# Patient Record
Sex: FEMALE | Race: WHITE | NOT HISPANIC OR LATINO | Employment: OTHER | URBAN - NONMETROPOLITAN AREA
[De-identification: names, ages, dates, MRNs, and addresses within clinical notes are randomized per-mention and may not be internally consistent; named-entity substitution may affect disease eponyms.]

---

## 2017-01-11 ENCOUNTER — TELEPHONE (OUTPATIENT)
Dept: GASTROENTEROLOGY | Facility: CLINIC | Age: 57
End: 2017-01-11

## 2017-01-11 NOTE — TELEPHONE ENCOUNTER
----- Message from Black Beckwith sent at 12/30/2016  2:00 PM CST -----  Regarding: RECALL  LEFT VOICE MAIL FOR PT TO CALL TO SET UP OV

## 2017-01-16 ENCOUNTER — TELEPHONE (OUTPATIENT)
Dept: OBGYN | Age: 57
End: 2017-01-16

## 2017-01-16 RX ORDER — MEDROXYPROGESTERONE ACETATE 2.5 MG/1
2.5 TABLET ORAL DAILY
Qty: 30 TABLET | Refills: 11 | Status: SHIPPED | OUTPATIENT
Start: 2017-01-16 | End: 2018-01-28 | Stop reason: SDUPTHER

## 2017-01-16 RX ORDER — ESTROGEN,CON/M-PROGEST ACET 0.625-2.5
TABLET ORAL
Qty: 30 TABLET | Refills: 11 | Status: SHIPPED | OUTPATIENT
Start: 2017-01-16 | End: 2017-05-24

## 2017-01-16 RX ORDER — ESTRADIOL 0.5 MG/1
0.5 TABLET ORAL DAILY
Qty: 30 TABLET | Refills: 11 | Status: SHIPPED | OUTPATIENT
Start: 2017-01-16 | End: 2018-01-28 | Stop reason: SDUPTHER

## 2017-01-23 ENCOUNTER — TELEPHONE (OUTPATIENT)
Dept: GASTROENTEROLOGY | Facility: CLINIC | Age: 57
End: 2017-01-23

## 2017-01-23 NOTE — TELEPHONE ENCOUNTER
----- Message from Black Beckwith sent at 1/20/2017 11:11 AM CST -----  Regarding: RECALL  SPOKE WITH PT. SHE HAS SWITCHED TO ANOTHER GI DOCTOR AND HAS HAD HER SCOPE DONE.

## 2017-01-31 ENCOUNTER — OFFICE VISIT (OUTPATIENT)
Dept: OBGYN | Age: 57
End: 2017-01-31
Payer: MEDICARE

## 2017-01-31 VITALS
WEIGHT: 163 LBS | SYSTOLIC BLOOD PRESSURE: 114 MMHG | DIASTOLIC BLOOD PRESSURE: 74 MMHG | BODY MASS INDEX: 30 KG/M2 | HEIGHT: 62 IN

## 2017-01-31 DIAGNOSIS — R32 URINARY INCONTINENCE, UNSPECIFIED TYPE: ICD-10-CM

## 2017-01-31 DIAGNOSIS — Z12.31 ENCOUNTER FOR SCREENING MAMMOGRAM FOR BREAST CANCER: ICD-10-CM

## 2017-01-31 DIAGNOSIS — Z01.419 ENCOUNTER FOR GYNECOLOGICAL EXAMINATION WITHOUT ABNORMAL FINDING: Primary | ICD-10-CM

## 2017-01-31 LAB
BACTERIA URINE, POC: NORMAL
BILIRUBIN URINE: NORMAL MG/DL
BLOOD, URINE: NEGATIVE
CASTS URINE, POC: NORMAL
CLARITY: CLEAR
COLOR: NORMAL
CRYSTALS URINE, POC: NORMAL
EPI CELLS URINE, POC: NORMAL
GLUCOSE URINE: NORMAL
KETONES, URINE: NEGATIVE
LEUKOCYTE EST, POC: NORMAL
NITRITE, URINE: NEGATIVE
PH UA: NORMAL (ref 4.5–8)
PROTEIN UA: NEGATIVE
RBC URINE, POC: NORMAL
SPECIFIC GRAVITY UA: NORMAL (ref 1–1.03)
UROBILINOGEN, URINE: NORMAL
WBC URINE, POC: NORMAL
YEAST URINE, POC: NORMAL

## 2017-01-31 PROCEDURE — 3017F COLORECTAL CA SCREEN DOC REV: CPT

## 2017-01-31 PROCEDURE — 3014F SCREEN MAMMO DOC REV: CPT

## 2017-01-31 PROCEDURE — 1036F TOBACCO NON-USER: CPT

## 2017-01-31 PROCEDURE — 81000 URINALYSIS NONAUTO W/SCOPE: CPT

## 2017-01-31 PROCEDURE — G8427 DOCREV CUR MEDS BY ELIG CLIN: HCPCS

## 2017-01-31 PROCEDURE — 99213 OFFICE O/P EST LOW 20 MIN: CPT

## 2017-01-31 PROCEDURE — G8484 FLU IMMUNIZE NO ADMIN: HCPCS

## 2017-01-31 PROCEDURE — G0101 CA SCREEN;PELVIC/BREAST EXAM: HCPCS

## 2017-01-31 PROCEDURE — G8419 CALC BMI OUT NRM PARAM NOF/U: HCPCS

## 2017-01-31 ASSESSMENT — ENCOUNTER SYMPTOMS
BACK PAIN: 0
CONSTIPATION: 0
DIARRHEA: 0
COUGH: 0
COLOR CHANGE: 0
BLOOD IN STOOL: 0
SHORTNESS OF BREATH: 0
TROUBLE SWALLOWING: 0

## 2017-02-20 DIAGNOSIS — R09.81 SINUS CONGESTION: ICD-10-CM

## 2017-02-20 DIAGNOSIS — Z91.09 ENVIRONMENTAL ALLERGIES: Primary | ICD-10-CM

## 2017-02-20 RX ORDER — FLUTICASONE PROPIONATE 50 MCG
SPRAY, SUSPENSION (ML) NASAL
Qty: 1 BOTTLE | Refills: 6 | Status: SHIPPED | OUTPATIENT
Start: 2017-02-20 | End: 2018-10-03 | Stop reason: SDUPTHER

## 2017-03-03 RX ORDER — MONTELUKAST SODIUM 10 MG/1
10 TABLET ORAL NIGHTLY
Qty: 30 TABLET | Refills: 11 | Status: SHIPPED | OUTPATIENT
Start: 2017-03-03 | End: 2018-03-26 | Stop reason: SDUPTHER

## 2017-03-15 ENCOUNTER — CARE COORDINATION (OUTPATIENT)
Dept: PRIMARY CARE CLINIC | Age: 57
End: 2017-03-15

## 2017-03-15 ENCOUNTER — OFFICE VISIT (OUTPATIENT)
Dept: PRIMARY CARE CLINIC | Age: 57
End: 2017-03-15
Payer: MEDICARE

## 2017-03-15 VITALS
DIASTOLIC BLOOD PRESSURE: 74 MMHG | OXYGEN SATURATION: 98 % | HEART RATE: 73 BPM | WEIGHT: 165.4 LBS | BODY MASS INDEX: 30.44 KG/M2 | SYSTOLIC BLOOD PRESSURE: 108 MMHG | HEIGHT: 62 IN

## 2017-03-15 DIAGNOSIS — E03.9 HYPOTHYROIDISM, UNSPECIFIED TYPE: ICD-10-CM

## 2017-03-15 DIAGNOSIS — Z23 NEED FOR PROPHYLACTIC VACCINATION AGAINST STREPTOCOCCUS PNEUMONIAE (PNEUMOCOCCUS): ICD-10-CM

## 2017-03-15 DIAGNOSIS — Z00.00 ROUTINE GENERAL MEDICAL EXAMINATION AT A HEALTH CARE FACILITY: Primary | ICD-10-CM

## 2017-03-15 DIAGNOSIS — Z11.4 SCREENING FOR HIV (HUMAN IMMUNODEFICIENCY VIRUS): ICD-10-CM

## 2017-03-15 PROCEDURE — 90670 PCV13 VACCINE IM: CPT | Performed by: FAMILY MEDICINE

## 2017-03-15 PROCEDURE — G0009 ADMIN PNEUMOCOCCAL VACCINE: HCPCS | Performed by: FAMILY MEDICINE

## 2017-03-15 PROCEDURE — G0438 PPPS, INITIAL VISIT: HCPCS | Performed by: FAMILY MEDICINE

## 2017-03-20 DIAGNOSIS — E03.9 HYPOTHYROIDISM, UNSPECIFIED TYPE: ICD-10-CM

## 2017-03-20 DIAGNOSIS — Z00.00 ROUTINE GENERAL MEDICAL EXAMINATION AT A HEALTH CARE FACILITY: ICD-10-CM

## 2017-03-20 DIAGNOSIS — E78.1 HIGH TRIGLYCERIDES: ICD-10-CM

## 2017-03-20 DIAGNOSIS — Z11.4 SCREENING FOR HIV (HUMAN IMMUNODEFICIENCY VIRUS): ICD-10-CM

## 2017-03-20 LAB
ALBUMIN SERPL-MCNC: 4.2 G/DL (ref 3.5–5.2)
ALP BLD-CCNC: 78 U/L (ref 35–104)
ALT SERPL-CCNC: 14 U/L (ref 5–33)
ANION GAP SERPL CALCULATED.3IONS-SCNC: 13 MMOL/L (ref 7–19)
AST SERPL-CCNC: 19 U/L (ref 5–32)
BILIRUB SERPL-MCNC: 0.4 MG/DL (ref 0.2–1.2)
BUN BLDV-MCNC: 10 MG/DL (ref 6–20)
CALCIUM SERPL-MCNC: 9.3 MG/DL (ref 8.6–10)
CHLORIDE BLD-SCNC: 101 MMOL/L (ref 98–111)
CHOLESTEROL, TOTAL: 196 MG/DL (ref 160–199)
CO2: 24 MMOL/L (ref 22–29)
CREAT SERPL-MCNC: 0.6 MG/DL (ref 0.5–0.9)
GFR NON-AFRICAN AMERICAN: >60
GLOBULIN: 3.1 G/DL
GLUCOSE BLD-MCNC: 101 MG/DL (ref 74–109)
HDLC SERPL-MCNC: 53 MG/DL (ref 65–121)
LDL CHOLESTEROL CALCULATED: 116 MG/DL
POTASSIUM SERPL-SCNC: 4.1 MMOL/L (ref 3.5–5)
RAPID HIV 1&2: NORMAL
SODIUM BLD-SCNC: 138 MMOL/L (ref 136–145)
T4 FREE: 1.7 NG/ML (ref 0.9–1.7)
TOTAL PROTEIN: 7.3 G/DL (ref 6.6–8.7)
TRIGL SERPL-MCNC: 133 MG/DL (ref 150–199)
TSH SERPL DL<=0.05 MIU/L-ACNC: 0.31 UIU/ML (ref 0.27–4.2)

## 2017-03-29 ENCOUNTER — TELEPHONE (OUTPATIENT)
Dept: PRIMARY CARE CLINIC | Age: 57
End: 2017-03-29

## 2017-03-30 DIAGNOSIS — M47.812 ARTHRITIS OF NECK: ICD-10-CM

## 2017-03-30 DIAGNOSIS — R51.9 NONINTRACTABLE HEADACHE, UNSPECIFIED CHRONICITY PATTERN, UNSPECIFIED HEADACHE TYPE: ICD-10-CM

## 2017-03-30 DIAGNOSIS — M62.838 MUSCLE SPASM: ICD-10-CM

## 2017-03-30 RX ORDER — NAPROXEN 500 MG/1
500 TABLET ORAL EVERY 12 HOURS
Qty: 60 TABLET | Refills: 11 | Status: SHIPPED | OUTPATIENT
Start: 2017-03-30

## 2017-04-03 ENCOUNTER — HOSPITAL ENCOUNTER (OUTPATIENT)
Dept: WOMENS IMAGING | Age: 57
Discharge: HOME | End: 2017-04-03
Payer: MEDICARE

## 2017-04-03 DIAGNOSIS — Z12.31 ENCOUNTER FOR SCREENING MAMMOGRAM FOR BREAST CANCER: ICD-10-CM

## 2017-04-03 PROCEDURE — 77063 BREAST TOMOSYNTHESIS BI: CPT

## 2017-05-24 ENCOUNTER — OFFICE VISIT (OUTPATIENT)
Dept: PRIMARY CARE CLINIC | Age: 57
End: 2017-05-24
Payer: MEDICARE

## 2017-05-24 VITALS
RESPIRATION RATE: 18 BRPM | BODY MASS INDEX: 30.36 KG/M2 | HEIGHT: 62 IN | TEMPERATURE: 97.5 F | SYSTOLIC BLOOD PRESSURE: 128 MMHG | DIASTOLIC BLOOD PRESSURE: 68 MMHG | OXYGEN SATURATION: 98 % | HEART RATE: 78 BPM | WEIGHT: 165 LBS

## 2017-05-24 DIAGNOSIS — T78.40XA ALLERGIC REACTION, INITIAL ENCOUNTER: Primary | ICD-10-CM

## 2017-05-24 PROCEDURE — 3017F COLORECTAL CA SCREEN DOC REV: CPT | Performed by: FAMILY MEDICINE

## 2017-05-24 PROCEDURE — 1036F TOBACCO NON-USER: CPT | Performed by: FAMILY MEDICINE

## 2017-05-24 PROCEDURE — G8427 DOCREV CUR MEDS BY ELIG CLIN: HCPCS | Performed by: FAMILY MEDICINE

## 2017-05-24 PROCEDURE — G8417 CALC BMI ABV UP PARAM F/U: HCPCS | Performed by: FAMILY MEDICINE

## 2017-05-24 PROCEDURE — 99213 OFFICE O/P EST LOW 20 MIN: CPT | Performed by: FAMILY MEDICINE

## 2017-05-24 PROCEDURE — 96372 THER/PROPH/DIAG INJ SC/IM: CPT | Performed by: FAMILY MEDICINE

## 2017-05-24 PROCEDURE — 3014F SCREEN MAMMO DOC REV: CPT | Performed by: FAMILY MEDICINE

## 2017-05-24 RX ORDER — METHYLPREDNISOLONE 4 MG/1
TABLET ORAL
Qty: 1 KIT | Refills: 0 | Status: SHIPPED | OUTPATIENT
Start: 2017-05-24 | End: 2017-08-10

## 2017-05-24 RX ORDER — METHYLPREDNISOLONE SODIUM SUCCINATE 40 MG/ML
80 INJECTION, POWDER, LYOPHILIZED, FOR SOLUTION INTRAMUSCULAR; INTRAVENOUS ONCE
Status: COMPLETED | OUTPATIENT
Start: 2017-05-24 | End: 2017-05-24

## 2017-05-24 RX ADMIN — METHYLPREDNISOLONE SODIUM SUCCINATE 80 MG: 40 INJECTION, POWDER, LYOPHILIZED, FOR SOLUTION INTRAMUSCULAR; INTRAVENOUS at 14:57

## 2017-05-24 ASSESSMENT — ENCOUNTER SYMPTOMS
NAUSEA: 0
ABDOMINAL PAIN: 0
DIARRHEA: 0
SHORTNESS OF BREATH: 0
FACIAL SWELLING: 1
COUGH: 0
CONSTIPATION: 0
WHEEZING: 0
VOMITING: 0
CHEST TIGHTNESS: 0
SINUS PRESSURE: 0
SORE THROAT: 0

## 2017-06-13 ENCOUNTER — OFFICE VISIT (OUTPATIENT)
Dept: PRIMARY CARE CLINIC | Age: 57
End: 2017-06-13
Payer: MEDICARE

## 2017-06-13 VITALS
DIASTOLIC BLOOD PRESSURE: 80 MMHG | BODY MASS INDEX: 30.36 KG/M2 | OXYGEN SATURATION: 98 % | TEMPERATURE: 98 F | HEART RATE: 77 BPM | HEIGHT: 62 IN | SYSTOLIC BLOOD PRESSURE: 136 MMHG | RESPIRATION RATE: 20 BRPM | WEIGHT: 165 LBS

## 2017-06-13 DIAGNOSIS — H10.32 ACUTE BACTERIAL CONJUNCTIVITIS OF LEFT EYE: Primary | ICD-10-CM

## 2017-06-13 PROCEDURE — G8427 DOCREV CUR MEDS BY ELIG CLIN: HCPCS | Performed by: FAMILY MEDICINE

## 2017-06-13 PROCEDURE — 1036F TOBACCO NON-USER: CPT | Performed by: FAMILY MEDICINE

## 2017-06-13 PROCEDURE — G8417 CALC BMI ABV UP PARAM F/U: HCPCS | Performed by: FAMILY MEDICINE

## 2017-06-13 PROCEDURE — 99213 OFFICE O/P EST LOW 20 MIN: CPT | Performed by: FAMILY MEDICINE

## 2017-06-13 PROCEDURE — 3017F COLORECTAL CA SCREEN DOC REV: CPT | Performed by: FAMILY MEDICINE

## 2017-06-13 PROCEDURE — 3014F SCREEN MAMMO DOC REV: CPT | Performed by: FAMILY MEDICINE

## 2017-06-13 RX ORDER — POLYMYXIN B SULFATE AND TRIMETHOPRIM 1; 10000 MG/ML; [USP'U]/ML
1 SOLUTION OPHTHALMIC EVERY 4 HOURS
Qty: 10 ML | Refills: 0 | Status: SHIPPED | OUTPATIENT
Start: 2017-06-13 | End: 2017-06-23

## 2017-06-13 ASSESSMENT — ENCOUNTER SYMPTOMS
NAUSEA: 0
EYE REDNESS: 1
DIARRHEA: 0
SHORTNESS OF BREATH: 0
ABDOMINAL PAIN: 0
EYE DISCHARGE: 1
CHEST TIGHTNESS: 0
WHEEZING: 0
CONSTIPATION: 0
COUGH: 0
PHOTOPHOBIA: 0
EYE PAIN: 1
EYE ITCHING: 1
VOMITING: 0

## 2017-08-10 ENCOUNTER — OFFICE VISIT (OUTPATIENT)
Dept: GASTROENTEROLOGY | Age: 57
End: 2017-08-10
Payer: MEDICARE

## 2017-08-10 ENCOUNTER — OFFICE VISIT (OUTPATIENT)
Dept: PRIMARY CARE CLINIC | Age: 57
End: 2017-08-10
Payer: MEDICARE

## 2017-08-10 VITALS
WEIGHT: 163 LBS | OXYGEN SATURATION: 98 % | DIASTOLIC BLOOD PRESSURE: 62 MMHG | HEART RATE: 83 BPM | HEIGHT: 62 IN | BODY MASS INDEX: 30 KG/M2 | RESPIRATION RATE: 18 BRPM | SYSTOLIC BLOOD PRESSURE: 116 MMHG

## 2017-08-10 VITALS
BODY MASS INDEX: 30 KG/M2 | SYSTOLIC BLOOD PRESSURE: 116 MMHG | DIASTOLIC BLOOD PRESSURE: 68 MMHG | HEIGHT: 62 IN | OXYGEN SATURATION: 98 % | WEIGHT: 163 LBS | HEART RATE: 70 BPM | TEMPERATURE: 97.5 F

## 2017-08-10 DIAGNOSIS — H10.13 ALLERGIC CONJUNCTIVITIS OF BOTH EYES AND RHINITIS: Primary | ICD-10-CM

## 2017-08-10 DIAGNOSIS — K58.1 IRRITABLE BOWEL SYNDROME WITH CONSTIPATION: Primary | ICD-10-CM

## 2017-08-10 DIAGNOSIS — K59.09 CHRONIC CONSTIPATION: ICD-10-CM

## 2017-08-10 DIAGNOSIS — J30.9 ALLERGIC CONJUNCTIVITIS OF BOTH EYES AND RHINITIS: Primary | ICD-10-CM

## 2017-08-10 DIAGNOSIS — K64.9 HEMORRHOIDS, UNSPECIFIED HEMORRHOID TYPE: ICD-10-CM

## 2017-08-10 DIAGNOSIS — K21.9 CHRONIC GERD: ICD-10-CM

## 2017-08-10 PROCEDURE — 3017F COLORECTAL CA SCREEN DOC REV: CPT | Performed by: FAMILY MEDICINE

## 2017-08-10 PROCEDURE — 3014F SCREEN MAMMO DOC REV: CPT | Performed by: NURSE PRACTITIONER

## 2017-08-10 PROCEDURE — G8417 CALC BMI ABV UP PARAM F/U: HCPCS | Performed by: FAMILY MEDICINE

## 2017-08-10 PROCEDURE — 3017F COLORECTAL CA SCREEN DOC REV: CPT | Performed by: NURSE PRACTITIONER

## 2017-08-10 PROCEDURE — 99213 OFFICE O/P EST LOW 20 MIN: CPT | Performed by: FAMILY MEDICINE

## 2017-08-10 PROCEDURE — G8427 DOCREV CUR MEDS BY ELIG CLIN: HCPCS | Performed by: NURSE PRACTITIONER

## 2017-08-10 PROCEDURE — 99213 OFFICE O/P EST LOW 20 MIN: CPT | Performed by: NURSE PRACTITIONER

## 2017-08-10 PROCEDURE — 3014F SCREEN MAMMO DOC REV: CPT | Performed by: FAMILY MEDICINE

## 2017-08-10 PROCEDURE — G8427 DOCREV CUR MEDS BY ELIG CLIN: HCPCS | Performed by: FAMILY MEDICINE

## 2017-08-10 PROCEDURE — 1036F TOBACCO NON-USER: CPT | Performed by: NURSE PRACTITIONER

## 2017-08-10 PROCEDURE — G8417 CALC BMI ABV UP PARAM F/U: HCPCS | Performed by: NURSE PRACTITIONER

## 2017-08-10 PROCEDURE — 1036F TOBACCO NON-USER: CPT | Performed by: FAMILY MEDICINE

## 2017-08-10 RX ORDER — NEOMYCIN SULFATE, POLYMYXIN B SULFATE AND DEXAMETHASONE 3.5; 10000; 1 MG/ML; [USP'U]/ML; MG/ML
SUSPENSION/ DROPS OPHTHALMIC
Refills: 0 | COMMUNITY
Start: 2017-06-27 | End: 2017-08-10

## 2017-08-10 RX ORDER — LANSOPRAZOLE 30 MG/1
30 CAPSULE, DELAYED RELEASE ORAL
Qty: 90 CAPSULE | Refills: 3 | Status: SHIPPED | OUTPATIENT
Start: 2017-08-10 | End: 2018-07-16 | Stop reason: SDUPTHER

## 2017-08-10 RX ORDER — POLYETHYLENE GLYCOL 3350 17 G/17G
POWDER, FOR SOLUTION ORAL
Qty: 1 BOTTLE | Refills: 11 | Status: SHIPPED | OUTPATIENT
Start: 2017-08-10 | End: 2018-10-18 | Stop reason: SDUPTHER

## 2017-08-10 RX ORDER — TOBRAMYCIN 3 MG/ML
SOLUTION/ DROPS OPHTHALMIC
Refills: 0 | COMMUNITY
Start: 2017-06-19 | End: 2017-08-10

## 2017-08-10 ASSESSMENT — ENCOUNTER SYMPTOMS
EYE ITCHING: 1
ALLERGIC/IMMUNOLOGIC NEGATIVE: 1
RECTAL PAIN: 0
ABDOMINAL PAIN: 0
SHORTNESS OF BREATH: 0
CHEST TIGHTNESS: 0
COUGH: 0
NAUSEA: 0
DIARRHEA: 0
SORE THROAT: 0
EYE DISCHARGE: 1
BACK PAIN: 1
VOICE CHANGE: 0
EYES NEGATIVE: 1
BLOOD IN STOOL: 0
VOMITING: 0
COUGH: 1
SHORTNESS OF BREATH: 0
CONSTIPATION: 1

## 2017-08-14 ENCOUNTER — TELEPHONE (OUTPATIENT)
Dept: PRIMARY CARE CLINIC | Age: 57
End: 2017-08-14

## 2017-08-28 RX ORDER — LEVOTHYROXINE SODIUM 112 MCG
TABLET ORAL
Qty: 30 TABLET | Refills: 11 | Status: SHIPPED | OUTPATIENT
Start: 2017-08-28 | End: 2017-09-18 | Stop reason: SDUPTHER

## 2017-09-18 ENCOUNTER — OFFICE VISIT (OUTPATIENT)
Dept: PRIMARY CARE CLINIC | Age: 57
End: 2017-09-18
Payer: MEDICARE

## 2017-09-18 VITALS
BODY MASS INDEX: 30.44 KG/M2 | HEIGHT: 62 IN | SYSTOLIC BLOOD PRESSURE: 118 MMHG | OXYGEN SATURATION: 98 % | HEART RATE: 77 BPM | TEMPERATURE: 98.6 F | DIASTOLIC BLOOD PRESSURE: 68 MMHG | WEIGHT: 165.4 LBS

## 2017-09-18 DIAGNOSIS — E03.9 HYPOTHYROIDISM, UNSPECIFIED TYPE: Primary | ICD-10-CM

## 2017-09-18 DIAGNOSIS — E03.9 HYPOTHYROIDISM, UNSPECIFIED TYPE: ICD-10-CM

## 2017-09-18 DIAGNOSIS — J45.20 MILD INTERMITTENT ASTHMA WITHOUT COMPLICATION: ICD-10-CM

## 2017-09-18 DIAGNOSIS — Z91.09 ENVIRONMENTAL ALLERGIES: ICD-10-CM

## 2017-09-18 LAB
T4 FREE: 1.5 NG/DL (ref 0.9–1.7)
TSH SERPL DL<=0.05 MIU/L-ACNC: 0.12 UIU/ML (ref 0.27–4.2)

## 2017-09-18 PROCEDURE — 1036F TOBACCO NON-USER: CPT | Performed by: FAMILY MEDICINE

## 2017-09-18 PROCEDURE — G8417 CALC BMI ABV UP PARAM F/U: HCPCS | Performed by: FAMILY MEDICINE

## 2017-09-18 PROCEDURE — 3014F SCREEN MAMMO DOC REV: CPT | Performed by: FAMILY MEDICINE

## 2017-09-18 PROCEDURE — 3017F COLORECTAL CA SCREEN DOC REV: CPT | Performed by: FAMILY MEDICINE

## 2017-09-18 PROCEDURE — 99213 OFFICE O/P EST LOW 20 MIN: CPT | Performed by: FAMILY MEDICINE

## 2017-09-18 PROCEDURE — G8427 DOCREV CUR MEDS BY ELIG CLIN: HCPCS | Performed by: FAMILY MEDICINE

## 2017-09-18 RX ORDER — EPINEPHRINE 0.3 MG/.3ML
INJECTION SUBCUTANEOUS
Refills: 1 | COMMUNITY
Start: 2017-08-29

## 2017-09-18 RX ORDER — METHYLPREDNISOLONE 4 MG/1
TABLET ORAL
Qty: 1 KIT | Refills: 0 | Status: SHIPPED | OUTPATIENT
Start: 2017-09-18 | End: 2017-09-24

## 2017-09-18 RX ORDER — LEVOTHYROXINE SODIUM 112 MCG
TABLET ORAL
Qty: 30 TABLET | Refills: 11 | Status: SHIPPED | OUTPATIENT
Start: 2017-09-18 | End: 2018-04-09 | Stop reason: SDUPTHER

## 2017-09-18 RX ORDER — EPINASTINE HCL 0.05 %
DROPS OPHTHALMIC (EYE)
Refills: 3 | COMMUNITY
Start: 2017-08-29

## 2017-09-18 RX ORDER — CETIRIZINE HYDROCHLORIDE 10 MG/1
TABLET ORAL
Refills: 3 | COMMUNITY
Start: 2017-08-29 | End: 2019-09-05

## 2017-09-18 ASSESSMENT — ENCOUNTER SYMPTOMS
SHORTNESS OF BREATH: 0
RHINORRHEA: 1
WHEEZING: 0
COUGH: 1

## 2017-09-19 ENCOUNTER — TELEPHONE (OUTPATIENT)
Dept: PRIMARY CARE CLINIC | Age: 57
End: 2017-09-19

## 2017-09-19 DIAGNOSIS — R79.89 LOW TSH LEVEL: Primary | ICD-10-CM

## 2017-11-15 ENCOUNTER — OFFICE VISIT (OUTPATIENT)
Dept: URGENT CARE | Age: 57
End: 2017-11-15
Payer: MEDICARE

## 2017-11-15 VITALS
BODY MASS INDEX: 29.81 KG/M2 | RESPIRATION RATE: 20 BRPM | SYSTOLIC BLOOD PRESSURE: 139 MMHG | TEMPERATURE: 97.7 F | OXYGEN SATURATION: 98 % | WEIGHT: 163 LBS | DIASTOLIC BLOOD PRESSURE: 86 MMHG | HEART RATE: 68 BPM

## 2017-11-15 DIAGNOSIS — J01.10 ACUTE NON-RECURRENT FRONTAL SINUSITIS: Primary | ICD-10-CM

## 2017-11-15 PROCEDURE — 96372 THER/PROPH/DIAG INJ SC/IM: CPT | Performed by: PHYSICIAN ASSISTANT

## 2017-11-15 PROCEDURE — 1036F TOBACCO NON-USER: CPT | Performed by: PHYSICIAN ASSISTANT

## 2017-11-15 PROCEDURE — G8484 FLU IMMUNIZE NO ADMIN: HCPCS | Performed by: PHYSICIAN ASSISTANT

## 2017-11-15 PROCEDURE — G8427 DOCREV CUR MEDS BY ELIG CLIN: HCPCS | Performed by: PHYSICIAN ASSISTANT

## 2017-11-15 PROCEDURE — 3014F SCREEN MAMMO DOC REV: CPT | Performed by: PHYSICIAN ASSISTANT

## 2017-11-15 PROCEDURE — 3017F COLORECTAL CA SCREEN DOC REV: CPT | Performed by: PHYSICIAN ASSISTANT

## 2017-11-15 PROCEDURE — 99213 OFFICE O/P EST LOW 20 MIN: CPT | Performed by: PHYSICIAN ASSISTANT

## 2017-11-15 PROCEDURE — G8417 CALC BMI ABV UP PARAM F/U: HCPCS | Performed by: PHYSICIAN ASSISTANT

## 2017-11-15 RX ORDER — DEXAMETHASONE SODIUM PHOSPHATE 10 MG/ML
10 INJECTION INTRAMUSCULAR; INTRAVENOUS ONCE
Status: COMPLETED | OUTPATIENT
Start: 2017-11-15 | End: 2017-11-15

## 2017-11-15 RX ORDER — AZITHROMYCIN 250 MG/1
TABLET, FILM COATED ORAL
Qty: 1 PACKET | Refills: 0 | Status: SHIPPED | OUTPATIENT
Start: 2017-11-15 | End: 2017-11-25

## 2017-11-15 RX ADMIN — DEXAMETHASONE SODIUM PHOSPHATE 10 MG: 10 INJECTION INTRAMUSCULAR; INTRAVENOUS at 14:46

## 2017-11-15 ASSESSMENT — ENCOUNTER SYMPTOMS
ABDOMINAL PAIN: 0
RHINORRHEA: 1
COUGH: 1
VOMITING: 0
SORE THROAT: 1
NAUSEA: 0
DIARRHEA: 0

## 2017-11-15 NOTE — PROGRESS NOTES
Subjective:      Patient ID: Champ Chun is a 55 y.o. female. HUGO Brannon presents today with headache, runny nose, sore throat, sinus pressure, watery eyes, wheezing, and chills. Symptoms developed 1 week ago. Has nasal congestion. Has a cough that is productive. Has ear pressure. No abdominal pain or NVD. No fever noted. Has used an albuterol inhaler, flonase. Review of Systems   Constitutional: Positive for chills. Negative for fever. HENT: Positive for congestion, rhinorrhea and sore throat. Negative for ear pain. Respiratory: Positive for cough. Gastrointestinal: Negative for abdominal pain, diarrhea, nausea and vomiting. Neurological: Positive for headaches. All other systems reviewed and are negative. Objective:   Physical Exam   Constitutional: She is oriented to person, place, and time. She appears well-developed and well-nourished. No distress. HENT:   Head: Normocephalic and atraumatic. Right Ear: External ear normal.   Left Ear: External ear normal.   Nose: Nose normal.   Mouth/Throat: Oropharynx is clear and moist. No oropharyngeal exudate. Eyes: Conjunctivae are normal. Right eye exhibits no discharge. Left eye exhibits no discharge. Neck: Normal range of motion. Neck supple. Cardiovascular: Normal rate, regular rhythm and normal heart sounds. No murmur heard. Pulmonary/Chest: Effort normal and breath sounds normal. No respiratory distress. She has no wheezes. She has no rales. Abdominal: Soft. Bowel sounds are normal. She exhibits no distension and no mass. There is no tenderness. There is no rebound and no guarding. Lymphadenopathy:     She has no cervical adenopathy. Neurological: She is alert and oriented to person, place, and time. Skin: Skin is warm and dry. No rash noted. She is not diaphoretic. No erythema. No pallor. Psychiatric: She has a normal mood and affect.  Her behavior is normal. Judgment and thought content normal.   Nursing note and vitals reviewed. Assessment:      Acute Sinusitis      Plan:      - Start Ciara Ades today. Take as directed. - Decadron 10 mg IM x 1 today in office.   - Continue all other medications as directed. - Notify clinic with any change in or worsening of symptoms.   - Return as needed.

## 2017-11-15 NOTE — PATIENT INSTRUCTIONS
Patient Education        Sinusitis: Care Instructions  Your Care Instructions    Sinusitis is an infection of the lining of the sinus cavities in your head. Sinusitis often follows a cold. It causes pain and pressure in your head and face. In most cases, sinusitis gets better on its own in 1 to 2 weeks. But some mild symptoms may last for several weeks. Sometimes antibiotics are needed. Follow-up care is a key part of your treatment and safety. Be sure to make and go to all appointments, and call your doctor if you are having problems. It's also a good idea to know your test results and keep a list of the medicines you take. How can you care for yourself at home? · Take an over-the-counter pain medicine, such as acetaminophen (Tylenol), ibuprofen (Advil, Motrin), or naproxen (Aleve). Read and follow all instructions on the label. · If the doctor prescribed antibiotics, take them as directed. Do not stop taking them just because you feel better. You need to take the full course of antibiotics. · Be careful when taking over-the-counter cold or flu medicines and Tylenol at the same time. Many of these medicines have acetaminophen, which is Tylenol. Read the labels to make sure that you are not taking more than the recommended dose. Too much acetaminophen (Tylenol) can be harmful. · Breathe warm, moist air from a steamy shower, a hot bath, or a sink filled with hot water. Avoid cold, dry air. Using a humidifier in your home may help. Follow the directions for cleaning the machine. · Use saline (saltwater) nasal washes to help keep your nasal passages open and wash out mucus and bacteria. You can buy saline nose drops at a grocery store or drugstore. Or you can make your own at home by adding 1 teaspoon of salt and 1 teaspoon of baking soda to 2 cups of distilled water. If you make your own, fill a bulb syringe with the solution, insert the tip into your nostril, and squeeze gently. [place] your nose.   · Put a hot, wet towel or a warm gel pack on your face 3 or 4 times a day for 5 to 10 minutes each time. · Try a decongestant nasal spray like oxymetazoline (Afrin). Do not use it for more than 3 days in a row. Using it for more than 3 days can make your congestion worse. When should you call for help? Call your doctor now or seek immediate medical care if:  · You have new or worse swelling or redness in your face or around your eyes. · You have a new or higher fever. Watch closely for changes in your health, and be sure to contact your doctor if:  · You have new or worse facial pain. · The mucus from your nose becomes thicker (like pus) or has new blood in it. · You are not getting better as expected. Where can you learn more? Go to https://VY[employer] LimitedpepremaAnybotseb.GoCoop. org and sign in to your "Tixie [employer]" account. Enter R405 in the GEOCOMtms box to learn more about \"Sinusitis: Care Instructions. \"     If you do not have an account, please click on the \"Sign Up Now\" link. Current as of: July 29, 2016  Content Version: 11.3  © 1499-5780 [email or web address], [employer]. Care instructions adapted under license by [facility] ([facility]). If you have questions about a medical condition or this instruction, always ask your healthcare professional. [address] any warranty or liability for your use of this information.

## 2018-01-03 ENCOUNTER — TELEPHONE (OUTPATIENT)
Dept: GASTROENTEROLOGY | Age: 58
End: 2018-01-03

## 2018-01-26 DIAGNOSIS — R79.89 LOW TSH LEVEL: ICD-10-CM

## 2018-01-26 LAB
T4 FREE: 1.5 NG/DL (ref 0.9–1.7)
TSH SERPL DL<=0.05 MIU/L-ACNC: 0.38 UIU/ML (ref 0.27–4.2)

## 2018-01-29 RX ORDER — ESTRADIOL 0.5 MG/1
0.5 TABLET ORAL DAILY
Qty: 30 TABLET | Refills: 10 | Status: SHIPPED | OUTPATIENT
Start: 2018-01-29 | End: 2018-04-30 | Stop reason: SDUPTHER

## 2018-01-29 RX ORDER — MEDROXYPROGESTERONE ACETATE 2.5 MG/1
2.5 TABLET ORAL DAILY
Qty: 30 TABLET | Refills: 10 | Status: SHIPPED | OUTPATIENT
Start: 2018-01-29 | End: 2018-04-30 | Stop reason: SDUPTHER

## 2018-02-02 ENCOUNTER — OFFICE VISIT (OUTPATIENT)
Dept: PRIMARY CARE CLINIC | Age: 58
End: 2018-02-02
Payer: MEDICARE

## 2018-02-02 DIAGNOSIS — Z23 NEED FOR INFLUENZA VACCINATION: Primary | ICD-10-CM

## 2018-02-02 PROCEDURE — 90688 IIV4 VACCINE SPLT 0.5 ML IM: CPT | Performed by: NURSE PRACTITIONER

## 2018-02-02 PROCEDURE — G0008 ADMIN INFLUENZA VIRUS VAC: HCPCS | Performed by: NURSE PRACTITIONER

## 2018-02-02 NOTE — PROGRESS NOTES
After obtaining consent, and per orders of Hong ORTIZ, injection of Influenza given in Right deltoid by Bunny Xiao. Patient instructed to remain in clinic for 20 minutes afterwards, and to report any adverse reaction to me immediately.

## 2018-02-05 ENCOUNTER — HOSPITAL ENCOUNTER (OUTPATIENT)
Dept: MRI IMAGING | Age: 58
Discharge: HOME | End: 2018-02-05
Payer: MEDICARE

## 2018-02-05 DIAGNOSIS — M25.511 CHRONIC RIGHT SHOULDER PAIN: ICD-10-CM

## 2018-02-05 DIAGNOSIS — G89.29 CHRONIC RIGHT SHOULDER PAIN: ICD-10-CM

## 2018-02-05 DIAGNOSIS — M47.892 OTHER OSTEOARTHRITIS OF SPINE, CERVICAL REGION: ICD-10-CM

## 2018-02-05 DIAGNOSIS — M50.31 DEGENERATION OF INTERVERTEBRAL DISC OF HIGH CERVICAL REGION: ICD-10-CM

## 2018-02-05 PROCEDURE — 72141 MRI NECK SPINE W/O DYE: CPT

## 2018-02-06 ENCOUNTER — OFFICE VISIT (OUTPATIENT)
Dept: OBGYN | Age: 58
End: 2018-02-06
Payer: MEDICARE

## 2018-02-06 VITALS
WEIGHT: 171 LBS | BODY MASS INDEX: 31.47 KG/M2 | SYSTOLIC BLOOD PRESSURE: 150 MMHG | DIASTOLIC BLOOD PRESSURE: 89 MMHG | HEIGHT: 62 IN | HEART RATE: 66 BPM

## 2018-02-06 DIAGNOSIS — Z12.31 SCREENING MAMMOGRAM, ENCOUNTER FOR: ICD-10-CM

## 2018-02-06 DIAGNOSIS — Z12.4 SCREENING FOR CERVICAL CANCER: ICD-10-CM

## 2018-02-06 DIAGNOSIS — N81.4 CYSTOCELE WITH UTERINE PROLAPSE: ICD-10-CM

## 2018-02-06 DIAGNOSIS — R39.15 URINARY URGENCY: ICD-10-CM

## 2018-02-06 DIAGNOSIS — Z01.419 ENCOUNTER FOR GYNECOLOGICAL EXAMINATION WITHOUT ABNORMAL FINDING: Primary | ICD-10-CM

## 2018-02-06 DIAGNOSIS — N39.3 SI (STRESS INCONTINENCE), FEMALE: ICD-10-CM

## 2018-02-06 PROCEDURE — 3017F COLORECTAL CA SCREEN DOC REV: CPT

## 2018-02-06 PROCEDURE — 99214 OFFICE O/P EST MOD 30 MIN: CPT

## 2018-02-06 PROCEDURE — 3014F SCREEN MAMMO DOC REV: CPT

## 2018-02-06 PROCEDURE — G8427 DOCREV CUR MEDS BY ELIG CLIN: HCPCS

## 2018-02-06 PROCEDURE — G8417 CALC BMI ABV UP PARAM F/U: HCPCS

## 2018-02-06 PROCEDURE — G8484 FLU IMMUNIZE NO ADMIN: HCPCS

## 2018-02-06 PROCEDURE — G0101 CA SCREEN;PELVIC/BREAST EXAM: HCPCS

## 2018-02-06 PROCEDURE — 1036F TOBACCO NON-USER: CPT

## 2018-02-06 ASSESSMENT — ENCOUNTER SYMPTOMS
EYES NEGATIVE: 1
GASTROINTESTINAL NEGATIVE: 1
ALLERGIC/IMMUNOLOGIC NEGATIVE: 1
RESPIRATORY NEGATIVE: 1

## 2018-02-06 NOTE — PROGRESS NOTES
Subjective:      Patient ID: Ronal Lozano is a 65 y.o. female. HPI   Pt presents today for pap smear and breast exam.  She also complains that wants to discuss her \"weak bladder. \"  Pt had urodynamics in the past and has tried medication. Last mammogram:  2017  Last pap smear: 2016   Contraception:  TL  :  2  Para:  2  AB:  0  Last bone density:  It's been a while  Last colonoscopy:       Review of Systems   Constitutional: Negative. HENT: Negative. Eyes: Negative. Respiratory: Negative. Cardiovascular: Negative. Gastrointestinal: Negative. Endocrine: Negative. Genitourinary: Negative. Musculoskeletal: Negative. Skin: Negative. Allergic/Immunologic: Negative. Neurological: Negative. Hematological: Negative. Psychiatric/Behavioral: Negative.       Ronal Lozano is a 65 y.o. female with the following history as recorded in EpicCare:  Patient Active Problem List    Diagnosis Date Noted    Cystocele with uterine prolapse 2018    Chronic GERD 08/10/2017    Screening for colon cancer 2016    Breast density 2016    Irritable bowel syndrome 2015    Irritable bowel syndrome with constipation 2014    Abdominal cramping 2014    Bloating 2014    Abdominal distention 2014    Gas pain 2014    Reactive airway disease with wheezing     Allergic bronchitis     Rectal bleeding 2013    Abdominal pain, other specified site 2013    Hemorrhoids 2013    Chronic constipation 2013    Polypharmacy 2013    Narcotic dependence ([facility] [address] 2013    Chronic pain 2013    NSAID long-term use 2013    Family history of colonic polyps 2013    Stress incontinence, female 2013    Postmenopausal atrophic vaginitis 2013    Hemorrhoids, external 2013    Chronic anxiety     Vitamin D deficiency     GERD (gastroesophageal reflux disease) 2012    Hyperthyroidism 07/05/2012    Arthritis of neck (HCC) 07/05/2012    Headache 07/05/2012     Current Outpatient Prescriptions   Medication Sig Dispense Refill    estradiol (ESTRACE) 0.5 MG tablet TAKE 1 TABLET BY MOUTH DAILY 30 tablet 10    medroxyPROGESTERone (PROVERA) 2.5 MG tablet TAKE 1 TABLET BY MOUTH DAILY 30 tablet 10    cetirizine (ZYRTEC) 10 MG tablet TAKE 1 TABLET BY MOUTH DAILY  3    EPINEPHrine (EPIPEN) 0.3 MG/0.3ML SOAJ injection USE AS DIRECTED FOR ACUTE  ALLERGIC REACTION  1    epinastine (ELESTAT) 0.05 % SOLN INSTILL 1 DROP IN EACH EYE ONE TO TWICE A DAY AS NEEDED  3    SYNTHROID 112 MCG tablet TAKE 1 TABLET BY MOUTH DAILY. TAKE 30 MINUTES PRIOR TO MEAL. 30 tablet 11    linaclotide (LINZESS) 290 MCG CAPS capsule Take 1 capsule by mouth every morning (before breakfast) 90 capsule 3    lansoprazole (PREVACID) 30 MG delayed release capsule Take 1 capsule by mouth every morning (before breakfast) Office appt needed before requesting additional refills 90 capsule 3    polyethylene glycol (GLYCOLAX) powder 1 teaspoonful every day or as needed for desired bm 1 Bottle 11    naproxen (NAPROXEN) 500 MG EC tablet Take 1 tablet by mouth every 12 hours Generic is okay 60 tablet 11    montelukast (SINGULAIR) 10 MG tablet Take 1 tablet by mouth nightly 30 tablet 11    fluticasone (FLONASE) 50 MCG/ACT nasal spray USE 2 SPRAYS IN EACH NOSTRIL DAILY 1 Bottle 6    potassium citrate (UROCIT-K) 10 MEQ (1080 MG) extended release tablet Take by mouth daily      albuterol sulfate  (90 BASE) MCG/ACT inhaler Inhale 2 puffs into the lungs every 6 hours as needed for Wheezing 1 Inhaler 3    HYDROcodone-acetaminophen (NORCO) 7.5-325 MG per tablet   0    diazepam (VALIUM) 5 MG tablet Take 1 tablet by mouth every 12 hours as needed for Anxiety or Sleep.  This is our last fill on this (Patient taking differently: Take 10 mg by mouth every 12 hours as needed for Anxiety or Sleep This is our last fill on this) 60 tablet 0    vitamin D-3 (CHOLECALCIFEROL) 5000 UNITS TABS Take 5,000 Units by mouth daily       No current facility-administered medications for this visit. Allergies: Pollen extract; Effexor [venlafaxine hydrochloride]; and Poison ivy extract [poison ivy extract]  Past Medical History:   Diagnosis Date    Allergic bronchitis     has wheezing related to dust and pollen    Allergic rhinitis     Blepharitis of both eyes     Bone spur     cervical    Cervical disc disease     Chicken pox     age 26    Chronic anxiety     has been on valium after failing effexor and other medications    Coccyx disorder     history of f/x    Constipation     chronic since a teen    Degenerative disc disease at L5-S1 level     Genital warts     Dr Janel Esparza GERD (gastroesophageal reflux disease)     Hemorrhoids     HPV in female     Hyperthyroidism     took radioactive iodine    Post menopausal syndrome     Reactive airway disease with wheezing     Scoliosis     Unspecified vitamin D deficiency      Past Surgical History:   Procedure Laterality Date    COLONOSCOPY  2011    [facility]?     COLONOSCOPY N/A 2016    Dr Milind Garcia [facility] (-) dysplasia x 1, BCM x 1--3 yr recall    NASAL SEPTUM SURGERY      TUBAL LIGATION      UPPER GASTROINTESTINAL ENDOSCOPY  3/3/2011     ?      Family History   Problem Relation Age of Onset    Diabetes Sister     Thyroid Disease Sister     Rheum Arthritis Sister     Other Sister      fibromyalgia    Rheum Arthritis Maternal Aunt     Colon Polyps Maternal Aunt     Heart Failure Father     Heart Disease Father       of acute MI at 58    Hypertension Mother     Cancer Mother      lung cancer    Colon Polyps Mother     Other Son      sleep apnea,anxiety and neck problems after  injuries    Colon Cancer Neg Hx     Esophageal Cancer Neg Hx     Stomach Cancer Neg Hx     Liver Cancer Neg Hx     Liver Disease Neg Hx     Rectal Cancer Neg Hx      Social History   Substance Use Topics    Smoking status: Former Smoker     Quit date: 4/7/2008    Smokeless tobacco: Never Used      Comment: Quit smoking in 2008 with Wellbutrin (had crazy dreams on it)    Alcohol use No       Objective:   Physical Exam   Constitutional: She is oriented to person, place, and time. She appears well-developed and well-nourished. HENT:   Head: Normocephalic and atraumatic. Eyes: Pupils are equal, round, and reactive to light. Neck: Normal range of motion. Neck supple. Cardiovascular: Normal rate and regular rhythm. Exam reveals no gallop and no friction rub. No murmur heard. Pulmonary/Chest: Effort normal and breath sounds normal. Right breast exhibits no inverted nipple, no mass, no nipple discharge, no skin change and no tenderness. Left breast exhibits no inverted nipple, no mass, no nipple discharge, no skin change and no tenderness. Breasts are symmetrical.   Abdominal: Soft. Bowel sounds are normal. She exhibits no distension and no mass. There is no tenderness. Genitourinary: Rectal exam shows no external hemorrhoid, no internal hemorrhoid, no fissure, no mass, no tenderness and guaiac negative stool. No breast swelling, tenderness, discharge or bleeding. There is no rash, tenderness or lesion on the right labia. There is no rash, tenderness or lesion on the left labia. Uterus is not deviated, not enlarged, not fixed and not tender. Cervix exhibits no motion tenderness, no discharge and no friability. Right adnexum displays no mass, no tenderness and no fullness. Left adnexum displays no mass, no tenderness and no fullness. No vaginal discharge found. Genitourinary Comments: External genitalia: labia and vagina are atrophic with some loss of normal anatomical architecture. Specimen for cervical cytology obtained. Musculoskeletal: Normal range of motion. Neurological: She is alert and oriented to person, place, and time.    Skin: Skin is

## 2018-02-15 ENCOUNTER — TELEPHONE (OUTPATIENT)
Dept: OBGYN | Age: 58
End: 2018-02-15

## 2018-03-05 DIAGNOSIS — J45.20 REACTIVE AIRWAY DISEASE WITH WHEEZING, MILD INTERMITTENT, UNCOMPLICATED: ICD-10-CM

## 2018-03-19 ENCOUNTER — OFFICE VISIT (OUTPATIENT)
Dept: PRIMARY CARE CLINIC | Age: 58
End: 2018-03-19
Payer: MEDICARE

## 2018-03-19 VITALS
SYSTOLIC BLOOD PRESSURE: 120 MMHG | DIASTOLIC BLOOD PRESSURE: 72 MMHG | WEIGHT: 177.4 LBS | OXYGEN SATURATION: 98 % | TEMPERATURE: 97.6 F | HEIGHT: 62 IN | HEART RATE: 74 BPM | BODY MASS INDEX: 32.65 KG/M2

## 2018-03-19 DIAGNOSIS — E03.9 ACQUIRED HYPOTHYROIDISM: ICD-10-CM

## 2018-03-19 DIAGNOSIS — Z13.220 LIPID SCREENING: ICD-10-CM

## 2018-03-19 DIAGNOSIS — K59.03 DRUG-INDUCED CONSTIPATION: ICD-10-CM

## 2018-03-19 DIAGNOSIS — Z23 NEED FOR 23-POLYVALENT PNEUMOCOCCAL POLYSACCHARIDE VACCINE: ICD-10-CM

## 2018-03-19 DIAGNOSIS — Z00.00 ROUTINE GENERAL MEDICAL EXAMINATION AT A HEALTH CARE FACILITY: Primary | ICD-10-CM

## 2018-03-19 PROCEDURE — G0439 PPPS, SUBSEQ VISIT: HCPCS | Performed by: FAMILY MEDICINE

## 2018-03-19 PROCEDURE — 90732 PPSV23 VACC 2 YRS+ SUBQ/IM: CPT | Performed by: FAMILY MEDICINE

## 2018-03-19 PROCEDURE — G0009 ADMIN PNEUMOCOCCAL VACCINE: HCPCS | Performed by: FAMILY MEDICINE

## 2018-03-19 ASSESSMENT — ANXIETY QUESTIONNAIRES: GAD7 TOTAL SCORE: 6

## 2018-03-19 ASSESSMENT — LIFESTYLE VARIABLES: HOW OFTEN DO YOU HAVE A DRINK CONTAINING ALCOHOL: 0

## 2018-03-19 ASSESSMENT — PATIENT HEALTH QUESTIONNAIRE - PHQ9: SUM OF ALL RESPONSES TO PHQ QUESTIONS 1-9: 0

## 2018-03-19 NOTE — PROGRESS NOTES
refills Yes ANGEL Waters   polyethylene glycol [facility]) powder 1 teaspoonful every day or as needed for desired bm Yes ANGEL Waters   naproxen (NAPROXEN) 500 MG EC tablet Take 1 tablet by mouth every 12 hours Generic is okay Yes Yulissa Ortiz MD   montelukast (SINGULAIR) 10 MG tablet Take 1 tablet by mouth nightly Yes Yulissa Ortiz MD   fluticasone (FLONASE) 50 MCG/ACT nasal spray USE 2 SPRAYS IN EACH NOSTRIL DAILY Yes Yulissa Ortiz MD   potassium citrate (UROCIT-K) 10 MEQ (1080 MG) extended release tablet Take by mouth daily Yes Historical Provider, MD   HYDROcodone-acetaminophen ([address]) 7.5-325 MG per tablet  Yes Historical Provider, MD   vitamin D-3 (CHOLECALCIFEROL) 5000 UNITS TABS Take 5,000 Units by mouth daily Yes Historical Provider, MD   diazepam (VALIUM) 5 MG tablet Take 1 tablet by mouth every 12 hours as needed for Anxiety or Sleep. This is our last fill on this  Patient taking differently: Take 10 mg by mouth every 12 hours as needed for Anxiety or Sleep This is our last fill on this Yes Jun Villanueva MD       Past Medical History:   Diagnosis Date    Allergic bronchitis     has wheezing related to dust and pollen    Allergic rhinitis     Blepharitis of both eyes     Bone spur     cervical    Cervical disc disease     Chicken pox     age 26    Chronic anxiety 2006    has been on valium after failing effexor and other medications    Coccyx disorder     history of f/x    Constipation     chronic since a teen    Degenerative disc disease at L5-S1 level     Genital warts     Dr Lucy Porter GERD (gastroesophageal reflux disease)     Hemorrhoids     HPV in female     Hyperthyroidism     took radioactive iodine    Post menopausal syndrome 2010    Reactive airway disease with wheezing     Scoliosis     Unspecified vitamin D deficiency      Past Surgical History:   Procedure Laterality Date    [facility]  9/16/2011    [facility]?     COLONOSCOPY nodules, no cervical lymphadenopathy  Pulmonary/Chest: clear to auscultation bilaterally- no wheezes, rales or rhonchi, normal air movement, no respiratory distress  Cardiovascular: normal rate, regular rhythm, normal S1 and S2, no murmurs, rubs, clicks, or gallops, distal pulses intact, no carotid bruits  Abdomen: soft, non-tender, non-distended, normal bowel sounds, no masses or organomegaly  Extremities: no cyanosis, clubbing or edema  Musculoskeletal: normal range of motion, no joint swelling, deformity or tenderness  Neurologic: reflexes normal and symmetric, no cranial nerve deficit, gait, coordination and speech normal    Patient's complete Health Risk Assessment and screening values have been reviewed and are found in Flowsheets. The following problems were reviewed today and where indicated follow up appointments were made and/or referrals ordered. Positive Risk Factor Screenings with Interventions:         Anxiety:  Anxiety Score: 6  Anxiety Screening: (!) Positive Screening for Anxiety  Anxiety Interventions:  · None indicated      General Health:  General  In general, how would you say your health is?: Fair  In the past 7 days, have you experienced any of the following?: (!) New or Increased Fatigue, Stress  Do you get the social and emotional support that you need?: Yes  Do you have a Living Will?: (!) No  General Health Risk Interventions:  · Checking thyroid function    Health Habits/Nutrition:  Health Habits/Nutrition  Do you exercise for at least 20 minutes 2-3 times per week?: (!) No  Have you lost any weight without trying in the past 3 months?: No  Do you eat fewer than 2 meals per day?: No  Have you seen a dentist within the past year?: (!) No  Body mass index is 32.45 kg/m².   Health Habits/Nutrition Interventions:  · None indicated    Hearing/Vision:  Hearing/Vision  Do you or your family notice any trouble with your hearing?: No  Do you have difficulty driving, watching TV, or doing any of

## 2018-03-22 DIAGNOSIS — E03.9 ACQUIRED HYPOTHYROIDISM: ICD-10-CM

## 2018-03-22 DIAGNOSIS — Z13.220 LIPID SCREENING: ICD-10-CM

## 2018-03-22 LAB
ALBUMIN SERPL-MCNC: 4.5 G/DL (ref 3.5–5.2)
ALP BLD-CCNC: 72 U/L (ref 35–104)
ALT SERPL-CCNC: 19 U/L (ref 5–33)
ANION GAP SERPL CALCULATED.3IONS-SCNC: 20 MMOL/L (ref 7–19)
AST SERPL-CCNC: 21 U/L (ref 5–32)
BILIRUB SERPL-MCNC: 0.6 MG/DL (ref 0.2–1.2)
BUN BLDV-MCNC: 12 MG/DL (ref 6–20)
CALCIUM SERPL-MCNC: 9.8 MG/DL (ref 8.6–10)
CHLORIDE BLD-SCNC: 102 MMOL/L (ref 98–111)
CHOLESTEROL, TOTAL: 195 MG/DL (ref 160–199)
CO2: 21 MMOL/L (ref 22–29)
CREAT SERPL-MCNC: 0.6 MG/DL (ref 0.5–0.9)
GFR NON-AFRICAN AMERICAN: >60
GLUCOSE BLD-MCNC: 109 MG/DL (ref 74–109)
HDLC SERPL-MCNC: 49 MG/DL (ref 65–121)
LDL CHOLESTEROL CALCULATED: 120 MG/DL
POTASSIUM SERPL-SCNC: 4 MMOL/L (ref 3.5–5)
SODIUM BLD-SCNC: 143 MMOL/L (ref 136–145)
T4 FREE: 1.5 NG/DL (ref 0.9–1.7)
TOTAL PROTEIN: 7.9 G/DL (ref 6.6–8.7)
TRIGL SERPL-MCNC: 131 MG/DL (ref 0–149)
TSH SERPL DL<=0.05 MIU/L-ACNC: 0.33 UIU/ML (ref 0.27–4.2)

## 2018-03-26 RX ORDER — MONTELUKAST SODIUM 10 MG/1
10 TABLET ORAL NIGHTLY
Qty: 30 TABLET | Refills: 11 | Status: SHIPPED | OUTPATIENT
Start: 2018-03-26 | End: 2019-06-24 | Stop reason: SDUPTHER

## 2018-03-27 ENCOUNTER — TELEPHONE (OUTPATIENT)
Dept: PRIMARY CARE CLINIC | Age: 58
End: 2018-03-27

## 2018-03-27 NOTE — TELEPHONE ENCOUNTER
----- Message from Kaela Muñoz MD sent at 3/22/2018 12:40 PM CDT -----  Please notify patient of normal results. Normal TSH and Free T4. The current medical regimen is effective;  continue present plan and medications.

## 2018-04-09 ENCOUNTER — TELEPHONE (OUTPATIENT)
Dept: PRIMARY CARE CLINIC | Age: 58
End: 2018-04-09

## 2018-04-09 DIAGNOSIS — E03.9 HYPOTHYROIDISM, UNSPECIFIED TYPE: ICD-10-CM

## 2018-04-09 RX ORDER — LEVOTHYROXINE SODIUM 112 MCG
TABLET ORAL
Qty: 30 TABLET | Refills: 11 | Status: SHIPPED | OUTPATIENT
Start: 2018-04-09 | End: 2019-05-27 | Stop reason: SDUPTHER

## 2018-04-17 ENCOUNTER — TELEPHONE (OUTPATIENT)
Dept: PRIMARY CARE CLINIC | Age: 58
End: 2018-04-17

## 2018-04-25 ENCOUNTER — HOSPITAL ENCOUNTER (OUTPATIENT)
Dept: WOMENS IMAGING | Age: 58
Discharge: HOME | End: 2018-04-25
Payer: MEDICARE

## 2018-04-25 DIAGNOSIS — Z12.31 SCREENING MAMMOGRAM, ENCOUNTER FOR: ICD-10-CM

## 2018-04-25 PROCEDURE — 77063 BREAST TOMOSYNTHESIS BI: CPT

## 2018-04-30 RX ORDER — MEDROXYPROGESTERONE ACETATE 2.5 MG/1
2.5 TABLET ORAL DAILY
Qty: 90 TABLET | Refills: 3 | Status: SHIPPED | OUTPATIENT
Start: 2018-04-30 | End: 2019-02-12 | Stop reason: SDUPTHER

## 2018-04-30 RX ORDER — ESTRADIOL 0.5 MG/1
0.5 TABLET ORAL DAILY
Qty: 90 TABLET | Refills: 3 | Status: SHIPPED | OUTPATIENT
Start: 2018-04-30 | End: 2019-02-12 | Stop reason: SDUPTHER

## 2018-07-06 ENCOUNTER — TELEPHONE (OUTPATIENT)
Dept: GASTROENTEROLOGY | Age: 58
End: 2018-07-06

## 2018-07-09 RX ORDER — RANITIDINE 150 MG/1
150 TABLET ORAL 2 TIMES DAILY
Qty: 60 TABLET | Refills: 11 | Status: SHIPPED | OUTPATIENT
Start: 2018-07-09 | End: 2018-08-09

## 2018-07-16 RX ORDER — LANSOPRAZOLE 30 MG/1
30 CAPSULE, DELAYED RELEASE ORAL
Qty: 90 CAPSULE | Refills: 0 | Status: SHIPPED | OUTPATIENT
Start: 2018-07-16 | End: 2018-08-09 | Stop reason: SDUPTHER

## 2018-08-09 ENCOUNTER — OFFICE VISIT (OUTPATIENT)
Dept: GASTROENTEROLOGY | Age: 58
End: 2018-08-09
Payer: MEDICARE

## 2018-08-09 VITALS
OXYGEN SATURATION: 97 % | WEIGHT: 172 LBS | BODY MASS INDEX: 31.65 KG/M2 | DIASTOLIC BLOOD PRESSURE: 80 MMHG | HEART RATE: 71 BPM | HEIGHT: 62 IN | SYSTOLIC BLOOD PRESSURE: 130 MMHG

## 2018-08-09 DIAGNOSIS — K58.1 IRRITABLE BOWEL SYNDROME WITH CONSTIPATION: ICD-10-CM

## 2018-08-09 DIAGNOSIS — K21.9 CHRONIC GERD: Primary | ICD-10-CM

## 2018-08-09 DIAGNOSIS — F11.20 NARCOTIC DEPENDENCE (HCC): ICD-10-CM

## 2018-08-09 DIAGNOSIS — K92.89 GAS BLOAT SYNDROME: ICD-10-CM

## 2018-08-09 DIAGNOSIS — K59.03 CONSTIPATION DUE TO OPIOID THERAPY: ICD-10-CM

## 2018-08-09 DIAGNOSIS — T40.2X5A CONSTIPATION DUE TO OPIOID THERAPY: ICD-10-CM

## 2018-08-09 PROCEDURE — 99213 OFFICE O/P EST LOW 20 MIN: CPT | Performed by: NURSE PRACTITIONER

## 2018-08-09 RX ORDER — LANSOPRAZOLE 30 MG/1
30 CAPSULE, DELAYED RELEASE ORAL
Qty: 90 CAPSULE | Refills: 3 | Status: SHIPPED | OUTPATIENT
Start: 2018-08-09 | End: 2019-09-25 | Stop reason: SDUPTHER

## 2018-08-09 ASSESSMENT — ENCOUNTER SYMPTOMS
CHEST TIGHTNESS: 0
ABDOMINAL PAIN: 1
SHORTNESS OF BREATH: 0
SORE THROAT: 0
RECTAL PAIN: 0
EYES NEGATIVE: 1
CONSTIPATION: 1
NAUSEA: 0
VOMITING: 0
ALLERGIC/IMMUNOLOGIC NEGATIVE: 1
BACK PAIN: 1
BLOOD IN STOOL: 0
COUGH: 0
VOICE CHANGE: 0
DIARRHEA: 0

## 2018-08-09 NOTE — PROGRESS NOTES
Subjective:      Patient ID: Kaylie Price is a 65 y.o. female  Soren Stauffer MD    [facility]  Chief Complaint   Patient presents with    Medication Refill    Gas    Bloated       Patient is due for prescription refills. She has chronic GERD which is well controlled with prevacid once daily. She has chronic constipation. She is taking Linzess 290 mcg daily. She still has to take Miralax at times. She has movantik listed on her medications but she cannot recall taking this. She denies rectal bleeding, change in bowel habit. She has abdominal cramping and random generalized abdominal pain r/t IBS  She has chronic constipation, OIC    She denies dysphagia, melena, n/v. She has quite a lot of gas and has some mild abdominal distention/bloating. Past Medical History:   Diagnosis Date    Allergic bronchitis     has wheezing related to dust and pollen    Allergic rhinitis     Blepharitis of both eyes     Bone spur     cervical    Cervical disc disease     Chicken pox     age 26    Chronic anxiety 2006    has been on valium after failing effexor and other medications    Coccyx disorder     history of f/x    Constipation     chronic since a teen    Degenerative disc disease at L5-S1 level     Genital warts     Dr Feliciano Mi GERD (gastroesophageal reflux disease)     Hemorrhoids     HPV in female     Hyperthyroidism     took radioactive iodine    Post menopausal syndrome 2010    Reactive airway disease with wheezing     Scoliosis     Unspecified vitamin D deficiency        Past Surgical History:   Procedure Laterality Date    [facility]  9/16/2011    [facility]?     COLONOSCOPY N/A 11/11/2016    Dr Beauty Barstow AP (-) dysplasia x 1, BCM x 1--3 yr recall    NASAL SEPTUM SURGERY      TUBAL LIGATION      UPPER GASTROINTESTINAL ENDOSCOPY  3/3/2011     ?        Current Outpatient Prescriptions   Medication Sig Dispense Refill    linaclotide (LINZESS) [address]

## 2018-08-31 ENCOUNTER — OFFICE VISIT (OUTPATIENT)
Dept: URGENT CARE | Age: 58
End: 2018-08-31
Payer: MEDICARE

## 2018-08-31 VITALS
BODY MASS INDEX: 31.43 KG/M2 | HEIGHT: 62 IN | TEMPERATURE: 98.5 F | WEIGHT: 170.8 LBS | RESPIRATION RATE: 18 BRPM | SYSTOLIC BLOOD PRESSURE: 129 MMHG | OXYGEN SATURATION: 98 % | DIASTOLIC BLOOD PRESSURE: 86 MMHG | HEART RATE: 69 BPM

## 2018-08-31 DIAGNOSIS — R09.81 NASAL CONGESTION: ICD-10-CM

## 2018-08-31 DIAGNOSIS — J02.9 ACUTE PHARYNGITIS, UNSPECIFIED ETIOLOGY: Primary | ICD-10-CM

## 2018-08-31 DIAGNOSIS — J02.9 SORE THROAT: ICD-10-CM

## 2018-08-31 LAB — S PYO AG THROAT QL: NORMAL

## 2018-08-31 PROCEDURE — 99213 OFFICE O/P EST LOW 20 MIN: CPT | Performed by: SPECIALIST

## 2018-08-31 PROCEDURE — 96372 THER/PROPH/DIAG INJ SC/IM: CPT | Performed by: SPECIALIST

## 2018-08-31 PROCEDURE — 87880 STREP A ASSAY W/OPTIC: CPT | Performed by: SPECIALIST

## 2018-08-31 RX ORDER — DEXAMETHASONE SODIUM PHOSPHATE 10 MG/ML
5 INJECTION INTRAMUSCULAR; INTRAVENOUS ONCE
Status: COMPLETED | OUTPATIENT
Start: 2018-08-31 | End: 2018-08-31

## 2018-08-31 RX ORDER — AMOXICILLIN 500 MG/1
500 CAPSULE ORAL 3 TIMES DAILY
Qty: 30 CAPSULE | Refills: 0 | Status: SHIPPED | OUTPATIENT
Start: 2018-08-31 | End: 2018-09-10

## 2018-08-31 RX ADMIN — DEXAMETHASONE SODIUM PHOSPHATE 5 MG: 10 INJECTION INTRAMUSCULAR; INTRAVENOUS at 16:07

## 2018-08-31 ASSESSMENT — ENCOUNTER SYMPTOMS
COUGH: 1
SORE THROAT: 1
SWOLLEN GLANDS: 1

## 2018-08-31 NOTE — PATIENT INSTRUCTIONS
chances of quitting for good. · Use a vaporizer or humidifier to add moisture to your bedroom. Follow the directions for cleaning the machine. When should you call for help? Call your doctor now or seek immediate medical care if:    · You have new or worse trouble swallowing.     · Your sore throat gets much worse on one side.    Watch closely for changes in your health, and be sure to contact your doctor if you do not get better as expected. Where can you learn more? Go to https://[employer].W4. org and sign in to your Fashionchick account. Enter Y259 in the Trekea box to learn more about \"Sore Throat: Care Instructions. \"     If you do not have an account, please click on the \"Sign Up Now\" link. Current as of: May 12, 2017  Content Version: 11.7  © 9794-6879 [employer]. Care instructions adapted under license by [facility] ([facility]). If you have questions about a medical condition or this instruction, always ask your healthcare professional. [place] [address] any warranty or liability for your use of this information.

## 2018-08-31 NOTE — PROGRESS NOTES
[address]  [address] [place] [id number]  Dept: [phone]  Loc: [phone]    Kaylie Price is a 65 y.o. female who presents today for her medical conditions/complaints as noted below. Salud Funez is c/o of Pharyngitis and Otalgia        HPI:     Pharyngitis   This is a new problem. The current episode started in the past 7 days. The problem has been gradually worsening. Associated symptoms include congestion, coughing, a sore throat and swollen glands. Past Medical History:   Diagnosis Date    Allergic bronchitis     has wheezing related to dust and pollen    Allergic rhinitis     Blepharitis of both eyes     Bone spur     cervical    Cervical disc disease     Chicken pox     age 26    Chronic anxiety     has been on valium after failing effexor and other medications    Coccyx disorder     history of f/x    Constipation     chronic since a teen    Degenerative disc disease at L5-S1 level     Genital warts     Dr Braden Stark GERD (gastroesophageal reflux disease)     Hemorrhoids     HPV in female     Hyperthyroidism     took radioactive iodine    Post menopausal syndrome 2010    Reactive airway disease with wheezing     Scoliosis     Unspecified vitamin D deficiency       Past Surgical History:   Procedure Laterality Date    [facility]?     COLONOSCOPY N/A 2016     [facility] (-) dysplasia x 1, BCM x 1--3 yr recall    NASAL SEPTUM SURGERY      TUBAL LIGATION      UPPER GASTROINTESTINAL ENDOSCOPY  3/3/2011     ?        Family History   Problem Relation Age of Onset    Diabetes Sister     Thyroid Disease Sister     Rheum Arthritis Sister     Other Sister         fibromyalgia    Rheum Arthritis Maternal Aunt     Colon Polyps Maternal Aunt     Heart Failure Father     Heart Disease Father          of acute MI at 58    Hypertension Mother     Cancer Mother         lung cancer    Colon Polyps Mother    [employer] Other Son         sleep apnea,anxiety and neck problems after  injuries    Colon Cancer Neg Hx     Esophageal Cancer Neg Hx     Stomach Cancer Neg Hx     Liver Cancer Neg Hx     Liver Disease Neg Hx     Rectal Cancer Neg Hx        Social History   Substance Use Topics    Smoking status: Former Smoker     Quit date: 4/7/2008    Smokeless tobacco: Never Used      Comment: Quit smoking in 2008 with Wellbutrin (had crazy dreams on it)    Alcohol use No      Current Outpatient Prescriptions   Medication Sig Dispense Refill    amoxicillin (AMOXIL) 500 MG capsule Take 1 capsule by mouth 3 times daily for 10 days 30 capsule 0    linaclotide (LINZESS) 290 MCG CAPS capsule Take 1 capsule by mouth every morning (before breakfast) 90 capsule 3    lansoprazole (PREVACID) 30 MG delayed release capsule Take 1 capsule by mouth every morning (before breakfast) Office appt needed before requesting additional refills 90 capsule 3    estradiol (ESTRACE) 0.5 MG tablet Take 1 tablet by mouth daily 90 tablet 3    medroxyPROGESTERone (PROVERA) 2.5 MG tablet Take 1 tablet by mouth daily 90 tablet 3    SYNTHROID 112 MCG tablet TAKE 1 TABLET BY MOUTH DAILY. TAKE 30 MINUTES PRIOR TO MEAL.  30 tablet 11    montelukast (SINGULAIR) 10 MG tablet Take 1 tablet by mouth nightly 30 tablet 11    naloxegol (MOVANTIK) 25 MG TABS tablet Take 1 tablet by mouth every morning 30 tablet 3    PROAIR  (90 Base) MCG/ACT inhaler INHALE 2 PUFFS INTO THE LUNGS EVERY 6 HOURS AS NEEDED FOR WHEEZING 8.5 Inhaler 1    cetirizine (ZYRTEC) 10 MG tablet TAKE 1 TABLET BY MOUTH DAILY  3    EPINEPHrine (EPIPEN) 0.3 MG/0.3ML SOAJ injection USE AS DIRECTED FOR ACUTE  ALLERGIC REACTION  1    epinastine (ELESTAT) 0.05 % SOLN INSTILL 1 DROP IN EACH EYE ONE TO TWICE A DAY AS NEEDED  3    polyethylene glycol (GLYCOLAX) powder 1 teaspoonful every day or as needed for desired bm 1 Bottle 11  naproxen (NAPROXEN) 500 MG EC tablet Take 1 tablet by mouth every 12 hours Generic is okay 60 tablet 11    fluticasone (FLONASE) 50 MCG/ACT nasal spray USE 2 SPRAYS IN EACH NOSTRIL DAILY 1 Bottle 6    HYDROcodone-acetaminophen (NORCO) 7.5-325 MG per tablet   0    diazepam (VALIUM) 5 MG tablet Take 1 tablet by mouth every 12 hours as needed for Anxiety or Sleep. This is our last fill on this (Patient taking differently: Take 10 mg by mouth every 12 hours as needed for Anxiety or Sleep This is our last fill on this) 60 tablet 0     No current facility-administered medications for this visit. Allergies   Allergen Reactions    Pollen Extract      Early Spring and early Fall    Effexor [facility] Hydrochloride] Other (See Comments)     Slurred speech, feelings of having a TIA, seeing double, unaware of her surroundings   [address] [place]] [address] [employer] Maintenance   Topic Date Due    Diabetes screen  11/17/2001    Shingles Vaccine (1 of 2 - 2 Dose Series) 11/17/2011    Flu vaccine (1) 09/01/2018    TSH testing  03/22/2019    Colon cancer screen colonoscopy  11/11/2019    Breast cancer screen  04/25/2020    Cervical cancer screen  02/07/2021    DTaP/Tdap/Td vaccine (2 - Td) 01/23/2023    Lipid screen  03/22/2023    Pneumococcal med risk  Completed    Hepatitis C screen  Completed    HIV screen  Completed       Subjective:      Review of Systems   HENT: Positive for congestion and sore throat. Respiratory: Positive for cough. Objective:     Physical Exam   Constitutional: She is oriented to person, place, and time. Vital signs are normal. She appears well-developed and well-nourished. HENT:   Head: Normocephalic and atraumatic. Right Ear: Tympanic membrane and external ear normal.   Left Ear: Tympanic membrane and external ear normal.   Mouth/Throat: Uvula is midline and mucous membranes are normal. Posterior oropharyngeal erythema present.

## 2018-09-20 ENCOUNTER — OFFICE VISIT (OUTPATIENT)
Dept: PRIMARY CARE CLINIC | Age: 58
End: 2018-09-20
Payer: MEDICARE

## 2018-09-20 VITALS
TEMPERATURE: 97.4 F | SYSTOLIC BLOOD PRESSURE: 120 MMHG | DIASTOLIC BLOOD PRESSURE: 82 MMHG | WEIGHT: 172.4 LBS | OXYGEN SATURATION: 98 % | BODY MASS INDEX: 31.73 KG/M2 | HEART RATE: 62 BPM | HEIGHT: 62 IN

## 2018-09-20 DIAGNOSIS — K59.03 CONSTIPATION DUE TO OPIOID THERAPY: ICD-10-CM

## 2018-09-20 DIAGNOSIS — T40.2X5A CONSTIPATION DUE TO OPIOID THERAPY: ICD-10-CM

## 2018-09-20 DIAGNOSIS — E03.9 HYPOTHYROIDISM, UNSPECIFIED TYPE: ICD-10-CM

## 2018-09-20 DIAGNOSIS — K59.03 DRUG-INDUCED CONSTIPATION: ICD-10-CM

## 2018-09-20 DIAGNOSIS — M47.812 ARTHRITIS OF NECK: ICD-10-CM
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
## 2018-09-20 DIAGNOSIS — Z23 NEEDS FLU SHOT: ICD-10-CM

## 2018-09-20 LAB
ALBUMIN SERPL-MCNC: 4.5 G/DL (ref 3.5–5.2)
ALP BLD-CCNC: 78 U/L (ref 35–104)
ALT SERPL-CCNC: 18 U/L (ref 5–33)
ANION GAP SERPL CALCULATED.3IONS-SCNC: 14 MMOL/L (ref 7–19)
AST SERPL-CCNC: 19 U/L (ref 5–32)
BILIRUB SERPL-MCNC: 0.6 MG/DL (ref 0.2–1.2)
BUN BLDV-MCNC: 11 MG/DL (ref 6–20)
CALCIUM SERPL-MCNC: 9.2 MG/DL (ref 8.6–10)
CHLORIDE BLD-SCNC: 102 MMOL/L (ref 98–111)
CO2: 23 MMOL/L (ref 22–29)
CREAT SERPL-MCNC: 0.6 MG/DL (ref 0.5–0.9)
GFR NON-AFRICAN AMERICAN: >60
GLUCOSE BLD-MCNC: 87 MG/DL (ref 74–109)
POTASSIUM SERPL-SCNC: 4.4 MMOL/L (ref 3.5–5)
SODIUM BLD-SCNC: 139 MMOL/L (ref 136–145)
T4 FREE: 1.3 NG/DL (ref 0.9–1.7)
TOTAL PROTEIN: 7.5 G/DL (ref 6.6–8.7)
TSH SERPL DL<=0.05 MIU/L-ACNC: 0.63 UIU/ML (ref 0.27–4.2)

## 2018-09-20 PROCEDURE — 90686 IIV4 VACC NO PRSV 0.5 ML IM: CPT | Performed by: FAMILY MEDICINE

## 2018-09-20 PROCEDURE — 99214 OFFICE O/P EST MOD 30 MIN: CPT | Performed by: FAMILY MEDICINE

## 2018-09-20 PROCEDURE — G0008 ADMIN INFLUENZA VIRUS VAC: HCPCS | Performed by: FAMILY MEDICINE

## 2018-09-20 RX ORDER — DOXYCYCLINE 100 MG/1
100 CAPSULE ORAL 2 TIMES DAILY
Qty: 20 CAPSULE | Refills: 0 | Status: SHIPPED | OUTPATIENT
Start: 2018-09-20 | End: 2018-11-14

## 2018-09-20 NOTE — PROGRESS NOTES
breakfast) Office appt needed before requesting additional refills 8/9/18  Yes ANGEL Tolliver   estradiol (ESTRACE) 0.5 MG tablet Take 1 tablet by mouth daily 4/30/18  Yes Rakesh Emerson MD   medroxyPROGESTERone (PROVERA) 2.5 MG tablet Take 1 tablet by mouth daily 4/30/18  Yes Rakesh Emerson MD   SYNTHROID 112 MCG tablet TAKE 1 TABLET BY MOUTH DAILY. TAKE 30 MINUTES PRIOR TO MEAL. 4/9/18  Yes Megan Michaels MD   montelukast (SINGULAIR) 10 MG tablet Take 1 tablet by mouth nightly 3/26/18  Yes Megan Michaels MD   PROAIR  (37 Base) MCG/ACT inhaler INHALE 2 PUFFS INTO THE LUNGS EVERY 6 HOURS AS NEEDED FOR WHEEZING 3/5/18  Yes Megan Michaels MD   cetirizine (ZYRTEC) 10 MG tablet TAKE 1 TABLET BY MOUTH DAILY 8/29/17  Yes Historical Provider, MD   EPINEPHrine (EPIPEN) 0.3 MG/0.3ML SOAJ injection USE AS DIRECTED FOR ACUTE  ALLERGIC REACTION 8/29/17  Yes Historical Provider, MD   epinastine (ELESTAT) 0.05 % SOLN INSTILL 1 DROP IN EACH EYE ONE TO TWICE A DAY AS NEEDED 8/29/17  Yes Historical Provider, MD   polyethylene glycol (GLYCOLAX) powder 1 teaspoonful every day or as needed for desired bm 8/10/17  Yes ANGEL Tolliver   naproxen (NAPROXEN) 500 MG EC tablet Take 1 tablet by mouth every 12 hours Generic is okay 3/30/17  Yes Megan Michaels MD   fluticasone [facility]) 50 MCG/ACT nasal spray USE 2 SPRAYS IN [facility] NOSTRIL DAILY 2/20/17  Yes Megan Michaels MD   HYDROcodone-acetaminophen [facility]) 7.5-325 MG per tablet  6/4/15  Yes Historical Provider, MD   diazepam (VALIUM) 5 MG tablet Take 1 tablet by mouth every 12 hours as needed for Anxiety or Sleep.  This is our last fill on this  Patient taking differently: Take 10 mg by mouth every 12 hours as needed for Anxiety or Sleep This is our last fill on this 7/8/13  Yes Viktor Mahan MD       Past Medical History:   Diagnosis Date    Allergic bronchitis     has wheezing related to dust and pollen    Allergic rhinitis     But most ticks do not carry diseases, and most tick bites do not cause serious health problems. Some people may have an allergic reaction to a tick bite. This reaction may be mild, with symptoms like itching and swelling. In rare cases, a severe allergic reaction may occur. Most of the time, all you need to do for a tick bite is relieve any symptoms you may have. Follow-up care is a key part of your treatment and safety. Be sure to make and go to all appointments, and call your doctor if you are having problems. It's also a good idea to know your test results and keep a list of the medicines you take. How can you care for yourself at home? · Put ice or a cold pack on the bite for 15 to 20 minutes once an hour. Put a thin cloth between the ice and your skin. · Try an over-the-counter medicine to relieve itching, redness, swelling, and pain. Be safe with medicines. Read and follow all instructions on the label. ¨ Take an antihistamine medicine, such as a nondrowsy one like loratadine (Claritin) or one that might make you sleepy like diphenhydramine (Benadryl). These medicines may help relieve itching, redness, and swelling. ¨ Use a spray of local anesthetic that contains benzocaine, such as Solarcaine. It may help relieve pain. If your skin reacts to the spray, stop using it. ¨ Put calamine lotion on the skin. It may help relieve itching. To avoid tick bites  · Avoid ticks:  ¨ Learn where ticks are found in your community, and stay away from those areas if possible. ¨ Cover as much of your body as possible when you work or play in grassy or wooded areas. ¨ Use insect repellents, such as products containing DEET. You can spray them on your skin. ¨ Take steps to control ticks on your property if you live in an area where Lyme disease occurs. Clear leaves, brush, tall grasses, woodpiles, and stone fences from around your house and the edges of your yard or garden. This may help get rid of ticks.   · When you

## 2018-09-20 NOTE — PROGRESS NOTES
After obtaining consent, and per orders of Dr. Madison Garay, injection of Flu given in Left deltoid by Davion Kim. Patient instructed to remain in clinic for 20 minutes afterwards, and to report any adverse reaction to me immediately.

## 2018-09-20 NOTE — PATIENT INSTRUCTIONS
Patient Education        Tick Bite: Care Instructions  Your Care Instructions    Ticks are small spiderlike animals. They bite to fasten themselves onto your skin and feed on your blood. Ticks can carry diseases. But most ticks do not carry diseases, and most tick bites do not cause serious health problems. Some people may have an allergic reaction to a tick bite. This reaction may be mild, with symptoms like itching and swelling. In rare cases, a severe allergic reaction may occur. Most of the time, all you need to do for a tick bite is relieve any symptoms you may have. Follow-up care is a key part of your treatment and safety. Be sure to make and go to all appointments, and call your doctor if you are having problems. It's also a good idea to know your test results and keep a list of the medicines you take. How can you care for yourself at home? · Put ice or a cold pack on the bite for 15 to 20 minutes once an hour. Put a thin cloth between the ice and your skin. · Try an over-the-counter medicine to relieve itching, redness, swelling, and pain. Be safe with medicines. Read and follow all instructions on the label. ¨ Take an antihistamine medicine, such as a nondrowsy one like loratadine (Claritin) or one that might make you sleepy like diphenhydramine (Benadryl). These medicines may help relieve itching, redness, and swelling. ¨ Use a spray of local anesthetic that contains benzocaine, such as Solarcaine. It may help relieve pain. If your skin reacts to the spray, stop using it. ¨ Put calamine lotion on the skin. It may help relieve itching. To avoid tick bites  · Avoid ticks:  ¨ Learn where ticks are found in your community, and stay away from those areas if possible. ¨ Cover as much of your body as possible when you work or play in grassy or wooded areas. ¨ Use insect repellents, such as products containing DEET. You can spray them on your skin.   ¨ Take steps to control ticks on your property if you live in an area where Lyme disease occurs. Clear leaves, brush, tall grasses, woodpiles, and stone fences from around your house and the edges of your yard or garden. This may help get rid of ticks. · When you come in from outdoors, check your body for ticks, including your groin, head, and underarms. The ticks may be about the size of a sesame seed. If no one else can help you check for ticks on your scalp, comb your hair with a fine-tooth comb. · If you find a tick, remove it quickly. Use tweezers to grasp the tick as close to its mouth (the part in your skin) as possible. Slowly pull the tick straight out-do not twist or yank-until its mouth releases from your skin. If part of the tick stays in the skin, leave it alone. It will likely come out on its own in a few days. · Ticks can come into your house on clothing, outdoor gear, and pets. These ticks can fall off and attach to you. ¨ Check your clothing and outdoor gear. Remove any ticks you find. Then put your clothing in a clothes dryer on high heat for 1 hour to kill any ticks that might remain. ¨ Check your pets for ticks after they have been outdoors. · When hiking in the woods, carry a small dry jar or ziplock bag. If you find a tick on your body, remove the tick and put it in the jar or bag. Store the container in the freezer so you can give it to your doctor if symptoms develop. The tick can be tested to learn whether it is carrying the bacteria that cause Lyme disease. When should you call for help? Call 911 anytime you think you may need emergency care. For example, call if:    · You have symptoms of a severe allergic reaction. These may include:  ¨ Sudden raised, red areas (hives) all over your body. ¨ Swelling of the throat, mouth, lips, or tongue. ¨ Trouble breathing. ¨ Passing out (losing consciousness).  Or you may feel very lightheaded or suddenly feel weak, confused, or restless.    Call your doctor now or seek immediate medical care

## 2018-09-23 ASSESSMENT — ENCOUNTER SYMPTOMS
CONSTIPATION: 1
COUGH: 0
SHORTNESS OF BREATH: 0

## 2018-10-03 DIAGNOSIS — R09.81 SINUS CONGESTION: ICD-10-CM

## 2018-10-03 DIAGNOSIS — Z91.09 ENVIRONMENTAL ALLERGIES: ICD-10-CM

## 2018-10-03 RX ORDER — FLUTICASONE PROPIONATE 50 MCG
SPRAY, SUSPENSION (ML) NASAL
Qty: 1 BOTTLE | Refills: 6 | Status: SHIPPED | OUTPATIENT
Start: 2018-10-03 | End: 2018-10-04 | Stop reason: SDUPTHER

## 2018-10-04 ENCOUNTER — OFFICE VISIT (OUTPATIENT)
Dept: PRIMARY CARE CLINIC | Age: 58
End: 2018-10-04
Payer: MEDICARE

## 2018-10-04 VITALS
BODY MASS INDEX: 31.5 KG/M2 | HEIGHT: 62 IN | WEIGHT: 171.2 LBS | DIASTOLIC BLOOD PRESSURE: 84 MMHG | TEMPERATURE: 96.9 F | OXYGEN SATURATION: 98 % | SYSTOLIC BLOOD PRESSURE: 122 MMHG | HEART RATE: 88 BPM

## 2018-10-04 DIAGNOSIS — Z91.09 ENVIRONMENTAL ALLERGIES: ICD-10-CM

## 2018-10-04 DIAGNOSIS — K59.03 DRUG-INDUCED CONSTIPATION: Primary | ICD-10-CM
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
## 2018-10-04 PROCEDURE — 99213 OFFICE O/P EST LOW 20 MIN: CPT | Performed by: FAMILY MEDICINE

## 2018-10-04 RX ORDER — FLUTICASONE PROPIONATE 50 MCG
SPRAY, SUSPENSION (ML) NASAL
Qty: 1 BOTTLE | Refills: 6 | Status: SHIPPED | OUTPATIENT
Start: 2018-10-04 | End: 2020-12-03 | Stop reason: SDUPTHER

## 2018-10-04 ASSESSMENT — ENCOUNTER SYMPTOMS
SHORTNESS OF BREATH: 0
COUGH: 0
COLOR CHANGE: 0

## 2018-10-04 NOTE — PROGRESS NOTES
types were placed in this encounter. No orders of the defined types were placed in this encounter. Return in about 6 weeks (around 11/15/2018) for COnstipation. Patient Instructions       Patient Education        Constipation: Care Instructions  Your Care Instructions    Constipation means that you have a hard time passing stools (bowel movements). People pass stools from 3 times a day to once every 3 days. What is normal for you may be different. Constipation may occur with pain in the rectum and cramping. The pain may get worse when you try to pass stools. Sometimes there are small amounts of bright red blood on toilet paper or the surface of stools. This is because of enlarged veins near the rectum (hemorrhoids). A few changes in your diet and lifestyle may help you avoid ongoing constipation. Your doctor may also prescribe medicine to help loosen your stool. Some medicines can cause constipation. These include pain medicines and antidepressants. Tell your doctor about all the medicines you take. Your doctor may want to make a medicine change to ease your symptoms. Follow-up care is a key part of your treatment and safety. Be sure to make and go to all appointments, and call your doctor if you are having problems. It's also a good idea to know your test results and keep a list of the medicines you take. How can you care for yourself at home? · Drink plenty of fluids, enough so that your urine is light yellow or clear like water. If you have kidney, heart, or liver disease and have to limit fluids, talk with your doctor before you increase the amount of fluids you drink. · Include high-fiber foods in your diet each day. These include fruits, vegetables, beans, and whole grains. · Get at least 30 minutes of exercise on most days of the week. Walking is a good choice. You also may want to do other activities, such as running, swimming, cycling, or playing tennis or team sports.   · Take a fiber

## 2018-11-07 ENCOUNTER — OFFICE VISIT (OUTPATIENT)
Dept: URGENT CARE | Age: 58
End: 2018-11-07
Payer: MEDICARE

## 2018-11-07 VITALS
HEART RATE: 90 BPM | SYSTOLIC BLOOD PRESSURE: 128 MMHG | TEMPERATURE: 97.3 F | WEIGHT: 171.4 LBS | HEIGHT: 62 IN | RESPIRATION RATE: 18 BRPM | DIASTOLIC BLOOD PRESSURE: 82 MMHG | BODY MASS INDEX: 31.54 KG/M2 | OXYGEN SATURATION: 98 %

## 2018-11-07 DIAGNOSIS — J02.9 SORE THROAT: Primary | ICD-10-CM

## 2018-11-07 DIAGNOSIS — J06.9 URI, ACUTE: ICD-10-CM

## 2018-11-07 LAB — S PYO AG THROAT QL: NORMAL

## 2018-11-07 PROCEDURE — G8417 CALC BMI ABV UP PARAM F/U: HCPCS | Performed by: NURSE PRACTITIONER

## 2018-11-07 PROCEDURE — 3017F COLORECTAL CA SCREEN DOC REV: CPT | Performed by: NURSE PRACTITIONER

## 2018-11-07 PROCEDURE — G8482 FLU IMMUNIZE ORDER/ADMIN: HCPCS | Performed by: NURSE PRACTITIONER

## 2018-11-07 PROCEDURE — 99213 OFFICE O/P EST LOW 20 MIN: CPT | Performed by: NURSE PRACTITIONER

## 2018-11-07 PROCEDURE — G8427 DOCREV CUR MEDS BY ELIG CLIN: HCPCS | Performed by: NURSE PRACTITIONER

## 2018-11-07 PROCEDURE — 87880 STREP A ASSAY W/OPTIC: CPT | Performed by: NURSE PRACTITIONER

## 2018-11-07 PROCEDURE — 1036F TOBACCO NON-USER: CPT | Performed by: NURSE PRACTITIONER

## 2018-11-07 RX ORDER — GUAIFENESIN 600 MG/1
600 TABLET, EXTENDED RELEASE ORAL 2 TIMES DAILY
Qty: 30 TABLET | Refills: 0 | Status: SHIPPED | OUTPATIENT
Start: 2018-11-07 | End: 2019-09-05

## 2018-11-07 RX ORDER — MOMETASONE FUROATE 50 UG/1
2 SPRAY, METERED NASAL DAILY
Qty: 1 INHALER | Refills: 3 | Status: SHIPPED | OUTPATIENT
Start: 2018-11-07 | End: 2018-11-28

## 2018-11-07 RX ORDER — METHYLPREDNISOLONE 4 MG/1
TABLET ORAL
Qty: 1 KIT | Refills: 0 | Status: SHIPPED | OUTPATIENT
Start: 2018-11-07 | End: 2018-11-13

## 2018-11-07 ASSESSMENT — ENCOUNTER SYMPTOMS
VOICE CHANGE: 0
COUGH: 1
SINUS PRESSURE: 0
STRIDOR: 0
EYES NEGATIVE: 1
TROUBLE SWALLOWING: 0
COLOR CHANGE: 0
SHORTNESS OF BREATH: 0
SORE THROAT: 1
GASTROINTESTINAL NEGATIVE: 1
WHEEZING: 0
CHOKING: 0
CHEST TIGHTNESS: 0
RHINORRHEA: 0

## 2018-11-08 NOTE — PROGRESS NOTES
[address] [place] [address]  Dept: [phone]  Loc: [phone]    Vi Marshall is a 65 y.o. female who presents today for her medical conditions/complaintsas noted below. Salud Funez is c/o of Pharyngitis; Cough; and Otalgia (bilateral)        HPI:     HPI   Pt presents to clinic with c/o cough and congestion for 3 days. Denies fever, SOB, or any other symptoms. Results for orders placed or performed in visit on 11/07/18   POCT rapid strep A   Result Value Ref Range    Strep A Ag None Detected None Detected         Past Medical History:   Diagnosis Date    Allergic bronchitis     has wheezing related to dust and pollen    Allergic rhinitis     Blepharitis of both eyes     Bone spur     cervical    Cervical disc disease     Chicken pox     age 26    Chronic anxiety 2006    has been on valium after failing effexor and other medications    Coccyx disorder     history of f/x    Constipation     chronic since a teen    Degenerative disc disease at L5-S1 level     Genital warts     Dr Louie Walker GERD (gastroesophageal reflux disease)     Hemorrhoids     HPV in female     Hyperthyroidism     took radioactive iodine    Post menopausal syndrome 2010    Reactive airway disease with wheezing     Scoliosis     Unspecified vitamin D deficiency       Past Surgical History:   Procedure Laterality Date    [facility]     COLONOSCOPY N/A 11/11/2016    Dr Cristhian [facility] (-) dysplasia x 1, BCM x 1--3 yr recall    NASAL SEPTUM SURGERY      TUBAL LIGATION      UPPER GASTROINTESTINAL ENDOSCOPY  3/3/2011     ?        Family History   Problem Relation Age of Onset    Diabetes Sister     Thyroid Disease Sister     Rheum Arthritis Sister     Other Sister         fibromyalgia    Rheum Arthritis Maternal Aunt     Colon Polyps Maternal Aunt     Heart Failure Father     Heart Disease Father          of acute MI at 58    Hypertension Mother     Cancer Mother         lung cancer    Colon Polyps Mother     Other Son         sleep apnea,anxiety and neck problems after  injuries    Colon Cancer Neg Hx     Esophageal Cancer Neg Hx     Stomach Cancer Neg Hx     Liver Cancer Neg Hx     Liver Disease Neg Hx     Rectal Cancer Neg Hx        Social History   Substance Use Topics    Smoking status: Former Smoker     Packs/day: 0.50     Years: 15.00     Quit date: 2008    Smokeless tobacco: Never Used      Comment: Quit smoking in  with Wellbutrin (had crazy dreams on it)    Alcohol use No      Current Outpatient Prescriptions   Medication Sig Dispense Refill    methylPREDNISolone (MEDROL DOSEPACK) 4 MG tablet Take by mouth. 1 kit 0    mometasone (NASONEX) 50 MCG/ACT nasal spray 2 sprays by Nasal route daily 1 Inhaler 3    guaiFENesin (MUCINEX) 600 MG extended release tablet Take 1 tablet by mouth 2 times daily 30 tablet 0    polyethylene glycol (GLYCOLAX) powder 1 TEASPOONFUL EVERY DAY OR AS NEEDED FOR DESIRED  g 11    fluticasone (FLONASE) 50 MCG/ACT nasal spray USE 2 SPRAYS IN EACH NOSTRIL DAILY 1 Bottle 6    lansoprazole (PREVACID) 30 MG delayed release capsule Take 1 capsule by mouth every morning (before breakfast) Office appt needed before requesting additional refills 90 capsule 3    estradiol (ESTRACE) 0.5 MG tablet Take 1 tablet by mouth daily 90 tablet 3    medroxyPROGESTERone (PROVERA) 2.5 MG tablet Take 1 tablet by mouth daily 90 tablet 3    SYNTHROID 112 MCG tablet TAKE 1 TABLET BY MOUTH DAILY. TAKE 30 MINUTES PRIOR TO MEAL.  30 tablet 11    montelukast (SINGULAIR) 10 MG tablet Take 1 tablet by mouth nightly 30 tablet 11    PROAIR  (90 Base) MCG/ACT inhaler INHALE 2 PUFFS INTO THE LUNGS EVERY 6 HOURS AS NEEDED FOR WHEEZING 8.5 Inhaler 1    cetirizine (ZYRTEC) 10 MG tablet TAKE 1 TABLET BY MOUTH DAILY  3    EPINEPHrine (EPIPEN) 0.3

## 2018-11-13 ENCOUNTER — TELEPHONE (OUTPATIENT)
Dept: PRIMARY CARE CLINIC | Age: 58
End: 2018-11-13

## 2018-11-14 ENCOUNTER — OFFICE VISIT (OUTPATIENT)
Dept: PRIMARY CARE CLINIC | Age: 58
End: 2018-11-14
Payer: MEDICARE

## 2018-11-14 VITALS
OXYGEN SATURATION: 98 % | SYSTOLIC BLOOD PRESSURE: 122 MMHG | HEIGHT: 62 IN | WEIGHT: 173.6 LBS | TEMPERATURE: 97.6 F | DIASTOLIC BLOOD PRESSURE: 64 MMHG | BODY MASS INDEX: 31.94 KG/M2 | HEART RATE: 60 BPM

## 2018-11-14 DIAGNOSIS — J01.40 ACUTE NON-RECURRENT PANSINUSITIS: ICD-10-CM

## 2018-11-14 DIAGNOSIS — A49.9 BACTERIAL INFECTION: Primary | ICD-10-CM

## 2018-11-14 PROCEDURE — 96372 THER/PROPH/DIAG INJ SC/IM: CPT | Performed by: NURSE PRACTITIONER

## 2018-11-14 PROCEDURE — G8427 DOCREV CUR MEDS BY ELIG CLIN: HCPCS | Performed by: NURSE PRACTITIONER

## 2018-11-14 PROCEDURE — G8482 FLU IMMUNIZE ORDER/ADMIN: HCPCS | Performed by: NURSE PRACTITIONER

## 2018-11-14 PROCEDURE — 99213 OFFICE O/P EST LOW 20 MIN: CPT | Performed by: NURSE PRACTITIONER

## 2018-11-14 PROCEDURE — 3017F COLORECTAL CA SCREEN DOC REV: CPT | Performed by: NURSE PRACTITIONER

## 2018-11-14 PROCEDURE — G8417 CALC BMI ABV UP PARAM F/U: HCPCS | Performed by: NURSE PRACTITIONER

## 2018-11-14 PROCEDURE — 1036F TOBACCO NON-USER: CPT | Performed by: NURSE PRACTITIONER

## 2018-11-14 RX ORDER — AMOXICILLIN AND CLAVULANATE POTASSIUM 875; 125 MG/1; MG/1
1 TABLET, FILM COATED ORAL 2 TIMES DAILY
Qty: 20 TABLET | Refills: 0 | Status: SHIPPED | OUTPATIENT
Start: 2018-11-14 | End: 2018-11-24

## 2018-11-14 RX ORDER — DEXTROMETHORPHAN HYDROBROMIDE AND PROMETHAZINE HYDROCHLORIDE 15; 6.25 MG/5ML; MG/5ML
SYRUP ORAL
Qty: 120 ML | Refills: 0 | Status: SHIPPED | OUTPATIENT
Start: 2018-11-14 | End: 2018-11-21

## 2018-11-14 RX ORDER — METHYLPREDNISOLONE ACETATE 40 MG/ML
40 INJECTION, SUSPENSION INTRA-ARTICULAR; INTRALESIONAL; INTRAMUSCULAR; SOFT TISSUE ONCE
Status: COMPLETED | OUTPATIENT
Start: 2018-11-14 | End: 2018-11-14

## 2018-11-14 RX ADMIN — METHYLPREDNISOLONE ACETATE 40 MG: 40 INJECTION, SUSPENSION INTRA-ARTICULAR; INTRALESIONAL; INTRAMUSCULAR; SOFT TISSUE at 14:36

## 2018-11-14 ASSESSMENT — ENCOUNTER SYMPTOMS
VOICE CHANGE: 1
SINUS PRESSURE: 1
EYES NEGATIVE: 1
GASTROINTESTINAL NEGATIVE: 1
COUGH: 1
SORE THROAT: 1

## 2018-11-14 NOTE — PROGRESS NOTES
[facility] PRIMARY CARE  [facility]  [address] [place] [address]  Dept: [phone]  Dept Fax: [phone]  Loc: [phone]    Arash Fisher is a 65 y.o. female who presents today for her medical conditions/complaints as noted below. Arash Fisher is c/o of URI (seen in UC on 11/7 ); Cough; and Shortness of Breath (when laying down)      Chief Complaint   Patient presents with    URI     seen in UC on 11/7     Cough    Shortness of Breath     when laying down       HPI:     HPI   Patient here with complaints of worsening URI symptoms. Patient had appointment with UC and received medrol dosepak and flonase. She was told to use Mucinex. She reports that symptoms have worsened and she now is having some SOB when she lays down. She reports that the congestion has settled in her chest now. Past Medical History:   Diagnosis Date    Allergic bronchitis     has wheezing related to dust and pollen    Allergic rhinitis     Blepharitis of both eyes     Bone spur     cervical    Cervical disc disease     Chicken pox     age 26    Chronic anxiety 2006    has been on valium after failing effexor and other medications    Coccyx disorder     history of f/x    Constipation     chronic since a teen    Degenerative disc disease at L5-S1 level     Genital warts     Dr Sandra Fontenot GERD (gastroesophageal reflux disease)     Hemorrhoids     HPV in female     Hyperthyroidism     took radioactive iodine    Post menopausal syndrome 2010    Reactive airway disease with wheezing     Scoliosis     Unspecified vitamin D deficiency         Past Surgical History:   Procedure Laterality Date    COLONOSCOPY  9/16/2011    [facility]?     COLONOSCOPY N/A 11/11/2016    Dr Safia Lim AP (-) dysplasia x 1, BCM x 1--3 yr recall    NASAL SEPTUM SURGERY      TUBAL LIGATION      UPPER GASTROINTESTINAL ENDOSCOPY  3/3/2011     ?        Social History   Substance Use Negative. Musculoskeletal: Negative. Skin: Negative. Neurological: Positive for headaches. Hematological: Negative. Psychiatric/Behavioral: Negative. Objective:     Physical Exam   Constitutional: She is oriented to person, place, and time. Vital signs are normal. She appears well-developed and well-nourished. HENT:   Head: Normocephalic and atraumatic. Right Ear: Hearing, external ear and ear canal normal. A middle ear effusion is present. Left Ear: Hearing, external ear and ear canal normal. A middle ear effusion is present. Nose: Mucosal edema, rhinorrhea and sinus tenderness present. Right sinus exhibits maxillary sinus tenderness and frontal sinus tenderness. Left sinus exhibits maxillary sinus tenderness and frontal sinus tenderness. Mouth/Throat: Uvula is midline and mucous membranes are normal. Posterior oropharyngeal erythema present. No oropharyngeal exudate or posterior oropharyngeal edema. Eyes: Pupils are equal, round, and reactive to light. Conjunctivae, EOM and lids are normal.   Neck: Trachea normal and normal range of motion. Neck supple. No thyroid mass and no thyromegaly present. Cardiovascular: Normal rate, regular rhythm, normal heart sounds and intact distal pulses. Pulmonary/Chest: Effort normal and breath sounds normal.   Abdominal: Soft. Normal appearance and bowel sounds are normal.   Musculoskeletal: Normal range of motion. Cervical back: Normal. She exhibits normal range of motion and no tenderness. Thoracic back: Normal. She exhibits normal range of motion and no tenderness. Lumbar back: Normal. She exhibits normal range of motion and no tenderness. Neurological: She is alert and oriented to person, place, and time. She has normal strength. Skin: Skin is warm, dry and intact. Psychiatric: She has a normal mood and affect.  Her speech is normal and behavior is normal. Judgment and thought content normal. Cognition and memory are normal.   Nursing note and vitals reviewed. /64   Pulse 60   Temp 97.6 °F (36.4 °C) (Temporal)   Ht 5' 2\" (1.575 m)   Wt 173 lb 9.6 oz (78.7 kg)   LMP 2009   SpO2 98%   BMI 31.75 kg/m²     Assessment:      Diagnosis Orders   1. Bacterial infection     2. Acute non-recurrent pansinusitis  amoxicillin-clavulanate (AUGMENTIN) 875-125 MG per tablet    methylPREDNISolone acetate (DEPO-MEDROL) injection 40 mg    promethazine-dextromethorphan (PROMETHAZINE-DM) 6.25-15 MG/5ML syrup       No results found for this visit on 18. Plan:     Discussed diagnosis and treatment with patient. Take full course of antibiotics. Steroid injection in office. Use promethazin as needed for coughing. Advised symptomatic treatment. If patient is not improving or developing any new/worsening symptoms then RTC, prn or go to ER. Patient is to follow up with PCP as needed. Patient verbalizes understanding. Return if symptoms worsen or fail to improve. No orders of the defined types were placed in this encounter. Orders Placed This Encounter   Medications    amoxicillin-clavulanate (AUGMENTIN) 875-125 MG per tablet     Sig: Take 1 tablet by mouth 2 times daily for 10 days     Dispense:  20 tablet     Refill:  0    methylPREDNISolone acetate (DEPO-MEDROL) injection 40 mg    promethazine-dextromethorphan (PROMETHAZINE-DM) 6.25-15 MG/5ML syrup     Si mLs at night for cough as needed     Dispense:  120 mL     Refill:  0        Patient giveneducational materials - see patient instructions. Discussed use, benefit, and side effects of prescribed medications. All patient questions answered. Pt voiced understanding. Reviewed health maintenance. Instructed tocontinue current medications, diet and exercise. Patient agreed with treatment plan. Follow up as directed.            Electronically signed by ANGEL Macario on 2018 at 2:27 PM

## 2018-11-28 ENCOUNTER — OFFICE VISIT (OUTPATIENT)
Dept: PRIMARY CARE CLINIC | Age: 58
End: 2018-11-28
Payer: MEDICARE

## 2018-11-28 VITALS
HEIGHT: 62 IN | WEIGHT: 172.4 LBS | OXYGEN SATURATION: 97 % | TEMPERATURE: 97.2 F | DIASTOLIC BLOOD PRESSURE: 80 MMHG | HEART RATE: 68 BPM | SYSTOLIC BLOOD PRESSURE: 122 MMHG | BODY MASS INDEX: 31.73 KG/M2

## 2018-11-28 DIAGNOSIS — B37.9 YEAST INFECTION: ICD-10-CM

## 2018-11-28 DIAGNOSIS — T40.2X5A CONSTIPATION DUE TO OPIOID THERAPY: ICD-10-CM

## 2018-11-28 DIAGNOSIS — K59.03 CONSTIPATION DUE TO OPIOID THERAPY: ICD-10-CM

## 2018-11-28 DIAGNOSIS — J06.9 URI, ACUTE: Primary | ICD-10-CM

## 2018-11-28 PROCEDURE — 99213 OFFICE O/P EST LOW 20 MIN: CPT | Performed by: FAMILY MEDICINE

## 2018-11-28 PROCEDURE — G8417 CALC BMI ABV UP PARAM F/U: HCPCS | Performed by: FAMILY MEDICINE

## 2018-11-28 PROCEDURE — 3017F COLORECTAL CA SCREEN DOC REV: CPT | Performed by: FAMILY MEDICINE

## 2018-11-28 PROCEDURE — G8482 FLU IMMUNIZE ORDER/ADMIN: HCPCS | Performed by: FAMILY MEDICINE

## 2018-11-28 PROCEDURE — 1036F TOBACCO NON-USER: CPT | Performed by: FAMILY MEDICINE

## 2018-11-28 PROCEDURE — G8427 DOCREV CUR MEDS BY ELIG CLIN: HCPCS | Performed by: FAMILY MEDICINE

## 2018-11-28 RX ORDER — FLUCONAZOLE 150 MG/1
150 TABLET ORAL ONCE
Qty: 1 TABLET | Refills: 0 | Status: SHIPPED | OUTPATIENT
Start: 2018-11-28 | End: 2018-11-28

## 2018-11-28 ASSESSMENT — ENCOUNTER SYMPTOMS
COLOR CHANGE: 0
COUGH: 1

## 2018-11-28 NOTE — PROGRESS NOTES
Angie Hoyos is a 63 y.o. female    Chief Complaint   Patient presents with    Follow-up     6 weeks    Constipation       URI    This is a new problem. The current episode started 1 to 4 weeks ago. The problem has been gradually improving. There has been no fever. Associated symptoms include congestion and coughing. Pertinent negatives include no chest pain or rash. Treatments tried: recently completed course of augmentin. The treatment provided moderate relief. She does complain of yeast infection. Her last appointment patient was still with chronic constipation. It was thought it was related to opiate-induced constipation. Patient was started on Movantik. Patient states that it worsened her constipation. Patient states she switched back to miralax and her symptoms have improved. Review of Systems   Constitutional: Negative for chills and fever. HENT: Positive for congestion. Respiratory: Positive for cough. Cardiovascular: Negative for chest pain and leg swelling. Skin: Negative for color change and rash. Psychiatric/Behavioral: The patient is not nervous/anxious. Prior to Admission medications    Medication Sig Start Date End Date Taking?  Authorizing Provider   fluconazole (DIFLUCAN) 150 MG tablet Take 1 tablet by mouth once for 1 dose 11/28/18 11/28/18 Yes Luz Elena Sharp MD   guaiFENesin [facility]) 600 MG extended release tablet Take 1 tablet by mouth 2 times daily 11/7/18  Yes ANGEL Altman - CNP   polyethylene glycol [facility]) powder 1 TEASPOONFUL EVERY DAY OR AS NEEDED FOR DESIRED BM 10/22/18  Yes ANGEL Joshi   fluticasone [facility]) 50 MCG/ACT nasal spray USE 2 SPRAYS IN EACH NOSTRIL DAILY 10/4/18  Yes Luz Elena Sharp MD   lansoprazole (PREVACID) 30 MG delayed release capsule Take 1 capsule by mouth every morning (before breakfast) Office appt needed before requesting additional refills 8/9/18  Yes ANGEL Joshi   estradiol (ESTRACE) 0.5 MG

## 2018-12-27 ENCOUNTER — TELEPHONE (OUTPATIENT)
Dept: PRIMARY CARE CLINIC | Age: 58
End: 2018-12-27

## 2019-01-08 ENCOUNTER — TELEPHONE (OUTPATIENT)
Dept: PRIMARY CARE CLINIC | Age: 59
End: 2019-01-08

## 2019-02-12 ENCOUNTER — OFFICE VISIT (OUTPATIENT)
Dept: OBGYN | Age: 59
End: 2019-02-12
Payer: MEDICARE

## 2019-02-12 VITALS
BODY MASS INDEX: 32.2 KG/M2 | SYSTOLIC BLOOD PRESSURE: 138 MMHG | WEIGHT: 175 LBS | HEART RATE: 72 BPM | HEIGHT: 62 IN | DIASTOLIC BLOOD PRESSURE: 80 MMHG

## 2019-02-12 DIAGNOSIS — Z78.0 MENOPAUSE: ICD-10-CM

## 2019-02-12 DIAGNOSIS — Z12.31 SCREENING MAMMOGRAM, ENCOUNTER FOR: ICD-10-CM

## 2019-02-12 DIAGNOSIS — Z01.419 ENCOUNTER FOR ROUTINE GYNECOLOGIC EXAMINATION IN MEDICARE PATIENT: Primary | ICD-10-CM

## 2019-02-12 PROCEDURE — 3017F COLORECTAL CA SCREEN DOC REV: CPT | Performed by: NURSE PRACTITIONER

## 2019-02-12 PROCEDURE — G8427 DOCREV CUR MEDS BY ELIG CLIN: HCPCS | Performed by: NURSE PRACTITIONER

## 2019-02-12 PROCEDURE — G8482 FLU IMMUNIZE ORDER/ADMIN: HCPCS | Performed by: NURSE PRACTITIONER

## 2019-02-12 PROCEDURE — 1036F TOBACCO NON-USER: CPT | Performed by: NURSE PRACTITIONER

## 2019-02-12 PROCEDURE — G8417 CALC BMI ABV UP PARAM F/U: HCPCS | Performed by: NURSE PRACTITIONER

## 2019-02-12 PROCEDURE — 99213 OFFICE O/P EST LOW 20 MIN: CPT | Performed by: NURSE PRACTITIONER

## 2019-02-12 RX ORDER — ESTRADIOL 0.5 MG/1
0.5 TABLET ORAL DAILY
Qty: 90 TABLET | Refills: 3 | Status: SHIPPED | OUTPATIENT
Start: 2019-02-12 | End: 2020-02-07 | Stop reason: SDUPTHER

## 2019-02-12 RX ORDER — MEDROXYPROGESTERONE ACETATE 2.5 MG/1
2.5 TABLET ORAL DAILY
Qty: 90 TABLET | Refills: 3 | Status: SHIPPED | OUTPATIENT
Start: 2019-02-12 | End: 2020-02-13 | Stop reason: SDUPTHER

## 2019-02-12 ASSESSMENT — ENCOUNTER SYMPTOMS
DIARRHEA: 0
ALLERGIC/IMMUNOLOGIC NEGATIVE: 1
GASTROINTESTINAL NEGATIVE: 1
RESPIRATORY NEGATIVE: 1
BACK PAIN: 1
CONSTIPATION: 0
EYES NEGATIVE: 1

## 2019-02-13 ENCOUNTER — TELEPHONE (OUTPATIENT)
Dept: PRIMARY CARE CLINIC | Age: 59
End: 2019-02-13

## 2019-04-29 ENCOUNTER — HOSPITAL ENCOUNTER (OUTPATIENT)
Dept: WOMENS IMAGING | Age: 59
Discharge: HOME | End: 2019-04-29
Payer: MEDICARE

## 2019-04-29 DIAGNOSIS — Z12.31 SCREENING MAMMOGRAM, ENCOUNTER FOR: ICD-10-CM

## 2019-04-29 PROCEDURE — 77063 BREAST TOMOSYNTHESIS BI: CPT

## 2019-05-02 ENCOUNTER — TELEPHONE (OUTPATIENT)
Dept: WOMENS IMAGING | Age: 59
End: 2019-05-02

## 2019-05-02 NOTE — TELEPHONE ENCOUNTER
I called MsFreya Armin with mammo results and recommendation for additional imaging.   She requested a Monday or Tuesday morning, so we scheduled her for Monday, 5/13/19 @ 0800

## 2019-05-13 ENCOUNTER — HOSPITAL ENCOUNTER (OUTPATIENT)
Dept: WOMENS IMAGING | Age: 59
Discharge: HOME | End: 2019-05-13
Payer: MEDICARE

## 2019-05-13 ENCOUNTER — HOSPITAL ENCOUNTER (OUTPATIENT)
Dept: ULTRASOUND IMAGING | Age: 59
Discharge: HOME | End: 2019-05-13
Payer: MEDICARE

## 2019-05-13 DIAGNOSIS — R92.8 ABNORMAL MAMMOGRAM: ICD-10-CM

## 2019-05-13 PROCEDURE — G0279 TOMOSYNTHESIS, MAMMO: HCPCS

## 2019-05-13 PROCEDURE — 76642 ULTRASOUND BREAST LIMITED: CPT

## 2019-05-27 DIAGNOSIS — E03.9 HYPOTHYROIDISM, UNSPECIFIED TYPE: ICD-10-CM

## 2019-05-28 RX ORDER — LEVOTHYROXINE SODIUM 112 MCG
TABLET ORAL
Qty: 30 TABLET | Refills: 0 | Status: SHIPPED | OUTPATIENT
Start: 2019-05-28 | End: 2019-06-10 | Stop reason: SDUPTHER

## 2019-05-29 ENCOUNTER — OFFICE VISIT (OUTPATIENT)
Dept: PRIMARY CARE CLINIC | Age: 59
End: 2019-05-29
Payer: MEDICARE

## 2019-05-29 VITALS
WEIGHT: 176.6 LBS | TEMPERATURE: 97.4 F | RESPIRATION RATE: 18 BRPM | DIASTOLIC BLOOD PRESSURE: 76 MMHG | HEIGHT: 62 IN | HEART RATE: 76 BPM | BODY MASS INDEX: 32.5 KG/M2 | SYSTOLIC BLOOD PRESSURE: 128 MMHG | OXYGEN SATURATION: 99 %

## 2019-05-29 DIAGNOSIS — Z13.220 LIPID SCREENING: ICD-10-CM

## 2019-05-29 DIAGNOSIS — E03.9 HYPOTHYROIDISM, UNSPECIFIED TYPE: Primary | ICD-10-CM

## 2019-05-29 DIAGNOSIS — R23.2 FACIAL FLUSHING: ICD-10-CM

## 2019-05-29 DIAGNOSIS — R73.09 ELEVATED GLUCOSE: ICD-10-CM

## 2019-05-29 DIAGNOSIS — F11.20 NARCOTIC DEPENDENCE (HCC): ICD-10-CM

## 2019-05-29 PROCEDURE — 99214 OFFICE O/P EST MOD 30 MIN: CPT | Performed by: NURSE PRACTITIONER

## 2019-05-29 PROCEDURE — 3017F COLORECTAL CA SCREEN DOC REV: CPT | Performed by: NURSE PRACTITIONER

## 2019-05-29 PROCEDURE — G8427 DOCREV CUR MEDS BY ELIG CLIN: HCPCS | Performed by: NURSE PRACTITIONER

## 2019-05-29 PROCEDURE — 1036F TOBACCO NON-USER: CPT | Performed by: NURSE PRACTITIONER

## 2019-05-29 PROCEDURE — G8417 CALC BMI ABV UP PARAM F/U: HCPCS | Performed by: NURSE PRACTITIONER

## 2019-05-29 RX ORDER — FLUOXETINE 10 MG/1
10 CAPSULE ORAL DAILY
Qty: 30 CAPSULE | Refills: 3 | Status: SHIPPED | OUTPATIENT
Start: 2019-05-29 | End: 2019-09-15 | Stop reason: SDUPTHER

## 2019-05-29 ASSESSMENT — PATIENT HEALTH QUESTIONNAIRE - PHQ9
SUM OF ALL RESPONSES TO PHQ QUESTIONS 1-9: 0
SUM OF ALL RESPONSES TO PHQ9 QUESTIONS 1 & 2: 0
2. FEELING DOWN, DEPRESSED OR HOPELESS: 0
SUM OF ALL RESPONSES TO PHQ QUESTIONS 1-9: 0
1. LITTLE INTEREST OR PLEASURE IN DOING THINGS: 0

## 2019-05-29 NOTE — PROGRESS NOTES
[facility] PRIMARY CARE  [facility]5 [facility]  [address] [place] [address]  Dept: [phone]  Dept Fax: [phone]  Loc: [phone]    Joshua Kyle is a 63 y.o. female who presents today for her medical conditions/complaints as noted below. Salud Funez is c/o of Thyroid Problem (6 mo follow up)      Chief Complaint   Patient presents with    Thyroid Problem     6 mo follow up       HPI:     HPI  Patient here for follow up on chronic conditions including hypothyroidism, GERD, and chronic constipation. Patient is also complaining of increased anxiety and insomnia. She reports trying the CBD oil, but is unable to continue due to cost.   Patient reports being on Valium in the past and was taking that daily. She had tried Effexor in the past, but had allergic reaction to that. She has also tried Vistaril as well without relief. Past Medical History:   Diagnosis Date    Allergic bronchitis     has wheezing related to dust and pollen    Allergic rhinitis     Blepharitis of both eyes     Bone spur     cervical    Cervical disc disease     Chicken pox     age 26    Chronic anxiety 2006    has been on valium after failing effexor and other medications    Coccyx disorder     history of f/x    Constipation     chronic since a teen    Degenerative disc disease at L5-S1 level     Genital warts     Dr Coretta Carmona GERD (gastroesophageal reflux disease)     Hemorrhoids     HPV in female     Hyperthyroidism     took radioactive iodine    Post menopausal syndrome 2010    Reactive airway disease with wheezing     Scoliosis     Unspecified vitamin D deficiency         Past Surgical History:   Procedure Laterality Date    COLONOSCOPY  9/16/2011    [facility]?     COLONOSCOPY N/A 11/11/2016    Dr Andressa Love AP (-) dysplasia x 1, BCM x 1--3 yr recall    NASAL SEPTUM SURGERY      TUBAL LIGATION      UPPER GASTROINTESTINAL ENDOSCOPY  3/3/2011     ? facility-administered medications for this visit. Allergies   Allergen Reactions    Pollen Extract      Early Spring and early Fall    Effexor [facility] Hydrochloride] Other (See Comments)     Slurred speech, feelings of having a TIA, seeing double, unaware of her surroundings    Poison Ivy Extract [Poison Ivy Extract] Itching       Family History   Problem Relation Age of Onset    Diabetes Sister     Thyroid Disease Sister     Rheum Arthritis Sister     Other Sister         fibromyalgia    Rheum Arthritis Maternal Aunt     Colon Polyps Maternal Aunt     Heart Failure Father     Heart Disease Father          of acute MI at 58    Hypertension Mother     Cancer Mother         lung cancer    Colon Polyps Mother     Other Son         sleep apnea,anxiety and neck problems after  injuries    Colon Cancer Neg Hx     Esophageal Cancer Neg Hx     Stomach Cancer Neg Hx     Liver Cancer Neg Hx     Liver Disease Neg Hx     Rectal Cancer Neg Hx        Quality & Risk Score Accuracy    Visit Dx:  F11.20 - Narcotic dependence ([facility] [address]  Assessment and plan:  Stable based upon symptoms and exam. Continue current treatment plan and follow up at least yearly. Last edited 19 06:59 CDT by ANGEL Mann           Subjective:      Review of Systems   Constitutional: Negative. HENT: Negative. Facial flushing   Eyes: Negative. Respiratory: Negative. Cardiovascular: Negative. Gastrointestinal: Negative. Endocrine: Negative. Genitourinary: Negative. Musculoskeletal: Positive for back pain (chronic). Skin: Negative. Neurological: Negative. Hematological: Negative. Psychiatric/Behavioral: Negative. Objective:     Physical Exam   Constitutional: She is oriented to person, place, and time. Vital signs are normal. She appears well-developed and well-nourished. HENT:   Head: Normocephalic and atraumatic.    Right Ear: Hearing and external ear normal. annual wellness exam.    Return in about 6 months (around 11/29/2019) for Medicare AWV. Orders Placed This Encounter   Procedures    CBC     Standing Status:   Future     Standing Expiration Date:   5/29/2020    Comprehensive Metabolic Panel     Standing Status:   Future     Standing Expiration Date:   5/29/2020    TSH without Reflex     Standing Status:   Future     Standing Expiration Date:   5/29/2020    Lipid Panel     Standing Status:   Future     Standing Expiration Date:   5/29/2020     Order Specific Question:   Is Patient Fasting?/# of Hours     Answer:   8 hours    Hemoglobin A1C     Standing Status:   Future     Standing Expiration Date:   5/29/2020    MARCIA Screen with Reflex     Standing Status:   Future     Standing Expiration Date:   5/29/2020    T4, Free     Standing Status:   Future     Standing Expiration Date:   5/29/2020       Orders Placed This Encounter   Medications    FLUoxetine (PROZAC) 10 MG capsule     Sig: Take 1 capsule by mouth daily     Dispense:  30 capsule     Refill:  3        Patient offered educational handouts and has had all questions answered. Patient voices understanding and agrees to plans along with risks and benefits of plan. Patient is instructed to continue prior meds, diet, and exercise plans as instructed. Patient agrees to follow up as instructed and sooner if needed. Patient agrees to go to ER if condition becomes emergent. EMR Dragon/transcription disclaimer: Some of this encounter note is an electronic transcription/translation of spoken language to printed text. The electronic translation of spoken language may permit erroneous, or at times, nonsensical words or phrases to be inadvertently transcribed.  Although I have reviewed the note for such errors, some may still exist.    Electronically signed by ANGEL Ahumada on 5/30/2019 at 6:59 AM

## 2019-05-30 DIAGNOSIS — Z13.220 LIPID SCREENING: ICD-10-CM

## 2019-05-30 DIAGNOSIS — R23.2 FACIAL FLUSHING: ICD-10-CM

## 2019-05-30 DIAGNOSIS — R73.09 ELEVATED GLUCOSE: ICD-10-CM

## 2019-05-30 DIAGNOSIS — E03.9 HYPOTHYROIDISM, UNSPECIFIED TYPE: ICD-10-CM

## 2019-05-30 LAB
ALBUMIN SERPL-MCNC: 4.4 G/DL (ref 3.5–5.2)
ALP BLD-CCNC: 70 U/L (ref 35–104)
ALT SERPL-CCNC: 17 U/L (ref 5–33)
ANION GAP SERPL CALCULATED.3IONS-SCNC: 14 MMOL/L (ref 7–19)
AST SERPL-CCNC: 18 U/L (ref 5–32)
BILIRUB SERPL-MCNC: 0.7 MG/DL (ref 0.2–1.2)
BUN BLDV-MCNC: 11 MG/DL (ref 6–20)
CALCIUM SERPL-MCNC: 9.2 MG/DL (ref 8.6–10)
CHLORIDE BLD-SCNC: 103 MMOL/L (ref 98–111)
CHOLESTEROL, TOTAL: 188 MG/DL (ref 160–199)
CO2: 24 MMOL/L (ref 22–29)
CREAT SERPL-MCNC: 0.7 MG/DL (ref 0.5–0.9)
GFR NON-AFRICAN AMERICAN: >60
GLUCOSE BLD-MCNC: 98 MG/DL (ref 74–109)
HBA1C MFR BLD: 5.5 % (ref 4–6)
HCT VFR BLD CALC: 38.8 % (ref 37–47)
HDLC SERPL-MCNC: 46 MG/DL (ref 65–121)
HEMOGLOBIN: 13 G/DL (ref 12–16)
LDL CHOLESTEROL CALCULATED: 105 MG/DL
MCH RBC QN AUTO: 30.6 PG (ref 27–31)
MCHC RBC AUTO-ENTMCNC: 33.5 G/DL (ref 33–37)
MCV RBC AUTO: 91.3 FL (ref 81–99)
PDW BLD-RTO: 13.9 % (ref 11.5–14.5)
PLATELET # BLD: 264 K/UL (ref 130–400)
PMV BLD AUTO: 11.2 FL (ref 9.4–12.3)
POTASSIUM SERPL-SCNC: 4.4 MMOL/L (ref 3.5–5)
RBC # BLD: 4.25 M/UL (ref 4.2–5.4)
SODIUM BLD-SCNC: 141 MMOL/L (ref 136–145)
T4 FREE: 1.3 NG/DL (ref 0.9–1.7)
TOTAL PROTEIN: 7.3 G/DL (ref 6.6–8.7)
TRIGL SERPL-MCNC: 187 MG/DL (ref 0–149)
TSH SERPL DL<=0.05 MIU/L-ACNC: 1.17 UIU/ML (ref 0.27–4.2)
WBC # BLD: 6.5 K/UL (ref 4.8–10.8)

## 2019-05-30 ASSESSMENT — ENCOUNTER SYMPTOMS
RESPIRATORY NEGATIVE: 1
BACK PAIN: 1
EYES NEGATIVE: 1
GASTROINTESTINAL NEGATIVE: 1

## 2019-05-31 ENCOUNTER — TELEPHONE (OUTPATIENT)
Dept: PRIMARY CARE CLINIC | Age: 59
End: 2019-05-31

## 2019-05-31 NOTE — TELEPHONE ENCOUNTER
----- Message from ANGEL Hector sent at 5/30/2019  4:16 PM CDT -----  Please let patient know that lab results were normal.  Still waiting for MARCIA to return. Thanks!

## 2019-05-31 NOTE — TELEPHONE ENCOUNTER
Called patient and left message on identified voice mail that labs were normal and still waiting on MARCIA results

## 2019-06-01 LAB — ANA IGG, ELISA: NORMAL

## 2019-06-04 ENCOUNTER — TELEPHONE (OUTPATIENT)
Dept: PRIMARY CARE CLINIC | Age: 59
End: 2019-06-04

## 2019-06-10 DIAGNOSIS — E03.9 HYPOTHYROIDISM, UNSPECIFIED TYPE: ICD-10-CM

## 2019-06-10 RX ORDER — LEVOTHYROXINE SODIUM 112 MCG
TABLET ORAL
Qty: 90 TABLET | Refills: 0 | Status: SHIPPED | OUTPATIENT
Start: 2019-06-10 | End: 2019-09-12 | Stop reason: SDUPTHER

## 2019-06-25 RX ORDER — MONTELUKAST SODIUM 10 MG/1
10 TABLET ORAL NIGHTLY
Qty: 30 TABLET | Refills: 2 | Status: SHIPPED | OUTPATIENT
Start: 2019-06-25 | End: 2019-09-18 | Stop reason: SDUPTHER

## 2019-09-04 DIAGNOSIS — L23.7 CONTACT DERMATITIS DUE TO POISON IVY: ICD-10-CM

## 2019-09-04 RX ORDER — TRIAMCINOLONE ACETONIDE 5 MG/G
CREAM TOPICAL
Qty: 60 G | Refills: 3 | Status: SHIPPED | OUTPATIENT
Start: 2019-09-04 | End: 2019-09-11

## 2019-09-04 NOTE — TELEPHONE ENCOUNTER
Salud Funez called to request a refill on her medication. Last office visit : 5/29/2019   Next office visit : 12/3/2019     Requested Prescriptions     Signed Prescriptions Disp Refills    triamcinolone (ARISTOCORT) 0.5 % cream 60 g 3     Sig: Apply topically 3 times daily for poison ivy rash.      Authorizing Provider: Momo Valente User: Srinivas Kan MA

## 2019-09-05 ENCOUNTER — OFFICE VISIT (OUTPATIENT)
Dept: URGENT CARE | Age: 59
End: 2019-09-05
Payer: MEDICARE

## 2019-09-05 VITALS
OXYGEN SATURATION: 97 % | BODY MASS INDEX: 30.18 KG/M2 | WEIGHT: 164 LBS | TEMPERATURE: 97.4 F | HEART RATE: 86 BPM | DIASTOLIC BLOOD PRESSURE: 78 MMHG | HEIGHT: 62 IN | RESPIRATION RATE: 18 BRPM | SYSTOLIC BLOOD PRESSURE: 120 MMHG

## 2019-09-05 DIAGNOSIS — L23.7 POISON IVY DERMATITIS: Primary | ICD-10-CM

## 2019-09-05 PROCEDURE — 99213 OFFICE O/P EST LOW 20 MIN: CPT | Performed by: NURSE PRACTITIONER

## 2019-09-05 PROCEDURE — G8417 CALC BMI ABV UP PARAM F/U: HCPCS | Performed by: NURSE PRACTITIONER

## 2019-09-05 PROCEDURE — 1036F TOBACCO NON-USER: CPT | Performed by: NURSE PRACTITIONER

## 2019-09-05 PROCEDURE — 3017F COLORECTAL CA SCREEN DOC REV: CPT | Performed by: NURSE PRACTITIONER

## 2019-09-05 PROCEDURE — G8427 DOCREV CUR MEDS BY ELIG CLIN: HCPCS | Performed by: NURSE PRACTITIONER

## 2019-09-05 PROCEDURE — 96372 THER/PROPH/DIAG INJ SC/IM: CPT | Performed by: NURSE PRACTITIONER

## 2019-09-05 RX ORDER — DEXAMETHASONE SODIUM PHOSPHATE 10 MG/ML
10 INJECTION INTRAMUSCULAR; INTRAVENOUS ONCE
Status: COMPLETED | OUTPATIENT
Start: 2019-09-05 | End: 2019-09-05

## 2019-09-05 RX ORDER — PREDNISONE 10 MG/1
TABLET ORAL
Qty: 42 TABLET | Refills: 0 | Status: SHIPPED | OUTPATIENT
Start: 2019-09-05 | End: 2019-10-24

## 2019-09-05 RX ADMIN — DEXAMETHASONE SODIUM PHOSPHATE 10 MG: 10 INJECTION INTRAMUSCULAR; INTRAVENOUS at 14:17

## 2019-09-05 ASSESSMENT — ENCOUNTER SYMPTOMS
SORE THROAT: 0
COUGH: 1

## 2019-09-05 NOTE — PROGRESS NOTES
dermatitis may also be caused by something that irritates the skin, such as bleach, a chemical, or soap. These types of rashes cannot be spread from person to person. How long your rash will last depends on what caused it. Rashes may last a few days or months. Follow-up care is a key part of your treatment and safety. Be sure to make and go to all appointments, and call your doctor if you are having problems. It's also a good idea to know your test results and keep a list of the medicines you take. How can you care for yourself at home? · Do not scratch the rash. Cut your nails short, and file them smooth. Or wear gloves if this helps keep you from scratching. · Wash the area with water only. Pat dry. · Put cold, wet cloths on the rash to reduce itching. · Keep cool, and stay out of the sun. · Leave the rash open to the air as much as possible. · If the rash itches, use hydrocortisone cream. Follow the directions on the label. Calamine lotion may help for plant rashes. · Take an over-the-counter antihistamine, such as diphenhydramine (Benadryl) or loratadine (Claritin), to help calm the itching. Read and follow all instructions on the label. · If your doctor prescribed a cream, use it as directed. If your doctor prescribed medicine, take it exactly as directed. When should you call for help? Call your doctor now or seek immediate medical care if:    · You have symptoms of infection, such as:  ? Increased pain, swelling, warmth, or redness. ? Red streaks leading from the area. ? Pus draining from the area. ? A fever.     · You have joint pain along with the rash.    Watch closely for changes in your health, and be sure to contact your doctor if:    · Your rash is changing or getting worse.     · You are not getting better as expected. Where can you learn more? Go to https://juanita.Breadcrumbtracking. org and sign in to your CoContest account.  Enter [phone] in the "[employer]" box to learn more about \"Dermatitis: Care Instructions. \"     If you do not have an account, please click on the \"Sign Up Now\" link. Current as of: April 1, 2019  Content Version: 12.1  © 5372-2830 [employer]. Care instructions adapted under license by [facility] ([facility]). If you have questions about a medical condition or this instruction, always ask your healthcare professional. [place] [address] any warranty or liability for your use of this information. Patient Education        Poison [place], [place], and [place]: Care Instructions  Your Care Instructions    Poison ivy, poison oak, and poison sumac are plants that can cause a skin rash upon contact. The red, itchy rash often shows up in lines or streaks and may cause fluid-filled blisters or large, raised hives. The rash is caused by an allergic reaction to an oil in poison ivy, oak, and sumac. The rash may occur when you touch the plant or when you touch clothing, pet fur, sporting gear, gardening tools, or other objects that have come in contact with one of these plants. You cannot catch or spread the rash, even if you touch it or the blister fluid, because the plant oil will already have been absorbed or washed off the skin. The rash may seem to be spreading, but either it is still developing from earlier contact or you have touched something that still has the plant oil on it. Follow-up care is a key part of your treatment and safety. Be sure to make and go to all appointments, and call your doctor if you are having problems. It's also a good idea to know your test results and keep a list of the medicines you take. How can you care for yourself at home? · If your doctor prescribed a cream, use it as directed. If your doctor prescribed medicine, take it exactly as prescribed. Call your doctor if you think you are having a problem with your medicine. · Use cold, wet cloths to reduce itching. · Keep cool, and stay out of the sun.   · Leave the rash open to the air. · Wash all clothing or other things that may have come in contact with the plant oil. · Avoid most lotions and ointments until the rash heals. Calamine lotion may help relieve symptoms of a plant rash. Use it 3 or 4 times a day. To prevent poison ivy exposure  If you know that you will be near poison ivy, oak, or sumac, you can try these options:  · Use a product designed to help prevent plant oil from getting on the skin. These products, such as Ivy X Pre-Contact Skin Solution, come in lotions, sprays, or towelettes. You put the product on your skin right before you go outdoors. · If you did not use a preventive product and you have had contact with plant oil, clean it off your skin as soon as possible. Use a product such as Tecnu Original Outdoor Skin Cleanser. These products can also be used to clean plant oil from clothing or tools. When should you call for help? Call your doctor now or seek immediate medical care if:    · Your rash gets worse, and you start to feel bad and have a fever, a stiff neck, nausea, and vomiting.     · You have signs of infection, such as:  ? Increased pain, swelling, warmth, or redness. ? Red streaks leading from the rash. ? Pus draining from the rash. ? A fever.    Watch closely for changes in your health, and be sure to contact your doctor if:    · You have new blisters or bruises, or the rash spreads and looks like a sunburn.     · The rash gets worse, or it comes back after nearly disappearing.     · You think a medicine you are using is making your rash worse.     · Your rash does not clear up after 1 to 2 weeks of home treatment.     · You have joint aches or body aches with your rash. Where can you learn more? Go to https://PanOpticapeArkeb.Interactive Bid Games Inc. org and sign in to your lifeIO account. Enter K513 in the [facility] box to learn more about \"Poison [place], [place], and [place]: Care Instructions. \"     If you do not have an account,

## 2019-09-12 DIAGNOSIS — E03.9 HYPOTHYROIDISM, UNSPECIFIED TYPE: ICD-10-CM

## 2019-09-12 RX ORDER — LEVOTHYROXINE SODIUM 112 MCG
TABLET ORAL
Qty: 90 TABLET | Refills: 0 | Status: SHIPPED | OUTPATIENT
Start: 2019-09-12 | End: 2019-12-05 | Stop reason: SDUPTHER

## 2019-09-12 RX ORDER — LEVOCETIRIZINE DIHYDROCHLORIDE 5 MG/1
5 TABLET, FILM COATED ORAL DAILY
Qty: 30 TABLET | Refills: 5 | Status: SHIPPED | OUTPATIENT
Start: 2019-09-12 | End: 2020-03-08 | Stop reason: SDUPTHER

## 2019-09-18 RX ORDER — MONTELUKAST SODIUM 10 MG/1
10 TABLET ORAL NIGHTLY
Qty: 90 TABLET | Refills: 2 | Status: SHIPPED | OUTPATIENT
Start: 2019-09-18 | End: 2020-03-08 | Stop reason: SDUPTHER

## 2019-09-27 ENCOUNTER — TELEPHONE (OUTPATIENT)
Dept: GASTROENTEROLOGY | Age: 59
End: 2019-09-27

## 2019-09-27 ENCOUNTER — TELEPHONE (OUTPATIENT)
Dept: PRIMARY CARE CLINIC | Age: 59
End: 2019-09-27

## 2019-09-27 NOTE — TELEPHONE ENCOUNTER
Last OV BG stringer 8-9-18 OV scheduled for medication refills on 10-24-19. Patient has been taking Lansoprazole 30 mg daily. I called this patient at [phone] to let her know her insurance has denied her Lansoprazole, they said it is no longer on Formulary. I told the patient it has been over a year since she was last seen, she does have OV scheduled on 10-24-19, I suggested she can purchase OTC till she can come in to see BG stringer and discuss at that time one of the alternatives recommended by her insurance company. Patient was not happy with this news and said she will be calling her insurance company to ask about this.  Kal montez

## 2019-10-14 DIAGNOSIS — E03.9 HYPOTHYROIDISM, UNSPECIFIED TYPE: Primary | ICD-10-CM

## 2019-10-14 DIAGNOSIS — R73.09 ELEVATED GLUCOSE: ICD-10-CM

## 2019-10-14 DIAGNOSIS — Z13.220 LIPID SCREENING: ICD-10-CM

## 2019-10-14 DIAGNOSIS — E03.9 HYPOTHYROIDISM, UNSPECIFIED TYPE: ICD-10-CM

## 2019-10-14 LAB
ALBUMIN SERPL-MCNC: 4.5 G/DL (ref 3.5–5.2)
ALP BLD-CCNC: 74 U/L (ref 35–104)
ALT SERPL-CCNC: 13 U/L (ref 5–33)
ANION GAP SERPL CALCULATED.3IONS-SCNC: 14 MMOL/L (ref 7–19)
AST SERPL-CCNC: 19 U/L (ref 5–32)
BASOPHILS ABSOLUTE: 0 K/UL (ref 0–0.2)
BASOPHILS RELATIVE PERCENT: 0.3 % (ref 0–1)
BILIRUB SERPL-MCNC: 0.6 MG/DL (ref 0.2–1.2)
BUN BLDV-MCNC: 14 MG/DL (ref 6–20)
CALCIUM SERPL-MCNC: 9.7 MG/DL (ref 8.6–10)
CHLORIDE BLD-SCNC: 102 MMOL/L (ref 98–111)
CHOLESTEROL, TOTAL: 185 MG/DL (ref 160–199)
CO2: 25 MMOL/L (ref 22–29)
CREAT SERPL-MCNC: 0.6 MG/DL (ref 0.5–0.9)
EOSINOPHILS ABSOLUTE: 0 K/UL (ref 0–0.6)
EOSINOPHILS RELATIVE PERCENT: 0.6 % (ref 0–5)
GFR NON-AFRICAN AMERICAN: >60
GLUCOSE BLD-MCNC: 84 MG/DL (ref 74–109)
HBA1C MFR BLD: 5.6 % (ref 4–6)
HCT VFR BLD CALC: 41.1 % (ref 37–47)
HDLC SERPL-MCNC: 52 MG/DL (ref 65–121)
HEMOGLOBIN: 13.4 G/DL (ref 12–16)
IMMATURE GRANULOCYTES #: 0 K/UL
LDL CHOLESTEROL CALCULATED: 100 MG/DL
LYMPHOCYTES ABSOLUTE: 2.5 K/UL (ref 1.1–4.5)
LYMPHOCYTES RELATIVE PERCENT: 38.8 % (ref 20–40)
MCH RBC QN AUTO: 29.9 PG (ref 27–31)
MCHC RBC AUTO-ENTMCNC: 32.6 G/DL (ref 33–37)
MCV RBC AUTO: 91.7 FL (ref 81–99)
MONOCYTES ABSOLUTE: 0.4 K/UL (ref 0–0.9)
MONOCYTES RELATIVE PERCENT: 6.7 % (ref 0–10)
NEUTROPHILS ABSOLUTE: 3.5 K/UL (ref 1.5–7.5)
NEUTROPHILS RELATIVE PERCENT: 53.1 % (ref 50–65)
PDW BLD-RTO: 13.3 % (ref 11.5–14.5)
PLATELET # BLD: 263 K/UL (ref 130–400)
PMV BLD AUTO: 10.9 FL (ref 9.4–12.3)
POTASSIUM SERPL-SCNC: 4.5 MMOL/L (ref 3.5–5)
RBC # BLD: 4.48 M/UL (ref 4.2–5.4)
SODIUM BLD-SCNC: 141 MMOL/L (ref 136–145)
T4 FREE: 1.5 NG/DL (ref 0.9–1.7)
TOTAL PROTEIN: 7.9 G/DL (ref 6.6–8.7)
TRIGL SERPL-MCNC: 167 MG/DL (ref 0–149)
TSH SERPL DL<=0.05 MIU/L-ACNC: 0.41 UIU/ML (ref 0.27–4.2)
WBC # BLD: 6.6 K/UL (ref 4.8–10.8)

## 2019-10-18 ENCOUNTER — TELEPHONE (OUTPATIENT)
Dept: PRIMARY CARE CLINIC | Age: 59
End: 2019-10-18

## 2019-10-24 ENCOUNTER — OFFICE VISIT (OUTPATIENT)
Dept: GASTROENTEROLOGY | Age: 59
End: 2019-10-24
Payer: MEDICARE

## 2019-10-24 VITALS
WEIGHT: 169.4 LBS | HEART RATE: 73 BPM | BODY MASS INDEX: 31.17 KG/M2 | DIASTOLIC BLOOD PRESSURE: 70 MMHG | OXYGEN SATURATION: 98 % | HEIGHT: 62 IN | SYSTOLIC BLOOD PRESSURE: 100 MMHG

## 2019-10-24 DIAGNOSIS — K64.8 BLEEDING INTERNAL HEMORRHOIDS: ICD-10-CM

## 2019-10-24 DIAGNOSIS — K58.1 IRRITABLE BOWEL SYNDROME WITH CONSTIPATION: ICD-10-CM

## 2019-10-24 DIAGNOSIS — Z86.010 HISTORY OF ADENOMATOUS POLYP OF COLON: ICD-10-CM

## 2019-10-24 DIAGNOSIS — K21.9 CHRONIC GERD: Primary | ICD-10-CM

## 2019-10-24 PROCEDURE — 1036F TOBACCO NON-USER: CPT | Performed by: NURSE PRACTITIONER

## 2019-10-24 PROCEDURE — G8417 CALC BMI ABV UP PARAM F/U: HCPCS | Performed by: NURSE PRACTITIONER

## 2019-10-24 PROCEDURE — G8484 FLU IMMUNIZE NO ADMIN: HCPCS | Performed by: NURSE PRACTITIONER

## 2019-10-24 PROCEDURE — 3017F COLORECTAL CA SCREEN DOC REV: CPT | Performed by: NURSE PRACTITIONER

## 2019-10-24 PROCEDURE — G8427 DOCREV CUR MEDS BY ELIG CLIN: HCPCS | Performed by: NURSE PRACTITIONER

## 2019-10-24 PROCEDURE — 99214 OFFICE O/P EST MOD 30 MIN: CPT | Performed by: NURSE PRACTITIONER

## 2019-10-24 RX ORDER — PANTOPRAZOLE SODIUM 40 MG/1
40 TABLET, DELAYED RELEASE ORAL
Qty: 90 TABLET | Refills: 3 | Status: SHIPPED | OUTPATIENT
Start: 2019-10-24 | End: 2020-09-15

## 2019-10-24 RX ORDER — M-VIT,TX,IRON,MINS/CALC/FOLIC 27MG-0.4MG
1 TABLET ORAL DAILY
COMMUNITY
End: 2021-06-03

## 2019-10-24 ASSESSMENT — ENCOUNTER SYMPTOMS
DIARRHEA: 0
SORE THROAT: 0
ALLERGIC/IMMUNOLOGIC NEGATIVE: 1
NAUSEA: 0
VOICE CHANGE: 0
CONSTIPATION: 1
BLOOD IN STOOL: 0
SHORTNESS OF BREATH: 0
COUGH: 0
RECTAL PAIN: 0
BACK PAIN: 1
EYES NEGATIVE: 1
CHEST TIGHTNESS: 0
VOMITING: 0
ABDOMINAL PAIN: 0

## 2019-11-11 ENCOUNTER — HOSPITAL ENCOUNTER (OUTPATIENT)
Age: 59
Setting detail: OUTPATIENT SURGERY
Discharge: HOME | End: 2019-11-11
Attending: INTERNAL MEDICINE | Admitting: INTERNAL MEDICINE
Payer: MEDICARE

## 2019-11-11 ENCOUNTER — TELEPHONE (OUTPATIENT)
Dept: GASTROENTEROLOGY | Age: 59
End: 2019-11-11

## 2019-11-11 ENCOUNTER — ANESTHESIA (OUTPATIENT)
Dept: ENDOSCOPY | Age: 59
End: 2019-11-11
Payer: MEDICARE

## 2019-11-11 ENCOUNTER — ANESTHESIA EVENT (OUTPATIENT)
Dept: ENDOSCOPY | Age: 59
End: 2019-11-11
Payer: MEDICARE

## 2019-11-11 VITALS
OXYGEN SATURATION: 100 % | RESPIRATION RATE: 16 BRPM | WEIGHT: 169 LBS | DIASTOLIC BLOOD PRESSURE: 78 MMHG | BODY MASS INDEX: 31.1 KG/M2 | TEMPERATURE: 97.8 F | HEART RATE: 66 BPM | HEIGHT: 62 IN | SYSTOLIC BLOOD PRESSURE: 144 MMHG

## 2019-11-11 VITALS
SYSTOLIC BLOOD PRESSURE: 105 MMHG | OXYGEN SATURATION: 97 % | RESPIRATION RATE: 14 BRPM | DIASTOLIC BLOOD PRESSURE: 71 MMHG

## 2019-11-11 PROCEDURE — 3700000000 HC ANESTHESIA ATTENDED CARE: Performed by: INTERNAL MEDICINE

## 2019-11-11 PROCEDURE — 3700000001 HC ADD 15 MINUTES (ANESTHESIA): Performed by: INTERNAL MEDICINE

## 2019-11-11 PROCEDURE — 88304 TISSUE EXAM BY PATHOLOGIST: CPT

## 2019-11-11 PROCEDURE — 7100000010 HC PHASE II RECOVERY - FIRST 15 MIN: Performed by: INTERNAL MEDICINE

## 2019-11-11 PROCEDURE — 45378 DIAGNOSTIC COLONOSCOPY: CPT | Performed by: INTERNAL MEDICINE

## 2019-11-11 PROCEDURE — 6360000002 HC RX W HCPCS: Performed by: NURSE ANESTHETIST, CERTIFIED REGISTERED

## 2019-11-11 PROCEDURE — 2500000003 HC RX 250 WO HCPCS: Performed by: NURSE ANESTHETIST, CERTIFIED REGISTERED

## 2019-11-11 PROCEDURE — 7100000011 HC PHASE II RECOVERY - ADDTL 15 MIN: Performed by: INTERNAL MEDICINE

## 2019-11-11 PROCEDURE — 3609027000 HC COLONOSCOPY: Performed by: INTERNAL MEDICINE

## 2019-11-11 PROCEDURE — 2580000003 HC RX 258: Performed by: INTERNAL MEDICINE

## 2019-11-11 RX ORDER — ONDANSETRON 2 MG/ML
4 INJECTION INTRAMUSCULAR; INTRAVENOUS
Status: DISCONTINUED | OUTPATIENT
Start: 2019-11-11 | End: 2019-11-11 | Stop reason: HOSPADM

## 2019-11-11 RX ORDER — DIPHENHYDRAMINE HYDROCHLORIDE 50 MG/ML
12.5 INJECTION INTRAMUSCULAR; INTRAVENOUS
Status: DISCONTINUED | OUTPATIENT
Start: 2019-11-11 | End: 2019-11-11 | Stop reason: HOSPADM

## 2019-11-11 RX ORDER — PROMETHAZINE HYDROCHLORIDE 25 MG/ML
6.25 INJECTION, SOLUTION INTRAMUSCULAR; INTRAVENOUS
Status: DISCONTINUED | OUTPATIENT
Start: 2019-11-11 | End: 2019-11-11 | Stop reason: HOSPADM

## 2019-11-11 RX ORDER — PROPOFOL 10 MG/ML
INJECTION, EMULSION INTRAVENOUS PRN
Status: DISCONTINUED | OUTPATIENT
Start: 2019-11-11 | End: 2019-11-11 | Stop reason: SDUPTHER

## 2019-11-11 RX ORDER — LIDOCAINE HYDROCHLORIDE 20 MG/ML
INJECTION, SOLUTION INFILTRATION; PERINEURAL PRN
Status: DISCONTINUED | OUTPATIENT
Start: 2019-11-11 | End: 2019-11-11 | Stop reason: SDUPTHER

## 2019-11-11 RX ORDER — SODIUM CHLORIDE, SODIUM LACTATE, POTASSIUM CHLORIDE, CALCIUM CHLORIDE 600; 310; 30; 20 MG/100ML; MG/100ML; MG/100ML; MG/100ML
INJECTION, SOLUTION INTRAVENOUS CONTINUOUS
Status: DISCONTINUED | OUTPATIENT
Start: 2019-11-11 | End: 2019-11-11 | Stop reason: HOSPADM

## 2019-11-11 RX ORDER — LIDOCAINE HYDROCHLORIDE 10 MG/ML
1 INJECTION, SOLUTION EPIDURAL; INFILTRATION; INTRACAUDAL; PERINEURAL ONCE
Status: DISCONTINUED | OUTPATIENT
Start: 2019-11-11 | End: 2019-11-11 | Stop reason: HOSPADM

## 2019-11-11 RX ADMIN — PROPOFOL 50 MG: 10 INJECTION, EMULSION INTRAVENOUS at 11:46

## 2019-11-11 RX ADMIN — PROPOFOL 50 MG: 10 INJECTION, EMULSION INTRAVENOUS at 11:37

## 2019-11-11 RX ADMIN — PROPOFOL 100 MG: 10 INJECTION, EMULSION INTRAVENOUS at 11:42

## 2019-11-11 RX ADMIN — PROPOFOL 20 MG: 10 INJECTION, EMULSION INTRAVENOUS at 11:49

## 2019-11-11 RX ADMIN — SODIUM CHLORIDE, POTASSIUM CHLORIDE, SODIUM LACTATE AND CALCIUM CHLORIDE: 600; 310; 30; 20 INJECTION, SOLUTION INTRAVENOUS at 09:38

## 2019-11-11 RX ADMIN — LIDOCAINE HYDROCHLORIDE 50 ML: 20 INJECTION, SOLUTION INFILTRATION; PERINEURAL at 11:36

## 2019-11-11 ASSESSMENT — PAIN SCALES - GENERAL
PAINLEVEL_OUTOF10: 0
PAINLEVEL_OUTOF10: 0

## 2019-11-11 ASSESSMENT — PAIN - FUNCTIONAL ASSESSMENT: PAIN_FUNCTIONAL_ASSESSMENT: 0-10

## 2019-11-18 ENCOUNTER — OFFICE VISIT (OUTPATIENT)
Dept: SURGERY | Age: 59
End: 2019-11-18
Payer: MEDICARE

## 2019-11-18 VITALS
BODY MASS INDEX: 31.25 KG/M2 | DIASTOLIC BLOOD PRESSURE: 70 MMHG | HEIGHT: 62 IN | TEMPERATURE: 98 F | SYSTOLIC BLOOD PRESSURE: 124 MMHG | WEIGHT: 169.8 LBS

## 2019-11-18 DIAGNOSIS — K64.4 INTERNAL AND EXTERNAL BLEEDING HEMORRHOIDS: Primary | ICD-10-CM

## 2019-11-18 DIAGNOSIS — K64.8 INTERNAL AND EXTERNAL BLEEDING HEMORRHOIDS: Primary | ICD-10-CM

## 2019-11-18 PROCEDURE — G8484 FLU IMMUNIZE NO ADMIN: HCPCS | Performed by: SURGERY

## 2019-11-18 PROCEDURE — 99204 OFFICE O/P NEW MOD 45 MIN: CPT | Performed by: SURGERY

## 2019-11-18 PROCEDURE — 3017F COLORECTAL CA SCREEN DOC REV: CPT | Performed by: SURGERY

## 2019-11-18 PROCEDURE — G8427 DOCREV CUR MEDS BY ELIG CLIN: HCPCS | Performed by: SURGERY

## 2019-11-18 PROCEDURE — 1036F TOBACCO NON-USER: CPT | Performed by: SURGERY

## 2019-11-18 PROCEDURE — G8417 CALC BMI ABV UP PARAM F/U: HCPCS | Performed by: SURGERY

## 2019-11-18 ASSESSMENT — ENCOUNTER SYMPTOMS
EYE REDNESS: 0
CONSTIPATION: 1
BACK PAIN: 1
NAUSEA: 0
ANAL BLEEDING: 1
ABDOMINAL DISTENTION: 1
EYE PAIN: 0
SORE THROAT: 0
DIARRHEA: 0
SHORTNESS OF BREATH: 0
COUGH: 0
EYE ITCHING: 1
WHEEZING: 1

## 2019-12-03 ENCOUNTER — HOSPITAL ENCOUNTER (OUTPATIENT)
Dept: NON INVASIVE DIAGNOSTICS | Age: 59
Discharge: HOME | End: 2019-12-03
Payer: MEDICARE

## 2019-12-03 ENCOUNTER — OFFICE VISIT (OUTPATIENT)
Dept: PRIMARY CARE CLINIC | Age: 59
End: 2019-12-03
Payer: MEDICARE

## 2019-12-03 VITALS
BODY MASS INDEX: 32.02 KG/M2 | SYSTOLIC BLOOD PRESSURE: 126 MMHG | WEIGHT: 174 LBS | DIASTOLIC BLOOD PRESSURE: 84 MMHG | HEART RATE: 72 BPM | HEIGHT: 62 IN | OXYGEN SATURATION: 98 % | RESPIRATION RATE: 17 BRPM | TEMPERATURE: 97.6 F

## 2019-12-03 DIAGNOSIS — E03.9 HYPOTHYROIDISM, UNSPECIFIED TYPE: ICD-10-CM

## 2019-12-03 DIAGNOSIS — Z71.89 ADVANCE CARE PLANNING: ICD-10-CM

## 2019-12-03 DIAGNOSIS — Z13.31 POSITIVE DEPRESSION SCREENING: ICD-10-CM

## 2019-12-03 DIAGNOSIS — Z87.891 PERSONAL HISTORY OF TOBACCO USE: ICD-10-CM

## 2019-12-03 DIAGNOSIS — Z00.00 ROUTINE GENERAL MEDICAL EXAMINATION AT A HEALTH CARE FACILITY: Primary | ICD-10-CM

## 2019-12-03 LAB — TSH REFLEX FT4: 0.45 UIU/ML (ref 0.35–5.5)

## 2019-12-03 PROCEDURE — 99497 ADVNCD CARE PLAN 30 MIN: CPT | Performed by: NURSE PRACTITIONER

## 2019-12-03 PROCEDURE — G8431 POS CLIN DEPRES SCRN F/U DOC: HCPCS | Performed by: NURSE PRACTITIONER

## 2019-12-03 PROCEDURE — 93005 ELECTROCARDIOGRAM TRACING: CPT

## 2019-12-03 PROCEDURE — G8484 FLU IMMUNIZE NO ADMIN: HCPCS | Performed by: NURSE PRACTITIONER

## 2019-12-03 PROCEDURE — 3017F COLORECTAL CA SCREEN DOC REV: CPT | Performed by: NURSE PRACTITIONER

## 2019-12-03 PROCEDURE — G0296 VISIT TO DETERM LDCT ELIG: HCPCS | Performed by: NURSE PRACTITIONER

## 2019-12-03 PROCEDURE — G0439 PPPS, SUBSEQ VISIT: HCPCS | Performed by: NURSE PRACTITIONER

## 2019-12-03 PROCEDURE — G0447 BEHAVIOR COUNSEL OBESITY 15M: HCPCS | Performed by: NURSE PRACTITIONER

## 2019-12-03 ASSESSMENT — LIFESTYLE VARIABLES: HOW OFTEN DO YOU HAVE A DRINK CONTAINING ALCOHOL: 0

## 2019-12-03 ASSESSMENT — PATIENT HEALTH QUESTIONNAIRE - PHQ9: SUM OF ALL RESPONSES TO PHQ QUESTIONS 1-9: 14

## 2019-12-05 ENCOUNTER — TELEPHONE (OUTPATIENT)
Dept: PRIMARY CARE CLINIC | Age: 59
End: 2019-12-05

## 2019-12-05 DIAGNOSIS — E03.9 HYPOTHYROIDISM, UNSPECIFIED TYPE: ICD-10-CM

## 2019-12-05 LAB
EKG P AXIS: -16 DEGREES
EKG P-R INTERVAL: 162 MS
EKG Q-T INTERVAL: 414 MS
EKG QRS DURATION: 74 MS
EKG QTC CALCULATION (BAZETT): 417 MS
EKG T AXIS: 14 DEGREES

## 2019-12-05 RX ORDER — LEVOTHYROXINE SODIUM 112 MCG
TABLET ORAL
Qty: 90 TABLET | Refills: 0 | Status: SHIPPED | OUTPATIENT
Start: 2019-12-05 | End: 2020-03-08 | Stop reason: SDUPTHER

## 2019-12-06 ENCOUNTER — HOSPITAL ENCOUNTER (OUTPATIENT)
Age: 59
Setting detail: OUTPATIENT SURGERY
Discharge: HOME | End: 2019-12-06
Attending: SURGERY | Admitting: SURGERY
Payer: MEDICARE

## 2019-12-06 ENCOUNTER — ANESTHESIA EVENT (OUTPATIENT)
Dept: OPERATING ROOM | Age: 59
End: 2019-12-06

## 2019-12-06 ENCOUNTER — PREP FOR PROCEDURE (OUTPATIENT)
Dept: SURGERY | Age: 59
End: 2019-12-06

## 2019-12-06 ENCOUNTER — ANESTHESIA (OUTPATIENT)
Dept: OPERATING ROOM | Age: 59
End: 2019-12-06

## 2019-12-06 ENCOUNTER — HOSPITAL ENCOUNTER (OUTPATIENT)
Age: 59
Setting detail: SPECIMEN
Discharge: HOME | End: 2019-12-06
Payer: MEDICARE

## 2019-12-06 VITALS
WEIGHT: 170 LBS | DIASTOLIC BLOOD PRESSURE: 67 MMHG | HEIGHT: 62 IN | SYSTOLIC BLOOD PRESSURE: 123 MMHG | HEART RATE: 73 BPM | TEMPERATURE: 97.8 F | BODY MASS INDEX: 31.28 KG/M2 | RESPIRATION RATE: 18 BRPM | OXYGEN SATURATION: 96 %

## 2019-12-06 VITALS
RESPIRATION RATE: 2 BRPM | SYSTOLIC BLOOD PRESSURE: 93 MMHG | DIASTOLIC BLOOD PRESSURE: 54 MMHG | OXYGEN SATURATION: 99 % | TEMPERATURE: 98 F

## 2019-12-06 DIAGNOSIS — G89.18 POST-OPERATIVE PAIN: Primary | ICD-10-CM

## 2019-12-06 PROCEDURE — G8907 PT DOC NO EVENTS ON DISCHARG: HCPCS

## 2019-12-06 PROCEDURE — G8918 PT W/O PREOP ORDER IV AB PRO: HCPCS

## 2019-12-06 PROCEDURE — 46260 REMOVE IN/EX HEM GROUPS 2+: CPT | Performed by: SURGERY

## 2019-12-06 PROCEDURE — 46260 REMOVE IN/EX HEM GROUPS 2+: CPT

## 2019-12-06 RX ORDER — PROMETHAZINE HYDROCHLORIDE 25 MG/ML
6.25 INJECTION, SOLUTION INTRAMUSCULAR; INTRAVENOUS
Status: DISCONTINUED | OUTPATIENT
Start: 2019-12-06 | End: 2019-12-06 | Stop reason: HOSPADM

## 2019-12-06 RX ORDER — METOCLOPRAMIDE HYDROCHLORIDE 5 MG/ML
10 INJECTION INTRAMUSCULAR; INTRAVENOUS
Status: DISCONTINUED | OUTPATIENT
Start: 2019-12-06 | End: 2019-12-06 | Stop reason: HOSPADM

## 2019-12-06 RX ORDER — KETOROLAC TROMETHAMINE 30 MG/ML
INJECTION, SOLUTION INTRAMUSCULAR; INTRAVENOUS PRN
Status: DISCONTINUED | OUTPATIENT
Start: 2019-12-06 | End: 2019-12-06 | Stop reason: SDUPTHER

## 2019-12-06 RX ORDER — HYDROCODONE BITARTRATE AND ACETAMINOPHEN 5; 325 MG/1; MG/1
1 TABLET ORAL EVERY 4 HOURS PRN
Status: DISCONTINUED | OUTPATIENT
Start: 2019-12-06 | End: 2019-12-06 | Stop reason: HOSPADM

## 2019-12-06 RX ORDER — BUPIVACAINE HYDROCHLORIDE AND EPINEPHRINE 2.5; 5 MG/ML; UG/ML
INJECTION, SOLUTION INFILTRATION; PERINEURAL PRN
Status: DISCONTINUED | OUTPATIENT
Start: 2019-12-06 | End: 2019-12-06 | Stop reason: ALTCHOICE

## 2019-12-06 RX ORDER — SODIUM CHLORIDE, SODIUM LACTATE, POTASSIUM CHLORIDE, CALCIUM CHLORIDE 600; 310; 30; 20 MG/100ML; MG/100ML; MG/100ML; MG/100ML
INJECTION, SOLUTION INTRAVENOUS CONTINUOUS
Status: DISCONTINUED | OUTPATIENT
Start: 2019-12-06 | End: 2019-12-06 | Stop reason: HOSPADM

## 2019-12-06 RX ORDER — HYDROCODONE BITARTRATE AND ACETAMINOPHEN 5; 325 MG/1; MG/1
2 TABLET ORAL EVERY 4 HOURS PRN
Status: DISCONTINUED | OUTPATIENT
Start: 2019-12-06 | End: 2019-12-06 | Stop reason: HOSPADM

## 2019-12-06 RX ORDER — PROPOFOL 10 MG/ML
INJECTION, EMULSION INTRAVENOUS PRN
Status: DISCONTINUED | OUTPATIENT
Start: 2019-12-06 | End: 2019-12-06 | Stop reason: SDUPTHER

## 2019-12-06 RX ORDER — HYDROMORPHONE HCL 110MG/55ML
0.5 PATIENT CONTROLLED ANALGESIA SYRINGE INTRAVENOUS EVERY 5 MIN PRN
Status: DISCONTINUED | OUTPATIENT
Start: 2019-12-06 | End: 2019-12-06 | Stop reason: HOSPADM

## 2019-12-06 RX ORDER — DIPHENHYDRAMINE HYDROCHLORIDE 50 MG/ML
12.5 INJECTION INTRAMUSCULAR; INTRAVENOUS
Status: DISCONTINUED | OUTPATIENT
Start: 2019-12-06 | End: 2019-12-06 | Stop reason: HOSPADM

## 2019-12-06 RX ORDER — HYDRALAZINE HYDROCHLORIDE 20 MG/ML
5 INJECTION INTRAMUSCULAR; INTRAVENOUS EVERY 10 MIN PRN
Status: DISCONTINUED | OUTPATIENT
Start: 2019-12-06 | End: 2019-12-06 | Stop reason: HOSPADM

## 2019-12-06 RX ORDER — MORPHINE SULFATE 10 MG/ML
4 INJECTION, SOLUTION INTRAMUSCULAR; INTRAVENOUS EVERY 5 MIN PRN
Status: DISCONTINUED | OUTPATIENT
Start: 2019-12-06 | End: 2019-12-06 | Stop reason: HOSPADM

## 2019-12-06 RX ORDER — HYDROCODONE BITARTRATE AND ACETAMINOPHEN 7.5; 325 MG/1; MG/1
2 TABLET ORAL EVERY 6 HOURS PRN
Qty: 33 TABLET | Refills: 0 | Status: SHIPPED | OUTPATIENT
Start: 2019-12-06 | End: 2019-12-13

## 2019-12-06 RX ORDER — MORPHINE SULFATE 10 MG/ML
2 INJECTION, SOLUTION INTRAMUSCULAR; INTRAVENOUS EVERY 5 MIN PRN
Status: DISCONTINUED | OUTPATIENT
Start: 2019-12-06 | End: 2019-12-06 | Stop reason: HOSPADM

## 2019-12-06 RX ORDER — ONDANSETRON 2 MG/ML
4 INJECTION INTRAMUSCULAR; INTRAVENOUS EVERY 6 HOURS PRN
Status: DISCONTINUED | OUTPATIENT
Start: 2019-12-06 | End: 2019-12-06 | Stop reason: HOSPADM

## 2019-12-06 RX ORDER — SODIUM CHLORIDE 0.9 % (FLUSH) 0.9 %
10 SYRINGE (ML) INJECTION PRN
Status: DISCONTINUED | OUTPATIENT
Start: 2019-12-06 | End: 2019-12-06 | Stop reason: HOSPADM

## 2019-12-06 RX ORDER — ONDANSETRON 2 MG/ML
INJECTION INTRAMUSCULAR; INTRAVENOUS PRN
Status: DISCONTINUED | OUTPATIENT
Start: 2019-12-06 | End: 2019-12-06 | Stop reason: SDUPTHER

## 2019-12-06 RX ORDER — ONDANSETRON 4 MG/1
8 TABLET, FILM COATED ORAL ONCE
Status: COMPLETED | OUTPATIENT
Start: 2019-12-06 | End: 2019-12-06

## 2019-12-06 RX ORDER — SODIUM CHLORIDE 0.9 % (FLUSH) 0.9 %
10 SYRINGE (ML) INJECTION EVERY 12 HOURS SCHEDULED
Status: DISCONTINUED | OUTPATIENT
Start: 2019-12-06 | End: 2019-12-06 | Stop reason: HOSPADM

## 2019-12-06 RX ORDER — LABETALOL HYDROCHLORIDE 5 MG/ML
5 INJECTION, SOLUTION INTRAVENOUS EVERY 10 MIN PRN
Status: DISCONTINUED | OUTPATIENT
Start: 2019-12-06 | End: 2019-12-06 | Stop reason: HOSPADM

## 2019-12-06 RX ORDER — HYDROMORPHONE HCL 110MG/55ML
0.25 PATIENT CONTROLLED ANALGESIA SYRINGE INTRAVENOUS EVERY 5 MIN PRN
Status: DISCONTINUED | OUTPATIENT
Start: 2019-12-06 | End: 2019-12-06 | Stop reason: HOSPADM

## 2019-12-06 RX ORDER — ENALAPRILAT 2.5 MG/2ML
1.25 INJECTION INTRAVENOUS
Status: DISCONTINUED | OUTPATIENT
Start: 2019-12-06 | End: 2019-12-06 | Stop reason: HOSPADM

## 2019-12-06 RX ORDER — HYDROCODONE BITARTRATE AND ACETAMINOPHEN 7.5; 325 MG/1; MG/1
2 TABLET ORAL EVERY 6 HOURS PRN
Status: DISCONTINUED | OUTPATIENT
Start: 2019-12-06 | End: 2019-12-06 | Stop reason: HOSPADM

## 2019-12-06 RX ORDER — MEPERIDINE HYDROCHLORIDE 25 MG/ML
12.5 INJECTION INTRAMUSCULAR; INTRAVENOUS; SUBCUTANEOUS EVERY 5 MIN PRN
Status: DISCONTINUED | OUTPATIENT
Start: 2019-12-06 | End: 2019-12-06 | Stop reason: HOSPADM

## 2019-12-06 RX ORDER — DEXAMETHASONE SODIUM PHOSPHATE 4 MG/ML
INJECTION, SOLUTION INTRA-ARTICULAR; INTRALESIONAL; INTRAMUSCULAR; INTRAVENOUS; SOFT TISSUE PRN
Status: DISCONTINUED | OUTPATIENT
Start: 2019-12-06 | End: 2019-12-06 | Stop reason: SDUPTHER

## 2019-12-06 RX ORDER — FENTANYL CITRATE 50 UG/ML
INJECTION, SOLUTION INTRAMUSCULAR; INTRAVENOUS PRN
Status: DISCONTINUED | OUTPATIENT
Start: 2019-12-06 | End: 2019-12-06 | Stop reason: SDUPTHER

## 2019-12-06 RX ORDER — LIDOCAINE HYDROCHLORIDE 10 MG/ML
INJECTION, SOLUTION INFILTRATION; PERINEURAL PRN
Status: DISCONTINUED | OUTPATIENT
Start: 2019-12-06 | End: 2019-12-06 | Stop reason: SDUPTHER

## 2019-12-06 RX ADMIN — Medication 0.5 MG: at 09:35

## 2019-12-06 RX ADMIN — ONDANSETRON 4 MG: 2 INJECTION INTRAMUSCULAR; INTRAVENOUS at 09:48

## 2019-12-06 RX ADMIN — Medication 0.5 MG: at 10:14

## 2019-12-06 RX ADMIN — SODIUM CHLORIDE, SODIUM LACTATE, POTASSIUM CHLORIDE, CALCIUM CHLORIDE: 600; 310; 30; 20 INJECTION, SOLUTION INTRAVENOUS at 09:38

## 2019-12-06 RX ADMIN — LIDOCAINE HYDROCHLORIDE 50 MG: 10 INJECTION, SOLUTION INFILTRATION; PERINEURAL at 09:41

## 2019-12-06 RX ADMIN — FENTANYL CITRATE 25 MCG: 50 INJECTION, SOLUTION INTRAMUSCULAR; INTRAVENOUS at 09:41

## 2019-12-06 RX ADMIN — DEXAMETHASONE SODIUM PHOSPHATE 4 MG: 4 INJECTION, SOLUTION INTRA-ARTICULAR; INTRALESIONAL; INTRAMUSCULAR; INTRAVENOUS; SOFT TISSUE at 09:48

## 2019-12-06 RX ADMIN — HYDROCODONE BITARTRATE AND ACETAMINOPHEN 2 TABLET: 7.5; 325 TABLET ORAL at 11:13

## 2019-12-06 RX ADMIN — PROPOFOL 150 MG: 10 INJECTION, EMULSION INTRAVENOUS at 09:41

## 2019-12-06 RX ADMIN — KETOROLAC TROMETHAMINE 30 MG: 30 INJECTION, SOLUTION INTRAMUSCULAR; INTRAVENOUS at 09:48

## 2019-12-06 RX ADMIN — FENTANYL CITRATE 25 MCG: 50 INJECTION, SOLUTION INTRAMUSCULAR; INTRAVENOUS at 09:47

## 2019-12-06 RX ADMIN — ONDANSETRON 8 MG: 4 TABLET, FILM COATED ORAL at 11:14

## 2019-12-06 ASSESSMENT — PAIN SCALES - GENERAL: PAINLEVEL_OUTOF10: 7

## 2019-12-18 ENCOUNTER — OFFICE VISIT (OUTPATIENT)
Dept: URGENT CARE | Age: 59
End: 2019-12-18
Payer: MEDICARE

## 2019-12-18 ENCOUNTER — HOSPITAL ENCOUNTER (OUTPATIENT)
Dept: CT IMAGING | Age: 59
Discharge: HOME | End: 2019-12-18
Payer: MEDICARE

## 2019-12-18 ENCOUNTER — TELEPHONE (OUTPATIENT)
Dept: PRIMARY CARE CLINIC | Age: 59
End: 2019-12-18

## 2019-12-18 VITALS
TEMPERATURE: 98.1 F | DIASTOLIC BLOOD PRESSURE: 76 MMHG | SYSTOLIC BLOOD PRESSURE: 129 MMHG | BODY MASS INDEX: 31.43 KG/M2 | HEART RATE: 72 BPM | RESPIRATION RATE: 16 BRPM | WEIGHT: 170.8 LBS | OXYGEN SATURATION: 99 % | HEIGHT: 62 IN

## 2019-12-18 DIAGNOSIS — Z87.891 PERSONAL HISTORY OF TOBACCO USE: ICD-10-CM

## 2019-12-18 DIAGNOSIS — R91.1 LUNG NODULE: Primary | ICD-10-CM

## 2019-12-18 DIAGNOSIS — R50.9 FEVER, UNSPECIFIED FEVER CAUSE: ICD-10-CM

## 2019-12-18 DIAGNOSIS — J02.9 SORE THROAT: ICD-10-CM

## 2019-12-18 DIAGNOSIS — J01.90 ACUTE SINUSITIS, RECURRENCE NOT SPECIFIED, UNSPECIFIED LOCATION: Primary | ICD-10-CM

## 2019-12-18 LAB
INFLUENZA A ANTIBODY: NEGATIVE
INFLUENZA B ANTIBODY: NEGATIVE

## 2019-12-18 PROCEDURE — 1036F TOBACCO NON-USER: CPT | Performed by: SPECIALIST

## 2019-12-18 PROCEDURE — G8427 DOCREV CUR MEDS BY ELIG CLIN: HCPCS | Performed by: SPECIALIST

## 2019-12-18 PROCEDURE — G0297 LDCT FOR LUNG CA SCREEN: HCPCS

## 2019-12-18 PROCEDURE — 87804 INFLUENZA ASSAY W/OPTIC: CPT | Performed by: SPECIALIST

## 2019-12-18 PROCEDURE — G8417 CALC BMI ABV UP PARAM F/U: HCPCS | Performed by: SPECIALIST

## 2019-12-18 PROCEDURE — 99213 OFFICE O/P EST LOW 20 MIN: CPT | Performed by: SPECIALIST

## 2019-12-18 PROCEDURE — 96372 THER/PROPH/DIAG INJ SC/IM: CPT | Performed by: SPECIALIST

## 2019-12-18 PROCEDURE — G8484 FLU IMMUNIZE NO ADMIN: HCPCS | Performed by: SPECIALIST

## 2019-12-18 PROCEDURE — 3017F COLORECTAL CA SCREEN DOC REV: CPT | Performed by: SPECIALIST

## 2019-12-18 RX ORDER — DEXAMETHASONE SODIUM PHOSPHATE 10 MG/ML
5 INJECTION INTRAMUSCULAR; INTRAVENOUS ONCE
Status: COMPLETED | OUTPATIENT
Start: 2019-12-18 | End: 2019-12-18

## 2019-12-18 RX ORDER — AMOXICILLIN 875 MG/1
875 TABLET, COATED ORAL 2 TIMES DAILY
Qty: 20 TABLET | Refills: 0 | Status: SHIPPED | OUTPATIENT
Start: 2019-12-18 | End: 2019-12-28

## 2019-12-18 RX ADMIN — DEXAMETHASONE SODIUM PHOSPHATE 5 MG: 10 INJECTION INTRAMUSCULAR; INTRAVENOUS at 10:52

## 2019-12-18 ASSESSMENT — ENCOUNTER SYMPTOMS: COUGH: 1

## 2019-12-30 ENCOUNTER — OFFICE VISIT (OUTPATIENT)
Dept: SURGERY | Age: 59
End: 2019-12-30

## 2019-12-30 VITALS
SYSTOLIC BLOOD PRESSURE: 133 MMHG | BODY MASS INDEX: 31.28 KG/M2 | TEMPERATURE: 97.6 F | DIASTOLIC BLOOD PRESSURE: 86 MMHG | HEIGHT: 62 IN | WEIGHT: 170 LBS

## 2019-12-30 DIAGNOSIS — Z98.890 S/P HEMORRHOIDECTOMY: Primary | ICD-10-CM

## 2019-12-30 DIAGNOSIS — Z87.19 S/P HEMORRHOIDECTOMY: Primary | ICD-10-CM

## 2019-12-30 PROCEDURE — 99024 POSTOP FOLLOW-UP VISIT: CPT | Performed by: SURGERY

## 2020-01-20 ENCOUNTER — OFFICE VISIT (OUTPATIENT)
Dept: SURGERY | Age: 60
End: 2020-01-20

## 2020-01-20 VITALS
HEIGHT: 62 IN | TEMPERATURE: 97.4 F | DIASTOLIC BLOOD PRESSURE: 72 MMHG | BODY MASS INDEX: 32.39 KG/M2 | SYSTOLIC BLOOD PRESSURE: 118 MMHG | WEIGHT: 176 LBS

## 2020-01-20 PROCEDURE — 99024 POSTOP FOLLOW-UP VISIT: CPT | Performed by: SURGERY

## 2020-01-20 NOTE — PROGRESS NOTES
Subjective:  Ms. Mariana Root is here to follow up for a second time after hemorrhoidectomy. She reports that she is doing well. She has continued to improve since her last appointment. She denies any on going pain or bleeding. She has still had some discharge. She is having bowel movements without any difficulty. Objective:  /72 (Site: Right Upper Arm, Position: Sitting, Cuff Size: Medium Adult)   Temp 97.4 °F (36.3 °C) (Temporal)   Ht 5' 2\" (1.575 m)   Wt 176 lb (79.8 kg)   LMP 07/11/2009   BMI 32.19 kg/m²   General: NAD, AAO  Chest: regular, non-labored  Perianal: improvement in edema, area of excision continuing to heal with healthy pink tissue    Assessment and plan:  63year old female s/p internal and external hemorrhoidectomy  1) continuing to heal appropriately.   2) continue stool softener, water and fiber as needed to keep stool soft  3) follow up in general surgery as needed, and call for any questions or concerns

## 2020-02-07 RX ORDER — ESTRADIOL 0.5 MG/1
0.5 TABLET ORAL DAILY
Qty: 90 TABLET | Refills: 3 | Status: SHIPPED | OUTPATIENT
Start: 2020-02-07 | End: 2020-02-13 | Stop reason: SDUPTHER

## 2020-02-13 ENCOUNTER — OFFICE VISIT (OUTPATIENT)
Dept: OBGYN | Age: 60
End: 2020-02-13
Payer: MEDICARE

## 2020-02-13 VITALS
WEIGHT: 173 LBS | DIASTOLIC BLOOD PRESSURE: 80 MMHG | HEIGHT: 62 IN | HEART RATE: 79 BPM | BODY MASS INDEX: 31.83 KG/M2 | SYSTOLIC BLOOD PRESSURE: 126 MMHG

## 2020-02-13 PROCEDURE — G8484 FLU IMMUNIZE NO ADMIN: HCPCS | Performed by: NURSE PRACTITIONER

## 2020-02-13 PROCEDURE — 1036F TOBACCO NON-USER: CPT | Performed by: NURSE PRACTITIONER

## 2020-02-13 PROCEDURE — G0101 CA SCREEN;PELVIC/BREAST EXAM: HCPCS | Performed by: NURSE PRACTITIONER

## 2020-02-13 PROCEDURE — 3017F COLORECTAL CA SCREEN DOC REV: CPT | Performed by: NURSE PRACTITIONER

## 2020-02-13 PROCEDURE — 99214 OFFICE O/P EST MOD 30 MIN: CPT | Performed by: NURSE PRACTITIONER

## 2020-02-13 PROCEDURE — G8427 DOCREV CUR MEDS BY ELIG CLIN: HCPCS | Performed by: NURSE PRACTITIONER

## 2020-02-13 PROCEDURE — G8417 CALC BMI ABV UP PARAM F/U: HCPCS | Performed by: NURSE PRACTITIONER

## 2020-02-13 RX ORDER — MEDROXYPROGESTERONE ACETATE 2.5 MG/1
2.5 TABLET ORAL DAILY
Qty: 90 TABLET | Refills: 3 | Status: SHIPPED | OUTPATIENT
Start: 2020-02-13 | End: 2020-03-08 | Stop reason: SDUPTHER

## 2020-02-13 RX ORDER — ESTRADIOL 0.5 MG/1
0.5 TABLET ORAL DAILY
Qty: 90 TABLET | Refills: 3 | Status: SHIPPED | OUTPATIENT
Start: 2020-02-13 | End: 2021-05-03

## 2020-02-13 ASSESSMENT — ENCOUNTER SYMPTOMS
RESPIRATORY NEGATIVE: 1
DIARRHEA: 0
EYES NEGATIVE: 1
ALLERGIC/IMMUNOLOGIC NEGATIVE: 1
GASTROINTESTINAL NEGATIVE: 1
CONSTIPATION: 0

## 2020-02-13 NOTE — PATIENT INSTRUCTIONS
feel your breast tissue before moving on to the next spot. ? Check your entire breast, moving up and down as if following a strip from the collarbone to the bra line, and from the armpit to the ribs. Repeat until you have covered the entire breast.  ? Repeat this procedure for your left breast, using the pads of the 3 middle fingers of your right hand. · To examine your breasts while in the shower:  ? Place one arm over your head and lightly soap your breast on that side. ? Using the pads of your fingers, gently move your hand over your breast (in the strip pattern described above), feeling carefully for any lumps or changes. ? Repeat for the other breast.  · Have your doctor inspect anything you notice to see if you need further testing. Where can you learn more? Go to https://AltheRx Pharmaceuticalspemasoneb.V2contact. org and sign in to your Grid Mobile account. Enter P148 in the [employer] box to learn more about \"Breast Self-Exam: Care Instructions. \"     If you do not have an account, please click on the \"Sign Up Now\" link. Current as of: August 21, 2019  Content Version: 12.3  © 3651-9835 Healthwise, Inc[employer]. Care instructions adapted under license by [facility] ([facility]). If you have questions about a medical condition or this instruction, always ask your healthcare professional. [address] any warranty or liability for your use of this information.

## 2020-02-13 NOTE — PROGRESS NOTES
Pt presents today for pap smear and breast exam.   She does not want another BMD.    Mammo:2019  Pap smear:2018  Contraception:postmenopausal    P:2  Ab:0  Bone density:  she thinks  Colonoscopy:2019

## 2020-02-13 NOTE — PROGRESS NOTES
Adrian Garcia is a 63 y.o. female who presents today for her medical conditions/ complaints as noted below. Salud Funez is c/o of Gynecologic Exam        HPI   Pt presents for annual exam and pap smear. Needs order for screening mammogram. PCP draws labs and manages meds. Sister was recently diagnosed with breast cancer- ductal carcinoma. Her sister is 6years older. Taking HRT and doing well. C/o \"heavy spotting\" about 4 months ago. Has not had any since, and denies pain. Took steroids around this time for bronchitis. Mammo:2019  Pap smear:2018  Contraception:postmenopausal    P:2  Ab:0  Bone density:  she thinks  Colonoscopy:2019  Patient's last menstrual period was 2009. [id number]    Past Medical History:   Diagnosis Date    Allergic bronchitis     has wheezing related to dust and pollen    Allergic rhinitis     Blepharitis of both eyes     Bone spur     cervical    Cervical disc disease     Chicken pox     age 26    Chronic anxiety     has been on valium after failing effexor and other medications    Coccyx disorder     history of f/x    Constipation     chronic since a teen    Degenerative disc disease at L5-S1 level     Genital warts     Dr Contreras [facility] GERD (gastroesophageal reflux disease)     Hemorrhoids     HPV in female     Hyperthyroidism     took radioactive iodine    Post menopausal syndrome 2010    Reactive airway disease with wheezing     Scoliosis     Unspecified vitamin D deficiency      Past Surgical History:   Procedure Laterality Date    [facility]     COLONOSCOPY N/A 2016    Dr Hari Gipson AP (-) dysplasia x 1, BCM x 1--3 yr recall    COLONOSCOPY N/A 2019    Dr Liborio Zuleta, large grade 3 internal and external hemorrhoids, suboptimal prep, 3 yr recall    HEMORRHOID SURGERY N/A 2019    INTERNAL AND EXTERNAL [facility]  NASAL SEPTUM SURGERY      TUBAL LIGATION      UPPER GASTROINTESTINAL ENDOSCOPY  3/3/2011     ?      Family History   Problem Relation Age of Onset    Diabetes Sister     Thyroid Disease Sister     Rheum Arthritis Sister     Other Sister         fibromyalgia    Rheum Arthritis Maternal Aunt     Colon Polyps Maternal Aunt     Heart Failure Father     Heart Disease Father          of acute MI at 58    Hypertension Mother     Cancer Mother         lung cancer    Colon Polyps Mother     High Blood Pressure Mother     Other Son         sleep apnea,anxiety and neck problems after  injuries    Breast Cancer Maternal Cousin     Colon Cancer Neg Hx     Esophageal Cancer Neg Hx     Stomach Cancer Neg Hx     Liver Cancer Neg Hx     Liver Disease Neg Hx     Rectal Cancer Neg Hx      Social History     Tobacco Use    Smoking status: Former Smoker     Packs/day: 2.00     Years: 25.00     Pack years: 50.00     Types: Cigarettes     Last attempt to quit: 2008     Years since quittin.8    Smokeless tobacco: Never Used    Tobacco comment: Quit smoking in  with Wellbutrin (had crazy dreams on it)   Substance Use Topics    Alcohol use: No     Alcohol/week: 0.0 standard drinks       Current Outpatient Medications   Medication Sig Dispense Refill    medroxyPROGESTERone (PROVERA) 2.5 MG tablet Take 1 tablet by mouth daily 90 tablet 3    estradiol (ESTRACE) 0.5 MG tablet Take 1 tablet by mouth daily 90 tablet 3    FLUoxetine (PROZAC) 10 MG capsule TAKE 1 CAPSULE BY MOUTH EVERY DAY 30 capsule 5    SYNTHROID 112 MCG tablet TAKE 1 TABLET BY MOUTH EVERY DAY IN THE MORNING 90 tablet 0    Turmeric Curcumin 500 MG CAPS Take 1 capsule by mouth daily      vitamin D (CHOLECALCIFEROL) 125 MCG (5000 UT) CAPS capsule Take 5,000 Units by mouth daily      Multiple Vitamins-Minerals (THERAPEUTIC MULTIVITAMIN-MINERALS) tablet Take 1 tablet by mouth daily      pantoprazole (PROTONIX) 40 MG tablet Take 1 tablet by mouth every morning (before breakfast) 90 tablet 3    montelukast (SINGULAIR) 10 MG tablet TAKE 1 TABLET BY MOUTH NIGHTLY 90 tablet 2    levocetirizine (XYZAL) 5 MG tablet Take 1 tablet by mouth daily 30 tablet 5    fluticasone (FLONASE) 50 MCG/ACT nasal spray USE 2 SPRAYS IN EACH NOSTRIL DAILY 1 Bottle 6    PROAIR  (90 Base) MCG/ACT inhaler INHALE 2 PUFFS INTO THE LUNGS EVERY 6 HOURS AS NEEDED FOR WHEEZING 8.5 Inhaler 1    EPINEPHrine (EPIPEN) 0.3 MG/0.3ML SOAJ injection USE AS DIRECTED FOR ACUTE  ALLERGIC REACTION  1    epinastine (ELESTAT) 0.05 % SOLN INSTILL 1 DROP IN EACH EYE ONE TO TWICE A DAY AS NEEDED  3    naproxen (NAPROXEN) 500 MG EC tablet Take 1 tablet by mouth every 12 hours Generic is okay 60 tablet 11     No current facility-administered medications for this visit. Allergies   Allergen Reactions    Pollen Extract      Early Spring and early Fall    Effexor [facility] Hydrochloride] Other (See Comments)     Slurred speech, feelings of having a TIA, seeing double, unaware of her surroundings    Poison Ivy Extract [Poison Ivy Extract] Itching     Vitals:    02/13/20 1031   BP: 126/80   Pulse: 79     Body mass index is 31.64 kg/m². Review of Systems   Constitutional: Negative. HENT: Negative. Eyes: Negative. Respiratory: Negative. Cardiovascular: Negative. Gastrointestinal: Negative. Negative for constipation and diarrhea. Endocrine: Negative. Genitourinary: Positive for menstrual problem (heavy spotting about 4 months ago). Negative for frequency and urgency. Musculoskeletal: Negative. Skin: Negative. Allergic/Immunologic: Negative. Neurological: Negative. Hematological: Negative. Psychiatric/Behavioral: Negative. All other systems reviewed and are negative. Physical Exam  Vitals signs and nursing note reviewed. Constitutional:       Appearance: She is well-developed. HENT:      Head: Normocephalic. Neck:      Musculoskeletal: Normal range of motion and neck supple. Thyroid: No thyroid mass or thyromegaly. Cardiovascular:      Rate and Rhythm: Normal rate and regular rhythm. Pulmonary:      Effort: Pulmonary effort is normal.      Breath sounds: Normal breath sounds. Chest:      Breasts:         Right: No inverted nipple, mass, nipple discharge or skin change. Left: No inverted nipple, mass, nipple discharge or skin change. Abdominal:      Palpations: Abdomen is soft. There is no mass. Tenderness: There is no abdominal tenderness. Genitourinary:     General: Normal vulva. Vagina: Normal.      Cervix: No cervical motion tenderness. Uterus: Normal. Not enlarged. Adnexa:         Right: No mass or tenderness. Left: No mass or tenderness. Comments: Pap collected  Atrophic changes  External os pinpoint  Musculoskeletal: Normal range of motion. Skin:     General: Skin is warm and dry. Neurological:      Mental Status: She is alert and oriented to person, place, and time. Psychiatric:         Attention and Perception: Attention normal.         Mood and Affect: Mood normal.         Speech: Speech normal.         Behavior: Behavior normal.         Thought Content: Thought content normal.         Cognition and Memory: Cognition normal.         Judgment: Judgment normal.          Diagnosis Orders   1. Women's annual routine gynecological examination     2. Screening for cervical cancer  PAP SMEAR    Human papillomavirus (HPV) DNA probe thin prep high risk   3. Screening for breast cancer  JAME DIGITAL SCREEN W CAD BILATERAL   4. Postmenopausal bleeding  US Non OB Transvaginal   5.  Symptomatic menopausal or female climacteric states         MEDICATIONS:  Orders Placed This Encounter   Medications    medroxyPROGESTERone (PROVERA) 2.5 MG tablet     Sig: Take 1 tablet by mouth daily     Dispense:  90 tablet     Refill:  3    estradiol (ESTRACE) 0.5 MG tablet Sig: Take 1 tablet by mouth daily     Dispense:  90 tablet     Refill:  3       ORDERS:  Orders Placed This Encounter   Procedures    JAME DIGITAL SCREEN W CAD BILATERAL    US Non OB Transvaginal    PAP SMEAR    Human papillomavirus (HPV) DNA probe thin prep high risk       PLAN:  Pap collected  Ordered screening mammogram  Ordered TVUS  Refilled HRT    Patient Instructions   Patient Education        Breast Self-Exam: Care Instructions  Your Care Instructions    A breast self-exam is when you check your breasts for lumps or changes. This regular exam helps you learn how your breasts normally look and feel. Most breast problems or changes are not because of cancer. Breast self-exam is not a substitute for a mammogram. Having regular breast exams by your doctor and regular mammograms improve your chances of finding any problems with your breasts. Some women set a time each month to do a step-by-step breast self-exam. Other women like a less formal system. They might look at their breasts as they brush their teeth, or feel their breasts once in a while in the shower. If you notice a change in your breast, tell your doctor. Follow-up care is a key part of your treatment and safety. Be sure to make and go to all appointments, and call your doctor if you are having problems. It's also a good idea to know your test results and keep a list of the medicines you take. How do you do a breast self-exam?  · The best time to examine your breasts is usually one week after your menstrual period begins. Your breasts should not be tender then. If you do not have periods, you might do your exam on a day of the month that is easy to remember. · To examine your breasts:  ? Remove all your clothes above the waist and lie down. When you are lying down, your breast tissue spreads evenly over your chest wall, which makes it easier to feel all your breast tissue. ?  Use the pads--not the fingertips--of the 3 middle fingers of

## 2020-02-19 LAB
HPV COMMENT: NORMAL
HPV TYPE 16: NOT DETECTED
HPV TYPE 18: NOT DETECTED
HPVOH (OTHER TYPES): NOT DETECTED

## 2020-02-24 ENCOUNTER — HOSPITAL ENCOUNTER (OUTPATIENT)
Dept: ULTRASOUND IMAGING | Age: 60
Discharge: HOME | End: 2020-02-24
Payer: MEDICARE

## 2020-02-24 ENCOUNTER — TELEPHONE (OUTPATIENT)
Dept: OBGYN | Age: 60
End: 2020-02-24

## 2020-02-24 PROCEDURE — 76830 TRANSVAGINAL US NON-OB: CPT

## 2020-02-24 NOTE — TELEPHONE ENCOUNTER
----- Message from ANGEL Colin CNP sent at 2/24/2020 11:55 AM CST -----  Had some spotting several months ago. Please let her know her uterine lining is thickened, and she needs to have a biopsy to rule out any abnormal cells.  Thanks ML

## 2020-03-02 ENCOUNTER — PROCEDURE VISIT (OUTPATIENT)
Dept: OBGYN | Age: 60
End: 2020-03-02
Payer: MEDICARE

## 2020-03-02 VITALS
HEIGHT: 62 IN | DIASTOLIC BLOOD PRESSURE: 76 MMHG | SYSTOLIC BLOOD PRESSURE: 127 MMHG | WEIGHT: 176 LBS | HEART RATE: 76 BPM | BODY MASS INDEX: 32.39 KG/M2

## 2020-03-02 PROCEDURE — 58100 BIOPSY OF UTERUS LINING: CPT | Performed by: NURSE PRACTITIONER

## 2020-03-02 PROCEDURE — G8417 CALC BMI ABV UP PARAM F/U: HCPCS | Performed by: NURSE PRACTITIONER

## 2020-03-02 PROCEDURE — 3017F COLORECTAL CA SCREEN DOC REV: CPT | Performed by: NURSE PRACTITIONER

## 2020-03-02 PROCEDURE — G8484 FLU IMMUNIZE NO ADMIN: HCPCS | Performed by: NURSE PRACTITIONER

## 2020-03-02 PROCEDURE — 1036F TOBACCO NON-USER: CPT | Performed by: NURSE PRACTITIONER

## 2020-03-02 PROCEDURE — 99213 OFFICE O/P EST LOW 20 MIN: CPT | Performed by: NURSE PRACTITIONER

## 2020-03-02 PROCEDURE — G8427 DOCREV CUR MEDS BY ELIG CLIN: HCPCS | Performed by: NURSE PRACTITIONER

## 2020-03-02 ASSESSMENT — ENCOUNTER SYMPTOMS
DIARRHEA: 0
ALLERGIC/IMMUNOLOGIC NEGATIVE: 1
RESPIRATORY NEGATIVE: 1
EYES NEGATIVE: 1
CONSTIPATION: 0
GASTROINTESTINAL NEGATIVE: 1

## 2020-03-02 NOTE — PATIENT INSTRUCTIONS
Patient Education        Endometrial Biopsy: About This Test  What is it? An endometrial biopsy is a way for your doctor to take a small sample of the lining of the uterus (endometrium). The sample is looked at under a microscope for abnormal cells. An endometrial biopsy helps your doctor find problems in the endometrium. Why is this test done? An endometrial biopsy is done to check for cancer of the uterus. The test is also done if you have abnormal bleeding from your uterus. The test results show how your body's hormones are affecting the lining of the uterus, such as during menopause. How do you prepare for the test?  · If you take aspirin or some other blood thinner, ask your doctor if you should stop taking it before your test. Make sure that you understand exactly what your doctor wants you to do. These medicines increase the risk of bleeding. · Ask your doctor if you should take a pain reliever, such as ibuprofen (Advil or Motrin), 30 to 60 minutes before the test. This can help reduce any cramping pain that the test can cause. How is the test done? · You will lie on an exam table. Your feet will be in stirrups. · The doctor may use a spray or injection to numb your cervix. The cervix is the opening to the uterus. · The doctor will use a tool called a speculum to see the cervix. · Then the doctor will pass a thin tube through the cervix to take a sample of the uterus lining. · The sample is sent to a lab. How long does the test take? The test will take about 5 to 15 minutes. What happens after the test?  · You will probably be able to go home right away. · You likely will have mild vaginal bleeding and may have cramps for a few days after the test. The cramps may feel like bad menstrual cramps. How can you care for yourself at home? · Ask your doctor if you can take an over-the-counter pain medicine, such as acetaminophen (Tylenol), ibuprofen (Advil, Motrin), or naproxen (Aleve).  Be safe

## 2020-03-02 NOTE — PROGRESS NOTES
Haley Kellogg is a 63 y.o. female who presents today for her medical conditions/ complaints as noted below. Salud Funez is c/o of Procedure (EMB)        HPI  Pt presents for f/u on PMB and endometrial thickening on u/s. Had episode of PMB a few months ago after taking a round of steroids. Has not had any bleeding since. Patient's last menstrual period was 07/11/2009. [id number]    Past Medical History:   Diagnosis Date    Allergic bronchitis     has wheezing related to dust and pollen    Allergic rhinitis     Blepharitis of both eyes     Bone spur     cervical    Cervical disc disease     Chicken pox     age 26    Chronic anxiety 2006    has been on valium after failing effexor and other medications    Coccyx disorder     history of f/x    Constipation     chronic since a teen    Degenerative disc disease at L5-S1 level     Genital warts     Dr Hernandez Friend GERD (gastroesophageal reflux disease)     Hemorrhoids     HPV in female     Hyperthyroidism     took radioactive iodine    Post menopausal syndrome 2010    Reactive airway disease with wheezing     Scoliosis     Unspecified vitamin D deficiency      Past Surgical History:   Procedure Laterality Date    COLONOSCOPY  9/16/2011    [facility]?     COLONOSCOPY N/A 11/11/2016    Dr Merline Almonte AP (-) dysplasia x 1, BCM x 1--3 yr recall    COLONOSCOPY N/A 11/11/2019    Dr Malvin Mcfarland, large grade 3 internal and external hemorrhoids, suboptimal prep, 3 yr recall    HEMORRHOID SURGERY N/A 12/6/2019    INTERNAL AND EXTERNAL HEMORRHOIDECTOMY performed by Julio Bowling MD at [address] [facility] GASTROINTESTINAL ENDOSCOPY  3/3/2011     ?      Family History   Problem Relation Age of Onset    Diabetes Sister     Thyroid Disease Sister     Rheum Arthritis Sister     Other Sister         fibromyalgia    Rheum Arthritis Maternal Aunt     Colon Polyps Maternal Aunt  Heart Failure Father     Heart Disease Father          of acute MI at 58    Hypertension Mother     Cancer Mother         lung cancer    Colon Polyps Mother     High Blood Pressure Mother     Other Son         sleep apnea,anxiety and neck problems after  injuries    Breast Cancer Maternal Cousin     Colon Cancer Neg Hx     Esophageal Cancer Neg Hx     Stomach Cancer Neg Hx     Liver Cancer Neg Hx     Liver Disease Neg Hx     Rectal Cancer Neg Hx      Social History     Tobacco Use    Smoking status: Former Smoker     Packs/day: 2.00     Years: 25.00     Pack years: 50.00     Types: Cigarettes     Last attempt to quit: 2008     Years since quittin.9    Smokeless tobacco: Never Used    Tobacco comment: Quit smoking in  with Wellbutrin (had crazy dreams on it)   Substance Use Topics    Alcohol use: No     Alcohol/week: 0.0 standard drinks       Current Outpatient Medications   Medication Sig Dispense Refill    medroxyPROGESTERone (PROVERA) 2.5 MG tablet Take 1 tablet by mouth daily 90 tablet 3    estradiol (ESTRACE) 0.5 MG tablet Take 1 tablet by mouth daily 90 tablet 3    FLUoxetine (PROZAC) 10 MG capsule TAKE 1 CAPSULE BY MOUTH EVERY DAY 30 capsule 5    SYNTHROID 112 MCG tablet TAKE 1 TABLET BY MOUTH EVERY DAY IN THE MORNING 90 tablet 0    Turmeric Curcumin 500 MG CAPS Take 1 capsule by mouth daily      vitamin D (CHOLECALCIFEROL) 125 MCG (5000 UT) CAPS capsule Take 5,000 Units by mouth daily      Multiple Vitamins-Minerals (THERAPEUTIC MULTIVITAMIN-MINERALS) tablet Take 1 tablet by mouth daily      pantoprazole (PROTONIX) 40 MG tablet Take 1 tablet by mouth every morning (before breakfast) 90 tablet 3    montelukast (SINGULAIR) 10 MG tablet TAKE 1 TABLET BY MOUTH NIGHTLY 90 tablet 2    levocetirizine (XYZAL) 5 MG tablet Take 1 tablet by mouth daily 30 tablet 5    fluticasone (FLONASE) 50 MCG/ACT nasal spray USE 2 SPRAYS IN EACH NOSTRIL DAILY 1 Bottle 6    Cuff Size: Medium Adult)   Pulse 76   Ht 5' 2\" (1.575 m)   Wt 176 lb (79.8 kg)   LMP 07/11/2009   Breastfeeding No   BMI 32.19 kg/m²     Endometrial Biopsy Procedure Note    Pre-operative Diagnosis: PMB    Post-operative Diagnosis: normal    Indications: postmenopausal bleeding    Procedure Details    Urine pregnancy test was not done. The risks (including infection, bleeding, pain, and uterine perforation) and benefits of the procedure were explained to the patient and Written informed consent was obtained. The patient was placed in the dorsal lithotomy position. Speculum inserted in the vagina, and the cervix prepped with povidone iodine. Single tooth tenaculum applied to anterior cervical lip. Uterus was sounded to 9cm. Pipelle endometrial aspirator was inserted and gentle pressure applied. Adequate tissue sample obtained. Sent for pathology. Pt tolerated well. Condition:  Stable    Complications:  None    Plan:    The patient was advised to call for any fever or for prolonged or severe pain or bleeding. She was advised to use OTC ibuprofen as needed for mild to moderate pain. She was advised to avoid vaginal intercourse for 48 hours or until the bleeding has completely stopped. Musculoskeletal: Normal range of motion. Skin:     General: Skin is warm and dry. Neurological:      Mental Status: She is alert and oriented to person, place, and time. Psychiatric:         Attention and Perception: Attention normal.         Mood and Affect: Mood normal.         Speech: Speech normal.         Behavior: Behavior normal.         Thought Content: Thought content normal.         Cognition and Memory: Cognition normal.         Judgment: Judgment normal.          Diagnosis Orders   1. Postmenopausal bleeding  Surgical Pathology    54314 - [place] BIOPSY OF UTERUS LINING   2.  Abnormal vaginal bleeding with endometrial thickness greater than 5 mm present on transvaginal ultrasound in postmenopausal patient  Surgical Pathology    58315 - [place] BIOPSY OF UTERUS LINING       MEDICATIONS:  No orders of the defined types were placed in this encounter. ORDERS:  Orders Placed This Encounter   Procedures    Surgical Pathology    20212 - [place] BIOPSY OF UTERUS LINING     EXAM: Transvaginal pelvic ultrasound   INDICATION: Postmenopausal bleeding   COMPARISON: 7/11/2016   FINDINGS:   The uterine body measures 7.5 x 4.0 x 4.3 cm and demonstrates no focal   abnormality. The endometrial stripe is thickened and heterogeneous   measuring 12 mm. The right ovary was unable to be visualized. The left ovary measures   1.7 x 1.4 x 1.0 cm and demonstrates normal sonographic appearance and   Doppler flow. No free pelvic fluid identified.       Impression   1. Thickened endometrial stripe. Differential diagnosis includes   endometrial hyperplasia, neoplasm, and polyp. 2. RIGHT ovary was unable to be visualized. Signed by Dr Elliot Simmonds on 2/24/2020 11:43 AM           PLAN:  Endo bx done, pt tolerated well  Discussed differential dx with pt understanding  Will call with results and form plan- expectant management vs D&C    Patient Instructions   Patient Education        Endometrial Biopsy: About This Test  What is it? An endometrial biopsy is a way for your doctor to take a small sample of the lining of the uterus (endometrium). The sample is looked at under a microscope for abnormal cells. An endometrial biopsy helps your doctor find problems in the endometrium. Why is this test done? An endometrial biopsy is done to check for cancer of the uterus. The test is also done if you have abnormal bleeding from your uterus. The test results show how your body's hormones are affecting the lining of the uterus, such as during menopause.   How do you prepare for the test?  · If you take aspirin or some other blood thinner, ask your doctor if you should stop taking it before your test. Make sure that you understand exactly what your doctor wants you to do. These medicines increase the risk of bleeding. · Ask your doctor if you should take a pain reliever, such as ibuprofen (Advil or Motrin), 30 to 60 minutes before the test. This can help reduce any cramping pain that the test can cause. How is the test done? · You will lie on an exam table. Your feet will be in stirrups. · The doctor may use a spray or injection to numb your cervix. The cervix is the opening to the uterus. · The doctor will use a tool called a speculum to see the cervix. · Then the doctor will pass a thin tube through the cervix to take a sample of the uterus lining. · The sample is sent to a lab. How long does the test take? The test will take about 5 to 15 minutes. What happens after the test?  · You will probably be able to go home right away. · You likely will have mild vaginal bleeding and may have cramps for a few days after the test. The cramps may feel like bad menstrual cramps. How can you care for yourself at home? · Ask your doctor if you can take an over-the-counter pain medicine, such as acetaminophen (Tylenol), ibuprofen (Advil, Motrin), or naproxen (Aleve). Be safe with medicines. Read and follow all instructions on the label. · Use pads or tampons for vaginal bleeding or discharge. · You may return to all your usual activities (including sex) when you feel like it. Follow-up care is a key part of your treatment and safety. Be sure to make and go to all appointments, and call your doctor if you are having problems. It's also a good idea to keep a list of the medicines you take. Ask your doctor when you can expect to have your test results. Where can you learn more? Go to https://[employer]. org and sign in to your [employer] account. Enter [phone] in the [employer]enc[employer] box to learn more about \"Endometrial Biopsy: About This Test.\"     If you do not have an account, please click on the \"Sign Up Now\" link.   Current as of: February 19, 2019  Content Version: 12.3  © 4260-8952 Healthwise, Incorporated. Care instructions adapted under license by [facility] ([place] [facility]). If you have questions about a medical condition or this instruction, always ask your healthcare professional. [address] any warranty or liability for your use of this information.

## 2020-03-08 ENCOUNTER — PATIENT MESSAGE (OUTPATIENT)
Dept: OBGYN | Age: 60
End: 2020-03-08

## 2020-03-09 RX ORDER — MEDROXYPROGESTERONE ACETATE 2.5 MG/1
2.5 TABLET ORAL DAILY
Qty: 90 TABLET | Refills: 3 | Status: SHIPPED | OUTPATIENT
Start: 2020-03-09 | End: 2020-03-23 | Stop reason: SDUPTHER

## 2020-03-09 NOTE — TELEPHONE ENCOUNTER
From: Nisha Cifuentes  To: ANGEL Denis CNP  Sent: 3/8/2020 11:37 AM CDT  Subject: Test Results Question    Im wondering what my results where!  Fatmata had spotting when I wipe myself have been all week even today so I'm wondering if that's normal?

## 2020-03-10 ENCOUNTER — OFFICE VISIT (OUTPATIENT)
Dept: URGENT CARE | Age: 60
End: 2020-03-10
Payer: MEDICARE

## 2020-03-10 VITALS
RESPIRATION RATE: 18 BRPM | DIASTOLIC BLOOD PRESSURE: 90 MMHG | WEIGHT: 176.2 LBS | HEIGHT: 62 IN | HEART RATE: 74 BPM | SYSTOLIC BLOOD PRESSURE: 146 MMHG | OXYGEN SATURATION: 99 % | TEMPERATURE: 97.5 F | BODY MASS INDEX: 32.42 KG/M2

## 2020-03-10 LAB
INFLUENZA A ANTIBODY: NEGATIVE
INFLUENZA B ANTIBODY: NEGATIVE
S PYO AG THROAT QL: NORMAL

## 2020-03-10 PROCEDURE — 99213 OFFICE O/P EST LOW 20 MIN: CPT | Performed by: NURSE PRACTITIONER

## 2020-03-10 PROCEDURE — 87880 STREP A ASSAY W/OPTIC: CPT | Performed by: NURSE PRACTITIONER

## 2020-03-10 PROCEDURE — 87804 INFLUENZA ASSAY W/OPTIC: CPT | Performed by: NURSE PRACTITIONER

## 2020-03-10 RX ORDER — METHYLPREDNISOLONE 4 MG/1
TABLET ORAL
Qty: 1 KIT | Refills: 0 | Status: SHIPPED | OUTPATIENT
Start: 2020-03-10 | End: 2020-03-16

## 2020-03-10 RX ORDER — AMOXICILLIN AND CLAVULANATE POTASSIUM 875; 125 MG/1; MG/1
1 TABLET, FILM COATED ORAL 2 TIMES DAILY
Qty: 20 TABLET | Refills: 0 | Status: SHIPPED | OUTPATIENT
Start: 2020-03-10 | End: 2020-03-20

## 2020-03-10 ASSESSMENT — ENCOUNTER SYMPTOMS
NAUSEA: 0
ABDOMINAL PAIN: 0
EYES NEGATIVE: 1
SINUS PRESSURE: 0
RHINORRHEA: 1
DIARRHEA: 0
SORE THROAT: 0
COUGH: 1
SINUS PAIN: 1
SHORTNESS OF BREATH: 0
VOMITING: 0

## 2020-03-10 ASSESSMENT — VISUAL ACUITY: OU: 1

## 2020-03-10 NOTE — PROGRESS NOTES
[address] [facility] URGENT CARE  [address]  [address] [place] [address]  Dept: [phone]  Loc: [phone]    Lindsay Elise is a 63 y.o. female who presents today for her medical conditions/complaintsas noted below. Salud Funez is c/o of Congestion        HPI:     URI    This is a new problem. The current episode started yesterday. The problem has been unchanged. There has been no fever. Associated symptoms include congestion, coughing, headaches, rhinorrhea, sinus pain and sneezing. Pertinent negatives include no abdominal pain, diarrhea, nausea, rash, sore throat or vomiting. Associated symptoms comments: Bilateral ear pain. Treatments tried: Singulair, Xyzal daily. The treatment provided mild relief. She has had a headache and slight dizziness for 2 weeks, then 2 days ago she began having runny nose, drainage, and sneezing. She is taking Xyzal and Singulair daily. She denies fever, chills or body aches. Past Medical History:   Diagnosis Date    Allergic bronchitis     has wheezing related to dust and pollen    Allergic rhinitis     Blepharitis of both eyes     Bone spur     cervical    Cervical disc disease     Chicken pox     age 26    Chronic anxiety 2006    has been on valium after failing effexor and other medications    Coccyx disorder     history of f/x    Constipation     chronic since a teen    Degenerative disc disease at L5-S1 level     Genital warts     Dr Ramesh Serna GERD (gastroesophageal reflux disease)     Hemorrhoids     HPV in female     Hyperthyroidism     took radioactive iodine    Post menopausal syndrome 2010    Reactive airway disease with wheezing     Scoliosis     Unspecified vitamin D deficiency      Past Surgical History:   Procedure Laterality Date    COLONOSCOPY  9/16/2011    WB?     COLONOSCOPY N/A 11/11/2016    Dr Marie Rasheed AP (-) dysplasia x 1, BCM x 1--3 yr recall    COLONOSCOPY N/A 11/11/2019 1 TABLET BY MOUTH EVERY DAY IN THE MORNING 90 tablet 2    montelukast (SINGULAIR) 10 MG tablet Take 1 tablet by mouth nightly 90 tablet 2    estradiol (ESTRACE) 0.5 MG tablet Take 1 tablet by mouth daily 90 tablet 3    FLUoxetine (PROZAC) 10 MG capsule TAKE 1 CAPSULE BY MOUTH EVERY DAY 30 capsule 5    Turmeric Curcumin 500 MG CAPS Take 1 capsule by mouth daily      vitamin D (CHOLECALCIFEROL) 125 MCG (5000 UT) CAPS capsule Take 5,000 Units by mouth daily      Multiple Vitamins-Minerals (THERAPEUTIC MULTIVITAMIN-MINERALS) tablet Take 1 tablet by mouth daily      pantoprazole (PROTONIX) 40 MG tablet Take 1 tablet by mouth every morning (before breakfast) 90 tablet 3    fluticasone (FLONASE) 50 MCG/ACT nasal spray USE 2 SPRAYS IN EACH NOSTRIL DAILY 1 Bottle 6    PROAIR  (90 Base) MCG/ACT inhaler INHALE 2 PUFFS INTO THE LUNGS EVERY 6 HOURS AS NEEDED FOR WHEEZING 8.5 Inhaler 1    EPINEPHrine (EPIPEN) 0.3 MG/0.3ML SOAJ injection USE AS DIRECTED FOR ACUTE  ALLERGIC REACTION  1    epinastine (ELESTAT) 0.05 % SOLN INSTILL 1 DROP IN EACH EYE ONE TO TWICE A DAY AS NEEDED  3    naproxen (NAPROXEN) 500 MG EC tablet Take 1 tablet by mouth every 12 hours Generic is okay 60 tablet 11     No current facility-administered medications for this visit.       Allergies   Allergen Reactions    Pollen Extract      Early Spring and early Fall    Effexor [facility] Hydrochloride] Other (See Comments)     Slurred speech, feelings of having a TIA, seeing double, unaware of her surroundings   [address] Extract] Itching       Health Maintenance   Topic Date Due    Shingles Vaccine (1 of 2) 11/17/2011    Flu vaccine (1) 09/01/2019    Annual Wellness Visit (AWV)  12/03/2020    TSH testing  12/03/2020    Low dose CT lung screening  12/18/2020    Breast cancer screen  05/13/2021    Colon cancer screen colonoscopy  11/11/2022    DTaP/Tdap/Td vaccine (2 - Td) 01/23/2023    Lipid screen  10/14/2024 improve. Orders Placed This Encounter   Medications    amoxicillin-clavulanate (AUGMENTIN) 875-125 MG per tablet     Sig: Take 1 tablet by mouth 2 times daily for 10 days     Dispense:  20 tablet     Refill:  0    methylPREDNISolone (MEDROL DOSEPACK) 4 MG tablet     Sig: Take by mouth. Dispense:  1 kit     Refill:  0        Patient Instructions     Plenty of fluids  Rest  OTC Tylenol or Motrin as needed  Augmentin as prescribed  Medrol dosepack as directed  Continue daily allergy medications  Follow up with PCP or return to Urgent Care for worsening or unresolved symptoms. Patient Education        Seasonal Allergies: Care Instructions  Your Care Instructions  Allergies occur when your body's defense system (immune system) overreacts to certain substances. The immune system treats a harmless substance as if it were a harmful germ or virus. Many things can cause this to happen. Examples include pollens, medicine, food, dust, animal dander, and mold. Your allergies are seasonal if you have symptoms just at certain times of the year. In that case, you are probably allergic to pollens from certain trees, grasses, or weeds. Allergies can be mild or severe. Over-the-counter allergy medicine may help with some symptoms. Read and follow all instructions on the label. Managing your allergies is an important part of staying healthy. Your doctor may suggest that you have tests to help find the cause of your allergies. When you know what things trigger your symptoms, you can avoid them. This can prevent allergy symptoms and other health problems. In some cases, immunotherapy might help. For this treatment, you get shots or use pills that have a small amount of certain allergens in them. Your body \"gets used to\" the allergen, so you react less to it over time. This kind of treatment may help prevent or reduce some allergy symptoms. Follow-up care is a key part of your treatment and safety.  Be sure to make and go to all appointments, and call your doctor if you are having problems. It's also a good idea to know your test results and keep a list of the medicines you take. How can you care for yourself at home? · Be safe with medicines. Take your medicines exactly as prescribed. Call your doctor if you think you are having a problem with your medicine. · During your allergy season, keep windows closed. If you need to use air-conditioning, change or clean all filters every month. Take a shower and change your clothes after you have been outside. · Stay inside when pollen counts are high. Vacuum once or twice a week. Use a vacuum  with a HEPA filter or a double-thickness filter. When should you call for help? Give an epinephrine shot if:    · You think you are having a severe allergic reaction.    After giving an epinephrine shot, call  911, even if you feel better.   Call 911 if:    · You have symptoms of a severe allergic reaction. These may include:  ? Sudden raised, red areas (hives) all over your body. ? Swelling of the throat, mouth, lips, or tongue. ? Trouble breathing. ? Passing out (losing consciousness). Or you may feel very lightheaded or suddenly feel weak, confused, or restless.     · You have been given an epinephrine shot, even if you feel better.    Call your doctor now or seek immediate medical care if:    · You have symptoms of an allergic reaction, such as:  ? A rash or hives (raised, red areas on the skin). ? Itching. ? Swelling. ? Belly pain, nausea, or vomiting.    Watch closely for changes in your health, and be sure to contact your doctor if:    · You do not get better as expected. Where can you learn more? Go to https://McKinnon & [email or web address] org and sign in to your Dot VN account. Enter J912 in the [employer] box to learn more about \"Seasonal Allergies: Care Instructions. \"     If you do not have an account, please click on the \"Sign Up Now\" link.   Current as medicine. · Use saline (saltwater) nasal washes to help keep your nasal passages open and wash out mucus and bacteria. You can buy saline nose drops at a grocery store or drugstore. Or you can make your own at home by adding 1 teaspoon of salt and 1 teaspoon of baking soda to 2 cups of distilled water. If you make your own, fill a bulb syringe with the solution, insert the tip into your nostril, and squeeze gently. Ashlee Rivera your nose. When should you call for help? Call your doctor now or seek immediate medical care if:    · You are having trouble breathing.    Watch closely for changes in your health, and be sure to contact your doctor if:    · Mucus from your nose gets thicker (like pus) or has new blood in it.     · You have new or worse symptoms.     · You do not get better as expected. Where can you learn more? Go to https://Anatole.FTAPI Software. org and sign in to your Keynoir account. Enter M030 in the Sopogy box to learn more about \"Rhinitis: Care Instructions. \"     If you do not have an account, please click on the \"Sign Up Now\" link. Current as of: July 28, 2019  Content Version: 12.3  © 8909-1197 [facility]. Care instructions adapted under license by [facility] ([facility]). If you have questions about a medical condition or this instruction, always ask your healthcare professional. [place] [address] any warranty or liability for your use of this information. Patient Education        Middle Ear Fluid: Care Instructions  Your Care Instructions    Fluid often builds up inside the ear during a cold or allergies. Usually the fluid drains away, but sometimes a small tube in the ear, called the eustachian tube, stays blocked for months. Symptoms of fluid buildup may include:  · Popping, ringing, or a feeling of fullness or pressure in the ear. · Trouble hearing. · Balance problems and dizziness. In most cases, you can treat yourself at home.   Follow-up

## 2020-03-10 NOTE — PATIENT INSTRUCTIONS
Plenty of fluids  Rest  OTC Tylenol or Motrin as needed  Augmentin as prescribed  Medrol dosepack as directed  Continue daily allergy medications  Follow up with PCP or return to Urgent Care for worsening or unresolved symptoms. Patient Education        Seasonal Allergies: Care Instructions  Your Care Instructions  Allergies occur when your body's defense system (immune system) overreacts to certain substances. The immune system treats a harmless substance as if it were a harmful germ or virus. Many things can cause this to happen. Examples include pollens, medicine, food, dust, animal dander, and mold. Your allergies are seasonal if you have symptoms just at certain times of the year. In that case, you are probably allergic to pollens from certain trees, grasses, or weeds. Allergies can be mild or severe. Over-the-counter allergy medicine may help with some symptoms. Read and follow all instructions on the label. Managing your allergies is an important part of staying healthy. Your doctor may suggest that you have tests to help find the cause of your allergies. When you know what things trigger your symptoms, you can avoid them. This can prevent allergy symptoms and other health problems. In some cases, immunotherapy might help. For this treatment, you get shots or use pills that have a small amount of certain allergens in them. Your body \"gets used to\" the allergen, so you react less to it over time. This kind of treatment may help prevent or reduce some allergy symptoms. Follow-up care is a key part of your treatment and safety. Be sure to make and go to all appointments, and call your doctor if you are having problems. It's also a good idea to know your test results and keep a list of the medicines you take. How can you care for yourself at home? · Be safe with medicines. Take your medicines exactly as prescribed. Call your doctor if you think you are having a problem with your medicine.   · During your allergy season, keep windows closed. If you need to use air-conditioning, change or clean all filters every month. Take a shower and change your clothes after you have been outside. · Stay inside when pollen counts are high. Vacuum once or twice a week. Use a vacuum  with a HEPA filter or a double-thickness filter. When should you call for help? Give an epinephrine shot if:    · You think you are having a severe allergic reaction.    After giving an epinephrine shot, call  911, even if you feel better.   Call 911 if:    · You have symptoms of a severe allergic reaction. These may include:  ? Sudden raised, red areas (hives) all over your body. ? Swelling of the throat, mouth, lips, or tongue. ? Trouble breathing. ? Passing out (losing consciousness). Or you may feel very lightheaded or suddenly feel weak, confused, or restless.     · You have been given an epinephrine shot, even if you feel better.    Call your doctor now or seek immediate medical care if:    · You have symptoms of an allergic reaction, such as:  ? A rash or hives (raised, red areas on the skin). ? Itching. ? Swelling. ? Belly pain, nausea, or vomiting.    Watch closely for changes in your health, and be sure to contact your doctor if:    · You do not get better as expected. Where can you learn more? Go to https://musiXmatchpeIncident Technologies.Pombai. org and sign in to your Primcogent Solutions account. Enter J912 in the [facility] box to learn more about \"Seasonal Allergies: Care Instructions. \"     If you do not have an account, please click on the \"Sign Up Now\" link. Current as of: April 7, 2019  Content Version: 12.3  © 1834-2529 Healthwise, [employer]. Care instructions adapted under license by [facility] [employer] [facility] ([facility]). If you have questions about a medical condition or this instruction, always ask your healthcare professional. [place] [address] any warranty or liability for your use of this information.          Patient insert the tip into your nostril, and squeeze gently. Julissa Ana your nose. When should you call for help? Call your doctor now or seek immediate medical care if:    · You are having trouble breathing.    Watch closely for changes in your health, and be sure to contact your doctor if:    · Mucus from your nose gets thicker (like pus) or has new blood in it.     · You have new or worse symptoms.     · You do not get better as expected. Where can you learn more? Go to https://Touchdown Technologies.Lapio. org and sign in to your SuperSonic Imagine account. Enter M030 in the [employer] box to learn more about \"Rhinitis: Care Instructions. \"     If you do not have an account, please click on the \"Sign Up Now\" link. Current as of: July 28, 2019  Content Version: 12.3  © 5080-6032 [employer]. Care instructions adapted under license by [facility] ([facility]). If you have questions about a medical condition or this instruction, always ask your healthcare professional. [place] [address] any warranty or liability for your use of this information. Patient Education        Middle Ear Fluid: Care Instructions  Your Care Instructions    Fluid often builds up inside the ear during a cold or allergies. Usually the fluid drains away, but sometimes a small tube in the ear, called the eustachian tube, stays blocked for months. Symptoms of fluid buildup may include:  · Popping, ringing, or a feeling of fullness or pressure in the ear. · Trouble hearing. · Balance problems and dizziness. In most cases, you can treat yourself at home. Follow-up care is a key part of your treatment and safety. Be sure to make and go to all appointments, and call your doctor if you are having problems. It's also a good idea to know your test results and keep a list of the medicines you take. How can you care for yourself at home? · In most cases, the fluid clears up within a few months without treatment.  You may need

## 2020-03-13 ENCOUNTER — PATIENT MESSAGE (OUTPATIENT)
Dept: OBGYN | Age: 60
End: 2020-03-13

## 2020-03-23 ENCOUNTER — TELEPHONE (OUTPATIENT)
Dept: OBGYN | Age: 60
End: 2020-03-23

## 2020-03-23 RX ORDER — MEDROXYPROGESTERONE ACETATE 2.5 MG/1
2.5 TABLET ORAL DAILY
Qty: 90 TABLET | Refills: 3 | Status: SHIPPED | OUTPATIENT
Start: 2020-03-23 | End: 2021-04-19

## 2020-03-24 RX ORDER — MONTELUKAST SODIUM 10 MG/1
10 TABLET ORAL NIGHTLY
Qty: 90 TABLET | Refills: 2 | Status: SHIPPED | OUTPATIENT
Start: 2020-03-24 | End: 2021-05-31

## 2020-03-26 ENCOUNTER — PROCEDURE VISIT (OUTPATIENT)
Dept: OBGYN | Age: 60
End: 2020-03-26
Payer: MEDICARE

## 2020-03-26 VITALS
HEIGHT: 62 IN | WEIGHT: 177 LBS | DIASTOLIC BLOOD PRESSURE: 84 MMHG | TEMPERATURE: 98.4 F | SYSTOLIC BLOOD PRESSURE: 141 MMHG | BODY MASS INDEX: 32.57 KG/M2 | HEART RATE: 68 BPM

## 2020-03-26 PROCEDURE — 58100 BIOPSY OF UTERUS LINING: CPT | Performed by: OBSTETRICS & GYNECOLOGY

## 2020-03-31 ENCOUNTER — TELEPHONE (OUTPATIENT)
Dept: OBGYN | Age: 60
End: 2020-03-31

## 2020-03-31 NOTE — TELEPHONE ENCOUNTER
Called to inform pt that pathology from E[facility] was negative. Dr. Cristhian Dowell recommend D&C after Covid 19. Will place pt on a list to call after 5/2020 to schedule. L/m on identified VM informing pt of this.

## 2020-04-01 ENCOUNTER — TELEMEDICINE (OUTPATIENT)
Dept: PRIMARY CARE CLINIC | Age: 60
End: 2020-04-01
Payer: MEDICARE

## 2020-04-01 PROCEDURE — 3017F COLORECTAL CA SCREEN DOC REV: CPT | Performed by: NURSE PRACTITIONER

## 2020-04-01 PROCEDURE — G8427 DOCREV CUR MEDS BY ELIG CLIN: HCPCS | Performed by: NURSE PRACTITIONER

## 2020-04-01 PROCEDURE — 1036F TOBACCO NON-USER: CPT | Performed by: NURSE PRACTITIONER

## 2020-04-01 PROCEDURE — G8417 CALC BMI ABV UP PARAM F/U: HCPCS | Performed by: NURSE PRACTITIONER

## 2020-04-01 PROCEDURE — 99213 OFFICE O/P EST LOW 20 MIN: CPT | Performed by: NURSE PRACTITIONER

## 2020-04-01 RX ORDER — AZELASTINE 1 MG/ML
2 SPRAY, METERED NASAL 2 TIMES DAILY
Qty: 1 BOTTLE | Refills: 3 | Status: SHIPPED | OUTPATIENT
Start: 2020-04-01 | End: 2020-07-03

## 2020-04-01 RX ORDER — PREDNISONE 10 MG/1
TABLET ORAL
Qty: 39 TABLET | Refills: 0 | Status: SHIPPED | OUTPATIENT
Start: 2020-04-01 | End: 2020-07-20

## 2020-04-01 ASSESSMENT — ENCOUNTER SYMPTOMS
NAUSEA: 0
SORE THROAT: 1
SHORTNESS OF BREATH: 0
COUGH: 1
PHOTOPHOBIA: 0
RHINORRHEA: 1
VOMITING: 0
VOICE CHANGE: 0
BACK PAIN: 0
COLOR CHANGE: 0
EYE ITCHING: 1

## 2020-05-05 ENCOUNTER — TELEPHONE (OUTPATIENT)
Dept: OBGYN | Age: 60
End: 2020-05-05

## 2020-05-07 ENCOUNTER — TELEPHONE (OUTPATIENT)
Dept: OBGYN | Age: 60
End: 2020-05-07

## 2020-05-27 ENCOUNTER — TRANSCRIBE ORDERS (OUTPATIENT)
Dept: ADMINISTRATIVE | Facility: HOSPITAL | Age: 60
End: 2020-05-27

## 2020-05-27 DIAGNOSIS — Z01.818 PREOP TESTING: Primary | ICD-10-CM

## 2020-06-01 ENCOUNTER — APPOINTMENT (OUTPATIENT)
Dept: LAB | Facility: HOSPITAL | Age: 60
End: 2020-06-01

## 2020-06-01 PROCEDURE — U0003 INFECTIOUS AGENT DETECTION BY NUCLEIC ACID (DNA OR RNA); SEVERE ACUTE RESPIRATORY SYNDROME CORONAVIRUS 2 (SARS-COV-2) (CORONAVIRUS DISEASE [COVID-19]), AMPLIFIED PROBE TECHNIQUE, MAKING USE OF HIGH THROUGHPUT TECHNOLOGIES AS DESCRIBED BY CMS-2020-01-R: HCPCS | Performed by: PAIN MEDICINE

## 2020-06-03 LAB
COVID LABCORP PRIORITY: NORMAL
SARS-COV-2 RNA RESP QL NAA+PROBE: NOT DETECTED

## 2020-06-17 ENCOUNTER — PATIENT MESSAGE (OUTPATIENT)
Dept: PRIMARY CARE CLINIC | Age: 60
End: 2020-06-17

## 2020-06-26 ENCOUNTER — HOSPITAL ENCOUNTER (OUTPATIENT)
Dept: CT IMAGING | Age: 60
Discharge: HOME | End: 2020-06-26
Payer: MEDICARE

## 2020-06-26 PROCEDURE — 71250 CT THORAX DX C-: CPT

## 2020-06-29 ENCOUNTER — TELEPHONE (OUTPATIENT)
Dept: OBGYN | Age: 60
End: 2020-06-29

## 2020-07-02 RX ORDER — FLUOXETINE 10 MG/1
CAPSULE ORAL
Qty: 30 CAPSULE | Refills: 5 | Status: SHIPPED | OUTPATIENT
Start: 2020-07-02 | End: 2021-01-19

## 2020-07-16 ENCOUNTER — OFFICE VISIT (OUTPATIENT)
Dept: OBGYN | Age: 60
End: 2020-07-16
Payer: MEDICARE

## 2020-07-16 ENCOUNTER — TELEPHONE (OUTPATIENT)
Dept: OBGYN | Age: 60
End: 2020-07-16

## 2020-07-16 VITALS
DIASTOLIC BLOOD PRESSURE: 80 MMHG | TEMPERATURE: 98.5 F | BODY MASS INDEX: 33.31 KG/M2 | WEIGHT: 181 LBS | SYSTOLIC BLOOD PRESSURE: 138 MMHG | HEART RATE: 68 BPM | HEIGHT: 62 IN

## 2020-07-16 PROCEDURE — 99211 OFF/OP EST MAY X REQ PHY/QHP: CPT | Performed by: OBSTETRICS & GYNECOLOGY

## 2020-07-16 NOTE — PROGRESS NOTES
Patient is having a D&C and hysteroscopy on 7/24 for PMB. She had an episode of PMB in 3/2020 and EMB was negative. TVUS showed   1. Thickened endometrial stripe. Differential diagnosis includes    endometrial hyperplasia, neoplasm, and polyp. 2. RIGHT ovary was unable to be visualized. Dr. Dre Dyer recommended D&C    Eric Vail is a 63 y.o. female with the following history as recorded in [facility]:  Patient Active Problem List    Diagnosis Date Noted    Constipation due to opioid therapy 08/09/2018    Gas bloat syndrome 08/09/2018    Cystocele with uterine prolapse 02/06/2018    Chronic GERD 08/10/2017    Breast density 03/30/2016    Irritable bowel syndrome with constipation 04/07/2014    Reactive airway disease with wheezing     Allergic bronchitis     Abdominal pain, other specified site 03/20/2013    Chronic constipation 03/20/2013    Polypharmacy 03/20/2013    Narcotic dependence ([facility] [address] 03/20/2013    Chronic pain 03/20/2013    NSAID long-term use 03/20/2013    Family history of colonic polyps 03/20/2013    Stress incontinence, female 03/20/2013    Postmenopausal atrophic vaginitis 02/20/2013    Chronic anxiety     Vitamin D deficiency     Hyperthyroidism 07/05/2012    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  39 tablet 0    montelukast (SINGULAIR) 10 MG tablet Take 1 tablet by mouth nightly 90 tablet 2    medroxyPROGESTERone (PROVERA) 2.5 MG tablet Take 1 tablet by mouth daily 90 tablet 3    levocetirizine (XYZAL) 5 MG tablet Take 1 tablet by mouth daily 30 tablet 11    SYNTHROID 112 MCG tablet TAKE 1 TABLET BY MOUTH EVERY DAY IN THE MORNING 90 tablet 2    estradiol (ESTRACE) 0.5 MG tablet Take 1 tablet by mouth daily 90 tablet 3    Turmeric Curcumin 500 MG CAPS Take 1 capsule by mouth daily      vitamin D (CHOLECALCIFEROL) 125 MCG (5000 UT) CAPS capsule Take 5,000 Units by mouth daily      Multiple Vitamins-Minerals (THERAPEUTIC MULTIVITAMIN-MINERALS) tablet Take 1 tablet by mouth daily      pantoprazole (PROTONIX) 40 MG tablet Take 1 tablet by mouth every morning (before breakfast) 90 tablet 3    fluticasone (FLONASE) 50 MCG/ACT nasal spray USE 2 SPRAYS IN EACH NOSTRIL DAILY 1 Bottle 6    PROAIR  (90 Base) MCG/ACT inhaler INHALE 2 PUFFS INTO THE LUNGS EVERY 6 HOURS AS NEEDED FOR WHEEZING 8.5 Inhaler 1    EPINEPHrine (EPIPEN) 0.3 MG/0.3ML SOAJ injection USE AS DIRECTED FOR ACUTE  ALLERGIC REACTION  1    epinastine (ELESTAT) 0.05 % SOLN INSTILL 1 DROP IN EACH EYE ONE TO TWICE A DAY AS NEEDED  3    naproxen (NAPROXEN) 500 MG EC tablet Take 1 tablet by mouth every 12 hours Generic is okay 60 tablet 11     No current facility-administered medications for this visit.       Allergies: Pollen extract; Effexor [venlafaxine hydrochloride]; and Poison ivy extract [poison ivy extract]  Past Medical History:   Diagnosis Date    Allergic bronchitis     has wheezing related to dust and pollen    Allergic rhinitis     Blepharitis of both eyes     Bone spur     cervical    Cervical disc disease     Chicken pox     age 26    Chronic anxiety 2006    has been on valium after failing effexor and other medications    Coccyx disorder     history of f/x    Constipation     chronic since a teen    Degenerative disc disease at L5-S1 level     Genital warts     Dr Marshall Pro GERD (gastroesophageal reflux disease)     Hemorrhoids     HPV in female     Hyperthyroidism     took radioactive iodine    Post menopausal syndrome 2010    Reactive airway disease with wheezing     Scoliosis     Unspecified vitamin D deficiency      Past Surgical History:   Procedure Laterality Date    COLONOSCOPY  2011    [facility]?     COLONOSCOPY N/A 2016    Dr Gaspar Bumpers AP (-) dysplasia x 1, BCM x 1--3 yr recall    COLONOSCOPY N/A 2019    Dr Jacquelyn Catherine, large grade 3 internal and external hemorrhoids, suboptimal prep, 3 yr recall    HEMORRHOID SURGERY N/A 2019    INTERNAL AND EXTERNAL HEMORRHOIDECTOMY performed by Felicity Ballard MD at [facility] GASTROINTESTINAL ENDOSCOPY  3/3/2011     ?      Family History   Problem Relation Age of Onset    Diabetes Sister     Thyroid Disease Sister     Rheum Arthritis Sister     Other Sister         fibromyalgia    Rheum Arthritis Maternal Aunt     Colon Polyps Maternal Aunt     Heart Failure Father     Heart Disease Father          of acute MI at 58    Hypertension Mother     Cancer Mother         lung cancer    Colon Polyps Mother     High Blood Pressure Mother     Other Son         sleep apnea,anxiety and neck problems after  injuries    Breast Cancer Maternal Cousin     Colon Cancer Neg Hx     Esophageal Cancer Neg Hx     Stomach Cancer Neg Hx     Liver Cancer Neg Hx     Liver Disease Neg Hx     Rectal Cancer Neg Hx      Social History     Tobacco Use    Smoking status: Former Smoker     Packs/day: 2.00     Years: 25.00     Pack years: 50.00     Types: Cigarettes     Last attempt to quit: 2008     Years since quittin.2    Smokeless tobacco: Never Used    Tobacco comment: Quit smoking in  with Wellbutrin (had crazy dreams on it)   Substance Use Topics    Alcohol use: No     Alcohol/week: 0.0 standard drinks       /80 (Site: Left Upper Arm, Position: Sitting, Cuff Size: Medium Adult)   Pulse 68   Temp 98.5 °F (36.9 °C) (Temporal)   Ht 5' 2\" (1.575 m)   Wt 181 lb (82.1 kg)   LMP 2009   BMI 33.11 kg/m²        Diagnosis Orders   1. Postmenopausal bleeding           Counseling was offered and accepted by patient. Dr. Shannon Gonzalez discussed at length the risks, benefits and alternatives to surgery including medical management and no intervention at patient's last office visit. Discussed the surgical procedure in detail with patient. Some patients will need a full medical clearance. Preop testing ordered and pt aware of Covid testing. Consents for surgery signed and all questions proceeding. Reviewed signs and symptoms of postoperative infection and complications with patient.   All questions answered and  patient voiced understanding of above

## 2020-07-16 NOTE — TELEPHONE ENCOUNTER
Called to inform pt that I accidentally gave her wrong paper and to please arrive at 9 am for surgery on 7/24.  Douglas HOPSON

## 2020-07-16 NOTE — PATIENT INSTRUCTIONS
Patient Education        Endometrial Ablation: Before Your Procedure  What is endometrial ablation? Endometrial ablation is a type of procedure that's often used to treat heavy menstrual bleeding. It can also be used for other types of bleeding in the uterus. It's not recommended if you plan to get pregnant. Ablation works by destroying the lining of your uterus. As it heals, the lining will scar. This scarring reduces or prevents bleeding. Your doctor may give you medicine to help you relax. You may also get medicine to help with pain. First, your doctor inserts a special tool into your vagina. This is called a speculum. It gently spreads apart the sides of your vagina. Next, the doctor may put a lighted tube through your cervix. This is called a hysteroscope or scope. It helps the doctor see inside your uterus. Then the doctor inserts a device to destroy the lining. This device may work in one of many ways. It may use a laser beam, heat, electricity, freezing, or microwaves. Ablation can be done in a doctor's office. Or it may be done in a hospital. It usually takes less than an hour. You can go home after the procedure. Follow-up care is a key part of your treatment and safety. Be sure to make and go to all appointments, and call your doctor if you are having problems. It's also a good idea to know your test results and keep a list of the medicines you take. How do you prepare for the procedure? Procedures can be stressful. This information will help you understand what you can expect. And it will help you safely prepare for your procedure. Preparing for the procedure  · Be sure you have someone to take you home. Anesthesia and pain medicine will make it unsafe for you to drive or get home on your own. · Understand exactly what procedure is planned, along with the risks, benefits, and other options.   · If you take aspirin or some other blood thinner, ask your doctor if you should stop taking it before your procedure. Make sure that you understand exactly what your doctor wants you to do. These medicines increase the risk of bleeding. · Tell your doctor ALL the medicines, vitamins, supplements, and herbal remedies you take. Some may increase the risk of problems during your procedure. Your doctor will tell you if you should stop taking any of them before the procedure and how soon to do it. · Make sure your doctor and the hospital have a copy of your advance directive. If you don't have one, you may want to prepare one. It lets others know your health care wishes. It's a good thing to have before any type of surgery or procedure. What happens on the day of the procedure? · Follow the instructions exactly about when to stop eating and drinking. If you don't, your procedure may be canceled. If your doctor told you to take your medicines on the day of the procedure, take them with only a sip of water. · Take a bath or shower before you come in for your procedure. Do not apply lotions, perfumes, deodorants, or nail polish. · Take off all jewelry and piercings. And take out contact lenses, if you wear them. At the [address] [place] [address] or hospital    · Bring a picture ID. · You will be kept comfortable and safe by your anesthesia provider. You may get medicine that relaxes you or puts you in a light sleep. · The procedure will take less than an hour. When should you call your doctor? · You have questions or concerns. · You do not understand how to prepare for your procedure. · You become ill before the procedure (such as fever, flu, or a cold). · You need to reschedule or have changed your mind about having the procedure. Where can you learn more? Go to [email or web address] and sign in to your MediaLink account. Enter U091 in the Agency Entourage box to learn more about \"Endometrial Ablation: Before Your Procedure. \"     If you do not have an account, please click on the \"Sign Up Now\" link. Current as of: November 8, 2019               Content Version: 12.5  © 9785-0585 Healthwise, Incorporated. Care instructions adapted under license by [facility] ([facility]). If you have questions about a medical condition or this instruction, always ask your healthcare professional. [address] any warranty or liability for your use of this information.

## 2020-07-20 ENCOUNTER — TELEPHONE (OUTPATIENT)
Dept: PRIMARY CARE CLINIC | Age: 60
End: 2020-07-20

## 2020-07-20 ENCOUNTER — HOSPITAL ENCOUNTER (OUTPATIENT)
Dept: PREADMISSION TESTING | Age: 60
Discharge: HOME | End: 2020-07-24
Payer: MEDICARE

## 2020-07-20 VITALS — HEIGHT: 62 IN | BODY MASS INDEX: 33.31 KG/M2 | WEIGHT: 181 LBS

## 2020-07-20 LAB
BASOPHILS ABSOLUTE: 0 K/UL (ref 0–0.2)
BASOPHILS RELATIVE PERCENT: 0.5 % (ref 0–1)
EOSINOPHILS ABSOLUTE: 0.1 K/UL (ref 0–0.6)
EOSINOPHILS RELATIVE PERCENT: 1.1 % (ref 0–5)
HCT VFR BLD CALC: 36.7 % (ref 37–47)
HEMOGLOBIN: 12.5 G/DL (ref 12–16)
IMMATURE GRANULOCYTES #: 0 K/UL
LYMPHOCYTES ABSOLUTE: 2.5 K/UL (ref 1.1–4.5)
LYMPHOCYTES RELATIVE PERCENT: 37.7 % (ref 20–40)
MCH RBC QN AUTO: 31.5 PG (ref 27–31)
MCHC RBC AUTO-ENTMCNC: 34.1 G/DL (ref 33–37)
MCV RBC AUTO: 92.4 FL (ref 81–99)
MONOCYTES ABSOLUTE: 0.4 K/UL (ref 0–0.9)
MONOCYTES RELATIVE PERCENT: 6.6 % (ref 0–10)
NEUTROPHILS ABSOLUTE: 3.5 K/UL (ref 1.5–7.5)
NEUTROPHILS RELATIVE PERCENT: 53.6 % (ref 50–65)
PDW BLD-RTO: 13.1 % (ref 11.5–14.5)
PLATELET # BLD: 234 K/UL (ref 130–400)
PMV BLD AUTO: 10.9 FL (ref 9.4–12.3)
RBC # BLD: 3.97 M/UL (ref 4.2–5.4)
WBC # BLD: 6.5 K/UL (ref 4.8–10.8)

## 2020-07-20 PROCEDURE — 93005 ELECTROCARDIOGRAM TRACING: CPT | Performed by: OBSTETRICS & GYNECOLOGY

## 2020-07-20 PROCEDURE — 85025 COMPLETE CBC W/AUTO DIFF WBC: CPT

## 2020-07-21 ENCOUNTER — VIRTUAL VISIT (OUTPATIENT)
Dept: PRIMARY CARE CLINIC | Age: 60
End: 2020-07-21
Payer: MEDICARE

## 2020-07-21 LAB
EKG P AXIS: 13 DEGREES
EKG P-R INTERVAL: 164 MS
EKG Q-T INTERVAL: 382 MS
EKG QRS DURATION: 82 MS
EKG QTC CALCULATION (BAZETT): 396 MS
EKG T AXIS: 12 DEGREES
REPORT: NORMAL
SARS-COV-2: NOT DETECTED
THIS TEST SENT TO: NORMAL

## 2020-07-21 PROCEDURE — 99213 OFFICE O/P EST LOW 20 MIN: CPT | Performed by: NURSE PRACTITIONER

## 2020-07-21 RX ORDER — PREDNISONE 10 MG/1
10 TABLET ORAL DAILY
Qty: 7 TABLET | Refills: 0 | Status: ON HOLD | OUTPATIENT
Start: 2020-07-21 | End: 2020-07-24 | Stop reason: ALTCHOICE

## 2020-07-21 ASSESSMENT — ENCOUNTER SYMPTOMS
EYES NEGATIVE: 1
RESPIRATORY NEGATIVE: 1
GASTROINTESTINAL NEGATIVE: 1

## 2020-07-21 ASSESSMENT — PATIENT HEALTH QUESTIONNAIRE - PHQ9
SUM OF ALL RESPONSES TO PHQ9 QUESTIONS 1 & 2: 0
SUM OF ALL RESPONSES TO PHQ QUESTIONS 1-9: 0
SUM OF ALL RESPONSES TO PHQ QUESTIONS 1-9: 0
1. LITTLE INTEREST OR PLEASURE IN DOING THINGS: 0
2. FEELING DOWN, DEPRESSED OR HOPELESS: 0

## 2020-07-21 NOTE — PROGRESS NOTES
e  [address] [address]     Phone:  [phone]  Fax:  [phone]      2020    TELEHEALTH EVALUATION -- Audio/Visual (During OBDSX-65 public health emergency)    HPI:    Chief Complaint   Patient presents with    Elbow Pain         Salud Funez (:  1960) has requested an audio/video evaluation for the following concern(s): This is a video visit for patient having complaints of worsening elbow pain. She has noticed over the last few weeks a tender spot on the left elbow. It is red in color and slightly swollen. She denies ever having bursitis in the area before. Review of Systems   Constitutional: Negative. HENT: Negative. Eyes: Negative. Respiratory: Negative. Cardiovascular: Negative. Gastrointestinal: Negative. Endocrine: Negative. Genitourinary: Negative. Musculoskeletal: Positive for arthralgias (left elbow pain). Skin: Negative. Neurological: Negative. Hematological: Negative. Psychiatric/Behavioral: Negative. Prior to Visit Medications    Medication Sig Taking?  Authorizing Provider   predniSONE (DELTASONE) 10 MG tablet Take 1 tablet by mouth daily for 7 days Yes ANGEL Marquez   azelastine (ASTELIN) 0.1 % nasal spray USE 2 SPRAYS IN EACH NOSTRIL TWICE A DAY AS DIRECTED  ANGEL Khan   FLUoxetine (PROZAC) 10 MG capsule TAKE 1 CAPSULE BY MOUTH EVERY DAY  ANGEL Marquez   montelukast (SINGULAIR) 10 MG tablet Take 1 tablet by mouth nightly  ANGEL Marquez   medroxyPROGESTERone (PROVERA) 2.5 MG tablet Take 1 tablet by mouth daily  ANGEL Pyle CNP   levocetirizine (XYZAL) 5 MG tablet Take 1 tablet by mouth daily  ANGEL Marquez   SYNTHROID 112 MCG tablet TAKE 1 TABLET BY MOUTH EVERY DAY IN THE MORNING  ANGEL Marquez   estradiol (ESTRACE) 0.5 MG tablet Take 1 tablet by mouth daily  ANGEL Pyle CNP   vitamin D (CHOLECALCIFEROL) 125 MCG (5000 UT) CAPS capsule Take 5,000 Units by mouth daily  Historical Provider, MD   Multiple Vitamins-Minerals (THERAPEUTIC MULTIVITAMIN-MINERALS) tablet Take 1 tablet by mouth daily  Historical Provider, MD   pantoprazole (PROTONIX) 40 MG tablet Take 1 tablet by mouth every morning (before breakfast)  Daralene Ro, APRN   fluticasone (FLONASE) 50 MCG/ACT nasal spray USE 2 SPRAYS IN EACH NOSTRIL DAILY  Simba Lawrence MD   PROAIR  (89 Base) MCG/ACT inhaler INHALE 2 PUFFS INTO THE LUNGS EVERY 6 HOURS AS NEEDED FOR WHEEZING  Simba Lawrence MD   EPINEPHrine (EPIPEN) 0.3 MG/0.3ML SOAJ injection USE AS DIRECTED FOR ACUTE  ALLERGIC REACTION  Historical Provider, MD   epinastine (ELESTAT) 0.05 % SOLN INSTILL 1 DROP IN EACH EYE ONE TO TWICE A DAY AS NEEDED  Historical Provider, MD   naproxen (NAPROXEN) 500 MG EC tablet Take 1 tablet by mouth every 12 hours Generic is okay  Simba Lawrence MD       Social History     Tobacco Use    Smoking status: Former Smoker     Packs/day: 2.00     Years: 25.00     Pack years: 50.00     Types: Cigarettes     Last attempt to quit: 2008     Years since quittin.2    Smokeless tobacco: Never Used    Tobacco comment: Quit smoking in  with Wellbutrin (had crazy dreams on it)   Substance Use Topics    Alcohol use: No     Alcohol/week: 0.0 standard drinks    Drug use: No        Allergies   Allergen Reactions    Pollen Extract      Early Spring and early     Effexor [Venlafaxine Hydrochloride] Other (See Comments)     Slurred speech, feelings of having a TIA, seeing double, unaware of her surroundings   [employer] Extract [Poison Ivy Extract] Itching   ,   Past Medical History:   Diagnosis Date    Allergic bronchitis     has wheezing related to dust and pollen    Allergic rhinitis     Asthma     Blepharitis of both eyes     Bone spur     cervical    Cervical disc disease     Chicken pox     age 26    Chronic anxiety     has been on valium after failing effexor and other medications    Coccyx disorder     history of f/x    Constipation     chronic since a teen    Degenerative disc disease at L5-S1 level     Genital warts     Dr Gurwinder Snowden GERD (gastroesophageal reflux disease)     Hemorrhoids     HPV in female     Hyperthyroidism     took radioactive iodine    Post menopausal syndrome     Reactive airway disease with wheezing     Scoliosis     Unspecified vitamin D deficiency    ,   Past Surgical History:   Procedure Laterality Date    COLONOSCOPY  2011    WB?     COLONOSCOPY N/A 2016    Dr Sondra Franco AP (-) dysplasia x 1, BCM x 1--3 yr recall    COLONOSCOPY N/A 2019    Dr Kanika Mason, large grade 3 internal and external hemorrhoids, suboptimal prep, 3 yr recall    HEMORRHOID SURGERY N/A 2019    INTERNAL AND EXTERNAL HEMORRHOIDECTOMY performed by Jesse Roque MD at [address]      UPPER GASTROINTESTINAL ENDOSCOPY  3/3/2011     ?   ,   Family History   Problem Relation Age of Onset    Diabetes Sister     Thyroid Disease Sister     Rheum Arthritis Sister     Other Sister         fibromyalgia    Rheum Arthritis Maternal Aunt     Colon Polyps Maternal Aunt     Heart Failure Father     Heart Disease Father          of acute MI at 58    Hypertension Mother     Cancer Mother         lung cancer    Colon Polyps Mother     High Blood Pressure Mother     Other Son         sleep apnea,anxiety and neck problems after  injuries    Breast Cancer Maternal Cousin     Colon Cancer Neg Hx     Esophageal Cancer Neg Hx     Stomach Cancer Neg Hx     Liver Cancer Neg Hx     Liver Disease Neg Hx     Rectal Cancer Neg Hx        PHYSICAL EXAMINATION:  [ INSTRUCTIONS:  \"[x]\" Indicates a positive item  \"[]\" Indicates a negative item  -- DELETE ALL ITEMS NOT EXAMINED]  Vital Signs: (As obtained by patient/caregiver at home)        Constitutional: [x] Appears well-developed and well-nourished [x] No apparent distress      [] Abnormal   Mental status  [x] Alert and awake  [x] Oriented to person/place/time [x]Able to follow commands        Eyes:  EOM    [x]  Normal  [] Abnormal-  Sclera  [x]  Normal  [] Abnormal -         Discharge []  None visible  [] Abnormal -    HENT:   [x] Normocephalic, atraumatic. [] Abnormal   [x] Mouth/Throat: Mucous membranes are moist.     External Ears [x] Normal  [] Abnormal-    Neck: [x] No visualized mass     Pulmonary/Chest: [x] Respiratory effort normal.  [x] No visualized signs of difficulty breathing or respiratory distress        [] Abnormal      Musculoskeletal:   [x] Normal gait with no signs of ataxia         [x] Normal range of motion of neck        [] Abnormal       Neurological:        [x] No Facial Asymmetry (Cranial nerve 7 motor function) (limited exam to video visit)          [] No gaze palsy        [] Abnormal         Skin:        [x] No significant exanthematous lesions or discoloration noted on facial skin         [] Abnormal            Psychiatric:       [x] Normal Affect [] Abnormal        [] No Hallucinations    Other pertinent observable physical exam findings:-    Due to this being a TeleHealth encounter, evaluation of the following organ systems is limited: Vitals/Constitutional/EENT/Resp/CV/GI//MS/Neuro/Skin/Heme-Lymph-Imm. ASSESSMENT/PLAN:  1. Olecranon bursitis of left elbow    - predniSONE (DELTASONE) 10 MG tablet; Take 1 tablet by mouth daily for 7 days  Dispense: 7 tablet; Refill: 0      I do believe patient has olecranon bursitis of left elbow, but it is not very large. She is to Ace wrap the area and alternate between heat and ice as discussed. I am sending in prednisone for her to take for inflammation. If area does enlarge she can come into the office for procedure to have this area drained. Return if symptoms worsen or fail to improve.     An  electronic

## 2020-07-24 ENCOUNTER — ANESTHESIA EVENT (OUTPATIENT)
Dept: OPERATING ROOM | Age: 60
End: 2020-07-24
Payer: MEDICARE

## 2020-07-24 ENCOUNTER — HOSPITAL ENCOUNTER (OUTPATIENT)
Age: 60
Setting detail: OUTPATIENT SURGERY
Discharge: HOME | End: 2020-07-24
Attending: OBSTETRICS & GYNECOLOGY | Admitting: OBSTETRICS & GYNECOLOGY
Payer: MEDICARE

## 2020-07-24 ENCOUNTER — ANESTHESIA (OUTPATIENT)
Dept: OPERATING ROOM | Age: 60
End: 2020-07-24
Payer: MEDICARE

## 2020-07-24 VITALS
WEIGHT: 180 LBS | OXYGEN SATURATION: 100 % | RESPIRATION RATE: 16 BRPM | DIASTOLIC BLOOD PRESSURE: 82 MMHG | SYSTOLIC BLOOD PRESSURE: 160 MMHG | HEIGHT: 62 IN | BODY MASS INDEX: 33.13 KG/M2 | TEMPERATURE: 97 F | HEART RATE: 58 BPM

## 2020-07-24 VITALS
RESPIRATION RATE: 12 BRPM | OXYGEN SATURATION: 98 % | SYSTOLIC BLOOD PRESSURE: 139 MMHG | TEMPERATURE: 97.1 F | DIASTOLIC BLOOD PRESSURE: 74 MMHG

## 2020-07-24 PROBLEM — N95.0 PMB (POSTMENOPAUSAL BLEEDING): Status: ACTIVE | Noted: 2020-07-24

## 2020-07-24 LAB — HCG(URINE) PREGNANCY TEST: NEGATIVE

## 2020-07-24 PROCEDURE — 2500000003 HC RX 250 WO HCPCS

## 2020-07-24 PROCEDURE — 7100000011 HC PHASE II RECOVERY - ADDTL 15 MIN: Performed by: OBSTETRICS & GYNECOLOGY

## 2020-07-24 PROCEDURE — 3700000001 HC ADD 15 MINUTES (ANESTHESIA): Performed by: OBSTETRICS & GYNECOLOGY

## 2020-07-24 PROCEDURE — 7100000000 HC PACU RECOVERY - FIRST 15 MIN: Performed by: OBSTETRICS & GYNECOLOGY

## 2020-07-24 PROCEDURE — 3600000004 HC SURGERY LEVEL 4 BASE: Performed by: OBSTETRICS & GYNECOLOGY

## 2020-07-24 PROCEDURE — 3700000000 HC ANESTHESIA ATTENDED CARE: Performed by: OBSTETRICS & GYNECOLOGY

## 2020-07-24 PROCEDURE — 58558 HYSTEROSCOPY BIOPSY: CPT | Performed by: OBSTETRICS & GYNECOLOGY

## 2020-07-24 PROCEDURE — 2709999900 HC NON-CHARGEABLE SUPPLY: Performed by: OBSTETRICS & GYNECOLOGY

## 2020-07-24 PROCEDURE — 6360000002 HC RX W HCPCS

## 2020-07-24 PROCEDURE — 7100000010 HC PHASE II RECOVERY - FIRST 15 MIN: Performed by: OBSTETRICS & GYNECOLOGY

## 2020-07-24 PROCEDURE — 88305 TISSUE EXAM BY PATHOLOGIST: CPT

## 2020-07-24 PROCEDURE — 3600000014 HC SURGERY LEVEL 4 ADDTL 15MIN: Performed by: OBSTETRICS & GYNECOLOGY

## 2020-07-24 PROCEDURE — 2720000010 HC SURG SUPPLY STERILE: Performed by: OBSTETRICS & GYNECOLOGY

## 2020-07-24 PROCEDURE — 2580000003 HC RX 258: Performed by: OBSTETRICS & GYNECOLOGY

## 2020-07-24 PROCEDURE — 2580000003 HC RX 258: Performed by: ANESTHESIOLOGY

## 2020-07-24 PROCEDURE — 6360000002 HC RX W HCPCS: Performed by: ANESTHESIOLOGY

## 2020-07-24 PROCEDURE — 6370000000 HC RX 637 (ALT 250 FOR IP): Performed by: ANESTHESIOLOGY

## 2020-07-24 PROCEDURE — 84703 CHORIONIC GONADOTROPIN ASSAY: CPT

## 2020-07-24 PROCEDURE — 7100000001 HC PACU RECOVERY - ADDTL 15 MIN: Performed by: OBSTETRICS & GYNECOLOGY

## 2020-07-24 PROCEDURE — 6360000002 HC RX W HCPCS: Performed by: OBSTETRICS & GYNECOLOGY

## 2020-07-24 RX ORDER — HYDROMORPHONE HYDROCHLORIDE 1 MG/ML
0.25 INJECTION, SOLUTION INTRAMUSCULAR; INTRAVENOUS; SUBCUTANEOUS EVERY 5 MIN PRN
Status: DISCONTINUED | OUTPATIENT
Start: 2020-07-24 | End: 2020-07-24 | Stop reason: HOSPADM

## 2020-07-24 RX ORDER — MORPHINE SULFATE 4 MG/ML
4 INJECTION, SOLUTION INTRAMUSCULAR; INTRAVENOUS
Status: DISCONTINUED | OUTPATIENT
Start: 2020-07-24 | End: 2020-07-24 | Stop reason: HOSPADM

## 2020-07-24 RX ORDER — LIDOCAINE HYDROCHLORIDE 10 MG/ML
1 INJECTION, SOLUTION EPIDURAL; INFILTRATION; INTRACAUDAL; PERINEURAL
Status: DISCONTINUED | OUTPATIENT
Start: 2020-07-24 | End: 2020-07-24 | Stop reason: HOSPADM

## 2020-07-24 RX ORDER — MIDAZOLAM HYDROCHLORIDE 1 MG/ML
INJECTION INTRAMUSCULAR; INTRAVENOUS PRN
Status: DISCONTINUED | OUTPATIENT
Start: 2020-07-24 | End: 2020-07-24 | Stop reason: SDUPTHER

## 2020-07-24 RX ORDER — DIPHENHYDRAMINE HYDROCHLORIDE 50 MG/ML
12.5 INJECTION INTRAMUSCULAR; INTRAVENOUS
Status: DISCONTINUED | OUTPATIENT
Start: 2020-07-24 | End: 2020-07-24 | Stop reason: HOSPADM

## 2020-07-24 RX ORDER — METOCLOPRAMIDE HYDROCHLORIDE 5 MG/ML
10 INJECTION INTRAMUSCULAR; INTRAVENOUS
Status: DISCONTINUED | OUTPATIENT
Start: 2020-07-24 | End: 2020-07-24 | Stop reason: HOSPADM

## 2020-07-24 RX ORDER — MORPHINE SULFATE 4 MG/ML
2 INJECTION, SOLUTION INTRAMUSCULAR; INTRAVENOUS EVERY 5 MIN PRN
Status: DISCONTINUED | OUTPATIENT
Start: 2020-07-24 | End: 2020-07-24 | Stop reason: HOSPADM

## 2020-07-24 RX ORDER — MEPERIDINE HYDROCHLORIDE 50 MG/ML
12.5 INJECTION INTRAMUSCULAR; INTRAVENOUS; SUBCUTANEOUS EVERY 5 MIN PRN
Status: DISCONTINUED | OUTPATIENT
Start: 2020-07-24 | End: 2020-07-24 | Stop reason: HOSPADM

## 2020-07-24 RX ORDER — MIDAZOLAM HYDROCHLORIDE 1 MG/ML
2 INJECTION INTRAMUSCULAR; INTRAVENOUS
Status: DISCONTINUED | OUTPATIENT
Start: 2020-07-24 | End: 2020-07-24 | Stop reason: HOSPADM

## 2020-07-24 RX ORDER — FENTANYL CITRATE 50 UG/ML
INJECTION, SOLUTION INTRAMUSCULAR; INTRAVENOUS PRN
Status: DISCONTINUED | OUTPATIENT
Start: 2020-07-24 | End: 2020-07-24 | Stop reason: SDUPTHER

## 2020-07-24 RX ORDER — DEXAMETHASONE SODIUM PHOSPHATE 10 MG/ML
INJECTION, SOLUTION INTRAMUSCULAR; INTRAVENOUS PRN
Status: DISCONTINUED | OUTPATIENT
Start: 2020-07-24 | End: 2020-07-24 | Stop reason: SDUPTHER

## 2020-07-24 RX ORDER — HYDROMORPHONE HYDROCHLORIDE 1 MG/ML
0.5 INJECTION, SOLUTION INTRAMUSCULAR; INTRAVENOUS; SUBCUTANEOUS EVERY 5 MIN PRN
Status: DISCONTINUED | OUTPATIENT
Start: 2020-07-24 | End: 2020-07-24 | Stop reason: HOSPADM

## 2020-07-24 RX ORDER — SODIUM CHLORIDE, SODIUM LACTATE, POTASSIUM CHLORIDE, CALCIUM CHLORIDE 600; 310; 30; 20 MG/100ML; MG/100ML; MG/100ML; MG/100ML
INJECTION, SOLUTION INTRAVENOUS CONTINUOUS
Status: DISCONTINUED | OUTPATIENT
Start: 2020-07-24 | End: 2020-07-24 | Stop reason: HOSPADM

## 2020-07-24 RX ORDER — LIDOCAINE HYDROCHLORIDE 10 MG/ML
INJECTION, SOLUTION EPIDURAL; INFILTRATION; INTRACAUDAL; PERINEURAL PRN
Status: DISCONTINUED | OUTPATIENT
Start: 2020-07-24 | End: 2020-07-24 | Stop reason: SDUPTHER

## 2020-07-24 RX ORDER — MORPHINE SULFATE 4 MG/ML
4 INJECTION, SOLUTION INTRAMUSCULAR; INTRAVENOUS EVERY 5 MIN PRN
Status: DISCONTINUED | OUTPATIENT
Start: 2020-07-24 | End: 2020-07-24 | Stop reason: HOSPADM

## 2020-07-24 RX ORDER — SCOLOPAMINE TRANSDERMAL SYSTEM 1 MG/1
1 PATCH, EXTENDED RELEASE TRANSDERMAL ONCE
Status: DISCONTINUED | OUTPATIENT
Start: 2020-07-24 | End: 2020-07-24 | Stop reason: HOSPADM

## 2020-07-24 RX ORDER — LABETALOL HYDROCHLORIDE 5 MG/ML
5 INJECTION, SOLUTION INTRAVENOUS EVERY 10 MIN PRN
Status: DISCONTINUED | OUTPATIENT
Start: 2020-07-24 | End: 2020-07-24 | Stop reason: HOSPADM

## 2020-07-24 RX ORDER — SODIUM CHLORIDE 0.9 % (FLUSH) 0.9 %
10 SYRINGE (ML) INJECTION PRN
Status: DISCONTINUED | OUTPATIENT
Start: 2020-07-24 | End: 2020-07-24 | Stop reason: HOSPADM

## 2020-07-24 RX ORDER — KETOROLAC TROMETHAMINE 30 MG/ML
INJECTION, SOLUTION INTRAMUSCULAR; INTRAVENOUS PRN
Status: DISCONTINUED | OUTPATIENT
Start: 2020-07-24 | End: 2020-07-24 | Stop reason: SDUPTHER

## 2020-07-24 RX ORDER — FENTANYL CITRATE 50 UG/ML
50 INJECTION, SOLUTION INTRAMUSCULAR; INTRAVENOUS
Status: DISCONTINUED | OUTPATIENT
Start: 2020-07-24 | End: 2020-07-24 | Stop reason: HOSPADM

## 2020-07-24 RX ORDER — ONDANSETRON 2 MG/ML
INJECTION INTRAMUSCULAR; INTRAVENOUS PRN
Status: DISCONTINUED | OUTPATIENT
Start: 2020-07-24 | End: 2020-07-24 | Stop reason: SDUPTHER

## 2020-07-24 RX ORDER — SODIUM CHLORIDE 0.9 % (FLUSH) 0.9 %
10 SYRINGE (ML) INJECTION EVERY 12 HOURS SCHEDULED
Status: DISCONTINUED | OUTPATIENT
Start: 2020-07-24 | End: 2020-07-24 | Stop reason: HOSPADM

## 2020-07-24 RX ORDER — HYDROCODONE BITARTRATE AND ACETAMINOPHEN 7.5; 325 MG/1; MG/1
1 TABLET ORAL 3 TIMES DAILY
COMMUNITY
End: 2021-08-06 | Stop reason: ALTCHOICE

## 2020-07-24 RX ORDER — PROMETHAZINE HYDROCHLORIDE 25 MG/ML
6.25 INJECTION, SOLUTION INTRAMUSCULAR; INTRAVENOUS
Status: DISCONTINUED | OUTPATIENT
Start: 2020-07-24 | End: 2020-07-24 | Stop reason: HOSPADM

## 2020-07-24 RX ORDER — HYDRALAZINE HYDROCHLORIDE 20 MG/ML
5 INJECTION INTRAMUSCULAR; INTRAVENOUS EVERY 10 MIN PRN
Status: DISCONTINUED | OUTPATIENT
Start: 2020-07-24 | End: 2020-07-24 | Stop reason: HOSPADM

## 2020-07-24 RX ORDER — PROPOFOL 10 MG/ML
INJECTION, EMULSION INTRAVENOUS PRN
Status: DISCONTINUED | OUTPATIENT
Start: 2020-07-24 | End: 2020-07-24 | Stop reason: SDUPTHER

## 2020-07-24 RX ADMIN — PROPOFOL 160 MG: 10 INJECTION, EMULSION INTRAVENOUS at 11:10

## 2020-07-24 RX ADMIN — WATER 1 G: 1 INJECTION INTRAMUSCULAR; INTRAVENOUS; SUBCUTANEOUS at 11:13

## 2020-07-24 RX ADMIN — FENTANYL CITRATE 50 MCG: 50 INJECTION INTRAMUSCULAR; INTRAVENOUS at 11:15

## 2020-07-24 RX ADMIN — LIDOCAINE HYDROCHLORIDE 50 MG: 10 INJECTION, SOLUTION EPIDURAL; INFILTRATION; INTRACAUDAL; PERINEURAL at 11:10

## 2020-07-24 RX ADMIN — ONDANSETRON HYDROCHLORIDE 4 MG: 2 INJECTION, SOLUTION INTRAMUSCULAR; INTRAVENOUS at 11:15

## 2020-07-24 RX ADMIN — HYDROMORPHONE HYDROCHLORIDE 0.5 MG: 1 INJECTION, SOLUTION INTRAMUSCULAR; INTRAVENOUS; SUBCUTANEOUS at 11:49

## 2020-07-24 RX ADMIN — KETOROLAC TROMETHAMINE 30 MG: 30 INJECTION, SOLUTION INTRAMUSCULAR; INTRAVENOUS at 11:28

## 2020-07-24 RX ADMIN — DEXAMETHASONE SODIUM PHOSPHATE 10 MG: 10 INJECTION, SOLUTION INTRAMUSCULAR; INTRAVENOUS at 11:15

## 2020-07-24 RX ADMIN — FENTANYL CITRATE 50 MCG: 50 INJECTION INTRAMUSCULAR; INTRAVENOUS at 11:05

## 2020-07-24 RX ADMIN — MIDAZOLAM 2 MG: 1 INJECTION INTRAMUSCULAR; INTRAVENOUS at 10:59

## 2020-07-24 RX ADMIN — SODIUM CHLORIDE, POTASSIUM CHLORIDE, SODIUM LACTATE AND CALCIUM CHLORIDE: 600; 310; 30; 20 INJECTION, SOLUTION INTRAVENOUS at 09:21

## 2020-07-24 ASSESSMENT — PAIN DESCRIPTION - PAIN TYPE: TYPE: SURGICAL PAIN

## 2020-07-24 ASSESSMENT — PAIN DESCRIPTION - DESCRIPTORS: DESCRIPTORS: ACHING;CRAMPING

## 2020-07-24 ASSESSMENT — ENCOUNTER SYMPTOMS: SHORTNESS OF BREATH: 0

## 2020-07-24 ASSESSMENT — PAIN SCALES - GENERAL
PAINLEVEL_OUTOF10: 7
PAINLEVEL_OUTOF10: 5

## 2020-07-24 ASSESSMENT — PAIN DESCRIPTION - LOCATION: LOCATION: ABDOMEN

## 2020-07-24 ASSESSMENT — LIFESTYLE VARIABLES: SMOKING_STATUS: 0

## 2020-07-24 NOTE — OP NOTE
OPERATIVE NOTE        Date of Service: 07/24/20     Pre-operative Diagnosis: PMB, ENDOMETRIUM THICKENING    Post-operative Diagnosis:  Same    Procedure: Procedure(s):  DILATATION AND CURETTAGE HYSTEROSCOPY    Anesthesia: General    Surgeon: Js Eubanks MD    Assistants: Scrub Person First: Cam Saba GIANFRANCO DIAN [employer] Word    Procedure Note:  After informed consents were obtained, the patient was taken to the operating  room where she was placed in the dorsal supine position. After adequate  anesthesia, she was placed in the dorsal lithotomy position, prepped and  draped in the usual sterile fashion for a vaginal procedure. Exam under anesthesia is unremarkable. Retractor was  then placed in the anterior and posterior wall of the vagina and the anterior lip  of the cervix was grasped with a single-tooth tenaculum. Uterus was sounded to 8cm. The cervix was serially dilated and the hysteroscope was inserted. Hysteroscopy revealed endometrial cavity normal in contour without mass or and small fundal polyp removed with myosure. Following hysteroscopy, sharp curettage was performed. This tissue was sent  to pathology for analysis. The tenaculum was removed from the anterior lip of the cervix and all the  retractors were removed from the vagina. Good hemostasis was observed. The patient tolerated the procedure well. There were no complications. She  was awakened from anesthesia, transferred to the recovery room in stable  condition. Sponge lap and needle counts were correct x2.         Estimated Blood Loss: 5cc    Complications: None    Specimens:   ID Type Source Tests Collected by Time Destination   A : uterine polyps Tissue Uterus SURGICAL PATHOLOGY Js Eubanks MD 7/24/2020 [address] [facility] MD [place]  07/24/20  11:33 AM

## 2020-07-24 NOTE — H&P
Patient is having a D&C and hysteroscopy for PMB. She had an episode of PMB in 3/2020 and EMB was negative. TVUS showed   1. Thickened endometrial stripe. Differential diagnosis includes    endometrial hyperplasia, neoplasm, and polyp. 2. RIGHT ovary was unable to be visualized.       Jet Ch is a 63 y.o. female with the following history as recorded in [facility]:  Patient Active Problem List    Diagnosis Date Noted    Constipation due to opioid therapy 08/09/2018    Gas bloat syndrome 08/09/2018    Cystocele with uterine prolapse 02/06/2018    Chronic GERD 08/10/2017    Breast density 03/30/2016    Irritable bowel syndrome with constipation 04/07/2014    Reactive airway disease with wheezing     Allergic bronchitis     Abdominal pain, other specified site 03/20/2013    Chronic constipation 03/20/2013    Polypharmacy 03/20/2013    Narcotic dependence ([address] 03/20/2013    Chronic pain 03/20/2013    NSAID long-term use 03/20/2013    Family history of colonic polyps 03/20/2013    Stress incontinence, female 03/20/2013    Postmenopausal atrophic vaginitis 02/20/2013    Chronic anxiety     Vitamin D deficiency     Hyperthyroidism 07/05/2012    Arthritis of neck 07/05/2012    Headache 07/05/2012     Current Facility-Administered Medications   Medication Dose Route Frequency Provider Last Rate Last Dose    ceFAZolin (ANCEF) 1 g in sterile water 10 mL IV syringe  1 g Intravenous Once Meredith Whiteside MD        lactated ringers infusion   Intravenous Continuous Julius Mason  mL/hr at 07/24/20 0777      sodium chloride flush 0.9 % injection 10 mL  10 mL Intravenous 2 times per day Julius Mason MD        sodium chloride flush 0.9 % injection 10 mL  10 mL Intravenous PRN Julius Mason MD        lidocaine PF 1 % injection 1 mL  1 mL Intradermal Once PRN Julius Mason MD        morphine injection 4 mg  4 mg Intravenous Once PRN Julius Mason MD        morphine injection Alcohol/week: 0.0 standard drinks         /85   Pulse 72   Temp 97.3 °F (36.3 °C) (Temporal)   Resp 16   Ht 5' 2\" (1.575 m)   Wt 180 lb (81.6 kg)   LMP 07/11/2009   SpO2 99%   BMI 32.92 kg/m²   Physical Exam  Constitutional:       Appearance: Normal appearance. HENT:      Head: Normocephalic and atraumatic. Nose: Nose normal.      Mouth/Throat:      Mouth: Mucous membranes are moist.   Eyes:      Extraocular Movements: Extraocular movements intact. Pupils: Pupils are equal, round, and reactive to light. Neck:      Musculoskeletal: Normal range of motion. Cardiovascular:      Rate and Rhythm: Normal rate. Pulses: Normal pulses. Pulmonary:      Effort: Pulmonary effort is normal.   Abdominal:      Palpations: Abdomen is soft. Musculoskeletal: Normal range of motion. Neurological:      General: No focal deficit present. Mental Status: She is alert and oriented to person, place, and time. Skin:     General: Skin is warm. Psychiatric:         Mood and Affect: Mood normal.         Behavior: Behavior normal.          Diagnosis Orders   1. Postmenopausal bleeding     D&C, Hysteroscopy  Counseling was offered and accepted by patient. Discussed at length the risks, benefits and alternatives to surgery including medical management and no intervention. Pt is in agreement with surgery. Consents for surgery signed. All questions answered and patient voiced understanding of above.

## 2020-07-24 NOTE — ANESTHESIA POSTPROCEDURE EVALUATION
Department of Anesthesiology  Postprocedure Note    Patient: Jude Dover  MRN: [id number]  YOB: 1960  Date of evaluation: 7/24/2020  Time:  11:35 AM     Procedure Summary     Date:  07/24/20 Room / Location:  [facility] OR [address] [place] [address]    Anesthesia Start:  9665 Anesthesia Stop:  3015    Procedure:  DILATATION AND CURETTAGE HYSTEROSCOPY (N/A ) Diagnosis:  (PMB, ENDOMETRIUM THICKENING)    Surgeon:  Yasmin Park MD Responsible Provider:  ANGEL Arceo CRNA    Anesthesia Type:  general ASA Status:  2          Anesthesia Type: general    Mansi Phase I: Mansi Score: 10    Mansi Phase II:      Last vitals: Reviewed and per EMR flowsheets.        Anesthesia Post Evaluation

## 2020-07-24 NOTE — ANESTHESIA PRE PROCEDURE
Department of Anesthesiology  Preprocedure Note       Name:  Vitaly Barfield   Age:  63 y.o.  :  1960                                          MRN:  [id number]         Date:  2020      Surgeon: Shaina Horner):  Payal Hernandez MD    Procedure: Procedure(s):  DILATATION AND CURETTAGE HYSTEROSCOPY    Medications prior to admission:   Prior to Admission medications    Medication Sig Start Date End Date Taking?  Authorizing Provider   predniSONE (DELTASONE) 10 MG tablet Take 1 tablet by mouth daily for 7 days 20  ANGEL Sutton   azelastine (ASTELIN) 0.1 % nasal spray USE 2 SPRAYS IN EACH NOSTRIL TWICE A DAY AS DIRECTED 00   ANGEL Hernandes   FLUoxetine (PROZAC) 10 MG capsule TAKE 1 CAPSULE BY MOUTH EVERY DAY 20   ANGEL Sutton   montelukast (SINGULAIR) 10 MG tablet Take 1 tablet by mouth nightly 3/24/20   ANGEL Sutton   medroxyPROGESTERone (PROVERA) 2.5 MG tablet Take 1 tablet by mouth daily 3/23/20   ANGEL Geller CNP   levocetirizine (XYZAL) 5 MG tablet Take 1 tablet by mouth daily 3/8/20   ANGEL Sutton   SYNTHROID 112 MCG tablet TAKE 1 TABLET BY MOUTH EVERY DAY IN THE MORNING 3/8/20   ANGEL Sutton   estradiol (ESTRACE) 0.5 MG tablet Take 1 tablet by mouth daily 20   ANGEL Geller CNP   vitamin D (CHOLECALCIFEROL) 125 MCG (5000 UT) CAPS capsule Take 5,000 Units by mouth daily    Historical Provider, MD   Multiple Vitamins-Minerals (THERAPEUTIC MULTIVITAMIN-MINERALS) tablet Take 1 tablet by mouth daily    Historical Provider, MD   pantoprazole (PROTONIX) 40 MG tablet Take 1 tablet by mouth every morning (before breakfast) 10/24/19   ANGEL Douglass   fluticasone (FLONASE) 50 MCG/ACT nasal spray USE 2 SPRAYS IN [facility] NOSTRIL DAILY 10/4/18   Jm Ordoñez MD   PROAIR  (47 Base) MCG/ACT inhaler INHALE 2 PUFFS INTO THE LUNGS EVERY 6 HOURS AS NEEDED FOR WHEEZING 3/5/18   Jm Ordoñez MD   EPINEPHrine (EPIPEN) 0.3 MG/0.3ML SOAJ injection USE AS DIRECTED FOR ACUTE  ALLERGIC REACTION 8/29/17   Historical Provider, MD   epinastine (ELESTAT) 0.05 % SOLN INSTILL 1 DROP IN EACH EYE ONE TO TWICE A DAY AS NEEDED 8/29/17   Historical Provider, MD   naproxen (NAPROXEN) 500 MG EC tablet Take 1 tablet by mouth every 12 hours Generic is okay 3/30/17   Jase Becker MD       Current medications:    Current Facility-Administered Medications   Medication Dose Route Frequency Provider Last Rate Last Dose    ceFAZolin (ANCEF) 1 g in sterile water 10 mL IV syringe  1 g Intravenous Once Bremarino Franco MD           Allergies:     Allergies   Allergen Reactions    Pollen Extract      Early Spring and early Fall    Effexor [Venlafaxine Hydrochloride] Other (See Comments)     Slurred speech, feelings of having a TIA, seeing double, unaware of her surroundings    Poison Ivy Extract [Poison Ivy Extract] Itching       Problem List:    Patient Active Problem List   Diagnosis Code    Hyperthyroidism E05.90    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    Headache R51    Chronic anxiety F41.9    Vitamin D deficiency E55.9    Postmenopausal atrophic vaginitis N95.2    Abdominal pain, other specified site R10.9    Chronic constipation K59.09    Polypharmacy Z79.899    Narcotic dependence ([facility] [address] F11.20    Chronic pain G89.29    NSAID long-term use Z79.1    Family history of colonic polyps Z83.71    Stress incontinence, female N39.3    Reactive airway disease with wheezing J45.909    Allergic bronchitis J45.909    Irritable bowel syndrome with constipation K58.1    Breast density R92.2    Chronic GERD K21.9    Cystocele with uterine prolapse N81.4    Constipation due to opioid therapy K59.03, T40.2X5A    Gas bloat syndrome K92.89       Past Medical History:        Diagnosis Date    Allergic bronchitis     has wheezing related to dust and pollen    Allergic rhinitis     Asthma     Blepharitis of both eyes     Bone spur     cervical    on day of surgery. Cardiovascular:        (-) hypertension, CAD,  angina and  CHF    NYHA Classification: I  ECG reviewed  Rhythm: regular  Rate: normal           Beta Blocker:  Not on Beta Blocker         Neuro/Psych:   Negative Neuro/Psych ROS  (+) headaches:,    (-) seizures, CVA and depression/anxiety             ROS comment: Cervical disc dz GI/Hepatic/Renal: Neg GI/Hepatic/Renal ROS  (+) GERD:, morbid obesity     (-) hiatal hernia       Endo/Other: Negative Endo/Other ROS   (+) hyperthyroidism::., .          Pt had PAT visit. Abdominal:       Abdomen: soft. Vascular:                                        Anesthesia Plan      general     ASA 2     (Iv zofran within 30 min of closing   lma ok )  Induction: intravenous. BIS  MIPS: Postoperative opioids intended and Prophylactic antiemetics administered. Anesthetic plan and risks discussed with patient. Use of blood products discussed with patient whom. Plan discussed with CRNA.     Attending anesthesiologist reviewed and agrees with Pre Eval content              Lisbeth Sutherland MD   7/24/2020

## 2020-09-10 ENCOUNTER — TRANSCRIBE ORDERS (OUTPATIENT)
Dept: ADMINISTRATIVE | Facility: HOSPITAL | Age: 60
End: 2020-09-10

## 2020-09-10 DIAGNOSIS — Z01.818 PREOP TESTING: Primary | ICD-10-CM

## 2020-09-14 ENCOUNTER — LAB (OUTPATIENT)
Dept: LAB | Facility: HOSPITAL | Age: 60
End: 2020-09-14

## 2020-09-14 ENCOUNTER — TELEPHONE (OUTPATIENT)
Dept: PRIMARY CARE CLINIC | Age: 60
End: 2020-09-14

## 2020-09-14 PROCEDURE — U0003 INFECTIOUS AGENT DETECTION BY NUCLEIC ACID (DNA OR RNA); SEVERE ACUTE RESPIRATORY SYNDROME CORONAVIRUS 2 (SARS-COV-2) (CORONAVIRUS DISEASE [COVID-19]), AMPLIFIED PROBE TECHNIQUE, MAKING USE OF HIGH THROUGHPUT TECHNOLOGIES AS DESCRIBED BY CMS-2020-01-R: HCPCS | Performed by: PAIN MEDICINE

## 2020-09-14 PROCEDURE — C9803 HOPD COVID-19 SPEC COLLECT: HCPCS | Performed by: PAIN MEDICINE

## 2020-09-15 ENCOUNTER — PATIENT MESSAGE (OUTPATIENT)
Dept: PRIMARY CARE CLINIC | Age: 60
End: 2020-09-15

## 2020-09-15 LAB
COVID LABCORP PRIORITY: NORMAL
SARS-COV-2 RNA RESP QL NAA+PROBE: NOT DETECTED

## 2020-09-15 NOTE — TELEPHONE ENCOUNTER
From: Cristel Anders  To: ANGEL Blakely  Sent: 9/15/2020 9:27 AM CDT  Subject: Prescription Question    Elly Cabrera talked to Omid Carrera at [facility] group and she said I could get my PCP to write the Prescription of my Pantoprazole. That way it would make it a lot easier on me and if I have medical problems I could always schedule a visit with her if I only have health issues. she wrote me a Prescription for only 90 days. Is this something that I could get my PCP to do for me?  Thanks

## 2020-09-18 RX ORDER — PANTOPRAZOLE SODIUM 40 MG/1
TABLET, DELAYED RELEASE ORAL
Qty: 90 TABLET | Refills: 1 | Status: SHIPPED | OUTPATIENT
Start: 2020-09-18 | End: 2021-03-21

## 2020-09-23 ENCOUNTER — TRANSCRIBE ORDERS (OUTPATIENT)
Dept: LAB | Facility: HOSPITAL | Age: 60
End: 2020-09-23

## 2020-09-23 DIAGNOSIS — Z01.818 PRE-OP TESTING: Primary | ICD-10-CM

## 2020-09-26 ENCOUNTER — LAB (OUTPATIENT)
Dept: LAB | Facility: HOSPITAL | Age: 60
End: 2020-09-26

## 2020-09-26 DIAGNOSIS — Z01.818 PRE-OP TESTING: ICD-10-CM

## 2020-09-26 PROCEDURE — C9803 HOPD COVID-19 SPEC COLLECT: HCPCS

## 2020-09-26 PROCEDURE — U0003 INFECTIOUS AGENT DETECTION BY NUCLEIC ACID (DNA OR RNA); SEVERE ACUTE RESPIRATORY SYNDROME CORONAVIRUS 2 (SARS-COV-2) (CORONAVIRUS DISEASE [COVID-19]), AMPLIFIED PROBE TECHNIQUE, MAKING USE OF HIGH THROUGHPUT TECHNOLOGIES AS DESCRIBED BY CMS-2020-01-R: HCPCS

## 2020-09-27 LAB
COVID LABCORP PRIORITY: NORMAL
SARS-COV-2 RNA RESP QL NAA+PROBE: NOT DETECTED

## 2020-09-30 ENCOUNTER — TRANSCRIBE ORDERS (OUTPATIENT)
Dept: ADMINISTRATIVE | Facility: HOSPITAL | Age: 60
End: 2020-09-30

## 2020-09-30 DIAGNOSIS — Z01.818 PREOP TESTING: Primary | ICD-10-CM

## 2020-10-06 ENCOUNTER — LAB (OUTPATIENT)
Dept: LAB | Facility: HOSPITAL | Age: 60
End: 2020-10-06

## 2020-10-06 PROCEDURE — U0003 INFECTIOUS AGENT DETECTION BY NUCLEIC ACID (DNA OR RNA); SEVERE ACUTE RESPIRATORY SYNDROME CORONAVIRUS 2 (SARS-COV-2) (CORONAVIRUS DISEASE [COVID-19]), AMPLIFIED PROBE TECHNIQUE, MAKING USE OF HIGH THROUGHPUT TECHNOLOGIES AS DESCRIBED BY CMS-2020-01-R: HCPCS | Performed by: PAIN MEDICINE

## 2020-10-06 PROCEDURE — C9803 HOPD COVID-19 SPEC COLLECT: HCPCS | Performed by: PAIN MEDICINE

## 2020-10-07 ENCOUNTER — HOSPITAL ENCOUNTER (OUTPATIENT)
Dept: MRI IMAGING | Age: 60
Discharge: HOME | End: 2020-10-07
Payer: MEDICARE

## 2020-10-07 LAB
COVID LABCORP PRIORITY: NORMAL
SARS-COV-2 RNA RESP QL NAA+PROBE: NOT DETECTED

## 2020-10-07 PROCEDURE — 72141 MRI NECK SPINE W/O DYE: CPT

## 2020-11-23 RX ORDER — LEVOTHYROXINE SODIUM 112 MCG
TABLET ORAL
Qty: 90 TABLET | Refills: 2 | Status: SHIPPED | OUTPATIENT
Start: 2020-11-23 | End: 2021-08-15

## 2020-11-28 ASSESSMENT — PATIENT HEALTH QUESTIONNAIRE - PHQ9
SUM OF ALL RESPONSES TO PHQ QUESTIONS 1-9: 0
SUM OF ALL RESPONSES TO PHQ9 QUESTIONS 1 & 2: 0
2. FEELING DOWN, DEPRESSED OR HOPELESS: 0
1. LITTLE INTEREST OR PLEASURE IN DOING THINGS: 0

## 2020-11-28 ASSESSMENT — LIFESTYLE VARIABLES: HOW OFTEN DO YOU HAVE A DRINK CONTAINING ALCOHOL: 0

## 2020-11-30 ENCOUNTER — TELEPHONE (OUTPATIENT)
Dept: ADMINISTRATIVE | Age: 60
End: 2020-11-30

## 2020-11-30 ASSESSMENT — LIFESTYLE VARIABLES
HOW OFTEN DO YOU HAVE A DRINK CONTAINING ALCOHOL: NEVER
AUDIT-C TOTAL SCORE: INCOMPLETE
AUDIT TOTAL SCORE: INCOMPLETE

## 2020-12-03 ENCOUNTER — TELEPHONE (OUTPATIENT)
Dept: OTOLARYNGOLOGY | Age: 60
End: 2020-12-03

## 2020-12-03 ENCOUNTER — OFFICE VISIT (OUTPATIENT)
Dept: PRIMARY CARE CLINIC | Age: 60
End: 2020-12-03
Payer: MEDICARE

## 2020-12-03 VITALS
HEIGHT: 62 IN | TEMPERATURE: 96.7 F | OXYGEN SATURATION: 98 % | SYSTOLIC BLOOD PRESSURE: 126 MMHG | DIASTOLIC BLOOD PRESSURE: 72 MMHG | HEART RATE: 74 BPM | BODY MASS INDEX: 33.68 KG/M2 | WEIGHT: 183 LBS

## 2020-12-03 PROCEDURE — G0439 PPPS, SUBSEQ VISIT: HCPCS | Performed by: NURSE PRACTITIONER

## 2020-12-03 PROCEDURE — G0447 BEHAVIOR COUNSEL OBESITY 15M: HCPCS | Performed by: NURSE PRACTITIONER

## 2020-12-03 PROCEDURE — 99497 ADVNCD CARE PLAN 30 MIN: CPT | Performed by: NURSE PRACTITIONER

## 2020-12-03 RX ORDER — FLUTICASONE PROPIONATE 50 MCG
SPRAY, SUSPENSION (ML) NASAL
Qty: 1 BOTTLE | Refills: 6 | Status: SHIPPED | OUTPATIENT
Start: 2020-12-03 | End: 2021-11-10 | Stop reason: SDUPTHER

## 2020-12-03 ASSESSMENT — PATIENT HEALTH QUESTIONNAIRE - PHQ9
SUM OF ALL RESPONSES TO PHQ QUESTIONS 1-9: 0
2. FEELING DOWN, DEPRESSED OR HOPELESS: 0
SUM OF ALL RESPONSES TO PHQ9 QUESTIONS 1 & 2: 0
1. LITTLE INTEREST OR PLEASURE IN DOING THINGS: 0

## 2020-12-03 NOTE — PATIENT INSTRUCTIONS
Advance Directives: Care Instructions  Overview  An advance directive is a legal way to state your wishes at the end of your life. It tells your family and your doctor what to do if you can't say what you want. There are two main types of advance directives. You can change them any time your wishes change. Living will. This form tells your family and your doctor your wishes about life support and other treatment. The form is also called a declaration. Medical power of . This form lets you name a person to make treatment decisions for you when you can't speak for yourself. This person is called a health care agent (health care proxy, health care surrogate). The form is also called a durable power of  for health care. If you do not have an advance directive, decisions about your medical care may be made by a family member, or by a doctor or a  who doesn't know you. It may help to think of an advance directive as a gift to the people who care for you. If you have one, they won't have to make tough decisions by themselves. Follow-up care is a key part of your treatment and safety. Be sure to make and go to all appointments, and call your doctor if you are having problems. It's also a good idea to know your test results and keep a list of the medicines you take. What should you include in an advance directive? Many states have a unique advance directive form. (It may ask you to address specific issues.) Or you might use a universal form that's approved by many states. If your form doesn't tell you what to address, it may be hard to know what to include in your advance directive. Use the questions below to help you get started. · Who do you want to make decisions about your medical care if you are not able to? · What life-support measures do you want if you have a serious illness that gets worse over time or can't be cured? · What are you most afraid of that might happen? (Maybe you're afraid of having pain, losing your independence, or being kept alive by machines.)  · Where would you prefer to die? (Your home? A hospital? A nursing home?)  · Do you want to donate your organs when you die? · Do you want certain Lutheran practices performed before you die? When should you call for help? Be sure to contact your doctor if you have any questions. Where can you learn more? Go to [email or web address] and sign in to your Infectious account. Enter R264 in the [employer] box to learn more about \"Advance Directives: Care Instructions. \"     If you do not have an account, please click on the \"Sign Up Now\" link. Current as of: December 9, 2019               Content Version: 12.6  © 7616-5424 [employer]. Care instructions adapted under license by [facility] ([facility]). If you have questions about a medical condition or this instruction, always ask your healthcare professional. [address] any warranty or liability for your use of this information. Body Mass Index: Care Instructions  Your Care Instructions     Body mass index (BMI) can help you see if your weight is raising your risk for health problems. It uses a formula to compare how much you weigh with how tall you are. · A BMI lower than 18.5 is considered underweight. · A BMI between 18.5 and 24.9 is considered healthy. · A BMI between 25 and 29.9 is considered overweight. A BMI of 30 or higher is considered obese. If your BMI is in the normal range, it means that you have a lower risk for weight-related health problems. If your BMI is in the overweight or obese range, you may be at increased risk for weight-related health problems, such as high blood pressure, heart disease, stroke, arthritis or joint pain, and diabetes.  If your BMI is in the underweight range, you may be at increased risk for health problems such as fatigue, lower protection (immunity) health. You may need to make lifestyle changes to gain or maintain weight and stay healthy, such as getting more healthy foods in your diet and doing exercises to build muscle. Where can you learn more? Go to [email or web address] and sign in to your [employer] account. Enter S176 in the [facility] box to learn more about \"Body Mass Index: Care Instructions. \"     If you do not have an account, please click on the \"Sign Up Now\" link. Current as of: December 11, 2019               Content Version: 12.6  © 2857-8993 [employer]. Care instructions adapted under license by [facility] ([facility]). If you have questions about a medical condition or this instruction, always ask your healthcare professional. [address] any warranty or liability for your use of this information. Personalized Preventive Plan for Salud Funez - 12/3/2020  Medicare offers a range of preventive health benefits. Some of the tests and screenings are paid in full while other may be subject to a deductible, co-insurance, and/or copay. Some of these benefits include a comprehensive review of your medical history including lifestyle, illnesses that may run in your family, and various assessments and screenings as appropriate. After reviewing your medical record and screening and assessments performed today your provider may have ordered immunizations, labs, imaging, and/or referrals for you. A list of these orders (if applicable) as well as your Preventive Care list are included within your After Visit Summary for your review. Other Preventive Recommendations:    · A preventive eye exam performed by an eye specialist is recommended every 1-2 years to screen for glaucoma; cataracts, macular degeneration, and other eye disorders. · A preventive dental visit is recommended every 6 months.   · Try to get at least 150 minutes of exercise per week or 10,000 steps per day on a pedometer . · Order or download the FREE \"Exercise & Physical Activity: Your Everyday Guide\" from [employer]. Call 9-310.853.8195 or search [employer] online. · You need 3171-8871 mg of calcium and 6886-8080 IU of vitamin D per day. It is possible to meet your calcium requirement with diet alone, but a vitamin D supplement is usually necessary to meet this goal.  · When exposed to the sun, use a sunscreen that protects against both UVA and UVB radiation with an SPF of 30 or greater. Reapply every 2 to 3 hours or after sweating, drying off with a towel, or swimming. · Always wear a seat belt when traveling in a car. Always wear a helmet when riding a bicycle or motorcycle.

## 2020-12-03 NOTE — PROGRESS NOTES
Medicare Annual Wellness Visit  Name: Ignacio Eubanks Date: 12/3/2020   MRN: [id number] Sex: Female   Age: 62 y.o. Ethnicity: Non-/Non    : 1960 Race: [employer] is here for Medicare AWV (Pt states her left ear keeps retaining fluid. taking medication for it but doesn't seem to be helping) and Referral - General (Needs referral to Dr. Selwyn Cantu)    Screenings for behavioral, psychosocial and functional/safety risks, and cognitive dysfunction are all negative except as indicated below. These results, as well as other patient data from the [address] form, are documented in Flowsheets linked to this Encounter. Allergies   Allergen Reactions    Pollen Extract      Early Spring and early     Effexor [facility] Hydrochloride] Other (See Comments)     Slurred speech, feelings of having a TIA, seeing double, unaware of her surroundings    Poison Ivy Extract [Poison Ivy Extract] Itching       Prior to Visit Medications    Medication Sig Taking? Authorizing Provider   SYNTHROID 112 MCG tablet TAKE 1 TABLET BY MOUTH EVERY DAY IN THE MORNING Yes ANGEL Woo   pantoprazole (PROTONIX) 40 MG tablet TAKE 1 TABLET BY MOUTH EVERY DAY BEFORE BREAKFAST Yes ANGEL Woo   HYDROcodone-acetaminophen (NORCO) 7.5-325 MG per tablet Take 1 tablet by mouth 3 times daily.  Ordered by pain management Yes Historical Provider, MD   azelastine (ASTELIN) 0.1 % nasal spray USE 2 SPRAYS IN EACH NOSTRIL TWICE A DAY AS DIRECTED Yes ANGEL Hillman   FLUoxetine (PROZAC) 10 MG capsule TAKE 1 CAPSULE BY MOUTH EVERY DAY Yes ANGEL Woo   montelukast (SINGULAIR) 10 MG tablet Take 1 tablet by mouth nightly Yes ANGEL Woo   medroxyPROGESTERone (PROVERA) 2.5 MG tablet Take 1 tablet by mouth daily Yes ANGEL Cooper - CNP   levocetirizine (XYZAL) 5 MG tablet Take 1 tablet by mouth daily Yes ANGEL Woo   estradiol (ESTRACE) 0.5 MG tablet Take 1 tablet internal and external hemorrhoids, suboptimal prep, 3 yr recall   [facility] N/A 2020    DILATATION AND CURETTAGE HYSTEROSCOPY performed by Lizette Duckworth MD at [address] N/A 2019    INTERNAL AND EXTERNAL HEMORRHOIDECTOMY performed by Antony Mtz MD at [address]      UPPER GASTROINTESTINAL ENDOSCOPY  3/3/2011     ? Family History   Problem Relation Age of Onset    Diabetes Sister     Thyroid Disease Sister     Rheum Arthritis Sister     Other Sister         fibromyalgia    Rheum Arthritis Maternal Aunt     Colon Polyps Maternal Aunt     Heart Failure Father     Heart Disease Father          of acute MI at 58    Hypertension Mother     Cancer Mother         lung cancer    Colon Polyps Mother     High Blood Pressure Mother     Other Son         sleep apnea,anxiety and neck problems after  injuries    Breast Cancer Maternal Cousin     Colon Cancer Neg Hx     Esophageal Cancer Neg Hx     Stomach Cancer Neg Hx     Liver Cancer Neg Hx     Liver Disease Neg Hx     Rectal Cancer Neg Hx        CareTeam (Including outside providers/suppliers regularly involved in providing care):   Patient Care Team:  ANGEL Delgado as PCP - General (Nurse Practitioner Family)  ANGEL Delgado [facility]  ANGEL Adam as Advanced Practice Nurse (Family Nurse Practitioner)    Wt Readings from Last 3 Encounters:   20 183 lb (83 kg)   20 181 lb (82.1 kg)   20 180 lb (81.6 kg)     Vitals:    20 0844   BP: 126/72   Pulse: 74   Temp: 96.7 °F (35.9 °C)   TempSrc: Temporal   SpO2: 98%   Weight: 183 lb (83 kg)   Height: 5' 2\" (1.575 m)     Body mass index is 33.47 kg/m². Based upon direct observation of the patient, evaluation of cognition reveals recent and remote memory intact.     General Appearance: alert and oriented to person, place Will: Advance Care Planning addressed with patient today    Health Habits/Nutrition:  Health Habits/Nutrition  Do you exercise for at least 20 minutes 2-3 times per week?: (!) No  Have you lost any weight without trying in the past 3 months?: No  Do you eat fewer than 2 meals per day?: No  Have you seen a dentist within the past year?: Yes  Body mass index: (!) 33.47  Health Habits/Nutrition Interventions:  · Inadequate physical activity:  educational materials provided to promote increased physical activity    Personalized Preventive Plan   Current Health Maintenance Status  Immunization History   Administered Date(s) Administered    Influenza Vaccine, unspecified formulation 10/21/2014    Influenza Virus Vaccine 10/17/2012, 10/27/2015, 10/16/2020    Influenza, Quadv, IM, (6 mo and older Fluzone, Flulaval, Fluarix and 3 yrs and older Afluria) 02/02/2018    Influenza, Quadv, IM, PF (6 mo and older Fluzone, Flulaval, Fluarix, and 3 yrs and older Afluria) 09/20/2018    Pneumococcal Conjugate 13-valent ([id number]) 03/15/2017    Pneumococcal Polysaccharide ([id number]) 03/19/2018    Td, unspecified formulation 01/15/2013    Tdap (Boostrix, Adacel) 01/23/2013        Health Maintenance   Topic Date Due    Shingles Vaccine (1 of 2) 11/17/2011    Annual Wellness Visit (AWV)  12/03/2020    TSH testing  12/03/2020    Breast cancer screen  05/13/2021    Low dose CT lung screening  06/26/2021    Colon cancer screen colonoscopy  11/11/2022    DTaP/Tdap/Td vaccine (2 - Td) 01/23/2023    Lipid screen  10/14/2024    Cervical cancer screen  02/13/2025    Flu vaccine  Completed    Hepatitis C screen  Completed    HIV screen  Completed    Hepatitis A vaccine  Aged Out    Hepatitis B vaccine  Aged Out    Hib vaccine  Aged Out    Meningococcal (ACWY) vaccine  Aged Out    Pneumococcal 0-64 years Vaccine  Aged Out     Recommendations for [employer] Due: see orders and patient instructions/AVS.  . Recommended screening schedule for the next 5-10 years is provided to the patient in written form: see Patient Instructions/AVS.    There are no diagnoses linked to this encounter. Advance Care Planning   Advanced Care Planning: Discussed the patients choices for care and treatment in case of a health event that adversely affects decision-making abilities. Also discussed the patients long-term treatment options. Reviewed the process of designating a Health Care Surrogate as defined by the [facility]. Reviewed the [facility] Will Directive process and the kinds of life-sustaining treatments the patient would like to receive should they become terminally ill or permanently unconscious. Explained the state requirement to complete the forms in the presence of two eligible witnesses OR in the presence of a . Patient was asked to provide a copy of the signed forms to the practice office. Time spent (minutes): 30    Obesity Counseling: Assessed behavioral health risks and factors affecting choice of behavior. Suggested weight control approaches, including dietary changes behavioral modification and follow up plan. Provided educational and support documentation. Time spent (minutes): 15      Patient is also wanting referral to Anton Jaffe due to abnormal MRI of cervical spine.     ENT referral

## 2020-12-03 NOTE — TELEPHONE ENCOUNTER
Called to schedule an appointment from a referral. Patient did not answer and voicemail was full. I was unable to leave a voicemail.

## 2020-12-04 ENCOUNTER — TELEPHONE (OUTPATIENT)
Dept: OTOLARYNGOLOGY | Age: 60
End: 2020-12-04

## 2020-12-04 NOTE — TELEPHONE ENCOUNTER
Salud requests that office  return their call. The best time to reach her is Anytime. Patient returning a called back to scheduled appointment    Thank you.

## 2020-12-08 DIAGNOSIS — E03.9 HYPOTHYROIDISM, UNSPECIFIED TYPE: ICD-10-CM

## 2020-12-08 DIAGNOSIS — Z13.220 LIPID SCREENING: ICD-10-CM

## 2020-12-08 DIAGNOSIS — R73.09 ELEVATED GLUCOSE: ICD-10-CM

## 2020-12-08 LAB
ALBUMIN SERPL-MCNC: 4.6 G/DL (ref 3.5–5.2)
ALP BLD-CCNC: 90 U/L (ref 35–104)
ALT SERPL-CCNC: 15 U/L (ref 5–33)
ANION GAP SERPL CALCULATED.3IONS-SCNC: 13 MMOL/L (ref 7–19)
AST SERPL-CCNC: 17 U/L (ref 5–32)
BASOPHILS ABSOLUTE: 0 K/UL (ref 0–0.2)
BASOPHILS RELATIVE PERCENT: 0.4 % (ref 0–1)
BILIRUB SERPL-MCNC: 0.6 MG/DL (ref 0.2–1.2)
BUN BLDV-MCNC: 10 MG/DL (ref 6–20)
CALCIUM SERPL-MCNC: 9.8 MG/DL (ref 8.6–10)
CHLORIDE BLD-SCNC: 102 MMOL/L (ref 98–111)
CHOLESTEROL, FASTING: 189 MG/DL (ref 160–199)
CO2: 26 MMOL/L (ref 22–29)
CREAT SERPL-MCNC: 0.7 MG/DL (ref 0.5–0.9)
EOSINOPHILS ABSOLUTE: 0.1 K/UL (ref 0–0.6)
EOSINOPHILS RELATIVE PERCENT: 1.2 % (ref 0–5)
GFR AFRICAN AMERICAN: >59
GFR NON-AFRICAN AMERICAN: >60
GLUCOSE BLD-MCNC: 98 MG/DL (ref 74–109)
HBA1C MFR BLD: 5.7 % (ref 4–6)
HCT VFR BLD CALC: 39.8 % (ref 37–47)
HDLC SERPL-MCNC: 52 MG/DL (ref 65–121)
HEMOGLOBIN: 13 G/DL (ref 12–16)
IMMATURE GRANULOCYTES #: 0 K/UL
LDL CHOLESTEROL CALCULATED: 103 MG/DL
LYMPHOCYTES ABSOLUTE: 2.6 K/UL (ref 1.1–4.5)
LYMPHOCYTES RELATIVE PERCENT: 37.3 % (ref 20–40)
MCH RBC QN AUTO: 30.4 PG (ref 27–31)
MCHC RBC AUTO-ENTMCNC: 32.7 G/DL (ref 33–37)
MCV RBC AUTO: 93.2 FL (ref 81–99)
MONOCYTES ABSOLUTE: 0.5 K/UL (ref 0–0.9)
MONOCYTES RELATIVE PERCENT: 7.1 % (ref 0–10)
NEUTROPHILS ABSOLUTE: 3.7 K/UL (ref 1.5–7.5)
NEUTROPHILS RELATIVE PERCENT: 53.7 % (ref 50–65)
PDW BLD-RTO: 13.3 % (ref 11.5–14.5)
PLATELET # BLD: 293 K/UL (ref 130–400)
PMV BLD AUTO: 10.3 FL (ref 9.4–12.3)
POTASSIUM SERPL-SCNC: 4.8 MMOL/L (ref 3.5–5)
RBC # BLD: 4.27 M/UL (ref 4.2–5.4)
SODIUM BLD-SCNC: 141 MMOL/L (ref 136–145)
TOTAL PROTEIN: 8.2 G/DL (ref 6.6–8.7)
TRIGLYCERIDE, FASTING: 168 MG/DL (ref 0–149)
TSH REFLEX FT4: 1.28 UIU/ML (ref 0.35–5.5)
WBC # BLD: 7 K/UL (ref 4.8–10.8)

## 2020-12-10 ENCOUNTER — OFFICE VISIT (OUTPATIENT)
Dept: OTOLARYNGOLOGY | Age: 60
End: 2020-12-10
Payer: MEDICARE

## 2020-12-10 VITALS
DIASTOLIC BLOOD PRESSURE: 74 MMHG | SYSTOLIC BLOOD PRESSURE: 128 MMHG | WEIGHT: 179 LBS | HEIGHT: 62 IN | BODY MASS INDEX: 32.94 KG/M2

## 2020-12-10 PROBLEM — H69.93 EUSTACHIAN TUBE DYSFUNCTION, BILATERAL: Status: ACTIVE | Noted: 2020-12-10

## 2020-12-10 PROBLEM — H69.83 EUSTACHIAN TUBE DYSFUNCTION, BILATERAL: Status: ACTIVE | Noted: 2020-12-10

## 2020-12-10 PROCEDURE — 99202 OFFICE O/P NEW SF 15 MIN: CPT | Performed by: PHYSICIAN ASSISTANT

## 2020-12-10 RX ORDER — PSEUDOEPHEDRINE HYDROCHLORIDE 30 MG/1
30 TABLET ORAL 3 TIMES DAILY
Qty: 30 TABLET | Refills: 3 | Status: SHIPPED | OUTPATIENT
Start: 2020-12-10 | End: 2021-12-10

## 2020-12-10 ASSESSMENT — ENCOUNTER SYMPTOMS
SINUS PRESSURE: 0
SINUS PAIN: 0
RHINORRHEA: 0
SORE THROAT: 0
TROUBLE SWALLOWING: 0
FACIAL SWELLING: 0
EYE DISCHARGE: 0
EYE PAIN: 0
VOICE CHANGE: 0

## 2020-12-10 NOTE — ASSESSMENT & PLAN NOTE
Eustachian tube dysfunction-bilateral  Plan: Due to the patient having spontaneous drainage with no evidence of a middle ear effusion bilaterally, I have recommended the patient to increase her Flonase to twice a day and to add saline nasal spray at night. I will also prescribe over-the-counter Sudafed to be used as needed for her ear pressure. She is advised that if she has recurrence despite the medication, she is contact the office for reevaluation. Currently she is to follow-up on a as needed basis.

## 2020-12-10 NOTE — PROGRESS NOTES
[facility]/ENT  Salud is a pleasant 64-year-old  female that was referred by Marga Chavez due to problems with ear pain associated with tinnitus. Patient reports that she has a longstanding history of allergy problems and is managed by Dr. Brian Dempsey. Over the last 3 months she has noticed increasing amounts of problems with pressure to her ears with the left side greater than right. She reports that her hearing has been muffled at times which is off and on. She also reports occasional tinnitus that she mostly noticed when she is trying to sleep at night. At this time she has had no radiological studies or any audiology studies. Currently is and also denies any fever or chills. Allergies: Pollen extract; Effexor [venlafaxine hydrochloride]; and Poison ivy extract [poison ivy extract]      Current Outpatient Medications   Medication Sig Dispense Refill    pseudoephedrine (DECONGESTANT) 30 MG tablet Take 1 tablet by mouth 3 times daily 30 tablet 3    fluticasone (FLONASE) 50 MCG/ACT nasal spray USE 2 SPRAYS IN EACH NOSTRIL DAILY 1 Bottle 6    SYNTHROID 112 MCG tablet TAKE 1 TABLET BY MOUTH EVERY DAY IN THE MORNING 90 tablet 2    pantoprazole (PROTONIX) 40 MG tablet TAKE 1 TABLET BY MOUTH EVERY DAY BEFORE BREAKFAST 90 tablet 1    HYDROcodone-acetaminophen (NORCO) 7.5-325 MG per tablet Take 1 tablet by mouth 3 times daily.  Ordered by pain management      azelastine (ASTELIN) 0.1 % nasal spray USE 2 SPRAYS IN EACH NOSTRIL TWICE A DAY AS DIRECTED 1 Bottle 5    FLUoxetine (PROZAC) 10 MG capsule TAKE 1 CAPSULE BY MOUTH EVERY DAY 30 capsule 5    montelukast (SINGULAIR) 10 MG tablet Take 1 tablet by mouth nightly 90 tablet 2    medroxyPROGESTERone (PROVERA) 2.5 MG tablet Take 1 tablet by mouth daily 90 tablet 3    levocetirizine (XYZAL) 5 MG tablet Take 1 tablet by mouth daily 30 tablet 11    estradiol (ESTRACE) 0.5 MG tablet Take 1 tablet by mouth daily 90 tablet 3    vitamin D (CHOLECALCIFEROL) 125 MCG (5000 UT) CAPS capsule Take 5,000 Units by mouth daily      PROAIR  (90 Base) MCG/ACT inhaler INHALE 2 PUFFS INTO THE LUNGS EVERY 6 HOURS AS NEEDED FOR WHEEZING 8.5 Inhaler 1    EPINEPHrine (EPIPEN) 0.3 MG/0.3ML SOAJ injection USE AS DIRECTED FOR ACUTE  ALLERGIC REACTION  1    epinastine (ELESTAT) 0.05 % SOLN INSTILL 1 DROP IN EACH EYE ONE TO TWICE A DAY AS NEEDED  3    naproxen (NAPROXEN) 500 MG EC tablet Take 1 tablet by mouth every 12 hours Generic is okay 60 tablet 11    Multiple Vitamins-Minerals (THERAPEUTIC MULTIVITAMIN-MINERALS) tablet Take 1 tablet by mouth daily       No current facility-administered medications for this visit. Past Surgical History:   Procedure Laterality Date    COLONOSCOPY  9/16/2011    [facility]?     COLONOSCOPY N/A 11/11/2016    Dr Maria Elena David AP (-) dysplasia x 1, BCM x 1--3 yr recall    COLONOSCOPY N/A 11/11/2019    Dr Boone Tvoar, large grade 3 internal and external hemorrhoids, suboptimal prep, 3 yr recall    DILATION AND CURETTAGE OF UTERUS N/A 7/24/2020    DILATATION AND CURETTAGE HYSTEROSCOPY performed by Arina Byrnes MD [address] N/A 12/6/2019    INTERNAL AND EXTERNAL HEMORRHOIDECTOMY performed by Jocelyn Hook MD at [address] [facility] GASTROINTESTINAL ENDOSCOPY  3/3/2011     ?        Past Medical History:   Diagnosis Date    Allergic bronchitis     has wheezing related to dust and pollen    Allergic rhinitis     Asthma     Blepharitis of both eyes     Bone spur     cervical    Cervical disc disease     Chicken pox     age 26    Chronic anxiety 2006    has been on valium after failing effexor and other medications    Coccyx disorder     history of f/x    Constipation     chronic since a teen    Degenerative disc disease at L5-S1 level     Genital warts     Dr Maciel Hodge GERD (gastroesophageal reflux disease)  Hemorrhoids     HPV in female     Hyperthyroidism     took radioactive iodine    Post menopausal syndrome     Reactive airway disease with wheezing     Scoliosis     Unspecified vitamin D deficiency        Family History   Problem Relation Age of Onset    Diabetes Sister     Thyroid Disease Sister     Rheum Arthritis Sister     Other Sister         fibromyalgia    Rheum Arthritis Maternal Aunt     Colon Polyps Maternal Aunt     Heart Failure Father     Heart Disease Father          of acute MI at 58    Hypertension Mother     Cancer Mother         lung cancer    Colon Polyps Mother     High Blood Pressure Mother     Other Son         sleep apnea,anxiety and neck problems after  injuries    Breast Cancer Maternal Cousin     Colon Cancer Neg Hx     Esophageal Cancer Neg Hx     Stomach Cancer Neg Hx     Liver Cancer Neg Hx     Liver Disease Neg Hx     Rectal Cancer Neg Hx        Social History     Tobacco Use    Smoking status: Former Smoker     Packs/day: 2.00     Years: 25.00     Pack years: 50.00     Types: Cigarettes     Last attempt to quit: 2008     Years since quittin.6    Smokeless tobacco: Never Used    Tobacco comment: Quit smoking in  with Wellbutrin (had crazy dreams on it)   Substance Use Topics    Alcohol use: No     Alcohol/week: 0.0 standard drinks           REVIEW OF SYSTEMS:  all other systems reviewed and are negative  Review of Systems   Constitutional: Negative for chills and fever. HENT: Positive for congestion, ear discharge and ear pain. Negative for dental problem, facial swelling, hearing loss, nosebleeds, postnasal drip, rhinorrhea, sinus pressure, sinus pain, sneezing, sore throat, tinnitus, trouble swallowing and voice change. Eyes: Negative for pain and discharge.            Comments:     PHYSICAL EXAM:    /74   Ht 5' 2\" (1.575 m)   Wt 179 lb (81.2 kg)   LMP 2009   BMI 32.74 kg/m²   Body mass index is 32.74 kg/m². General Appearance: well developed  and well nourished  Head/ Face: normocephalic and atraumatic  Vocal Quality: good/ normal  Ears: Right Ear: External: external ears normal Otoscopy Ear Canal: clear Otoscopy TM: She is noted with a small microperforation to the central portion of her TM. The TM appears to be adequately drained with no evidence of infection. This is felt to be a spontaneous drainage from a middle ear effusion. Left Ear: External: external ears normal Otoscopy Ear Canal: canal clear Otoscopy TM: TM's retracted TM's total  Hearing: Rinne A>B: Left, Rinne A>B: Right and Burris L  Nose: nares normal and septum midline  Neck: supple and mass No  Thyroid: normal and nodules No   She is noted with no sinus tenderness to percussion over the maxillary or frontal sinuses bilaterally. Assessment & Plan:    Problem List Items Addressed This Visit     Eustachian tube dysfunction, bilateral - Primary     Eustachian tube dysfunction-bilateral  Plan: Due to the patient having spontaneous drainage with no evidence of a middle ear effusion bilaterally, I have recommended the patient to increase her Flonase to twice a day and to add saline nasal spray at night. I will also prescribe over-the-counter Sudafed to be used as needed for her ear pressure. She is advised that if she has recurrence despite the medication, she is contact the office for reevaluation. Currently she is to follow-up on a as needed basis. No orders of the defined types were placed in this encounter. Orders Placed This Encounter   Medications    pseudoephedrine (DECONGESTANT) 30 MG tablet     Sig: Take 1 tablet by mouth 3 times daily     Dispense:  30 tablet     Refill:  3       Electronically signed by Donnie Jiménez PA-C on 12/10/20 at 12:09 PM CST          Please note that this chart was generated using dragon dictation software.   Although every effort was made to ensure the accuracy of this automated transcription, some errors in transcription may have occurred.

## 2020-12-15 ENCOUNTER — HOSPITAL ENCOUNTER (OUTPATIENT)
Dept: GENERAL RADIOLOGY | Facility: HOSPITAL | Age: 60
Discharge: HOME | End: 2020-12-15
Admitting: NURSE PRACTITIONER

## 2020-12-15 ENCOUNTER — OFFICE VISIT (OUTPATIENT)
Dept: NEUROSURGERY | Facility: CLINIC | Age: 60
End: 2020-12-15

## 2020-12-15 ENCOUNTER — TRANSCRIBE ORDERS (OUTPATIENT)
Dept: ADMINISTRATIVE | Facility: HOSPITAL | Age: 60
End: 2020-12-15

## 2020-12-15 VITALS
SYSTOLIC BLOOD PRESSURE: 110 MMHG | BODY MASS INDEX: 33.16 KG/M2 | WEIGHT: 180.2 LBS | DIASTOLIC BLOOD PRESSURE: 70 MMHG | HEIGHT: 62 IN

## 2020-12-15 DIAGNOSIS — R20.2 NUMBNESS AND TINGLING IN BOTH HANDS: ICD-10-CM

## 2020-12-15 DIAGNOSIS — M48.02 CERVICAL STENOSIS OF SPINAL CANAL: ICD-10-CM

## 2020-12-15 DIAGNOSIS — Z01.818 PREOP TESTING: Primary | ICD-10-CM

## 2020-12-15 DIAGNOSIS — M50.30 DEGENERATION OF CERVICAL INTERVERTEBRAL DISC: ICD-10-CM

## 2020-12-15 DIAGNOSIS — M50.30 DEGENERATION OF CERVICAL INTERVERTEBRAL DISC: Primary | ICD-10-CM

## 2020-12-15 DIAGNOSIS — Z87.891 FORMER SMOKER: ICD-10-CM

## 2020-12-15 DIAGNOSIS — R20.0 NUMBNESS AND TINGLING IN BOTH HANDS: ICD-10-CM

## 2020-12-15 DIAGNOSIS — E66.09 CLASS 1 OBESITY DUE TO EXCESS CALORIES WITHOUT SERIOUS COMORBIDITY WITH BODY MASS INDEX (BMI) OF 32.0 TO 32.9 IN ADULT: ICD-10-CM

## 2020-12-15 PROCEDURE — 99204 OFFICE O/P NEW MOD 45 MIN: CPT | Performed by: NURSE PRACTITIONER

## 2020-12-15 PROCEDURE — 72052 X-RAY EXAM NECK SPINE 6/>VWS: CPT

## 2020-12-15 RX ORDER — LEVOCETIRIZINE DIHYDROCHLORIDE 5 MG/1
5 TABLET, FILM COATED ORAL DAILY
COMMUNITY
Start: 2020-11-23

## 2020-12-15 RX ORDER — EPINEPHRINE NASAL SOLUTION 1 MG/ML
0.5 SOLUTION NASAL AS NEEDED
COMMUNITY
End: 2021-07-19

## 2020-12-15 RX ORDER — AZELASTINE 1 MG/ML
1 SPRAY, METERED NASAL 2 TIMES DAILY
COMMUNITY
Start: 2020-07-03

## 2020-12-15 RX ORDER — FLUOXETINE 10 MG/1
10 CAPSULE ORAL DAILY
COMMUNITY
Start: 2020-07-02

## 2020-12-15 RX ORDER — MONTELUKAST SODIUM 10 MG/1
10 TABLET ORAL NIGHTLY
COMMUNITY

## 2020-12-15 RX ORDER — HYDROCODONE BITARTRATE AND ACETAMINOPHEN 7.5; 325 MG/1; MG/1
TABLET ORAL
COMMUNITY
Start: 2020-11-16 | End: 2020-12-16

## 2020-12-15 RX ORDER — ESTRADIOL 0.5 MG/1
0.5 TABLET ORAL DAILY
COMMUNITY
Start: 2020-11-05

## 2020-12-15 RX ORDER — PANTOPRAZOLE SODIUM 40 MG/1
40 TABLET, DELAYED RELEASE ORAL DAILY
COMMUNITY
Start: 2020-09-18

## 2020-12-15 RX ORDER — NAPROXEN 500 MG/1
500 TABLET ORAL 2 TIMES DAILY WITH MEALS
COMMUNITY
End: 2021-07-19

## 2020-12-15 RX ORDER — FLUTICASONE PROPIONATE 50 MCG
1 SPRAY, SUSPENSION (ML) NASAL NIGHTLY
COMMUNITY
Start: 2020-12-03

## 2020-12-15 RX ORDER — MEDROXYPROGESTERONE ACETATE 2.5 MG/1
2.5 TABLET ORAL DAILY
COMMUNITY
Start: 2020-10-21

## 2020-12-15 RX ORDER — LEVOTHYROXINE SODIUM 112 UG/1
112 TABLET ORAL DAILY
COMMUNITY
Start: 2020-11-23

## 2020-12-15 NOTE — PROGRESS NOTES
Chief complaint:   Chief Complaint   Patient presents with   • Neck Pain     Crystal was referred here by her PCP for follow up for cervical pain, she also has bilateral shoulder pain, she also has right arm pain.  She had an MRI in October at [facility] and we called for this to be uploaded in October.  No recent physical therapy, she is in pain management for her neck pain.        Subjective     HPI: This is a 60-year-old female patient who was referred to us by Dr. Chicas for neck pain.  She is here to be evaluated today.  The patient says that her pain is been going on since 2005.  She last worked in 2006 and has been on disability since that time.  The pain in her neck is intermittent.  It is worse with turning her neck.  Nothing makes it better.  She denies any pain radiating into her arms but does have pain going from her neck to her shoulders bilaterally.  Denies any bowel or bladder incontinence.  She does state that she does have numbness and tingling in her fingers when she wakes up in the morning.  She has not done any recent physical therapy, chiropractic care.  She currently is in pain management and has been since 2008.  She receives injections in her neck and her shoulders.  She receives 3 pain pills a day.  She is right-hand dominant.    Review of Systems   HENT: Positive for ear pain, facial swelling, postnasal drip and sneezing.    Eyes: Positive for itching.   Musculoskeletal: Positive for back pain, joint swelling, neck pain and neck stiffness.   Skin: Positive for color change.   Allergic/Immunologic: Positive for environmental allergies.   Neurological: Positive for weakness and numbness.   Psychiatric/Behavioral: The patient is nervous/anxious.    All other systems reviewed and are negative.       Past Medical History:   Diagnosis Date   • Arthritis    • Claustrophobia    • Hx of bladder infections    • Low back pain    • Neck pain    • Thyroid disease      Past Surgical History:  "  Procedure Laterality Date   • NO PAST SURGERIES       Family History   Problem Relation Age of Onset   • Cancer Mother    • Heart disease Father      Social History     Tobacco Use   • Smoking status: Former Smoker   • Smokeless tobacco: Never Used   Substance Use Topics   • Alcohol use: Never     Frequency: Never   • Drug use: Never     (Not in a hospital admission)    Allergies:  Pollen extract, Oxycodone-acetaminophen, Poison ivy extract, and Venlafaxine    Objective      Vital Signs  /70 (BP Location: Right arm, Patient Position: Sitting)   Ht 157.5 cm (62\")   Wt 81.7 kg (180 lb 3.2 oz)   BMI 32.96 kg/m²     Physical Exam  Constitutional:       Appearance: Normal appearance. She is well-developed.   HENT:      Head: Normocephalic.   Eyes:      General: Lids are normal.      Conjunctiva/sclera: Conjunctivae normal.      Pupils: Pupils are equal, round, and reactive to light.   Neck:      Musculoskeletal: Normal range of motion.   Cardiovascular:      Rate and Rhythm: Normal rate and regular rhythm.   Pulmonary:      Effort: Pulmonary effort is normal.      Breath sounds: Normal breath sounds.   Musculoskeletal: Normal range of motion.      Cervical back: She exhibits pain.   Skin:     General: Skin is warm.   Neurological:      Mental Status: She is alert and oriented to person, place, and time.      GCS: GCS eye subscore is 4. GCS verbal subscore is 5. GCS motor subscore is 6.      Cranial Nerves: No cranial nerve deficit.      Sensory: No sensory deficit.      Deep Tendon Reflexes: Reflexes are normal and symmetric. Reflexes normal.   Psychiatric:         Speech: Speech normal.         Behavior: Behavior normal.         Thought Content: Thought content normal.         Neurologic Exam     Mental Status   Oriented to person, place, and time.   Speech: speech is normal     Cranial Nerves     CN III, IV, VI   Pupils are equal, round, and reactive to light.      Results Review: MRI of the cervical spine " that was done on October 7, 2020 shows at C3-4 right-sided foraminal narrowing.  At C4-5 disc degeneration is noted.  See 5-6 there is left-sided foraminal narrowing.  At C6-7 cervical stenosis with bilateral foraminal narrowing.  There is loss of the normal cervical curvature.  No fracture visualized.  There is Modic endplate changes at C4-5.        Assessment/Plan: I am going to send the patient for set of x-rays of the cervical spine to include standing flexion extension for further evaluation of her neck pain.  I will also send her for nerve conduction study of her bilateral upper extremities with numbness and tingling that she is experiencing to rule out carpal tunnel syndrome  For first line conservative care of cervical pain, I would like to send Ms. Kovacs for a dedicated course of physician directed physical therapy consisting of 2-3 times a week for 4-6 weeks.    Return for reassessment with Dr. Gomez after physical therapy.     I advised the patient to call and return sooner for new or worsening complaints of weakness, paresthesias, gait disturbances, or any additional concerns.  Treatment options discussed in detail with Crystal and the patient voiced understanding.  Ms. Kovacs agrees with this plan of care.     Patient is a nonsmoker  The patient's Body mass index is 32.96 kg/m².. BMI is above normal parameters. Recommendations include: educational material and nutrition counseling    Diagnoses and all orders for this visit:    1. Degeneration of cervical intervertebral disc (Primary)  -     Ambulatory Referral to Physical Therapy Evaluate and treat (2-3 days a week for 4-6 weeks )  -     EMG & Nerve Conduction Test; Future  -     XR spine cervical complete w flex ext; Future    2. Cervical stenosis of spinal canal  -     Ambulatory Referral to Physical Therapy Evaluate and treat (2-3 days a week for 4-6 weeks )  -     EMG & Nerve Conduction Test; Future  -     XR spine cervical complete w flex ext;  Future    3. Numbness and tingling in both hands  -     Ambulatory Referral to Physical Therapy Evaluate and treat (2-3 days a week for 4-6 weeks )  -     EMG & Nerve Conduction Test; Future    4. Former smoker    5. Class 1 obesity due to excess calories without serious comorbidity with body mass index (BMI) of 32.0 to 32.9 in adult          I discussed the patients findings and my recommendations with patient    Kb Mckeon, APRN  12/15/20  16:22 CST

## 2020-12-15 NOTE — PATIENT INSTRUCTIONS
"DASH Eating Plan  DASH stands for \"Dietary Approaches to Stop Hypertension.\" The DASH eating plan is a healthy eating plan that has been shown to reduce high blood pressure (hypertension). It may also reduce your risk for type 2 diabetes, heart disease, and stroke. The DASH eating plan may also help with weight loss.  What are tips for following this plan?    General guidelines  · Avoid eating more than 2,300 mg (milligrams) of salt (sodium) a day. If you have hypertension, you may need to reduce your sodium intake to 1,500 mg a day.  · Limit alcohol intake to no more than 1 drink a day for nonpregnant women and 2 drinks a day for men. One drink equals 12 oz of beer, 5 oz of wine, or 1½ oz of hard liquor.  · Work with your health care provider to maintain a healthy body weight or to lose weight. Ask what an ideal weight is for you.  · Get at least 30 minutes of exercise that causes your heart to beat faster (aerobic exercise) most days of the week. Activities may include walking, swimming, or biking.  · Work with your health care provider or diet and nutrition specialist (dietitian) to adjust your eating plan to your individual calorie needs.  Reading food labels    · Check food labels for the amount of sodium per serving. Choose foods with less than 5 percent of the Daily Value of sodium. Generally, foods with less than 300 mg of sodium per serving fit into this eating plan.  · To find whole grains, look for the word \"whole\" as the first word in the ingredient list.  Shopping  · Buy products labeled as \"low-sodium\" or \"no salt added.\"  · Buy fresh foods. Avoid canned foods and premade or frozen meals.  Cooking  · Avoid adding salt when cooking. Use salt-free seasonings or herbs instead of table salt or sea salt. Check with your health care provider or pharmacist before using salt substitutes.  · Do not fall foods. Cook foods using healthy methods such as baking, boiling, grilling, and broiling instead.  · Cook with " heart-healthy oils, such as olive, canola, soybean, or sunflower oil.  Meal planning  · Eat a balanced diet that includes:  ? 5 or more servings of fruits and vegetables each day. At each meal, try to fill half of your plate with fruits and vegetables.  ? Up to 6-8 servings of whole grains each day.  ? Less than 6 oz of lean meat, poultry, or fish each day. A 3-oz serving of meat is about the same size as a deck of cards. One egg equals 1 oz.  ? 2 servings of low-fat dairy each day.  ? A serving of nuts, seeds, or beans 5 times each week.  ? Heart-healthy fats. Healthy fats called Omega-3 fatty acids are found in foods such as flaxseeds and coldwater fish, like sardines, salmon, and mackerel.  · Limit how much you eat of the following:  ? Canned or prepackaged foods.  ? Food that is high in trans fat, such as fried foods.  ? Food that is high in saturated fat, such as fatty meat.  ? Sweets, desserts, sugary drinks, and other foods with added sugar.  ? Full-fat dairy products.  · Do not salt foods before eating.  · Try to eat at least 2 vegetarian meals each week.  · Eat more home-cooked food and less restaurant, buffet, and fast food.  · When eating at a restaurant, ask that your food be prepared with less salt or no salt, if possible.  What foods are recommended?  The items listed may not be a complete list. Talk with your dietitian about what dietary choices are best for you.  Grains  Whole-grain or whole-wheat bread. Whole-grain or whole-wheat pasta. Brown rice. Oatmeal. Quinoa. Bulgur. Whole-grain and low-sodium cereals. Luz bread. Low-fat, low-sodium crackers. Whole-wheat flour tortillas.  Vegetables  Fresh or frozen vegetables (raw, steamed, roasted, or grilled). Low-sodium or reduced-sodium tomato and vegetable juice. Low-sodium or reduced-sodium tomato sauce and tomato paste. Low-sodium or reduced-sodium canned vegetables.  Fruits  All fresh, dried, or frozen fruit. Canned fruit in natural juice (without  added sugar).  Meat and other protein foods  Skinless chicken or turkey. Ground chicken or turkey. Pork with fat trimmed off. Fish and seafood. Egg whites. Dried beans, peas, or lentils. Unsalted nuts, nut butters, and seeds. Unsalted canned beans. Lean cuts of beef with fat trimmed off. Low-sodium, lean deli meat.  Dairy  Low-fat (1%) or fat-free (skim) milk. Fat-free, low-fat, or reduced-fat cheeses. Nonfat, low-sodium ricotta or cottage cheese. Low-fat or nonfat yogurt. Low-fat, low-sodium cheese.  Fats and oils  Soft margarine without trans fats. Vegetable oil. Low-fat, reduced-fat, or light mayonnaise and salad dressings (reduced-sodium). Canola, safflower, olive, soybean, and sunflower oils. Avocado.  Seasoning and other foods  Herbs. Spices. Seasoning mixes without salt. Unsalted popcorn and pretzels. Fat-free sweets.  What foods are not recommended?  The items listed may not be a complete list. Talk with your dietitian about what dietary choices are best for you.  Grains  Baked goods made with fat, such as croissants, muffins, or some breads. Dry pasta or rice meal packs.  Vegetables  Creamed or fried vegetables. Vegetables in a cheese sauce. Regular canned vegetables (not low-sodium or reduced-sodium). Regular canned tomato sauce and paste (not low-sodium or reduced-sodium). Regular tomato and vegetable juice (not low-sodium or reduced-sodium). Pickles. Olives.  Fruits  Canned fruit in a light or heavy syrup. Fried fruit. Fruit in cream or butter sauce.  Meat and other protein foods  Fatty cuts of meat. Ribs. Fried meat. Mcnamara. Sausage. Bologna and other processed lunch meats. Salami. Fatback. Hotdogs. Bratwurst. Salted nuts and seeds. Canned beans with added salt. Canned or smoked fish. Whole eggs or egg yolks. Chicken or turkey with skin.  Dairy  Whole or 2% milk, cream, and half-and-half. Whole or full-fat cream cheese. Whole-fat or sweetened yogurt. Full-fat cheese. Nondairy creamers. Whipped toppings.  Processed cheese and cheese spreads.  Fats and oils  Butter. Stick margarine. Lard. Shortening. Ghee. Mcnamara fat. Tropical oils, such as coconut, palm kernel, or palm oil.  Seasoning and other foods  Salted popcorn and pretzels. Onion salt, garlic salt, seasoned salt, table salt, and sea salt. Worcestershire sauce. Tartar sauce. Barbecue sauce. Teriyaki sauce. Soy sauce, including reduced-sodium. Steak sauce. Canned and packaged gravies. Fish sauce. Oyster sauce. Cocktail sauce. Horseradish that you find on the shelf. Ketchup. Mustard. Meat flavorings and tenderizers. Bouillon cubes. Hot sauce and Tabasco sauce. Premade or packaged marinades. Premade or packaged taco seasonings. Relishes. Regular salad dressings.  Where to find more information:  · National Heart, Lung, and Blood [place]: www.nhlbi.nih.gov  · American Heart Association: www.heart.org  Summary  · The DASH eating plan is a healthy eating plan that has been shown to reduce high blood pressure (hypertension). It may also reduce your risk for type 2 diabetes, heart disease, and stroke.  · With the DASH eating plan, you should limit salt (sodium) intake to 2,300 mg a day. If you have hypertension, you may need to reduce your sodium intake to 1,500 mg a day.  · When on the DASH eating plan, aim to eat more fresh fruits and vegetables, whole grains, lean proteins, low-fat dairy, and heart-healthy fats.  · Work with your health care provider or diet and nutrition specialist (dietitian) to adjust your eating plan to your individual calorie needs.  This information is not intended to replace advice given to you by your health care provider. Make sure you discuss any questions you have with your health care provider.  Document Revised: 11/30/2018 Document Reviewed: 12/11/2017  Elsevier Patient Education © 2020 [employer]      BMI for Adults  What is BMI?  Body mass index (BMI) is a number that is calculated from a person's weight and height. BMI can help  "estimate how much of a person's weight is composed of fat. BMI does not measure body fat directly. Rather, it is an alternative to procedures that directly measure body fat, which can be difficult and expensive.  BMI can help identify people who may be at higher risk for certain medical problems.  What are BMI measurements used for?  BMI is used as a screening tool to identify possible weight problems. It helps determine whether a person is obese, overweight, a healthy weight, or underweight.  BMI is useful for:  · Identifying a weight problem that may be related to a medical condition or may increase the risk for medical problems.  · Promoting changes, such as changes in diet and exercise, to help reach a healthy weight. BMI screening can be repeated to see if these changes are working.  How is BMI calculated?  BMI involves measuring your weight in relation to your height. Both height and weight are measured, and the BMI is calculated from those numbers. This can be done either in English ([place] or metric measurements. Note that charts and online BMI calculators are available to help you find your BMI quickly and easily without having to do these calculations yourself.  To calculate your BMI in English ([place] measurements:    1. Measure your weight in pounds (lb).  2. Multiply the number of pounds by 703.  ? For example, for a person who weighs 180 lb, multiply that number by 703, which equals 126,540.  3. Measure your height in inches. Then multiply that number by itself to get a measurement called \"inches squared.\"  ? For example, for a person who is 70 inches tall, the \"inches squared\" measurement is 70 inches x 70 inches, which equals 4,900 inches squared.  4. Divide the total from step 2 (number of lb x 703) by the total from step 3 (inches squared): 126,540 ÷ 4,900 = 25.8. This is your BMI.  To calculate your BMI in metric measurements:  1. Measure your weight in kilograms (kg).  2. Measure your height in " "meters (m). Then multiply that number by itself to get a measurement called \"meters squared.\"  ? For example, for a person who is 1.75 m tall, the \"meters squared\" measurement is 1.75 m x 1.75 m, which is equal to 3.1 meters squared.  3. Divide the number of kilograms (your weight) by the meters squared number. In this example: 70 ÷ 3.1 = 22.6. This is your BMI.  What do the results mean?  BMI charts are used to identify whether you are underweight, normal weight, overweight, or obese. The following guidelines will be used:  · Underweight: BMI less than 18.5.  · Normal weight: BMI between 18.5 and 24.9.  · Overweight: BMI between 25 and 29.9.  · Obese: BMI of 30 or above.  Keep these notes in mind:  · Weight includes both fat and muscle, so someone with a muscular build, such as an athlete, may have a BMI that is higher than 24.9. In cases like these, BMI is not an accurate measure of body fat.  · To determine if excess body fat is the cause of a BMI of 25 or higher, further assessments may need to be done by a health care provider.  · BMI is usually interpreted in the same way for men and women.  Where to find more information  For more information about BMI, including tools to quickly calculate your BMI, go to these websites:  · Centers for Disease Control and Prevention: www.cdc.gov  · American Heart Association: www.heart.org  · National Heart, Lung, and Blood [place]: www.nhlbi.nih.gov  Summary  · Body mass index (BMI) is a number that is calculated from a person's weight and height.  · BMI may help estimate how much of a person's weight is composed of fat. BMI can help identify those who may be at higher risk for certain medical problems.  · BMI can be measured using English measurements or metric measurements.  · BMI charts are used to identify whether you are underweight, normal weight, overweight, or obese.  This information is not intended to replace advice given to you by your health care provider. Make " sure you discuss any questions you have with your health care provider.  Document Revised: 09/09/2020 Document Reviewed: 07/17/2020  Elsevier Patient Education © 2020 [employer]

## 2020-12-18 ENCOUNTER — LAB (OUTPATIENT)
Dept: LAB | Facility: HOSPITAL | Age: 60
End: 2020-12-18

## 2020-12-18 DIAGNOSIS — Z01.818 PRE-OP TESTING: Primary | ICD-10-CM

## 2020-12-18 LAB — SARS-COV-2 ORF1AB RESP QL NAA+PROBE: NOT DETECTED

## 2020-12-18 PROCEDURE — C9803 HOPD COVID-19 SPEC COLLECT: HCPCS

## 2020-12-18 PROCEDURE — U0004 COV-19 TEST NON-CDC HGH THRU: HCPCS

## 2021-01-05 ENCOUNTER — HOSPITAL ENCOUNTER (OUTPATIENT)
Dept: NEUROLOGY | Facility: HOSPITAL | Age: 61
Discharge: HOME | End: 2021-01-05
Admitting: NURSE PRACTITIONER

## 2021-01-05 DIAGNOSIS — M48.02 CERVICAL STENOSIS OF SPINAL CANAL: ICD-10-CM

## 2021-01-05 DIAGNOSIS — R20.2 NUMBNESS AND TINGLING IN BOTH HANDS: ICD-10-CM

## 2021-01-05 DIAGNOSIS — M50.30 DEGENERATION OF CERVICAL INTERVERTEBRAL DISC: ICD-10-CM

## 2021-01-05 DIAGNOSIS — R20.0 NUMBNESS AND TINGLING IN BOTH HANDS: ICD-10-CM

## 2021-01-05 PROCEDURE — 95911 NRV CNDJ TEST 9-10 STUDIES: CPT

## 2021-01-05 PROCEDURE — 95886 MUSC TEST DONE W/N TEST COMP: CPT

## 2021-01-06 ENCOUNTER — TRANSCRIBE ORDERS (OUTPATIENT)
Dept: LAB | Facility: HOSPITAL | Age: 61
End: 2021-01-06

## 2021-01-06 DIAGNOSIS — Z01.818 PREOP TESTING: Primary | ICD-10-CM

## 2021-01-07 ENCOUNTER — HOSPITAL ENCOUNTER (OUTPATIENT)
Dept: PHYSICAL THERAPY | Age: 61
Setting detail: THERAPIES SERIES
Discharge: HOME | End: 2021-01-07
Payer: MEDICARE

## 2021-01-07 PROCEDURE — 97110 THERAPEUTIC EXERCISES: CPT

## 2021-01-07 PROCEDURE — 97162 PT EVAL MOD COMPLEX 30 MIN: CPT

## 2021-01-07 ASSESSMENT — PAIN DESCRIPTION - ORIENTATION: ORIENTATION: RIGHT;LEFT

## 2021-01-07 ASSESSMENT — PAIN SCALES - GENERAL: PAINLEVEL_OUTOF10: 8

## 2021-01-07 NOTE — PROGRESS NOTES
Physical Therapy  Initial Assessment  Date: 2021  Patient Name: Fortino Long  MRN: [id number]  : 1960          Restrictions       Subjective   General  Chart Reviewed: Yes  Patient assessed for rehabilitation services?: Yes  Additional Pertinent Hx: 62year old female referred to PT with diagnosis of degeneration of cervical intervertebral disc, cervical stenosis of spinal canal, numbness and tingling in both hands. Response To Previous Treatment: Not applicable  Referring Practitioner: Deanna Levine  Referral Date : 12/15/20  Diagnosis: degeneration of cervical intervertebral disc, cervical stenosis of spinal canal, numbness and tingling in both hands  PT Visit Information  PT Insurance Information: [employer] (precert through [facility]), [place] Medicaid (precert deferred until 9487)  Total # of Visits Approved: (12 visits anticipated)  Total # of Visits to Date: 1  Progress Note Due Date: 03/15/21  Subjective  Subjective: Patient presents with about  history of neck pain, \"had a bad crick in my neck\" which took a great deal of time to reduce. She was having difficulty turning her head in either direction. She also states she has shoulder pain and when she sleeps her fingers go to sleep (30 to 45 minutes for the paresthesias to reduce). She further states she is having difficulty gripping with her hands, holding a cup is difficult. She also has right lateral forearm paresthesias. She is having difficulty lifting, carrying, pushing and pulling are difficult and the pain in her shoulders is a function of how much physical activity she perform. Pain Screening  Patient Currently in Pain: Yes  Pain Assessment  Pain Assessment: 0-10  Pain Level: 8  Pain Location: Neck; Shoulder  Pain Orientation: Right;Left(shoulder equal in pain, hand/ arm more prounounced in right side)  Pain Frequency: Continuous(continuous but variable in intensity)  Vital Signs  Patient Currently in Pain: Yes    Vision/Hearing Orientation  Orientation  Overall Orientation Status: Within Normal Limits    Social/Functional History  Social/Functional History  Lives With: Alone  Type of Home: Mobile home  Home Layout: One level  Home Access: Stairs to enter with rails  ADL Assistance: Independent(has some problems with toiletries)  Homemaking Assistance: Independent  Ambulation Assistance: Independent  Transfer Assistance: Independent  Active : Yes  Mode of Transportation: Car  Occupation: On disability  Type of occupation: former , has done a lot of lifting  Leisure & Hobbies: likes to do puzzles, likes to fish    Objective     Observation/Palpation  Palpation: Increased muscle guarding bilateral upper traps, scalenes, cervical paraspinals, suboccipitals (worse on right side). Observation: Patient sits with slumped posture, increased thoracic kyphosis and decreased lordosis on neck, forward protraction of head and neck, rounded shoulders, tends to keep arms inward towards her body, limited movement of head during evaluation. Lowered left scapula/shoulder, head sidebendt to left with elevated right shoulder, trunk shift to left. Edema: Hands appear swollen bilaterally with some apparent rubor. Spine  Cervical: flexion: 26 degrees    ,extension: 50 degrees    , sidebending to right: 20 deg    ,sidebending to left: 25 degrees     ,rotation to left: full     rotation to right: 3/4 full    Strength RUE  Comment: Patient has WNL bilateral shoulder flexion and abduction, ER was 4-/5 and IR 4/5. Strength LUE  Comment: Patient has WNL bilateral shoulder flexion and abduction, ER was 4-/5 and IR 4/5. Additional Measures  Special Tests: + testing for TOS bilaterally (increased numbness/tingling in hands, discoloration of hands, decreased pulse, rebound of pulse with lowering of arms). Decreased reported neck pain with sitting manual traction.   Other: Patient scored 31% impairment on the Neck Pain Disability Questionnaire. Sensation  Overall Sensation Status: WFL(intermittent numbness and tingling in hands bilaterally)                Balance  Sitting - Static: Poor; Fair  Standing - Static: Fair;Poor                    Left Hand Strength -  (lbs)  Handle Setting 2: 46#,50#, 55#  Right Hand Strength -  (lbs)  Handle Setting 2: 45#,43#    Assessment   Conditions Requiring Skilled Therapeutic Intervention  Body structures, Functions, Activity limitations: Decreased ADL status; Decreased ROM; Decreased strength;Decreased high-level IADLs;Decreased sensation; Increased pain;Decreased posture  Assessment: Problem list: 1) decreased cervical ROM, 2) decreased bilateral  strength, 3) decreased shoulder ER and IR strength, 4) intermittent numbness and tingling bilateral hands (worse when waking in morning), 5) postural deviation, 6) need for instruction in HEP. Prognosis: Good;Fair  Decision Making: Medium Complexity  REQUIRES PT FOLLOW UP: Yes  Discharge Recommendations: Continue to assess pending progress  Activity Tolerance  Activity Tolerance: Patient Tolerated treatment well         Plan   Plan  Times per week: 2x per week  Plan weeks: 4-6 weeks  Current Treatment Recommendations: Strengthening, ROM, Home Exercise Program, Modalities, Positioning, Patient/Caregiver Education & Training, Manual Therapy - Soft Tissue Mobilization    G-Code       OutComes Score                                                  AM-PAC Score             Goals  Short term goals  Short term goal 1: Patient to be independent with HEP. Short term goal 2: Patient to report less frequent and intense numbness/tingling in hands when first waking. Short term goal 3: Patient to be independent with trial use of postural  (if needed and tolerated). Short term goal 4: Patient to have >= 60#  strength bilaterally. Long term goals  Long term goal 1: Patient to have >= 4+/5 bilateral shoulder ER and IR.   Long term goal 2: Patient to have >= 45 degrees cervical flexion. Long term goal 3: Patient to score <= 20% impairment on the Neck Pain Disability Questionnaire. Long term goal 4: Patient to report less difficulty with reading and working on puzzles.        Therapy Time   Individual Concurrent Group Co-treatment   Time In 1312         Time Out 1405         Minutes 53         Timed Code Treatment Minutes: 53 Minutes    Electronically signed by Beck Mcmillan PT on 1/7/2021 at 2:43 PM    __________________________________               _______________________       Referring Practitioner's Signature       Date  Beck Mcmillan PT

## 2021-01-08 ENCOUNTER — LAB (OUTPATIENT)
Dept: LAB | Facility: HOSPITAL | Age: 61
End: 2021-01-08

## 2021-01-08 LAB — SARS-COV-2 ORF1AB RESP QL NAA+PROBE: NOT DETECTED

## 2021-01-08 PROCEDURE — U0004 COV-19 TEST NON-CDC HGH THRU: HCPCS | Performed by: PAIN MEDICINE

## 2021-01-08 PROCEDURE — C9803 HOPD COVID-19 SPEC COLLECT: HCPCS | Performed by: PAIN MEDICINE

## 2021-01-11 ENCOUNTER — HOSPITAL ENCOUNTER (OUTPATIENT)
Dept: PHYSICAL THERAPY | Age: 61
Setting detail: THERAPIES SERIES
End: 2021-01-11
Payer: MEDICARE

## 2021-01-13 ENCOUNTER — HOSPITAL ENCOUNTER (OUTPATIENT)
Dept: PHYSICAL THERAPY | Age: 61
Setting detail: THERAPIES SERIES
Discharge: HOME | End: 2021-01-13
Payer: MEDICARE

## 2021-01-13 PROCEDURE — 97110 THERAPEUTIC EXERCISES: CPT

## 2021-01-13 NOTE — PROGRESS NOTES
Physical Therapy  Daily Treatment Note  Date: 2021  Patient Name: Karrie Andre  MRN: [id number]     :   1960    Subjective:   General  Additional Pertinent Hx: 62year old female referred to PT with diagnosis of degeneration of cervical intervertebral disc, cervical stenosis of spinal canal, numbness and tingling in both hands. Referring Practitioner: Toshia Sal  PT Insurance Information: [employer] (precert through [employer]), [facility] (precert deferred until )  Total # of Visits Approved: 7  Total # of Visits to Date: 2  Plan of Care/Certification Expiration Date: 21  Progress Note Due Date: 21  Subjective: the pain level varies depending on what i'm doing or lifting          Treatment Activities:      Exercises  Exercise 1: corner stretch (shoulder level and overhead)--3 reps, 20\" hold  Exercise 2: bilateral pectoralis minor stretches in doorway--3 reps, 20\" hold  Exercise 3: banded pull-apart (both arms grasping band with bent elbows and shouler ER)--15 reps  RED  Exercise 4: isometric resisted head flexion (assume chin-tuck position first)--3 secs x 10 reps  Exercise 5: lying on foam half-roll with overhead arm raises x 2 mins  Exercise 6: quadruped bilateral protraction--10 reps  Exercise 7: bilateral first rib mobilization with towel--3 reps, 20 second hold  Exercise 8: bilateral ulnar nerve glides (\"shotput, inverted eye-glasses)  5\" x 5 ea  Exercise 9: scapular retraction with t-band--1x 15  Exercise 10: t-band I's and T's--10 reps each  Exercise 11: downward lat pulls with band--15 reps  Exercise 12: bilateral anterior scalene stretch (cerv rotation and extension) 4 x 10 seconds  Exercise 13: Zheng's pose bilaterally--10 reps  Exercise 14:  L- stretch, left arm overhead, right out to side  5\" x 5  Exercise 15: wall push-outs  x 10  Exercise 16: ball roll up wall  5\" x 5  Exercise 17: wall lifts (arms overhead facing wall  x 10  Exercise 18: chair push-ups  x 10  Exercise 19: cervical traction--if exercises not helping  Exercise 20: -         Assessment:   Conditions Requiring Skilled Therapeutic Intervention  Body structures, Functions, Activity limitations: Decreased ADL status; Decreased ROM; Decreased strength;Decreased high-level IADLs;Decreased sensation; Increased pain;Decreased posture  Assessment: Initiated POC per initial evaluation, overall she tolerated fairly well as she was guided thru her routine with verbal cues and explanitation for proper technique with exercises. She was only given a limited number of visits so may need to introduce traction sooner than outlined due to this in the near future. Will assess affectiveness of exercise routine and advance her routine as appropriate. REQUIRES PT FOLLOW UP: Yes        Goals:  Short term goals  Short term goal 1: Patient to be independent with HEP. Short term goal 2: Patient to report less frequent and intense numbness/tingling in hands when first waking. Short term goal 3: Patient to be independent with trial use of postural  (if needed and tolerated). Short term goal 4: Patient to have >= 60#  strength bilaterally. Long term goals  Long term goal 1: Patient to have >= 4+/5 bilateral shoulder ER and IR. Long term goal 2: Patient to have >= 45 degrees cervical flexion. Long term goal 3: Patient to score <= 20% impairment on the Neck Pain Disability Questionnaire. Long term goal 4: Patient to report less difficulty with reading and working on puzzles.     Plan:    Times per week: 2x per week  Plan weeks: 4-6 weeks  Current Treatment Recommendations: Strengthening, ROM, Home Exercise Program, Modalities, Positioning, Patient/Caregiver Education & Training, Manual Therapy - Soft Tissue Mobilization        Therapy Time   Individual Concurrent Group Co-treatment   Time In 1244         Time Out 2699         Minutes 124 Gerry Morse PTA    Electronically signed by Hong Gonzales PTA on 1/13/2021 at 4:59 PM

## 2021-01-18 ENCOUNTER — HOSPITAL ENCOUNTER (OUTPATIENT)
Dept: PHYSICAL THERAPY | Age: 61
Setting detail: THERAPIES SERIES
Discharge: HOME | End: 2021-01-18
Payer: MEDICARE

## 2021-01-18 PROCEDURE — 97110 THERAPEUTIC EXERCISES: CPT

## 2021-01-18 ASSESSMENT — PAIN DESCRIPTION - LOCATION: LOCATION: NECK;SHOULDER

## 2021-01-18 NOTE — PROGRESS NOTES
212  Daily Treatment Note  Date: 2021  Patient Name: Alia Wood  MRN: [id number]     :   1960    Subjective:   General  Additional Pertinent Hx: 62year old female referred to PT with diagnosis of degeneration of cervical intervertebral disc, cervical stenosis of spinal canal, numbness and tingling in both hands. Referring Practitioner: Melody Turpin  PT Visit Information  PT Insurance Information: [place] Medicare (precert through [employer]), [facility] (precert deferred until )  Total # of Visits Approved: 7  Total # of Visits to Date: 3  Plan of Care/Certification Expiration Date: 21  Progress Note Due Date: 21  Subjective  Subjective: States she is a little sore. Did okay after last visit. ... a little sore the next day. Pain Screening  Patient Currently in Pain: Yes  Pain Assessment  Pain Assessment: 0-10  Pain Level: 3  Pain Location: Neck; Shoulder  Pain Orientation: Left  Vital Signs  Patient Currently in Pain: Yes       Treatment Activities:              Exercises  Exercise 1: corner stretch (shoulder level and overhead)--3 reps, 20\" hold  Exercise 2: bilateral pectoralis minor stretches in doorway--3 reps, 20\" hold  Exercise 3: banded pull-apart (both arms grasping band with bent elbows and shouler ER)--15 reps  RED  Exercise 4: isometric resisted head flexion (assume chin-tuck position first)--3 secs x 10 reps  Exercise 5: lying on foam half-roll with overhead arm raises x 2 mins  Exercise 6: quadruped bilateral protraction--10 reps  Exercise 7: bilateral first rib mobilization with towel--3 reps, 20 second hold  Exercise 8: bilateral ulnar nerve glides (\"shotput, inverted eye-glasses)  5\" x 5 ea (inverted glasses only)  Exercise 9: scapular retraction with t-band--1x 15 red  Exercise 10: t-band I's and T's--10 reps each  Exercise 11: downward lat pulls with band--15 reps  Exercise 12: bilateral anterior scalene stretch (cerv rotation and extension) 4 x 10 seconds  Exercise 13: Zheng's pose bilaterally--10 reps  Exercise 14: L- stretch, left arm overhead, right out to side  5\" x 5  Exercise 15: wall push-outs  x 10  Exercise 16: ball roll up wall  5\" x 5  Exercise 17: wall lifts (arms overhead facing wall) x 10  Exercise 18: chair push-ups  x 10  Exercise 19: cervical traction--if exercises not helping                 Assessment:   Conditions Requiring Skilled Therapeutic Intervention  Body structures, Functions, Activity limitations: Decreased ADL status; Decreased ROM; Decreased strength;Decreased high-level IADLs;Decreased sensation; Increased pain;Decreased posture  Assessment: Pt able to recall a few of exercises from previous session. States she was a little sore the day after but nothing severe. Did not advance routine and pt seemed to tolerate fairly well with only mild discomfort with some of the stretches. At end of session she denied any inc in pain. REQUIRES PT FOLLOW UP: Yes      G-Code:     OutComes Score                                                     Goals:  Short term goals  Short term goal 1: Patient to be independent with HEP. Short term goal 2: Patient to report less frequent and intense numbness/tingling in hands when first waking. Short term goal 3: Patient to be independent with trial use of postural  (if needed and tolerated). Short term goal 4: Patient to have >= 60#  strength bilaterally. Long term goals  Long term goal 1: Patient to have >= 4+/5 bilateral shoulder ER and IR. Long term goal 2: Patient to have >= 45 degrees cervical flexion. Long term goal 3: Patient to score <= 20% impairment on the Neck Pain Disability Questionnaire. Long term goal 4: Patient to report less difficulty with reading and working on puzzles.     Plan:    Plan  Times per week: 2x per week  Plan weeks: 4-6 weeks  Current Treatment Recommendations: Strengthening, ROM, Home Exercise Program, Modalities, Positioning, Patient/Caregiver Education & Training, Manual Therapy - Soft Tissue Mobilization  Timed Code Treatment Minutes: 43 Minutes     Therapy Time   Individual Concurrent Group Co-treatment   Time In 5356         Time Out 1431         Minutes 43         Timed Code Treatment Minutes: Davie SNOW PTA     Electronically signed by Lukasz Sánchez PTA on 1/18/2021 at 2:35 PM

## 2021-01-19 RX ORDER — FLUOXETINE 10 MG/1
CAPSULE ORAL
Qty: 30 CAPSULE | Refills: 5 | Status: SHIPPED | OUTPATIENT
Start: 2021-01-19 | End: 2021-07-18

## 2021-01-20 ENCOUNTER — HOSPITAL ENCOUNTER (OUTPATIENT)
Dept: PHYSICAL THERAPY | Age: 61
Setting detail: THERAPIES SERIES
Discharge: HOME | End: 2021-01-20
Payer: MEDICARE

## 2021-01-20 PROCEDURE — 97110 THERAPEUTIC EXERCISES: CPT

## 2021-01-20 NOTE — PROGRESS NOTES
Physical Therapy  Daily Treatment Note  Date: 2021  Patient Name: Destiny Pena  MRN: [id number]     :   1960    Subjective:   General  Additional Pertinent Hx: 62year old female referred to PT with diagnosis of degeneration of cervical intervertebral disc, cervical stenosis of spinal canal, numbness and tingling in both hands.   Referring Practitioner: Cady Ruiz  PT Visit Information  PT Insurance Information: [employer] (precert through [employer]), [facility] (precert deferred until )  Total # of Visits Approved: 7  Total # of Visits to Date: 4  Plan of Care/Certification Expiration Date: 21  Subjective  Subjective: Patient reports she is sore from the stretches and exercises that she performed last visit  Pain Screening  Patient Currently in Pain: Yes  Pain Assessment  Pain Assessment: 0-10  Pain Level: 6  Pain Location: Neck  Vital Signs  Patient Currently in Pain: Yes       Treatment Activities:                                      Exercises  Exercise 1: corner stretch (shoulder level and overhead)--3 reps, 20\" hold  Exercise 2: bilateral pectoralis minor stretches in doorway--3 reps, 20\" hold  Exercise 3: banded pull-apart (both arms grasping band with bent elbows and shouler ER)--15 reps  RED  Exercise 4: isometric resisted head flexion (assume chin-tuck position first)--3 secs x 10 reps  Exercise 5: lying on foam half-roll with overhead arm raises x 2 mins  Exercise 6: quadruped bilateral protraction--10 reps  Exercise 7: bilateral first rib mobilization with towel--3 reps, 20 second hold  Exercise 8: bilateral ulnar nerve glides (\"shotput, inverted eye-glasses)  5\" x 5 ea (inverted glasses only)  Exercise 9: scapular retraction with t-band--1x 15 red  Exercise 10: t-band I's and T's--10 reps each  Exercise 11: downward lat pulls with band--15 reps  Exercise 12: bilateral anterior scalene stretch (cerv rotation and extension) 4 x 10 seconds  Exercise 13: Zheng's pose bilaterally--10 reps  Exercise 14: L- stretch, left arm overhead, right out to side  5\" x 5  Exercise 15: wall push-outs  x 10  Exercise 16: ball roll up wall  5\" x 5  Exercise 17: wall lifts (arms overhead facing wall) x 10  Exercise 18: chair push-ups  x 10  Exercise 19: cervical traction--if exercises not helping                                   Assessment:   Conditions Requiring Skilled Therapeutic Intervention  Body structures, Functions, Activity limitations: Decreased ADL status; Decreased ROM; Decreased strength;Decreased high-level IADLs;Decreased sensation; Increased pain;Decreased posture  Assessment:  tolerated treatment well/ She had no complaints of pain while performing exercises. She requires min verbal and tactile cues to activate correct muscle group and  facilitate proper body mechanics. She was able to complete all exercises this treatment session  REQUIRES PT FOLLOW UP: Yes      G-Code:     OutComes Score                                                     Goals:  Short term goals  Short term goal 1: Patient to be independent with HEP. Short term goal 2: Patient to report less frequent and intense numbness/tingling in hands when first waking. Short term goal 3: Patient to be independent with trial use of postural  (if needed and tolerated). Short term goal 4: Patient to have >= 60#  strength bilaterally. Long term goals  Long term goal 1: Patient to have >= 4+/5 bilateral shoulder ER and IR. Long term goal 2: Patient to have >= 45 degrees cervical flexion. Long term goal 3: Patient to score <= 20% impairment on the Neck Pain Disability Questionnaire. Long term goal 4: Patient to report less difficulty with reading and working on puzzles.     Plan:       Timed Code Treatment Minutes: 38 Minutes     Therapy Time   Individual Concurrent Group Co-treatment   Time In 1244         Time Out 4520         Minutes 38         Timed Code Treatment Minutes: [address] Liss Webb     Electronically signed by Gwendolyn Hall PT on 1/20/2021 at 1:26 PM

## 2021-01-25 ENCOUNTER — HOSPITAL ENCOUNTER (OUTPATIENT)
Dept: PHYSICAL THERAPY | Age: 61
Setting detail: THERAPIES SERIES
Discharge: HOME | End: 2021-01-25
Payer: MEDICARE

## 2021-01-25 PROCEDURE — 97012 MECHANICAL TRACTION THERAPY: CPT

## 2021-01-25 PROCEDURE — 97110 THERAPEUTIC EXERCISES: CPT

## 2021-01-25 NOTE — PROGRESS NOTES
Daily Treatment Note  Date: 2021  Patient Name: Adrianna Sepulveda  MRN: [id number]     :   1960    Subjective:   General  Additional Pertinent Hx: 62year old female referred to PT with diagnosis of degeneration of cervical intervertebral disc, cervical stenosis of spinal canal, numbness and tingling in both hands. Referring Practitioner: Shashi Terry  PT Visit Information  PT Insurance Information: [employer] (precert through [employer]), [facility] (precert deferred until 8533)  Total # of Visits Approved: 7  Total # of Visits to Date: 5  Plan of Care/Certification Expiration Date: 21  Progress Note Due Date: 21  Subjective  Subjective: Patient states she can't tell that this has helped any. Says she has been having headaches more recently.   Pain Screening  Patient Currently in Pain: No  Vital Signs  Patient Currently in Pain: No       Treatment Activities:                                      Exercises  Exercise 1: corner stretch (shoulder level and overhead)--3 reps, 20\" hold  Exercise 2: bilateral pectoralis minor stretches in doorway--3 reps, 20\" hold  Exercise 3: banded pull-apart (both arms grasping band with bent elbows and shouler ER)--15 reps  RED  Exercise 4: isometric resisted head flexion (assume chin-tuck position first)--3 secs x 10 reps  Exercise 5: lying on foam half-roll with overhead arm raises x 2 mins *arms stayed static above head full 2 min today  Exercise 6: quadruped bilateral protraction--10 reps  Exercise 7: bilateral first rib mobilization with towel--3 reps, 15 second hold  Exercise 8: bilateral ulnar nerve glides (\"shotput, inverted eye-glasses)  5\" x 5 ea (inverted glasses only)  Exercise 9: scapular retraction with  red t-band--1x 15 red  Exercise 10: t-band I's and T's red--10 reps each  Exercise 11: downward lat pulls with band red--15 reps  Exercise 12: bilateral anterior scalene stretch (cerv rotation and extension) 4 x 10 seconds  Exercise 13: Zheng's pose bilaterally--10 reps  Exercise 14: L- stretch, left arm overhead, right out to side  5\" x 5  Exercise 15: wall push-outs  x 10  Exercise 16: ball roll up wall  5\" x 5  Exercise 17: wall lifts (arms overhead facing wall) x 10  Exercise 18: chair push-ups  x 10  Exercise 19: cervical traction-- 23 pounds x 15 min                                   Assessment:   Conditions Requiring Skilled Therapeutic Intervention  Body structures, Functions, Activity limitations: Decreased ADL status; Decreased ROM; Decreased strength;Decreased high-level IADLs;Decreased sensation; Increased pain;Decreased posture  Assessment: Patient tolerated tx well, but reported slight discomfort and decreased sensation in right hand with overhead activities today. She tolerated trial of traction well today with no increase or decrease in pain today. Patient required min to mod v.c. for proper tech with ex's throughout session. REQUIRES PT FOLLOW UP: Yes                                                      Goals:  Short term goals  Short term goal 1: Patient to be independent with HEP. Short term goal 2: Patient to report less frequent and intense numbness/tingling in hands when first waking. Short term goal 3: Patient to be independent with trial use of postural  (if needed and tolerated). Short term goal 4: Patient to have >= 60#  strength bilaterally. Long term goals  Long term goal 1: Patient to have >= 4+/5 bilateral shoulder ER and IR. Long term goal 2: Patient to have >= 45 degrees cervical flexion. Long term goal 3: Patient to score <= 20% impairment on the Neck Pain Disability Questionnaire. Long term goal 4: Patient to report less difficulty with reading and working on puzzles.     Plan:    Plan  Times per week: 2x per week  Plan weeks: 4-6 weeks  Current Treatment Recommendations: Strengthening, ROM, Home Exercise Program, Modalities, Positioning, Patient/Caregiver Education & Training, Manual Therapy - Soft Tissue Mobilization  Timed Code Treatment Minutes: 30 Minutes     Therapy Time   Individual Concurrent Group Co-treatment   Time In 9681         Time Out 3698         Minutes 50         Timed Code Treatment Minutes: [address], PT    Electronically signed by Toy Claude, PT on 1/25/2021 at 3:50 PM

## 2021-01-27 ENCOUNTER — HOSPITAL ENCOUNTER (OUTPATIENT)
Dept: PHYSICAL THERAPY | Age: 61
Setting detail: THERAPIES SERIES
Discharge: HOME | End: 2021-01-27
Payer: MEDICARE

## 2021-01-27 PROCEDURE — 97110 THERAPEUTIC EXERCISES: CPT

## 2021-01-27 PROCEDURE — 97012 MECHANICAL TRACTION THERAPY: CPT

## 2021-01-27 ASSESSMENT — PAIN SCALES - GENERAL: PAINLEVEL_OUTOF10: 3

## 2021-01-27 NOTE — PROGRESS NOTES
Physical Therapy  Daily Treatment Note  Date: 2021  Patient Name: Jasmine Lange  MRN: [id number]     :   1960    Subjective:   General  Additional Pertinent Hx: 62year old female referred to PT with diagnosis of degeneration of cervical intervertebral disc, cervical stenosis of spinal canal, numbness and tingling in both hands. Referring Practitioner: Mattie Delacruz  PT Insurance Information: [employer] (precert through [employer]), [facility] (precert deferred until 3/83/5513)  Total # of Visits Approved: 7  Total # of Visits to Date: 6  Plan of Care/Certification Expiration Date: 21  Progress Note Due Date: 21  Subjective: My L shoulder is bothering me but not too bad  Patient Currently in Pain: Yes  Pain Level: 3  Pain Location: Shoulder  Pain Orientation: Left       Treatment Activities:         Exercises  Exercise 1: corner stretch (shoulder level and overhead)--3 reps, 20\" hold  Exercise 2: bilateral pectoralis minor stretches in doorway--3 reps, 20\" hold  Exercise 3: banded pull-apart (both arms grasping band with bent elbows and shouler ER)--15 reps  RED  Exercise 4: isometric resisted head flexion (assume chin-tuck position first)--3 secs x 10 reps  Exercise 5: lying on foam half-roll with overhead arm raises x 2 mins *arms stayed static above head full 2 min today  Exercise 6: quadruped bilateral protraction--10 reps  Exercise 7: bilateral first rib mobilization with towel--3 reps, 15 second hold  Exercise 8: bilateral anterior scalene stretch (cerv rotation and extension) 4 x 10 seconds  Exercise 9: scapular retraction with  red t-band--1x 15 red  Exercise 10: t-band I's and T's red--10 reps each  Exercise 11: downward lat pulls with band red--15 reps  Exercise 12: bilateral ulnar nerve glides (\"shotput, inverted eye-glasses)  5\" x 5 ea (inverted glasses only)  Exercise 13: Zheng's pose bilaterally--10 reps  Exercise 14:  L- stretch, left arm overhead, right out to side  5\" x 5  Exercise 15: wall push-outs  x 10  Exercise 16: wall lifts (arms overhead facing wall) x 10  Exercise 17: ball roll up wall  5\" x 5  Exercise 18: chair push-ups  x 10  Exercise 19: cervical traction-- 23 pounds x 15 min        Assessment:   Conditions Requiring Skilled Therapeutic Intervention  Body structures, Functions, Activity limitations: Decreased ADL status; Decreased ROM; Decreased strength;Decreased high-level IADLs;Decreased sensation; Increased pain;Decreased posture  Assessment: Patient continues with good tolerance for exercise, some discomfort reported performing T's and ball roll up wall in posterior shoulders and inner scapula region. Had to wait 10' for traction table to become available and kept treatment time and wt same as previous visit. REQUIRES PT FOLLOW UP: Yes        Goals:  Short term goals  Short term goal 1: Patient to be independent with HEP. Short term goal 2: Patient to report less frequent and intense numbness/tingling in hands when first waking. Short term goal 3: Patient to be independent with trial use of postural  (if needed and tolerated). Short term goal 4: Patient to have >= 60#  strength bilaterally. Long term goals  Long term goal 1: Patient to have >= 4+/5 bilateral shoulder ER and IR. Long term goal 2: Patient to have >= 45 degrees cervical flexion. Long term goal 3: Patient to score <= 20% impairment on the Neck Pain Disability Questionnaire. Long term goal 4: Patient to report less difficulty with reading and working on puzzles.     Plan:    Times per week: 2x per week  Plan weeks: 4-6 weeks  Current Treatment Recommendations: Strengthening, ROM, Home Exercise Program, Modalities, Positioning, Patient/Caregiver Education & Training, Manual Therapy - Soft Tissue Mobilization       Therapy Time   Individual Concurrent Group Co-treatment   Time In 0775         Time Out 1400         Minutes 63         Timed Code Treatment Minutes: 33 Minutes(had to wait 10' for traction room)       Kana Andrea, KOFI    Electronically signed by Kana Andrea, KOFI on 1/27/2021 at 2:59 PM

## 2021-02-02 ENCOUNTER — HOSPITAL ENCOUNTER (OUTPATIENT)
Dept: PHYSICAL THERAPY | Age: 61
Setting detail: THERAPIES SERIES
Discharge: HOME | End: 2021-02-02
Payer: MEDICARE

## 2021-02-02 PROCEDURE — 97530 THERAPEUTIC ACTIVITIES: CPT

## 2021-02-02 PROCEDURE — 97110 THERAPEUTIC EXERCISES: CPT

## 2021-02-02 PROCEDURE — 97012 MECHANICAL TRACTION THERAPY: CPT

## 2021-02-02 ASSESSMENT — PAIN SCALES - GENERAL: PAINLEVEL_OUTOF10: 2

## 2021-02-02 NOTE — PROGRESS NOTES
Physical Therapy  Reassessment/Daily Treatment Note  Date: 2021  Patient Name: Destiny Pena  MRN: [id number]     :   1960    Subjective:   General  Additional Pertinent Hx: 62year old female referred to PT with diagnosis of degeneration of cervical intervertebral disc, cervical stenosis of spinal canal, numbness and tingling in both hands. Referring Practitioner: Cady Ruiz  PT Visit Information  PT Insurance Information: [employer] (precert through [employer]), [facility] (precert deferred until )  Total # of Visits Approved: 9(: only approved for 2 more visits)  Total # of Visits to Date: 7  Plan of Care/Certification Expiration Date: 21  Progress Note Due Date: 21  Subjective  Subjective: Patient reports she is now having headaches and she was not having that before.  Her headaches are inher occipital region  Pain Screening  Patient Currently in Pain: Yes  Pain Assessment  Pain Assessment: 0-10  Pain Level: 2  Pain Location: Shoulder  Vital Signs  Patient Currently in Pain: Yes       Treatment Activities:                              Spine  Cervical: flexion: 50 degrees    ,extension: 50 degrees    , sidebending to right: 35 deg    ,sidebending to left: 35 degrees     ,rotation to left: full     rotation to right:  full       Exercises  Exercise 1: corner stretch (shoulder level and overhead)--3 reps, 20\" hold  Exercise 2: bilateral pectoralis minor stretches in doorway--3 reps, 20\" hold  Exercise 3: banded pull-apart (both arms grasping band with bent elbows and shouler ER)--15 reps  REDnot today  Exercise 4: isometric resisted head flexion (assume chin-tuck position first)--3 secs x 10 reps  Exercise 5: lying on foam half-roll with overhead arm raises x 2 mins *arms stayed static above head full 2 min todaynot today  Exercise 6: quadruped bilateral protraction--10 reps not today  Exercise 7: bilateral first rib mobilization with towel--3 reps, 15 second holdnot today  Exercise 8: bilateral anterior scalene stretch (cerv rotation and extension) 4 x 10 seconds  Exercise 9: scapular retraction with  red t-band--1x 15 rednot today  Exercise 10: t-band I's and T's red--10 reps eachnot today  Exercise 11: downward lat pulls with band red--15 repsnot today  Exercise 12: bilateral ulnar nerve glides (\"shotput, inverted eye-glasses)  5\" x 5 ea (inverted glasses only)  Exercise 13: Zheng's pose bilaterally--10 reps  Exercise 14: L- stretch, left arm overhead, right out to side  5\" x 5  Exercise 15: wall push-outs  x 10  Exercise 16: wall lifts (arms overhead facing wall) x 10  Exercise 17: ball roll up wall  5\" x 5not today  Exercise 18: chair push-ups  x 10  Exercise 19: cervical traction-- 23 pounds x 15 min; STM 10-15 minutes                          Left Hand Strength -  (lbs)  Handle Setting 2: 40#,30#,30#  Right Hand Strength -  (lbs)  Handle Setting 2: 35#,40#,40#        Assessment:   Conditions Requiring Skilled Therapeutic Intervention  Body structures, Functions, Activity limitations: Decreased ADL status; Decreased ROM; Decreased strength;Decreased high-level IADLs;Decreased sensation; Increased pain;Decreased posture  Assessment:  is being seen for a PT reassessmnet. Patient reports her symptoms are unchanged but they have not increased. She does state she now gets headaches in the morning. Her Neck Disability Score increased by one point still indicating moderate disability. Though she does not feel like she has improved her cervical ROM has improved in all directions. Her POC will be updated by adding soft tissue mobilization and cervical joint mobs. She will benefit from continued skilled therapy services at this time to address her limitations  REQUIRES PT FOLLOW UP: Yes      G-Code:     OutComes Score                                                     Goals:  Short term goals  Short term goal 1: Patient to be independent with HEP.  progress(02/02/21: patient does not have an handout but has been performing exercises at home)  Short term goal 2: Patient to report less frequent and intense numbness/tingling in hands when first waking. progress(02/02/21: patient reports symptoms are the same)  Short term goal 3: Patient to be independent with trial use of postural  (if needed and tolerated). not indicated(02/02/21:)  Short term goal 4: Patient to have >= 60#  strength bilaterally. progress(02/02/21: measurements were lower than evaluation numbers)  Long term goals  Long term goal 1: Patient to have >= 4+/5 bilateral shoulder ER and IR.progress(02/02/21: 4/5)  Long term goal 2: Patient to have >= 45 degrees cervical flexion. met(02/02/21: 50 degrees of flexion)  Long term goal 3: Patient to score <= 20% impairment on the Neck Pain Disability Questionnaire. progress(02/02/21: 32%)  Long term goal 4: Patient to report less difficulty with reading and working on puzzles.  progress(02/02/21: patient reports she has not done any in a while)    Plan:    Plan  Times per week: 2x per week  Plan weeks: 4-6 weeks  Current Treatment Recommendations: Strengthening, ROM, Home Exercise Program, Modalities, Positioning, Patient/Caregiver Education & Training, Manual Therapy - Soft Tissue Mobilization  Timed Code Treatment Minutes: (had to wait 10' for traction room)     Therapy Time   Individual Concurrent Group Co-treatment   Time In 1500         Time Out 1558         Minutes 58         Timed Code Treatment Minutes: (had to wait 10' for traction room)       Marium Peterson, PT

## 2021-02-04 ENCOUNTER — HOSPITAL ENCOUNTER (OUTPATIENT)
Dept: PHYSICAL THERAPY | Age: 61
Setting detail: THERAPIES SERIES
Discharge: HOME | End: 2021-02-04
Payer: MEDICARE

## 2021-02-04 PROCEDURE — 97012 MECHANICAL TRACTION THERAPY: CPT

## 2021-02-04 PROCEDURE — 97110 THERAPEUTIC EXERCISES: CPT

## 2021-02-04 ASSESSMENT — PAIN DESCRIPTION - LOCATION: LOCATION: SHOULDER

## 2021-02-04 NOTE — PROGRESS NOTES
Physical Therapy  Daily Treatment Note  Date: 2021  Patient Name: Tiki Flor  MRN: [id number]     :   1960    Subjective:   General  Additional Pertinent Hx: 62year old female referred to PT with diagnosis of degeneration of cervical intervertebral disc, cervical stenosis of spinal canal, numbness and tingling in both hands. Referring Practitioner: Lorraine Cash  PT Visit Information  PT Insurance Information: [employer] (precert through [employer]), [facility] (precert deferred until 3/40/1210)  Total # of Visits Approved: 9(: only approved for 2 more visits)  Total # of Visits to Date: 8  Plan of Care/Certification Expiration Date: 21  Progress Note Due Date: 21  Subjective  Subjective: Patient states she did not wake with a headache this morning. She concedes that therapy has been helpful to this point. She reports generally she is tolerating her PT sessions better, \"morning after\" not quite as bad.   Pain Screening  Patient Currently in Pain: Yes  Pain Assessment  Pain Assessment: 0-10  Pain Level: 2  Pain Location: Shoulder  Pain Frequency: Continuous  Vital Signs  Patient Currently in Pain: Yes       Treatment Activities:                                      Exercises  Exercise 1: corner stretch (shoulder level and overhead)--3 reps, 20\" hold  Exercise 2: bilateral pectoralis minor stretches in doorway--3 reps, 20\" hold  Exercise 3: banded pull-apart (both arms grasping band with bent elbows and shouler ER)--15 reps  REDnot today  Exercise 4: isometric resisted head flexion (assume chin-tuck position first)--3 secs x 10 reps  Exercise 5: lying on foam half-roll with overhead arm raises x 2 mins *arms stayed static above head full 2 min todaynot today  Exercise 6: quadruped bilateral protraction--10 reps not today  Exercise 7: bilateral first rib mobilization with towel--3 reps, 15 second  Exercise 8: bilateral anterior scalene stretch (cerv rotation and extension) 4 x 10 seconds  Exercise 9: scapular retraction with  red t-band--1x 15 red  Exercise 10: t-band I's and T's red--10 reps  Exercise 11: downward lat pulls with band red--15 reps  Exercise 12: bilateral ulnar nerve glides (\"shotput, inverted eye-glasses)  5\" x 5 ea (inverted glasses only)  Exercise 13: Zheng's pose bilaterally--10 reps  Exercise 14: L- stretch, left arm overhead, right out to side  5\" x 5  Exercise 15: wall push-outs  x 10  Exercise 16: wall lifts (arms overhead facing wall) x 10  Exercise 17: ball roll up wall  5\" x 5  Exercise 18: chair push-ups  x 10  Exercise 19: cervical traction-- 23 pounds x 20 min                                   Assessment:   Conditions Requiring Skilled Therapeutic Intervention  Body structures, Functions, Activity limitations: Decreased ADL status; Decreased ROM; Decreased strength;Decreased high-level IADLs;Decreased sensation; Increased pain;Decreased posture  Assessment: Mrs. Stoner [place] gave good effort with her program today and complained of intermittent tingling of arms with exercises such as pect minor/corner stretch but which quickly resolves after arms are lowered. She was issued a formal HEP today which cover most all of her exercises. She has one more approved PT visit and tentative plans are to discharge after that session to continue independently with home exercises/stretches. REQUIRES PT FOLLOW UP: Yes  Discharge Recommendations: Continue to assess pending progress      G-Code:     OutComes Score                                                     Goals:  Short term goals  Short term goal 1: Patient to be independent with HEP. progress(02/02/21: patient does not have an handout but has been performing exercises at home. 2/4/21 HEP issued and reviewed.)  Short term goal 2: Patient to report less frequent and intense numbness/tingling in hands when first waking.  progress(02/02/21: patient reports symptoms are the same)  Short term goal 3: Patient to be independent with

## 2021-02-09 ENCOUNTER — HOSPITAL ENCOUNTER (OUTPATIENT)
Dept: PHYSICAL THERAPY | Age: 61
Setting detail: THERAPIES SERIES
Discharge: HOME | End: 2021-02-09
Payer: MEDICARE

## 2021-02-09 PROCEDURE — 97012 MECHANICAL TRACTION THERAPY: CPT

## 2021-02-09 PROCEDURE — 97110 THERAPEUTIC EXERCISES: CPT

## 2021-02-09 NOTE — PROGRESS NOTES
Daily Treatment Note/DISCHARGE  Date: 2021  Patient Name: Adrianna Sepulveda  MRN: [id number]     :   1960    Subjective:   General  Additional Pertinent Hx: 62year old female referred to PT with diagnosis of degeneration of cervical intervertebral disc, cervical stenosis of spinal canal, numbness and tingling in both hands. Referring Practitioner: Shashi Terry  PT Visit Information  PT Insurance Information: [place] [employer] (precert through [employer]), [facility] (precert deferred until )  Total # of Visits Approved: 9(: only approved for 2 more visits)  Total # of Visits to Date: 9  Plan of Care/Certification Expiration Date: 21  Progress Note Due Date: 21  Subjective  Subjective: Patient states she is sore today. She says it is abou 5/10.   Pain Screening  Patient Currently in Pain: Yes(5/10 pre tx)  Vital Signs  Patient Currently in Pain: Yes(5/10 pre tx)       Treatment Activities:                                      Exercises  Exercise 1: corner stretch (shoulder level and overhead)--3 reps, 20\" hold  Exercise 2: bilateral pectoralis minor stretches in doorway--3 reps, 20\" hold  Exercise 3: banded pull-apart (both arms grasping band with bent elbows and shouler ER)--15 reps  RED  Exercise 4: isometric resisted head flexion (assume chin-tuck position first)--3 secs x 10 reps  Exercise 5: lying on foam half-roll with overhead arm raises x 2 mins *arms stayed static above head full 2 min  Exercise 6: quadruped bilateral protraction--10 reps not today  Exercise 7: bilateral first rib mobilization with towel--3 reps, 15 second  Exercise 8: bilateral anterior scalene stretch (cerv rotation and extension) 4 x 10 seconds  Exercise 9: scapular retraction with  red t-band--1x 15 red  Exercise 10: t-band I's and T's red--10 reps  Exercise 11: downward lat pulls with band red--15 reps  Exercise 12: bilateral ulnar nerve glides (\"shotput, inverted eye-glasses)  5\" x 5 ea (inverted glasses only)- not today  Exercise 13: Zheng's pose bilaterally--10 reps  Exercise 14: L- stretch, left arm overhead, right out to side  5\" x 5  Exercise 15: wall push-outs  x 10  Exercise 16: wall lifts (arms overhead facing wall) x 10  Exercise 17: ball roll up wall  5\" x 5  Exercise 18: chair push-ups  x 10- not today  Exercise 19: cervical traction-- 23 pounds x 20 min                                   Assessment:   Conditions Requiring Skilled Therapeutic Intervention  Body structures, Functions, Activity limitations: Decreased ADL status; Decreased ROM; Decreased strength;Decreased high-level IADLs;Decreased sensation; Increased pain;Decreased posture  Assessment: Patient did well with tx today with slight improvement noted. She did report she has started having headaches and that she continues to have numbness in her hands. She says that she can tell the therapy has helped some though and that her arms are stronger. She did have increased strength in ue's noted and improved cervical rom. Patient is agreeable to making today her last tx and continuing with HEP. Goals:  Short term goals  Short term goal 1: Patient to be independent with HEP- MET(02/09/21: patient does not have an handout but has been performing exercises at home.  2/4/21 HEP issued and reviewed.)  Short term goal 2: Patient to report less frequent and intense numbness/tingling in hands when first waking- NOT MET(02/09/21: patient reports symptoms are the same)  Short term goal 3: Patient to be independent with trial use of postural  (if needed and tolerated)- NOT INDICATED(02/09/21:Not issued)  Short term goal 4: Patient to have >= 60#  strength bilaterally- PROGRESS(02/09/21: Patient right  44 pounds, left  45 pounds)  Long term goals  Long term goal 1: Patient to have >= 4+/5 bilateral shoulder ER and IR- MET(02/09/21: 5/5)  Long term goal 2: Patient to have >= 45 degrees cervical flexion- MET(02/09/21: 54 degrees of flexion)  Long term goal 3: Patient to score <= 20% impairment on the Neck Pain Disability Questionnaire- PROGRESS(02/09/21: 22%)  Long term goal 4: Patient to report less difficulty with reading and working on puzzles- NOT MET(20/09/2021: Patient states she has not done any puzzels, and reports that reading is about the same)    Plan:    Plan  Plan Comment: D/C with independent HEP  Timed Code Treatment Minutes: 41 Minutes     Therapy Time   Individual Concurrent Group Co-treatment   Time In 1003         Time Out 1104         Minutes 61         Timed Code Treatment Minutes: [address] [facility], PT    Electronically signed by Ana Hernandez PT on 2/9/2021 at 10:58 AM

## 2021-02-17 RX ORDER — LEVOCETIRIZINE DIHYDROCHLORIDE 5 MG/1
TABLET, FILM COATED ORAL
Qty: 90 TABLET | Refills: 3 | Status: SHIPPED | OUTPATIENT
Start: 2021-02-17 | End: 2022-02-07

## 2021-02-25 ENCOUNTER — OFFICE VISIT (OUTPATIENT)
Dept: OBGYN CLINIC | Age: 61
End: 2021-02-25
Payer: MEDICARE

## 2021-02-25 VITALS
SYSTOLIC BLOOD PRESSURE: 143 MMHG | DIASTOLIC BLOOD PRESSURE: 79 MMHG | HEART RATE: 82 BPM | WEIGHT: 179 LBS | BODY MASS INDEX: 32.94 KG/M2 | HEIGHT: 62 IN

## 2021-02-25 DIAGNOSIS — Z01.419 ENCOUNTER FOR ROUTINE GYNECOLOGIC EXAMINATION IN MEDICARE PATIENT: Primary | ICD-10-CM

## 2021-02-25 DIAGNOSIS — Z12.31 ENCOUNTER FOR SCREENING MAMMOGRAM FOR MALIGNANT NEOPLASM OF BREAST: ICD-10-CM

## 2021-02-25 DIAGNOSIS — Z12.4 SCREENING FOR CERVICAL CANCER: ICD-10-CM

## 2021-02-25 DIAGNOSIS — Z01.419 ENCOUNTER FOR GYNECOLOGICAL EXAMINATION WITHOUT ABNORMAL FINDING: ICD-10-CM

## 2021-02-25 DIAGNOSIS — Z12.39 SCREENING BREAST EXAMINATION: ICD-10-CM

## 2021-02-25 DIAGNOSIS — Z78.0 POSTMENOPAUSAL: ICD-10-CM

## 2021-02-25 PROCEDURE — G0101 CA SCREEN;PELVIC/BREAST EXAM: HCPCS | Performed by: NURSE PRACTITIONER

## 2021-02-25 ASSESSMENT — ENCOUNTER SYMPTOMS
CONSTIPATION: 0
EYES NEGATIVE: 1
RESPIRATORY NEGATIVE: 1
DIARRHEA: 0
GASTROINTESTINAL NEGATIVE: 1
ALLERGIC/IMMUNOLOGIC NEGATIVE: 1

## 2021-02-25 NOTE — PATIENT INSTRUCTIONS
Patient Education        Breast Self-Exam: Care Instructions  Your Care Instructions     A breast self-exam is when you check your breasts for lumps or changes. This regular exam helps you learn how your breasts normally look and feel. Most breast problems or changes are not because of cancer. Breast self-exam is not a substitute for a mammogram. Having regular breast exams by your doctor and regular mammograms improve your chances of finding any problems with your breasts. Some women set a time each month to do a step-by-step breast self-exam. Other women like a less formal system. They might look at their breasts as they brush their teeth, or feel their breasts once in a while in the shower. If you notice a change in your breast, tell your doctor. Follow-up care is a key part of your treatment and safety. Be sure to make and go to all appointments, and call your doctor if you are having problems. It's also a good idea to know your test results and keep a list of the medicines you take. How do you do a breast self-exam?  · The best time to examine your breasts is usually one week after your menstrual period begins. Your breasts should not be tender then. If you do not have periods, you might do your exam on a day of the month that is easy to remember. · To examine your breasts:  ? Remove all your clothes above the waist and lie down. When you are lying down, your breast tissue spreads evenly over your chest wall, which makes it easier to feel all your breast tissue. ? Use the padsnot the fingertipsof the 3 middle fingers of your left hand to check your right breast. Move your fingers slowly in small coin-sized circles that overlap. ? Use three levels of pressure to feel of all your breast tissue. Use light pressure to feel the tissue close to the skin surface. Use medium pressure to feel a little deeper. Use firm pressure to feel your tissue close to your breastbone and ribs. Use each pressure level to feel your breast tissue before moving on to the next spot. ? Check your entire breast, moving up and down as if following a strip from the collarbone to the bra line, and from the armpit to the ribs. Repeat until you have covered the entire breast.  ? Repeat this procedure for your left breast, using the pads of the 3 middle fingers of your right hand. · To examine your breasts while in the shower:  ? Place one arm over your head and lightly soap your breast on that side. ? Using the pads of your fingers, gently move your hand over your breast (in the strip pattern described above), feeling carefully for any lumps or changes. ? Repeat for the other breast.  · Have your doctor inspect anything you notice to see if you need further testing. Where can you learn more? Go to [email or web address] org and sign in to your [employer] account. Enter P148 in the [facility] box to learn more about \"Breast Self-Exam: Care Instructions. \"     If you do not have an account, please click on the \"Sign Up Now\" link. Current as of: April 29, 2020               Content Version: 12.6  © 0366-3868 [employer]. Care instructions adapted under license by [facility]. If you have questions about a medical condition or this instruction, always ask your healthcare professional. [place] [address] any warranty or liability for your use of this information.

## 2021-02-25 NOTE — PROGRESS NOTES
Pt presents today for pap smear and breast exam.  She went to the wrong building trying to get to this appt thinks that is why her b/p is evelated.      Mammo:2020  Pap smear:2020  Contraception:Postmenopausal     P:2  Ab:0  Bone density:2017  Colonoscopy:2019

## 2021-02-25 NOTE — PROGRESS NOTES
Karrie Andre is a 62 y.o. female who presents today for her medical conditions/ complaints as noted below. Salud Funez is c/o of Gynecologic Exam        HPI   Pt presents for annual exam. Normal pap in 2020. Needs order for mammogram and DEXA. Sister with DCIS at 72. Mammo:2020  Pap smear:2020  Contraception:Postmenopausal     P:2  Ab:0  Bone density:2017  Colonoscopy:2019  Patient's last menstrual period was 2009. [id number]    Past Medical History:   Diagnosis Date    Allergic bronchitis     has wheezing related to dust and pollen    Allergic rhinitis     Asthma     Blepharitis of both eyes     Bone spur     cervical    Cervical disc disease     Chicken pox     age 26    Chronic anxiety     has been on valium after failing effexor and other medications    Coccyx disorder     history of f/x    Constipation     chronic since a teen    Degenerative disc disease at L5-S1 level     Genital warts     Dr Elier Smith GERD (gastroesophageal reflux disease)     Hemorrhoids     HPV in female     Hyperthyroidism     took radioactive iodine    Post menopausal syndrome     Reactive airway disease with wheezing     Scoliosis     Unspecified vitamin D deficiency      Past Surgical History:   Procedure Laterality Date    [facility]?     COLONOSCOPY N/A 2016    Dr Awais Pate AP (-) dysplasia x 1, BCM x 1--3 yr recall    COLONOSCOPY N/A 2019    Dr Barb Skelton, large grade 3 internal and external hemorrhoids, suboptimal prep, 3 yr recall    DILATION AND CURETTAGE OF UTERUS N/A 2020    DILATATION AND CURETTAGE HYSTEROSCOPY performed by Jayme Correa MD at [facility] N/A 2019    INTERNAL AND EXTERNAL HEMORRHOIDECTOMY performed by Gaye Stephens MD at [address] [facility] GASTROINTESTINAL ENDOSCOPY  3/3/2011     ?      Family DIRECTED 1 Bottle 5    montelukast (SINGULAIR) 10 MG tablet Take 1 tablet by mouth nightly 90 tablet 2    medroxyPROGESTERone (PROVERA) 2.5 MG tablet Take 1 tablet by mouth daily 90 tablet 3    estradiol (ESTRACE) 0.5 MG tablet Take 1 tablet by mouth daily 90 tablet 3    vitamin D (CHOLECALCIFEROL) 125 MCG (5000 UT) CAPS capsule Take 5,000 Units by mouth daily      PROAIR  (90 Base) MCG/ACT inhaler INHALE 2 PUFFS INTO THE LUNGS EVERY 6 HOURS AS NEEDED FOR WHEEZING 8.5 Inhaler 1    EPINEPHrine (EPIPEN) 0.3 MG/0.3ML SOAJ injection USE AS DIRECTED FOR ACUTE  ALLERGIC REACTION  1    epinastine (ELESTAT) 0.05 % SOLN INSTILL 1 DROP IN EACH EYE ONE TO TWICE A DAY AS NEEDED  3    naproxen (NAPROXEN) 500 MG EC tablet Take 1 tablet by mouth every 12 hours Generic is okay 60 tablet 11    Multiple Vitamins-Minerals (THERAPEUTIC MULTIVITAMIN-MINERALS) tablet Take 1 tablet by mouth daily       No current facility-administered medications for this visit. Allergies   Allergen Reactions    Pollen Extract      Early Spring and early Fall    Effexor [facility] Hydrochloride] Other (See Comments)     Slurred speech, feelings of having a TIA, seeing double, unaware of her surroundings    Poison Ivy Extract [Poison Ivy Extract] Itching     Vitals:    02/25/21 1020   BP: (!) 143/79   Pulse: 82     Body mass index is 32.74 kg/m². Review of Systems   Constitutional: Negative. HENT: Negative. Eyes: Negative. Respiratory: Negative. Cardiovascular: Negative. Gastrointestinal: Negative. Negative for constipation and diarrhea. Endocrine: Negative. Genitourinary: Negative. Negative for frequency, menstrual problem and urgency. Musculoskeletal: Negative. Skin: Negative. Allergic/Immunologic: Negative. Neurological: Negative. Hematological: Negative. Psychiatric/Behavioral: Negative. All other systems reviewed and are negative.         Physical Exam  Vitals signs and nursing note reviewed. Constitutional:       Appearance: She is well-developed. HENT:      Head: Normocephalic. Neck:      Musculoskeletal: Normal range of motion and neck supple. Thyroid: No thyroid mass or thyromegaly. Cardiovascular:      Rate and Rhythm: Normal rate and regular rhythm. Pulmonary:      Effort: Pulmonary effort is normal.      Breath sounds: Normal breath sounds. Chest:      Breasts:         Right: No inverted nipple, mass, nipple discharge or skin change. Left: No inverted nipple, mass, nipple discharge or skin change. Abdominal:      Palpations: Abdomen is soft. There is no mass. Tenderness: There is no abdominal tenderness. Genitourinary:     General: Normal vulva. Vagina: Normal.      Cervix: No cervical motion tenderness. Uterus: Normal. Not enlarged. Adnexa:         Right: No mass or tenderness. Left: No mass or tenderness. Comments: Pap deferred  Musculoskeletal: Normal range of motion. Skin:     General: Skin is warm and dry. Neurological:      Mental Status: She is alert and oriented to person, place, and time. Psychiatric:         Attention and Perception: Attention normal.         Mood and Affect: Mood normal.         Speech: Speech normal.         Behavior: Behavior normal.         Thought Content: Thought content normal.         Cognition and Memory: Cognition normal.         Judgment: Judgment normal.          Diagnosis Orders   1. Encounter for routine gynecologic examination in Medicare patient  RI CA SCREEN;PELVIC/BREAST EXAM   2. Encounter for gynecological examination without abnormal finding     3. Encounter for screening mammogram for malignant neoplasm of breast  JAME DIGITAL SCREEN W OR WO CAD BILATERAL   4.  Postmenopausal  DEXA BONE DENSITY AXIAL SKELETON   5. Screening breast examination  RI CA SCREEN;PELVIC/BREAST EXAM   6. Screening for cervical cancer  RI CA SCREEN;PELVIC/BREAST EXAM       MEDICATIONS:  No orders of the defined types were placed in this encounter. ORDERS:  Orders Placed This Encounter   Procedures    DEXA BONE DENSITY AXIAL SKELETON    JAME DIGITAL SCREEN W OR WO CAD BILATERAL    DE CA SCREEN;PELVIC/BREAST EXAM       PLAN:  Pap deferred, will plan in 2022  Ordered screening mammogram and DEXA    Patient Instructions   Patient Education        Breast Self-Exam: Care Instructions  Your Care Instructions     A breast self-exam is when you check your breasts for lumps or changes. This regular exam helps you learn how your breasts normally look and feel. Most breast problems or changes are not because of cancer. Breast self-exam is not a substitute for a mammogram. Having regular breast exams by your doctor and regular mammograms improve your chances of finding any problems with your breasts. Some women set a time each month to do a step-by-step breast self-exam. Other women like a less formal system. They might look at their breasts as they brush their teeth, or feel their breasts once in a while in the shower. If you notice a change in your breast, tell your doctor. Follow-up care is a key part of your treatment and safety. Be sure to make and go to all appointments, and call your doctor if you are having problems. It's also a good idea to know your test results and keep a list of the medicines you take. How do you do a breast self-exam?  · The best time to examine your breasts is usually one week after your menstrual period begins. Your breasts should not be tender then. If you do not have periods, you might do your exam on a day of the month that is easy to remember. · To examine your breasts:  ? Remove all your clothes above the waist and lie down. When you are lying down, your breast tissue spreads evenly over your chest wall, which makes it easier to feel all your breast tissue. ?  Use the pads--not the fingertips--of the 3 middle fingers of your left hand to check your right breast. Move your fingers slowly in small coin-sized circles that overlap. ? Use three levels of pressure to feel of all your breast tissue. Use light pressure to feel the tissue close to the skin surface. Use medium pressure to feel a little deeper. Use firm pressure to feel your tissue close to your breastbone and ribs. Use each pressure level to feel your breast tissue before moving on to the next spot. ? Check your entire breast, moving up and down as if following a strip from the collarbone to the bra line, and from the armpit to the ribs. Repeat until you have covered the entire breast.  ? Repeat this procedure for your left breast, using the pads of the 3 middle fingers of your right hand. · To examine your breasts while in the shower:  ? Place one arm over your head and lightly soap your breast on that side. ? Using the pads of your fingers, gently move your hand over your breast (in the strip pattern described above), feeling carefully for any lumps or changes. ? Repeat for the other breast.  · Have your doctor inspect anything you notice to see if you need further testing. Where can you learn more? Go to [email or web address] and sign in to your [employer] account. Enter P148 in the [facility] box to learn more about \"Breast Self-Exam: Care Instructions. \"     If you do not have an account, please click on the \"Sign Up Now\" link. Current as of: April 29, 2020               Content Version: 12.6  © 1547-8921 [employer]. Care instructions adapted under license by [facility] ([facility]). If you have questions about a medical condition or this instruction, always ask your healthcare professional. [place] [address] any warranty or liability for your use of this information.

## 2021-03-08 ENCOUNTER — TELEPHONE (OUTPATIENT)
Dept: NEUROSURGERY | Facility: CLINIC | Age: 61
End: 2021-03-08

## 2021-03-08 NOTE — TELEPHONE ENCOUNTER
Caller: HALEIGH    [facility]hip[facility]    Best call back number:[phone]    What form or medical record are you requesting: PROGRESS NOTE FROM 12/15/20    Who is requesting this form or medical record from you: [facility]ALEIGH FROM [facility]    How would you like to receive the form or medical records (pick-up, mail, fax): FAX  If fax, what is the fax number:[phone]  If mail, what is the address: NA  If pick-up, provide patient with address and location details    Timeframe paperwork needed:ASAP    Additional notes:NEVER RECEIVED NOTES PLEASE FAX  THANK YOU!

## 2021-03-17 ENCOUNTER — TRANSCRIBE ORDERS (OUTPATIENT)
Dept: LAB | Facility: HOSPITAL | Age: 61
End: 2021-03-17

## 2021-03-17 DIAGNOSIS — Z01.818 PREOP TESTING: Primary | ICD-10-CM

## 2021-03-19 ENCOUNTER — APPOINTMENT (OUTPATIENT)
Dept: LAB | Facility: HOSPITAL | Age: 61
End: 2021-03-19

## 2021-03-20 ENCOUNTER — LAB (OUTPATIENT)
Dept: LAB | Facility: HOSPITAL | Age: 61
End: 2021-03-20

## 2021-03-20 LAB — SARS-COV-2 ORF1AB RESP QL NAA+PROBE: NOT DETECTED

## 2021-03-20 PROCEDURE — C9803 HOPD COVID-19 SPEC COLLECT: HCPCS | Performed by: PAIN MEDICINE

## 2021-03-20 PROCEDURE — U0004 COV-19 TEST NON-CDC HGH THRU: HCPCS | Performed by: PAIN MEDICINE

## 2021-03-21 RX ORDER — PANTOPRAZOLE SODIUM 40 MG/1
TABLET, DELAYED RELEASE ORAL
Qty: 90 TABLET | Refills: 1 | Status: SHIPPED | OUTPATIENT
Start: 2021-03-21 | End: 2021-09-16

## 2021-03-25 ENCOUNTER — HOSPITAL ENCOUNTER (OUTPATIENT)
Dept: WOMENS IMAGING | Age: 61
Discharge: HOME | End: 2021-03-25
Payer: MEDICARE

## 2021-03-25 DIAGNOSIS — Z12.31 ENCOUNTER FOR SCREENING MAMMOGRAM FOR MALIGNANT NEOPLASM OF BREAST: ICD-10-CM

## 2021-03-25 DIAGNOSIS — Z78.0 POSTMENOPAUSAL: ICD-10-CM

## 2021-03-25 PROCEDURE — 77067 SCR MAMMO BI INCL CAD: CPT

## 2021-03-25 PROCEDURE — 77080 DXA BONE DENSITY AXIAL: CPT

## 2021-04-01 ENCOUNTER — OFFICE VISIT (OUTPATIENT)
Dept: NEUROSURGERY | Facility: CLINIC | Age: 61
End: 2021-04-01

## 2021-04-01 VITALS — BODY MASS INDEX: 33.86 KG/M2 | HEIGHT: 62 IN | WEIGHT: 184 LBS

## 2021-04-01 DIAGNOSIS — M50.30 DEGENERATION OF CERVICAL INTERVERTEBRAL DISC: Primary | ICD-10-CM

## 2021-04-01 DIAGNOSIS — Z87.891 FORMER SMOKER: ICD-10-CM

## 2021-04-01 DIAGNOSIS — R20.2 NUMBNESS AND TINGLING IN BOTH HANDS: ICD-10-CM

## 2021-04-01 DIAGNOSIS — M54.12 CERVICAL RADICULOPATHY: ICD-10-CM

## 2021-04-01 DIAGNOSIS — M48.02 SPINAL STENOSIS IN CERVICAL REGION: ICD-10-CM

## 2021-04-01 DIAGNOSIS — R20.0 NUMBNESS AND TINGLING IN BOTH HANDS: ICD-10-CM

## 2021-04-01 PROCEDURE — 99213 OFFICE O/P EST LOW 20 MIN: CPT | Performed by: NEUROLOGICAL SURGERY

## 2021-04-01 RX ORDER — HYDROCODONE BITARTRATE AND ACETAMINOPHEN 7.5; 325 MG/1; MG/1
1 TABLET ORAL 3 TIMES DAILY
COMMUNITY
Start: 2021-03-16 | End: 2021-07-30 | Stop reason: HOSPADM

## 2021-04-01 NOTE — H&P
SUBJECTIVE:  Patient ID: Jodee Kovacs is a 60 y.o. female is being seen for consultation today at the request of No ref. provider found    Chief Complaint: Neck pain  Chief Complaint   Patient presents with   • NECK & ARM PAIN     patient complains of RUE pain; patient had imaging @ [facility] (shared) and xrays @ [facility]; patient had EMG/NCV on 1/5/21 @ [facility] that shows mild BCTS; patient did 9 sessions of therapy @ [facility] outpatient & patient cannot say whether or not this helped (she says she now has pain but she has been doing yard work lately); patient does see Dr Carl (patient since 2008) and she states the PMI he does for her gives her relief.       HPI  60-year-old female with a history of chronic pain issues.  We were following her for neck pain and bilateral shoulder and trapezius pain.  She denies any radicular pain down her arms.  She also complains of numbness and tingling in her hands.  She did a dedicated course of physical therapy recently for 9 sessions without any improvement.  She has been working with Dr. Chicas over the years with injection treatments both in her neck and her low back.  The injection treatments in her neck have not been helpful recently.  She takes oral pain medicine.  We also sent her for an EMG nerve conduction study which showed mild carpal tunnel syndrome bilaterally.    The following portions of the patient's history were reviewed and updated as appropriate: allergies, current medications, past family history, past medical history, past social history, past surgical history and problem list.    OBJECTIVE:    Review of Systems   Musculoskeletal: Positive for arthralgias and neck pain.   All other systems reviewed and are negative.         Physical Exam  Eyes:      Extraocular Movements: EOM normal.      Pupils: Pupils are equal, round, and reactive to light.   Neurological:      Mental Status: She is oriented to person, place, and time.      Coordination:  Finger-Nose-Finger Test normal.      Gait: Gait is intact.      Deep Tendon Reflexes: Strength normal.   Psychiatric:         Speech: Speech normal.         Neurologic Exam     Mental Status   Oriented to person, place, and time.   Attention: normal.   Speech: speech is normal   Level of consciousness: alert  Knowledge: good.     Cranial Nerves     CN II   Visual fields full to confrontation.     CN III, IV, VI   Pupils are equal, round, and reactive to light.  Extraocular motions are normal.     CN V   Facial sensation intact.     CN VII   Facial expression full, symmetric.     CN VIII   CN VIII normal.     CN IX, X   CN IX normal.   CN X normal.     CN XI   CN XI normal.     CN XII   CN XII normal.     Motor Exam   Muscle bulk: normal  Overall muscle tone: normal  Right arm pronator drift: absent  Left arm pronator drift: absent    Strength   Strength 5/5 throughout.     Sensory Exam   Light touch normal.   Pinprick normal.     Gait, Coordination, and Reflexes     Gait  Gait: normal    Coordination   Finger to nose coordination: normal    Tremor   Resting tremor: absent  Intention tremor: absent  Action tremor: absent    Reflexes   Reflexes 2+ except as noted.       Independent Review of Radiographic Studies:       ASSESSMENT/PLAN: She the patient has 3 severely degenerated disks in her neck atsC 4 5, 5 6, 6 7.  She has gone through an extensive dedicated course of conservative care including oral pain medicine injection treatments and physical therapy.  At this point she would benefit from a 3 level anterior cervical fusion.  The risks and benefits were discussed at length which included but were not limited to infection, bleeding, paralysis, spinal fluid leak, bowel bladder incontinence, speech and swallow difficulty, stroke, coma, and death.  She acknowledged understanding of this.  Her questions concerns were addressed.      1. Degeneration of cervical intervertebral disc    2. Spinal stenosis in cervical  region    3. Cervical radiculopathy    4. Numbness and tingling in both hands    5. Former smoker    6. BMI 33.0-33.9,adult        The patient's Body mass index is 33.65 kg/m².. BMI is above normal parameters. Recommendations include: educational material    No follow-ups on file.      Earnest Gomez MD

## 2021-04-01 NOTE — PATIENT INSTRUCTIONS
"PATIENT TO CONTINUE TO FOLLOW UP WITH HER PRIMARY CARE PROVIDER FOR YEARLY PHYSICAL EXAMS TO ENSURE COMPLETE HEALTH MAINTENANCE        BMI for Adults  What is BMI?  Body mass index (BMI) is a number that is calculated from a person's weight and height. BMI can help estimate how much of a person's weight is composed of fat. BMI does not measure body fat directly. Rather, it is an alternative to procedures that directly measure body fat, which can be difficult and expensive.  BMI can help identify people who may be at higher risk for certain medical problems.  What are BMI measurements used for?  BMI is used as a screening tool to identify possible weight problems. It helps determine whether a person is obese, overweight, a healthy weight, or underweight.  BMI is useful for:  · Identifying a weight problem that may be related to a medical condition or may increase the risk for medical problems.  · Promoting changes, such as changes in diet and exercise, to help reach a healthy weight. BMI screening can be repeated to see if these changes are working.  How is BMI calculated?  BMI involves measuring your weight in relation to your height. Both height and weight are measured, and the BMI is calculated from those numbers. This can be done either in English ([place] or metric measurements. Note that charts and online BMI calculators are available to help you find your BMI quickly and easily without having to do these calculations yourself.  To calculate your BMI in English ([place] measurements:    1. Measure your weight in pounds (lb).  2. Multiply the number of pounds by 703.  ? For example, for a person who weighs 180 lb, multiply that number by 703, which equals 126,540.  3. Measure your height in inches. Then multiply that number by itself to get a measurement called \"inches squared.\"  ? For example, for a person who is 70 inches tall, the \"inches squared\" measurement is 70 inches x 70 inches, which equals 4,900 inches " "squared.  4. Divide the total from step 2 (number of lb x 703) by the total from step 3 (inches squared): 126,540 ÷ 4,900 = 25.8. This is your BMI.  To calculate your BMI in metric measurements:  1. Measure your weight in kilograms (kg).  2. Measure your height in meters (m). Then multiply that number by itself to get a measurement called \"meters squared.\"  ? For example, for a person who is 1.75 m tall, the \"meters squared\" measurement is 1.75 m x 1.75 m, which is equal to 3.1 meters squared.  3. Divide the number of kilograms (your weight) by the meters squared number. In this example: 70 ÷ 3.1 = 22.6. This is your BMI.  What do the results mean?  BMI charts are used to identify whether you are underweight, normal weight, overweight, or obese. The following guidelines will be used:  · Underweight: BMI less than 18.5.  · Normal weight: BMI between 18.5 and 24.9.  · Overweight: BMI between 25 and 29.9.  · Obese: BMI of 30 or above.  Keep these notes in mind:  · Weight includes both fat and muscle, so someone with a muscular build, such as an athlete, may have a BMI that is higher than 24.9. In cases like these, BMI is not an accurate measure of body fat.  · To determine if excess body fat is the cause of a BMI of 25 or higher, further assessments may need to be done by a health care provider.  · BMI is usually interpreted in the same way for men and women.  Where to find more information  For more information about BMI, including tools to quickly calculate your BMI, go to these websites:  · Centers for Disease Control and Prevention: www.cdc.gov  · American Heart Association: www.heart.org  · National Heart, Lung, and Blood [place]: www.nhlbi.nih.gov  Summary  · Body mass index (BMI) is a number that is calculated from a person's weight and height.  · BMI may help estimate how much of a person's weight is composed of fat. BMI can help identify those who may be at higher risk for certain medical problems.  · BMI " "can be measured using English measurements or metric measurements.  · BMI charts are used to identify whether you are underweight, normal weight, overweight, or obese.  This information is not intended to replace advice given to you by your health care provider. Make sure you discuss any questions you have with your health care provider.  Document Revised: 09/09/2020 Document Reviewed: 07/17/2020  [employer] Patient Education © 2021 [employer]      https://www.nhlbi.nih.gov/files/docs/public/heart/dash_brief.pdf\">   DASH Eating Plan  DASH stands for Dietary Approaches to Stop Hypertension. The DASH eating plan is a healthy eating plan that has been shown to:  · Reduce high blood pressure (hypertension).  · Reduce your risk for type 2 diabetes, heart disease, and stroke.  · Help with weight loss.  What are tips for following this plan?  Reading food labels  · Check food labels for the amount of salt (sodium) per serving. Choose foods with less than 5 percent of the Daily Value of sodium. Generally, foods with less than 300 milligrams (mg) of sodium per serving fit into this eating plan.  · To find whole grains, look for the word \"whole\" as the first word in the ingredient list.  Shopping  · Buy products labeled as \"low-sodium\" or \"no salt added.\"  · Buy fresh foods. Avoid canned foods and pre-made or frozen meals.  Cooking  · Avoid adding salt when cooking. Use salt-free seasonings or herbs instead of table salt or sea salt. Check with your health care provider or pharmacist before using salt substitutes.  · Do not fall foods. Cook foods using healthy methods such as baking, boiling, grilling, roasting, and broiling instead.  · Cook with heart-healthy oils, such as olive, canola, avocado, soybean, or sunflower oil.  Meal planning    · Eat a balanced diet that includes:  ? 4 or more servings of fruits and 4 or more servings of vegetables each day. Try to fill one-half of your plate with fruits and vegetables.  ? 6-8 " servings of whole grains each day.  ? Less than 6 oz (170 g) of lean meat, poultry, or fish each day. A 3-oz (85-g) serving of meat is about the same size as a deck of cards. One egg equals 1 oz (28 g).  ? 2-3 servings of low-fat dairy each day. One serving is 1 cup (237 mL).  ? 1 serving of nuts, seeds, or beans 5 times each week.  ? 2-3 servings of heart-healthy fats. Healthy fats called omega-3 fatty acids are found in foods such as walnuts, flaxseeds, fortified milks, and eggs. These fats are also found in cold-water fish, such as sardines, salmon, and mackerel.  · Limit how much you eat of:  ? Canned or prepackaged foods.  ? Food that is high in trans fat, such as some fried foods.  ? Food that is high in saturated fat, such as fatty meat.  ? Desserts and other sweets, sugary drinks, and other foods with added sugar.  ? Full-fat dairy products.  · Do not salt foods before eating.  · Do not eat more than 4 egg yolks a week.  · Try to eat at least 2 vegetarian meals a week.  · Eat more home-cooked food and less restaurant, buffet, and fast food.  Lifestyle  · When eating at a restaurant, ask that your food be prepared with less salt or no salt, if possible.  · If you drink alcohol:  ? Limit how much you use to:  § 0-1 drink a day for women who are not pregnant.  § 0-2 drinks a day for men.  ? Be aware of how much alcohol is in your drink. In the [place] one drink equals one 12 oz bottle of beer (355 mL), one 5 oz glass of wine (148 mL), or one 1½ oz glass of hard liquor (44 mL).  General information  · Avoid eating more than 2,300 mg of salt a day. If you have hypertension, you may need to reduce your sodium intake to 1,500 mg a day.  · Work with your health care provider to maintain a healthy body weight or to lose weight. Ask what an ideal weight is for you.  · Get at least 30 minutes of exercise that causes your heart to beat faster (aerobic exercise) most days of the week. Activities may include walking,  swimming, or biking.  · Work with your health care provider or dietitian to adjust your eating plan to your individual calorie needs.  What foods should I eat?  Fruits  All fresh, dried, or frozen fruit. Canned fruit in natural juice (without added sugar).  Vegetables  Fresh or frozen vegetables (raw, steamed, roasted, or grilled). Low-sodium or reduced-sodium tomato and vegetable juice. Low-sodium or reduced-sodium tomato sauce and tomato paste. Low-sodium or reduced-sodium canned vegetables.  Grains  Whole-grain or whole-wheat bread. Whole-grain or whole-wheat pasta. Brown rice. Oatmeal. Quinoa. Bulgur. Whole-grain and low-sodium cereals. Luz bread. Low-fat, low-sodium crackers. Whole-wheat flour tortillas.  Meats and other proteins  Skinless chicken or turkey. Ground chicken or turkey. Pork with fat trimmed off. Fish and seafood. Egg whites. Dried beans, peas, or lentils. Unsalted nuts, nut butters, and seeds. Unsalted canned beans. Lean cuts of beef with fat trimmed off. Low-sodium, lean precooked or cured meat, such as sausages or meat loaves.  Dairy  Low-fat (1%) or fat-free (skim) milk. Reduced-fat, low-fat, or fat-free cheeses. Nonfat, low-sodium ricotta or cottage cheese. Low-fat or nonfat yogurt. Low-fat, low-sodium cheese.  Fats and oils  Soft margarine without trans fats. Vegetable oil. Reduced-fat, low-fat, or light mayonnaise and salad dressings (reduced-sodium). Canola, safflower, olive, avocado, soybean, and sunflower oils. Avocado.  Seasonings and condiments  Herbs. Spices. Seasoning mixes without salt.  Other foods  Unsalted popcorn and pretzels. Fat-free sweets.  The items listed above may not be a complete list of foods and beverages you can eat. Contact a dietitian for more information.  What foods should I avoid?  Fruits  Canned fruit in a light or heavy syrup. Fried fruit. Fruit in cream or butter sauce.  Vegetables  Creamed or fried vegetables. Vegetables in a cheese sauce. Regular canned  vegetables (not low-sodium or reduced-sodium). Regular canned tomato sauce and paste (not low-sodium or reduced-sodium). Regular tomato and vegetable juice (not low-sodium or reduced-sodium). Pickles. Olives.  Grains  Baked goods made with fat, such as croissants, muffins, or some breads. Dry pasta or rice meal packs.  Meats and other proteins  Fatty cuts of meat. Ribs. Fried meat. [place]. Bologna, salami, and other precooked or cured meats, such as sausages or meat loaves. Fat from the back of a pig (fatback). Bratwurst. Salted nuts and seeds. Canned beans with added salt. Canned or smoked fish. Whole eggs or egg yolks. Chicken or turkey with skin.  Dairy  Whole or 2% milk, cream, and half-and-half. Whole or full-fat cream cheese. Whole-fat or sweetened yogurt. Full-fat cheese. Nondairy creamers. Whipped toppings. Processed cheese and cheese spreads.  Fats and oils  Butter. Stick margarine. Lard. Shortening. Ghee. [place]ara fat. Tropical oils, such as coconut, palm kernel, or palm oil.  Seasonings and condiments  Onion salt, garlic salt, seasoned salt, table salt, and sea salt. Worcestershire sauce. Tartar sauce. Barbecue sauce. Teriyaki sauce. Soy sauce, including reduced-sodium. Steak sauce. Canned and packaged gravies. Fish sauce. Oyster sauce. Cocktail sauce. Store-bought horseradish. Ketchup. Mustard. Meat flavorings and tenderizers. Bouillon cubes. Hot sauces. Pre-made or packaged marinades. Pre-made or packaged taco seasonings. Relishes. Regular salad dressings.  Other foods  Salted popcorn and pretzels.  The items listed above may not be a complete list of foods and beverages you should avoid. Contact a dietitian for more information.  Where to find more information  · National Heart, Lung, and Blood [place]: www.nhlbi.nih.gov  · American Heart Association: www.heart.org  · Academy of Nutrition and Dietetics: www.eatright.org  · National Kidney Foundation: www.kidney.org  Summary  · The DASH eating plan is a  healthy eating plan that has been shown to reduce high blood pressure (hypertension). It may also reduce your risk for type 2 diabetes, heart disease, and stroke.  · When on the DASH eating plan, aim to eat more fresh fruits and vegetables, whole grains, lean proteins, low-fat dairy, and heart-healthy fats.  · With the DASH eating plan, you should limit salt (sodium) intake to 2,300 mg a day. If you have hypertension, you may need to reduce your sodium intake to 1,500 mg a day.  · Work with your health care provider or dietitian to adjust your eating plan to your individual calorie needs.  This information is not intended to replace advice given to you by your health care provider. Make sure you discuss any questions you have with your health care provider.  Document Revised: 11/20/2020 Document Reviewed: 11/20/2020  Elsevier Patient Education © 2021 [employer]

## 2021-04-14 ENCOUNTER — TRANSCRIBE ORDERS (OUTPATIENT)
Dept: ADMINISTRATIVE | Facility: HOSPITAL | Age: 61
End: 2021-04-14

## 2021-04-14 DIAGNOSIS — Z01.818 PREOP TESTING: Primary | ICD-10-CM

## 2021-04-16 ENCOUNTER — LAB (OUTPATIENT)
Dept: LAB | Facility: HOSPITAL | Age: 61
End: 2021-04-16

## 2021-04-16 LAB — SARS-COV-2 ORF1AB RESP QL NAA+PROBE: NOT DETECTED

## 2021-04-16 PROCEDURE — U0004 COV-19 TEST NON-CDC HGH THRU: HCPCS | Performed by: PAIN MEDICINE

## 2021-04-16 PROCEDURE — C9803 HOPD COVID-19 SPEC COLLECT: HCPCS | Performed by: PAIN MEDICINE

## 2021-04-19 RX ORDER — MEDROXYPROGESTERONE ACETATE 2.5 MG/1
TABLET ORAL
Qty: 90 TABLET | Refills: 3 | Status: SHIPPED | OUTPATIENT
Start: 2021-04-19 | End: 2022-03-03 | Stop reason: SDUPTHER

## 2021-05-03 RX ORDER — ESTRADIOL 0.5 MG/1
TABLET ORAL
Qty: 90 TABLET | Refills: 3 | Status: SHIPPED | OUTPATIENT
Start: 2021-05-03 | End: 2021-11-10 | Stop reason: SDUPTHER

## 2021-06-03 ENCOUNTER — OFFICE VISIT (OUTPATIENT)
Dept: PRIMARY CARE CLINIC | Age: 61
End: 2021-06-03
Payer: MEDICARE

## 2021-06-03 ENCOUNTER — HOSPITAL ENCOUNTER (OUTPATIENT)
Dept: GENERAL RADIOLOGY | Age: 61
Discharge: HOME | End: 2021-06-03
Payer: MEDICARE

## 2021-06-03 VITALS
OXYGEN SATURATION: 97 % | SYSTOLIC BLOOD PRESSURE: 124 MMHG | WEIGHT: 183 LBS | TEMPERATURE: 97.5 F | HEIGHT: 62 IN | DIASTOLIC BLOOD PRESSURE: 82 MMHG | BODY MASS INDEX: 33.68 KG/M2 | RESPIRATION RATE: 18 BRPM | HEART RATE: 74 BPM

## 2021-06-03 DIAGNOSIS — M79.675 PAIN IN LEFT TOE(S): ICD-10-CM

## 2021-06-03 DIAGNOSIS — L30.9 DERMATITIS: ICD-10-CM

## 2021-06-03 DIAGNOSIS — E03.9 HYPOTHYROIDISM, UNSPECIFIED TYPE: ICD-10-CM

## 2021-06-03 DIAGNOSIS — M79.675 PAIN IN LEFT TOE(S): Primary | ICD-10-CM

## 2021-06-03 PROCEDURE — 73630 X-RAY EXAM OF FOOT: CPT

## 2021-06-03 PROCEDURE — 99214 OFFICE O/P EST MOD 30 MIN: CPT | Performed by: NURSE PRACTITIONER

## 2021-06-03 SDOH — ECONOMIC STABILITY: FOOD INSECURITY: WITHIN THE PAST 12 MONTHS, THE FOOD YOU BOUGHT JUST DIDN'T LAST AND YOU DIDN'T HAVE MONEY TO GET MORE.: NEVER TRUE

## 2021-06-03 SDOH — ECONOMIC STABILITY: FOOD INSECURITY: WITHIN THE PAST 12 MONTHS, YOU WORRIED THAT YOUR FOOD WOULD RUN OUT BEFORE YOU GOT MONEY TO BUY MORE.: NEVER TRUE

## 2021-06-03 ASSESSMENT — ENCOUNTER SYMPTOMS
EYES NEGATIVE: 1
GASTROINTESTINAL NEGATIVE: 1
RESPIRATORY NEGATIVE: 1

## 2021-06-03 ASSESSMENT — SOCIAL DETERMINANTS OF HEALTH (SDOH): HOW HARD IS IT FOR YOU TO PAY FOR THE VERY BASICS LIKE FOOD, HOUSING, MEDICAL CARE, AND HEATING?: NOT HARD AT ALL

## 2021-06-03 ASSESSMENT — PATIENT HEALTH QUESTIONNAIRE - PHQ9
SUM OF ALL RESPONSES TO PHQ QUESTIONS 1-9: 0
SUM OF ALL RESPONSES TO PHQ QUESTIONS 1-9: 0
SUM OF ALL RESPONSES TO PHQ9 QUESTIONS 1 & 2: 0
2. FEELING DOWN, DEPRESSED OR HOPELESS: 0
SUM OF ALL RESPONSES TO PHQ QUESTIONS 1-9: 0
1. LITTLE INTEREST OR PLEASURE IN DOING THINGS: 0

## 2021-06-03 NOTE — PROGRESS NOTES
[address] [facility]  [address] [address] [place] [address]  Dept: [phone]  Dept Fax: [phone]  Loc: [phone]    Kun Mauro is a 62 y.o. female who presents today for her medical conditions/complaints as noted below. Kun Mauro is c/o of Hypothyroidism and Foot Pain      Chief Complaint   Patient presents with    Hypothyroidism    Foot Pain       HPI:     HPI   Patient returns for 6 month follow up on hypothyroidism. Patient reports a spot on her 2nd toe on the L foot that has been staying sore x 2 months or so. Patient would like to have that looked at today. She thought it would get better with better shoes but it has not. She is also having a rash on her abd that has been worsening over the last few months. She has not tried any otc meds. Past Medical History:   Diagnosis Date    Allergic bronchitis     has wheezing related to dust and pollen    Allergic rhinitis     Asthma     Blepharitis of both eyes     Bone spur     cervical    Cervical disc disease     Chicken pox     age 26    Chronic anxiety 2006    has been on valium after failing effexor and other medications    Coccyx disorder     history of f/x    Constipation     chronic since a teen    Degenerative disc disease at L5-S1 level     Genital warts     Dr Irwin Apodaca GERD (gastroesophageal reflux disease)     Hemorrhoids     HPV in female     Hyperthyroidism     took radioactive iodine    Post menopausal syndrome 2010    Reactive airway disease with wheezing     Scoliosis     Unspecified vitamin D deficiency         Past Surgical History:   Procedure Laterality Date    COLONOSCOPY  9/16/2011    [facility]?     COLONOSCOPY N/A 11/11/2016    Dr Charlee Elena AP (-) dysplasia x 1, BCM x 1--3 yr recall    COLONOSCOPY N/A 11/11/2019    Dr Rafael Avendaño, large grade 3 internal and external hemorrhoids, suboptimal prep, 3 yr recall    DILATION TWICE A DAY AS DIRECTED 1 Bottle 5    vitamin D (CHOLECALCIFEROL) 125 MCG (5000 UT) CAPS capsule Take 5,000 Units by mouth daily      PROAIR  (90 Base) MCG/ACT inhaler INHALE 2 PUFFS INTO THE LUNGS EVERY 6 HOURS AS NEEDED FOR WHEEZING 8.5 Inhaler 1    EPINEPHrine (EPIPEN) 0.3 MG/0.3ML SOAJ injection USE AS DIRECTED FOR ACUTE  ALLERGIC REACTION  1    epinastine (ELESTAT) 0.05 % SOLN INSTILL 1 DROP IN EACH EYE ONE TO TWICE A DAY AS NEEDED  3    naproxen (NAPROXEN) 500 MG EC tablet Take 1 tablet by mouth every 12 hours Generic is okay 60 tablet 11    Multiple Vitamins-Minerals (THERAPEUTIC MULTIVITAMIN-MINERALS) tablet Take 1 tablet by mouth daily       No current facility-administered medications for this visit. Allergies   Allergen Reactions    Pollen Extract      Early Spring and early Fall    Effexor [facility] Hydrochloride] Other (See Comments)     Slurred speech, feelings of having a TIA, seeing double, unaware of her surroundings    Poison Ivy Extract [Poison Ivy Extract] Itching       Family History   Problem Relation Age of Onset    Diabetes Sister     Thyroid Disease Sister     Rheum Arthritis Sister     Other Sister         fibromyalgia    Breast Cancer Sister 59    Rheum Arthritis Maternal Aunt     Colon Polyps Maternal Aunt     Heart Failure Father     Heart Disease Father          of acute MI at 58    Hypertension Mother     Cancer Mother         lung cancer    Colon Polyps Mother     High Blood Pressure Mother     Other Son         sleep apnea,anxiety and neck problems after  injuries    Breast Cancer Maternal Cousin 45    Colon Cancer Neg Hx     Esophageal Cancer Neg Hx     Stomach Cancer Neg Hx     Liver Cancer Neg Hx     Liver Disease Neg Hx     Rectal Cancer Neg Hx                Subjective:      Review of Systems   Constitutional: Negative. HENT: Negative. Eyes: Negative. Respiratory: Negative. Cardiovascular: Negative. Gastrointestinal: Negative. Endocrine: Negative. Genitourinary: Negative. Musculoskeletal: Positive for arthralgias (left foot pain). Skin: Positive for rash (abd). Neurological: Negative. Hematological: Negative. Psychiatric/Behavioral: Negative. Objective:     Physical Exam  Vitals and nursing note reviewed. Constitutional:       Appearance: Normal appearance. She is well-developed. Comments: obese   HENT:      Head: Normocephalic and atraumatic. Right Ear: Hearing, tympanic membrane, ear canal and external ear normal.      Left Ear: Hearing, tympanic membrane, ear canal and external ear normal.      Nose: Nose normal.      Mouth/Throat:      Pharynx: Uvula midline. Eyes:      General: Lids are normal.      Conjunctiva/sclera: Conjunctivae normal.      Pupils: Pupils are equal, round, and reactive to light. Neck:      Thyroid: No thyroid mass or thyromegaly. Trachea: Trachea normal.   Cardiovascular:      Rate and Rhythm: Normal rate and regular rhythm. Heart sounds: Normal heart sounds. Pulmonary:      Effort: Pulmonary effort is normal.      Breath sounds: Normal breath sounds. Abdominal:      General: Bowel sounds are normal.      Palpations: Abdomen is soft. Musculoskeletal:         General: Normal range of motion. Cervical back: Normal range of motion and neck supple. No tenderness. Thoracic back: Normal. No tenderness. Normal range of motion. Lumbar back: Normal. No tenderness. Normal range of motion. Feet:    Skin:     General: Skin is warm and dry. Neurological:      Mental Status: She is alert and oriented to person, place, and time. Psychiatric:         Speech: Speech normal.         Behavior: Behavior normal.         Thought Content:  Thought content normal.         Judgment: Judgment normal.         /82   Pulse 74   Temp 97.5 °F (36.4 °C) (Temporal)   Resp 18   Ht 5' 2\" (1.575 m)   Wt 183 lb (83 kg) LMP 07/11/2009   SpO2 97%   BMI 33.47 kg/m²     Assessment:      Diagnosis Orders   1. Pain in left toe(s)  XR FOOT LEFT (MIN 3 VIEWS)   2. Hypothyroidism, unspecified type     3. Dermatitis         No results found for this visit on 06/03/21. Plan: Will continue current regimen for hypothyroidism. Xray of foot due to continued pain. Kenalog cream for dermatitis. Patient is also to use Eucerin otc as discussed. More than 50% of the time was spent counseling and coordinating care for a total time of 32 mins face to face. Return for Keep follow up as scheduled. Orders Placed This Encounter   Procedures    XR FOOT LEFT (MIN 3 VIEWS)     Standing Status:   Future     Number of Occurrences:   1     Standing Expiration Date:   6/3/2022       Orders Placed This Encounter   Medications    triamcinolone (KENALOG) 0.1 % ointment     Sig: Apply topically 2 times daily for 7 days     Dispense:  80 g     Refill:  0            Patient offered educational handouts and has had all questions answered. Patient voices understanding and agrees to plans along with risks and benefits of plan. Patient is instructed to continue prior meds, diet, and exercise plans as instructed. Patient agrees to follow up as instructed and sooner if needed. Patient agrees to go to ER if condition becomes emergent. EMR Dragon/transcription disclaimer: Some of this encounter note is an electronic transcription/translation of spoken language to printed text. The electronic translation of spoken language may permit erroneous, or at times, nonsensical words or phrases to be inadvertently transcribed.  Although I have reviewed the note for such errors, some may still exist.    Electronically signed by ANGEL Waterman on 6/3/2021 at 12:53 PM Statement Selected

## 2021-06-28 ENCOUNTER — TRANSCRIBE ORDERS (OUTPATIENT)
Dept: LAB | Facility: HOSPITAL | Age: 61
End: 2021-06-28

## 2021-06-28 DIAGNOSIS — Z01.818 PREOPERATIVE TESTING: Primary | ICD-10-CM

## 2021-06-29 ENCOUNTER — LAB (OUTPATIENT)
Dept: LAB | Facility: HOSPITAL | Age: 61
End: 2021-06-29

## 2021-06-29 LAB — SARS-COV-2 ORF1AB RESP QL NAA+PROBE: NOT DETECTED

## 2021-06-29 PROCEDURE — U0004 COV-19 TEST NON-CDC HGH THRU: HCPCS | Performed by: PAIN MEDICINE

## 2021-06-29 PROCEDURE — C9803 HOPD COVID-19 SPEC COLLECT: HCPCS | Performed by: PAIN MEDICINE

## 2021-07-18 RX ORDER — FLUOXETINE 10 MG/1
CAPSULE ORAL
Qty: 30 CAPSULE | Refills: 5 | Status: SHIPPED | OUTPATIENT
Start: 2021-07-18 | End: 2022-01-10

## 2021-07-19 ENCOUNTER — PRE-ADMISSION TESTING (OUTPATIENT)
Dept: PREADMISSION TESTING | Facility: HOSPITAL | Age: 61
End: 2021-07-19

## 2021-07-19 VITALS
HEART RATE: 79 BPM | SYSTOLIC BLOOD PRESSURE: 131 MMHG | WEIGHT: 182.32 LBS | DIASTOLIC BLOOD PRESSURE: 70 MMHG | HEIGHT: 63 IN | OXYGEN SATURATION: 99 % | RESPIRATION RATE: 16 BRPM | BODY MASS INDEX: 32.3 KG/M2

## 2021-07-19 DIAGNOSIS — M50.30 DEGENERATION OF CERVICAL INTERVERTEBRAL DISC: ICD-10-CM

## 2021-07-19 DIAGNOSIS — M48.02 SPINAL STENOSIS IN CERVICAL REGION: ICD-10-CM

## 2021-07-19 DIAGNOSIS — M54.12 CERVICAL RADICULOPATHY: ICD-10-CM

## 2021-07-19 LAB
ALBUMIN SERPL-MCNC: 4.5 G/DL (ref 3.5–5.2)
ALBUMIN/GLOB SERPL: 1.7 G/DL
ALP SERPL-CCNC: 84 U/L (ref 39–117)
ALT SERPL W P-5'-P-CCNC: 13 U/L (ref 1–33)
ANION GAP SERPL CALCULATED.3IONS-SCNC: 9 MMOL/L (ref 5–15)
AST SERPL-CCNC: 18 U/L (ref 1–32)
BILIRUB SERPL-MCNC: 0.5 MG/DL (ref 0–1.2)
BILIRUB UR QL STRIP: NEGATIVE
BUN SERPL-MCNC: 13 MG/DL (ref 6–20)
BUN/CREAT SERPL: 19.7 (ref 7–25)
CALCIUM SPEC-SCNC: 9.3 MG/DL (ref 8.6–10.5)
CHLORIDE SERPL-SCNC: 103 MMOL/L (ref 98–107)
CLARITY UR: CLEAR
CO2 SERPL-SCNC: 25 MMOL/L (ref 22–29)
COLOR UR: YELLOW
CREAT SERPL-MCNC: 0.66 MG/DL (ref 0.57–1)
DEPRECATED RDW RBC AUTO: 43.3 FL (ref 37–54)
ERYTHROCYTE [DISTWIDTH] IN BLOOD BY AUTOMATED COUNT: 13.2 % (ref 12.3–15.4)
GFR SERPL CREATININE-BSD FRML MDRD: 92 ML/MIN/1.73
GLOBULIN UR ELPH-MCNC: 2.7 GM/DL
GLUCOSE SERPL-MCNC: 112 MG/DL (ref 65–99)
GLUCOSE UR STRIP-MCNC: NEGATIVE MG/DL
HCT VFR BLD AUTO: 36.5 % (ref 34–46.6)
HGB BLD-MCNC: 12.3 G/DL (ref 12–15.9)
HGB UR QL STRIP.AUTO: NEGATIVE
KETONES UR QL STRIP: NEGATIVE
LEUKOCYTE ESTERASE UR QL STRIP.AUTO: NEGATIVE
MCH RBC QN AUTO: 30.1 PG (ref 26.6–33)
MCHC RBC AUTO-ENTMCNC: 33.7 G/DL (ref 31.5–35.7)
MCV RBC AUTO: 89.2 FL (ref 79–97)
NITRITE UR QL STRIP: NEGATIVE
PH UR STRIP.AUTO: 6 [PH] (ref 5–8)
PLATELET # BLD AUTO: 252 10*3/MM3 (ref 140–450)
PMV BLD AUTO: 10.1 FL (ref 6–12)
POTASSIUM SERPL-SCNC: 4.3 MMOL/L (ref 3.5–5.2)
PROT SERPL-MCNC: 7.2 G/DL (ref 6–8.5)
PROT UR QL STRIP: NEGATIVE
RBC # BLD AUTO: 4.09 10*6/MM3 (ref 3.77–5.28)
SODIUM SERPL-SCNC: 137 MMOL/L (ref 136–145)
SP GR UR STRIP: 1.01 (ref 1–1.03)
UROBILINOGEN UR QL STRIP: NORMAL
WBC # BLD AUTO: 6.77 10*3/MM3 (ref 3.4–10.8)

## 2021-07-19 PROCEDURE — 80053 COMPREHEN METABOLIC PANEL: CPT

## 2021-07-19 PROCEDURE — 81003 URINALYSIS AUTO W/O SCOPE: CPT

## 2021-07-19 PROCEDURE — 85027 COMPLETE CBC AUTOMATED: CPT

## 2021-07-19 PROCEDURE — 36415 COLL VENOUS BLD VENIPUNCTURE: CPT

## 2021-07-19 RX ORDER — EPINASTINE HCL 0.05 %
1 DROPS OPHTHALMIC (EYE) 2 TIMES DAILY
COMMUNITY

## 2021-07-19 RX ORDER — ALBUTEROL SULFATE 90 UG/1
2 AEROSOL, METERED RESPIRATORY (INHALATION) EVERY 4 HOURS PRN
COMMUNITY

## 2021-07-19 NOTE — DISCHARGE INSTRUCTIONS
DAY OF SURGERY INSTRUCTIONS        YOUR SURGEON: DR. ESSIE MCKEON    PROCEDURE: ANTERIOR CERVICAL DISCECTOMY, INTERBODY FUSION, ANTERIOR INSTRUMENTATION CERVICAL 4-5, 5-6, 6-7    DATE OF SURGERY: July 28, 2021    ARRIVAL TIME: AS DIRECTED BY OFFICE    YOU MAY TAKE THE FOLLOWING MEDICATION(S) THE MORNING OF SURGERY WITH A SIP OF WATER: NORCO      ALL OTHER HOME MEDICATION CHECK WITH YOUR PHYSICIAN      DO NOT TAKE ANY ERECTILE DYSFUNCTION MEDICATIONS (EX: CIALIS, VIAGRA) 24 HOURS PRIOR TO SURGERY                      MANAGING PAIN AFTER SURGERY    We know you are probably wondering what your pain will be like after surgery.  Following surgery it is unrealistic to expect you will not have pain.   Pain is how our bodies let us know that something is wrong or cautions us to be careful.  That said, our goal is to make your pain tolerable.    Methods we may use to treat your pain include (oral or IV medications, PCAs, epidurals, nerve blocks, etc.)   While some procedures require IV pain medications for a short time after surgery, transitioning to pain medications by mouth allows for better management of pain.   Your nurse will encourage you to take oral pain medications whenever possible.  IV medications work almost immediately, but only last a short while.  Taking medications by mouth allows for a more constant level of medication in your blood stream for a longer period of time.      Once your pain is out of control it is harder to get back under control.  It is important you are aware when your next dose of pain medication is due.  If you are admitted, your nurse may write the time of your next dose on the white board in your room to help you remember.      We are interested in your pain and encourage you to inform us about aggravating factors during your visit.   Many times a simple repositioning every few hours can make a big difference.    If your physician says it is okay, do not let your pain prevent you from  getting out of bed. Be sure to call your nurse for assistance prior to getting up so you do not fall.      Before surgery, please decide your tolerable pain goal.  These faces help describe the pain ratings we use on a 0-10 scale.   Be prepared to tell us your goal and whether or not you take pain or anxiety medications at home.          BEFORE YOU COME TO THE HOSPITAL  (Pre-op instructions)  • Do not eat, drink, smoke or chew gum after midnight the night before surgery.  This also includes no mints.  • Morning of surgery take only the medicines you have been instructed with a sip of water unless otherwise instructed  by your physician.  • Do not shave, wear makeup or dark nail polish.  • Remove all jewelry including rings.  • Leave anything you consider valuable at home.  • Leave your suitcase in the car until after your surgery.  • Bring the following with you if applicable:  o Picture ID and insurance, Medicare or Medicaid cards  o Co-pay/deductible required by insurance (cash, check, credit card)  o Copy of advance directive, living will or power-of- documents if not brought to PAT  o CPAP or BIPAP mask and tubing  o Relaxation aids ( book, magazine), etc.  o Hearing aids                        ON THE DAY OF SURGERY  · On the day of surgery check in at registration located at the main entrance of the hospital.   ? You will be registered and given a beeper with instructions where to wait in the main lobby.  ? When your beeper lights up and vibrates a member of the Outpatient Surgery staff will meet you at the double doors under the stair steps and escort you to your preoperative room.   · You may have cloth compression devices placed on your legs. These help to prevent blood clots and reduce swelling in your legs.  · An IV may be inserted into one of your veins.  · In the operating room, you may be given one or more of the following:  ? A medicine to help you relax (sedative).  ? A medicine to numb the  "area (local anesthetic).  ? A medicine to make you fall asleep (general anesthetic).  ? A medicine that is injected into an area of your body to numb everything below the injection site (regional anesthetic).  · Your surgical site will be marked or identified.  · You may be given an antibiotic through your IV to help prevent infection.  Contact a health care provider if you:  · Develop a fever of more than 100.4°F (38°C) or other feelings of illness during the 48 hours before your surgery.  · Have symptoms that get worse.  Have questions or concerns about your surgery    General Anesthesia/Surgery, Adult  General anesthesia is the use of medicines to make a person \"go to sleep\" (unconscious) for a medical procedure. General anesthesia must be used for certain procedures, and is often recommended for procedures that:  · Last a long time.  · Require you to be still or in an unusual position.  · Are major and can cause blood loss.  The medicines used for general anesthesia are called general anesthetics. As well as making you unconscious for a certain amount of time, these medicines:  · Prevent pain.  · Control your blood pressure.  · Relax your muscles.  Tell a health care provider about:  · Any allergies you have.  · All medicines you are taking, including vitamins, herbs, eye drops, creams, and over-the-counter medicines.  · Any problems you or family members have had with anesthetic medicines.  · Types of anesthetics you have had in the past.  · Any blood disorders you have.  · Any surgeries you have had.  · Any medical conditions you have.  · Any recent upper respiratory, chest, or ear infections.  · Any history of:  ? Heart or lung conditions, such as heart failure, sleep apnea, asthma, or chronic obstructive pulmonary disease (COPD).  ?  service.  ? Depression or anxiety.  · Any tobacco or drug use, including marijuana or alcohol use.  · Whether you are pregnant or may be pregnant.  What are the " risks?  Generally, this is a safe procedure. However, problems may occur, including:  · Allergic reaction.  · Lung and heart problems.  · Inhaling food or liquid from the stomach into the lungs (aspiration).  · Nerve injury.  · Air in the bloodstream, which can lead to stroke.  · Extreme agitation or confusion (delirium) when you wake up from the anesthetic.  · Waking up during your procedure and being unable to move. This is rare.  These problems are more likely to develop if you are having a major surgery or if you have an advanced or serious medical condition. You can prevent some of these complications by answering all of your health care provider's questions thoroughly and by following all instructions before your procedure.  General anesthesia can cause side effects, including:  · Nausea or vomiting.  · A sore throat from the breathing tube.  · Hoarseness.  · Wheezing or coughing.  · Shaking chills.  · Tiredness.  · Body aches.  · Anxiety.  · Sleepiness or drowsiness.  · Confusion or agitation.  RISKS AND COMPLICATIONS OF SURGERY  Your health care provider will discuss possible risks and complications with you before surgery. Common risks and complications include:    · Problems due to the use of anesthetics.  · Blood loss and replacement (does not apply to minor surgical procedures).  · Temporary increase in pain due to surgery.  · Uncorrected pain or problems that the surgery was meant to correct.  · Infection.  · New damage.    What happens before the procedure?    Medicines  Ask your health care provider about:  · Changing or stopping your regular medicines. This is especially important if you are taking diabetes medicines or blood thinners.  · Taking medicines such as aspirin and ibuprofen. These medicines can thin your blood. Do not take these medicines unless your health care provider tells you to take them.  · Taking over-the-counter medicines, vitamins, herbs, and supplements. Do not take these during  the week before your procedure unless your health care provider approves them.  General instructions  · Starting 3-6 weeks before the procedure, do not use any products that contain nicotine or tobacco, such as cigarettes and e-cigarettes. If you need help quitting, ask your health care provider.  · If you brush your teeth on the morning of the procedure, make sure to spit out all of the toothpaste.  · Tell your health care provider if you become ill or develop a cold, cough, or fever.  · If instructed by your health care provider, bring your sleep apnea device with you on the day of your surgery (if applicable).  · Ask your health care provider if you will be going home the same day, the following day, or after a longer hospital stay.  ? Plan to have someone take you home from the hospital or clinic.  ? Plan to have a responsible adult care for you for at least 24 hours after you leave the hospital or clinic. This is important.  What happens during the procedure?  · You will be given anesthetics through both of the following:  ? A mask placed over your nose and mouth.  ? An IV in one of your veins.  · You may receive a medicine to help you relax (sedative).  · After you are unconscious, a breathing tube may be inserted down your throat to help you breathe. This will be removed before you wake up.  · An anesthesia specialist will stay with you throughout your procedure. He or she will:  ? Keep you comfortable and safe by continuing to give you medicines and adjusting the amount of medicine that you get.  ? Monitor your blood pressure, pulse, and oxygen levels to make sure that the anesthetics do not cause any problems.  The procedure may vary among health care providers and hospitals.  What happens after the procedure?  · Your blood pressure, temperature, heart rate, breathing rate, and blood oxygen level will be monitored until the medicines you were given have worn off.  · You will wake up in a recovery area. You  may wake up slowly.  · If you feel anxious or agitated, you may be given medicine to help you calm down.  · If you will be going home the same day, your health care provider may check to make sure you can walk, drink, and urinate.  · Your health care provider will treat any pain or side effects you have before you go home.  · Do not drive for 24 hours if you were given a sedative.  Summary  · General anesthesia is used to keep you still and prevent pain during a procedure.  · It is important to tell your healthcare provider about your medical history and any surgeries you have had, and previous experience with anesthesia.  · Follow your healthcare provider’s instructions about when to stop eating, drinking, or taking certain medicines before your procedure.  · Plan to have someone take you home from the hospital or clinic.  This information is not intended to replace advice given to you by your health care provider. Make sure you discuss any questions you have with your health care provider.  Document Released: 03/26/2009 Document Revised: 08/03/2018 Document Reviewed: 08/03/2018  [employer] Interactive Patient Education © 2019 [employer]       Fall Prevention in Hospitals, Adult  As a hospital patient, your condition and the treatments you receive can increase your risk for falls. Some additional risk factors for falls in a hospital include:  · Being in an unfamiliar environment.  · Being on bed rest.  · Your surgery.  · Taking certain medicines.  · Your tubing requirements, such as intravenous (IV) therapy or catheters.  It is important that you learn how to decrease fall risks while at the hospital. Below are important tips that can help prevent falls.  SAFETY TIPS FOR PREVENTING FALLS  Talk about your risk of falling.  · Ask your health care provider why you are at risk for falling. Is it your medicine, illness, tubing placement, or something else?  · Make a plan with your health care provider to keep you safe  from falls.  · Ask your health care provider or pharmacist about side effects of your medicines. Some medicines can make you dizzy or affect your coordination.  Ask for help.  · Ask for help before getting out of bed. You may need to press your call button.  · Ask for assistance in getting safely to the toilet.  · Ask for a walker or cane to be put at your bedside. Ask that most of the side rails on your bed be placed up before your health care provider leaves the room.  · Ask family or friends to sit with you.  · Ask for things that are out of your reach, such as your glasses, hearing aids, telephone, bedside table, or call button.  Follow these tips to avoid falling:  · Stay lying or seated, rather than standing, while waiting for help.  · Wear rubber-soled slippers or shoes whenever you walk in the hospital.  · Avoid quick, sudden movements.  ¨ Change positions slowly.  ¨ Sit on the side of your bed before standing.  ¨ Stand up slowly and wait before you start to walk.  · Let your health care provider know if there is a spill on the floor.  · Pay careful attention to the medical equipment, electrical cords, and tubes around you.  · When you need help, use your call button by your bed or in the bathroom. Wait for one of your health care providers to help you.  · If you feel dizzy or unsure of your footing, return to bed and wait for assistance.  · Avoid being distracted by the TV, telephone, or another person in your room.  · Do not lean or support yourself on rolling objects, such as IV poles or bedside tables.     This information is not intended to replace advice given to you by your health care provider. Make sure you discuss any questions you have with your health care provider.     Document Released: 12/15/2001 Document Revised: 01/08/2016 Document Reviewed: 08/25/2013  [employer] Interactive Patient Education ©2016 [employer]       [facility] 4% Patient Instruction Sheet    Chlorhexidine  Before Surgery  Chlorhexidine gluconate (CHG) is a germ-killing (antiseptic) solution that is used to clean the skin. It gets rid of the bacteria that normally live on the skin. Cleaning your skin with CHG before surgery helps lower the risk for infection after surgery.    How to use CHG solution  · You will take 2 showers, one shower the night before surgery, the second shower the morning of surgery before coming to the hospital.  · Use CHG only as told by your health care provider, and follow the instructions on the label.  · Use CHG solution while taking a shower. Follow these steps when using CHG solution (unless your health care provider gives you different instructions):  1. Start the shower.  2. Use your normal soap and shampoo to wash your face and hair.  3. Turn off the shower or move out of the shower stream.  4. Pour the CHG onto a clean washcloth. Do not use any type of brush or rough-edged sponge.  5. Starting at your neck, lather your body down to your toes. Make sure you:  6. Pay special attention to the part of your body where you will be having surgery. Scrub this area for at least 1 minute.  7. Use the full amount of CHG as directed. Usually, this is one half bottle for each shower.  8. Do not use CHG on your head or face. If the solution gets into your ears or eyes, rinse them well with water.  9. Avoid your genital area.  10. Avoid any areas of skin that have broken skin, cuts, or scrapes.  11. Scrub your back and under your arms. Make sure to wash skin folds.  12. Let the lather sit on your skin for 1-2 minutes or as long as told by your health care  provider.  13. Thoroughly rinse your entire body in the shower. Make sure that all body creases and crevices are rinsed well.  14. Dry off with a clean towel. Do not put any substances on your body afterward, such as powder, lotion, or perfume.  15. Put on clean clothes or pajamas.  16. If it is the night before your surgery, sleep in clean  sheets.    What are the risks?  Risks of using CHG include:  · A skin reaction.  · Hearing loss, if CHG gets in your ears.  · Eye injury, if CHG gets in your eyes and is not rinsed out.  · The CHG product catching fire.  Make sure that you avoid smoking and flames after applying CHG to your skin.  Do not use CHG:  · If you have a chlorhexidine allergy or have previously reacted to chlorhexidine.  · On babies younger than 2 months of age.      On the day of surgery, when you are taken to your room in Outpatient Surgery you will be given a CHG prepackaged cloth to wipe the site for your surgery.  How to use CHG prepackaged cloths  · Follow the instructions on the label.  · Use the CHG cloth on clean, dry skin. Follow these steps when using a CHG cloth (unless your health care provider gives you different instructions):  1. Using the CHG cloth, vigorously scrub the part of your body where you will be having surgery. Scrub using a back-and-forth motion for 3 minutes. The area on your body should be completely wet with CHG when you are finished scrubbing.  2. Do not rinse. Discard the cloth and let the area air-dry for 1 minute. Do not put any substances on your body afterward, such as powder, lotion, or perfume.  Contact a health care provider if:  · Your skin gets irritated after scrubbing.  · You have questions about using your solution or cloth.  Get help right away if:  · Your eyes become very red or swollen.  · Your eyes itch badly.  · Your skin itches badly and is red or swollen.  · Your hearing changes.  · You have trouble seeing.  · You have swelling or tingling in your mouth or throat.  · You have trouble breathing.  · You swallow any chlorhexidine.  Summary  · Chlorhexidine gluconate (CHG) is a germ-killing (antiseptic) solution that is used to clean the skin. Cleaning your skin with CHG before surgery helps lower the risk for infection after surgery.  · You may be given CHG to use at home. It may be in a  bottle or in a prepackaged cloth to use on your skin. Carefully follow your health care provider's instructions and the instructions on the product label.  · Do not use CHG if you have a chlorhexidine allergy.  · Contact your health care provider if your skin gets irritated after scrubbing.  This information is not intended to replace advice given to you by your health care provider. Make sure you discuss any questions you have with your health care provider.  Document Released: 09/11/2013 Document Revised: 11/15/2018 Document Reviewed: 11/15/2018  [employer] Interactive Patient Education © 2019 [employer]

## 2021-07-20 ENCOUNTER — OFFICE VISIT (OUTPATIENT)
Dept: PRIMARY CARE CLINIC | Age: 61
End: 2021-07-20
Payer: MEDICARE

## 2021-07-20 VITALS
HEART RATE: 70 BPM | OXYGEN SATURATION: 98 % | HEIGHT: 62 IN | RESPIRATION RATE: 18 BRPM | SYSTOLIC BLOOD PRESSURE: 122 MMHG | BODY MASS INDEX: 33.31 KG/M2 | WEIGHT: 181 LBS | TEMPERATURE: 97.2 F | DIASTOLIC BLOOD PRESSURE: 80 MMHG

## 2021-07-20 DIAGNOSIS — Z01.818 PRE-OP EXAM: Primary | ICD-10-CM

## 2021-07-20 DIAGNOSIS — M54.2 CERVICAL PAIN: ICD-10-CM

## 2021-07-20 PROCEDURE — 99213 OFFICE O/P EST LOW 20 MIN: CPT | Performed by: NURSE PRACTITIONER

## 2021-07-20 ASSESSMENT — ENCOUNTER SYMPTOMS
RESPIRATORY NEGATIVE: 1
EYES NEGATIVE: 1
GASTROINTESTINAL NEGATIVE: 1

## 2021-07-20 NOTE — PROGRESS NOTES
[address] [facility] CARE  [address] [address] [address] [address] [address]  Dept: [phone]  Dept Fax: [phone]  Loc: [phone]    Joni Bear is a 62 y.o. female who presents today for her medical conditions/complaints as noted below. Joni Bear is c/o of Surgical Consult      Chief Complaint   Patient presents with    Surgical Consult       HPI:     HPI   Patient is seen in office today due to the need for surgical clearance. Patient has surgery scheduled with Dr. Araceli Sorto for a cervical fusion. Patient monitors BP at home has normal readings most of the time, she only had 1 high reading recently, but that was due to increased stress. Patient denies any HA, chest pains or SOB. Past Medical History:   Diagnosis Date    Allergic bronchitis     has wheezing related to dust and pollen    Allergic rhinitis     Asthma     Blepharitis of both eyes     Bone spur     cervical    Cervical disc disease     Chicken pox     age 26    Chronic anxiety 2006    has been on valium after failing effexor and other medications    Coccyx disorder     history of f/x    Constipation     chronic since a teen    Degenerative disc disease at L5-S1 level     Genital warts     Dr Cornell Maddox GERD (gastroesophageal reflux disease)     Hemorrhoids     HPV in female     Hyperthyroidism     took radioactive iodine    Post menopausal syndrome 2010    Reactive airway disease with wheezing     Scoliosis     Unspecified vitamin D deficiency         Past Surgical History:   Procedure Laterality Date    [facility]  9/16/2011    [facility]     COLONOSCOPY N/A 11/11/2016    Dr Josie Wick AP (-) dysplasia x 1, BCM x 1--3 yr recall    COLONOSCOPY N/A 11/11/2019    Dr Phoenix Decker, large grade 3 internal and external hemorrhoids, suboptimal prep, 3 yr recall    DILATION AND CURETTAGE OF UTERUS N/A 7/24/2020    DILATATION AND CURETTAGE HYSTEROSCOPY performed INHALE 2 PUFFS INTO THE LUNGS EVERY 6 HOURS AS NEEDED FOR WHEEZING 8.5 Inhaler 1    EPINEPHrine (EPIPEN) 0.3 MG/0.3ML SOAJ injection USE AS DIRECTED FOR ACUTE  ALLERGIC REACTION  1    epinastine (ELESTAT) 0.05 % SOLN INSTILL 1 DROP IN EACH EYE ONE TO TWICE A DAY AS NEEDED  3    naproxen (NAPROXEN) 500 MG EC tablet Take 1 tablet by mouth every 12 hours Generic is okay 60 tablet 11     No current facility-administered medications for this visit. Allergies   Allergen Reactions    Pollen Extract      Early Spring and early     Effexor [facility] Hydrochloride] Other (See Comments)     Slurred speech, feelings of having a TIA, seeing double, unaware of her surroundings    Poison Ivy Extract [Poison Ivy Extract] Itching       Family History   Problem Relation Age of Onset    Diabetes Sister     Thyroid Disease Sister     Rheum Arthritis Sister     Other Sister         fibromyalgia    Breast Cancer Sister 59    Rheum Arthritis Maternal Aunt     Colon Polyps Maternal Aunt     Heart Failure Father     Heart Disease Father          of acute MI at 58    Hypertension Mother     Cancer Mother         lung cancer    Colon Polyps Mother     High Blood Pressure Mother     Other Son         sleep apnea,anxiety and neck problems after  injuries    Breast Cancer Maternal Cousin 45    Colon Cancer Neg Hx     Esophageal Cancer Neg Hx     Stomach Cancer Neg Hx     Liver Cancer Neg Hx     Liver Disease Neg Hx     Rectal Cancer Neg Hx                Subjective:      Review of Systems   Constitutional: Negative. HENT: Negative. Eyes: Negative. Respiratory: Negative. Cardiovascular: Negative. Gastrointestinal: Negative. Endocrine: Negative. Genitourinary: Negative. Musculoskeletal: Positive for neck pain (chronic). Skin: Negative. Neurological: Negative. Hematological: Negative. Psychiatric/Behavioral: Negative.         Objective:     Physical Exam  Vitals and nursing note reviewed. Constitutional:       Appearance: Normal appearance. HENT:      Head: Normocephalic and atraumatic. Jaw: There is normal jaw occlusion. Right Ear: Hearing, tympanic membrane, ear canal and external ear normal.      Left Ear: Hearing, tympanic membrane, ear canal and external ear normal.      Nose: Nose normal.      Mouth/Throat:      Lips: Pink. Mouth: Mucous membranes are moist.      Pharynx: Oropharynx is clear. Eyes:      General: Lids are normal.      Extraocular Movements: Extraocular movements intact. Conjunctiva/sclera: Conjunctivae normal.      Pupils: Pupils are equal, round, and reactive to light. Neck:      Thyroid: No thyromegaly. Trachea: Trachea normal.   Cardiovascular:      Rate and Rhythm: Normal rate and regular rhythm. Pulses: Normal pulses. Dorsalis pedis pulses are 2+ on the right side and 2+ on the left side. Posterior tibial pulses are 2+ on the right side and 2+ on the left side. Heart sounds: Normal heart sounds. No murmur heard. Pulmonary:      Effort: Pulmonary effort is normal.      Breath sounds: Normal breath sounds and air entry. Abdominal:      General: Bowel sounds are normal.      Palpations: Abdomen is soft. Musculoskeletal:      Cervical back: Neck supple. Decreased range of motion. Thoracic back: Normal range of motion. Lumbar back: Normal range of motion. Right lower leg: No edema. Left lower leg: No edema. Lymphadenopathy:      Cervical: No cervical adenopathy. Skin:     General: Skin is warm and dry. Capillary Refill: Capillary refill takes less than 2 seconds. Neurological:      General: No focal deficit present. Mental Status: She is alert and oriented to person, place, and time. Mental status is at baseline.    Psychiatric:         Attention and Perception: Attention normal.         Mood and Affect: Mood normal.         Speech: Speech normal. Behavior: Behavior normal.         Thought Content: Thought content normal.         Cognition and Memory: Cognition normal.         Judgment: Judgment normal.         /80   Pulse 70   Temp 97.2 °F (36.2 °C) (Temporal)   Resp 18   Ht 5' 2\" (1.575 m)   Wt 181 lb (82.1 kg)   LMP 07/11/2009   SpO2 98%   BMI 33.11 kg/m²     Assessment:      Diagnosis Orders   1. Pre-op exam     2. Cervical pain         No results found for this visit on 07/20/21. Plan:     Patient is currently cleared for surgery at this time from medical standpoint, but final evaluation on the day of surgery needs to be made by the surgical and anesthesia team.  If any major changes in patient status occur, evaluation by provider should occur as soon as possible. Return for Keep follow up as scheduled. No orders of the defined types were placed in this encounter. No orders of the defined types were placed in this encounter. Patient offered educational handouts and has had all questions answered. Patient voices understanding and agrees to plans along with risks and benefits of plan. Patient is instructed to continue prior meds, diet, and exercise plans as instructed. Patient agrees to follow up as instructed and sooner if needed. Patient agrees to go to ER if condition becomes emergent. EMR Dragon/transcription disclaimer: Some of this encounter note is an electronic transcription/translation of spoken language to printed text. The electronic translation of spoken language may permit erroneous, or at times, nonsensical words or phrases to be inadvertently transcribed.  Although I have reviewed the note for such errors, some may still exist.    Electronically signed by ANGEL Christianson on 7/20/2021 at 2:06 PM

## 2021-07-20 NOTE — LETTER
[address] [facility]  Phone: [phone]  Fax: [phone], APRN        July 20, 2021     Patient: Luisa Marinelli   YOB: 1960   Date of Visit: 7/20/2021       To Whom It May Concern:    Patient is currently cleared for surgery at this time from medical standpoint, but final evaluation on the day of surgery needs to be made by the surgical and anesthesia team.  If any major changes in patient status occur, evaluation by provider should occur as soon as possible. If you have any questions or concerns, please don't hesitate to call.     Sincerely,        ANGEL Ricardo

## 2021-07-21 ENCOUNTER — TRANSCRIBE ORDERS (OUTPATIENT)
Dept: ADMINISTRATIVE | Facility: HOSPITAL | Age: 61
End: 2021-07-21

## 2021-07-21 DIAGNOSIS — Z11.59 SCREENING FOR VIRAL DISEASE: Primary | ICD-10-CM

## 2021-07-26 ENCOUNTER — LAB (OUTPATIENT)
Dept: LAB | Facility: HOSPITAL | Age: 61
End: 2021-07-26

## 2021-07-26 LAB — SARS-COV-2 ORF1AB RESP QL NAA+PROBE: NOT DETECTED

## 2021-07-26 PROCEDURE — U0004 COV-19 TEST NON-CDC HGH THRU: HCPCS | Performed by: NEUROLOGICAL SURGERY

## 2021-07-26 PROCEDURE — C9803 HOPD COVID-19 SPEC COLLECT: HCPCS | Performed by: NEUROLOGICAL SURGERY

## 2021-07-28 ENCOUNTER — APPOINTMENT (OUTPATIENT)
Dept: GENERAL RADIOLOGY | Facility: HOSPITAL | Age: 61
End: 2021-07-28

## 2021-07-28 ENCOUNTER — HOSPITAL ENCOUNTER (OUTPATIENT)
Facility: HOSPITAL | Age: 61
Discharge: HOME | End: 2021-07-30
Attending: NEUROLOGICAL SURGERY | Admitting: NEUROLOGICAL SURGERY

## 2021-07-28 ENCOUNTER — ANESTHESIA EVENT (OUTPATIENT)
Dept: PERIOP | Facility: HOSPITAL | Age: 61
End: 2021-07-28

## 2021-07-28 ENCOUNTER — ANESTHESIA (OUTPATIENT)
Dept: PERIOP | Facility: HOSPITAL | Age: 61
End: 2021-07-28

## 2021-07-28 DIAGNOSIS — M48.02 SPINAL STENOSIS IN CERVICAL REGION: ICD-10-CM

## 2021-07-28 DIAGNOSIS — M50.30 DEGENERATION OF CERVICAL INTERVERTEBRAL DISC: ICD-10-CM

## 2021-07-28 DIAGNOSIS — Z78.9 DECREASED ACTIVITIES OF DAILY LIVING (ADL): ICD-10-CM

## 2021-07-28 DIAGNOSIS — M54.12 CERVICAL RADICULOPATHY: ICD-10-CM

## 2021-07-28 DIAGNOSIS — Z74.09 IMPAIRED MOBILITY: ICD-10-CM

## 2021-07-28 LAB
ABO GROUP BLD: NORMAL
BLD GP AB SCN SERPL QL: NEGATIVE
RH BLD: POSITIVE
T&S EXPIRATION DATE: NORMAL

## 2021-07-28 PROCEDURE — C1713 ANCHOR/SCREW BN/BN,TIS/BN: HCPCS | Performed by: NEUROLOGICAL SURGERY

## 2021-07-28 PROCEDURE — 86901 BLOOD TYPING SEROLOGIC RH(D): CPT | Performed by: NEUROLOGICAL SURGERY

## 2021-07-28 PROCEDURE — A9270 NON-COVERED ITEM OR SERVICE: HCPCS | Performed by: NURSE PRACTITIONER

## 2021-07-28 PROCEDURE — 22552 ARTHRD ANT NTRBD CERVICAL EA: CPT | Performed by: NEUROLOGICAL SURGERY

## 2021-07-28 PROCEDURE — 63710000001 DOCUSATE SODIUM 100 MG CAPSULE: Performed by: NURSE PRACTITIONER

## 2021-07-28 PROCEDURE — 25010000002 MORPHINE SULFATE (PF) 2 MG/ML SOLUTION: Performed by: NURSE PRACTITIONER

## 2021-07-28 PROCEDURE — C1889 IMPLANT/INSERT DEVICE, NOC: HCPCS | Performed by: NEUROLOGICAL SURGERY

## 2021-07-28 PROCEDURE — 25010000002 PROPOFOL 10 MG/ML EMULSION: Performed by: NURSE ANESTHETIST, CERTIFIED REGISTERED

## 2021-07-28 PROCEDURE — 22845 INSERT SPINE FIXATION DEVICE: CPT | Performed by: NEUROLOGICAL SURGERY

## 2021-07-28 PROCEDURE — 88304 TISSUE EXAM BY PATHOLOGIST: CPT | Performed by: NEUROLOGICAL SURGERY

## 2021-07-28 PROCEDURE — 25010000002 CEFAZOLIN PER 500 MG: Performed by: NEUROLOGICAL SURGERY

## 2021-07-28 PROCEDURE — 22551 ARTHRD ANT NTRBDY CERVICAL: CPT | Performed by: NEUROLOGICAL SURGERY

## 2021-07-28 PROCEDURE — S0260 H&P FOR SURGERY: HCPCS | Performed by: NEUROLOGICAL SURGERY

## 2021-07-28 PROCEDURE — 63710000001 HYDROCODONE-ACETAMINOPHEN 7.5-325 MG TABLET: Performed by: NURSE PRACTITIONER

## 2021-07-28 PROCEDURE — 25010000002 ONDANSETRON PER 1 MG: Performed by: NURSE ANESTHETIST, CERTIFIED REGISTERED

## 2021-07-28 PROCEDURE — 76000 FLUOROSCOPY <1 HR PHYS/QHP: CPT

## 2021-07-28 PROCEDURE — A9270 NON-COVERED ITEM OR SERVICE: HCPCS | Performed by: ANESTHESIOLOGY

## 2021-07-28 PROCEDURE — 25010000003 CEFAZOLIN PER 500 MG: Performed by: NEUROLOGICAL SURGERY

## 2021-07-28 PROCEDURE — 86900 BLOOD TYPING SEROLOGIC ABO: CPT | Performed by: NEUROLOGICAL SURGERY

## 2021-07-28 PROCEDURE — 63710000001 KETOTIFEN 0.025 % SOLUTION 5 ML BOTTLE: Performed by: NURSE PRACTITIONER

## 2021-07-28 PROCEDURE — 63710000001 ACETAMINOPHEN 500 MG TABLET: Performed by: ANESTHESIOLOGY

## 2021-07-28 PROCEDURE — 25010000002 DEXAMETHASONE PER 1 MG: Performed by: NURSE ANESTHETIST, CERTIFIED REGISTERED

## 2021-07-28 PROCEDURE — 94799 UNLISTED PULMONARY SVC/PX: CPT

## 2021-07-28 PROCEDURE — 63710000001 MONTELUKAST 10 MG TABLET: Performed by: NURSE PRACTITIONER

## 2021-07-28 PROCEDURE — 25010000002 CEFAZOLIN PER 500 MG: Performed by: NURSE PRACTITIONER

## 2021-07-28 PROCEDURE — 86850 RBC ANTIBODY SCREEN: CPT | Performed by: NEUROLOGICAL SURGERY

## 2021-07-28 PROCEDURE — 88311 DECALCIFY TISSUE: CPT | Performed by: NEUROLOGICAL SURGERY

## 2021-07-28 PROCEDURE — 72040 X-RAY EXAM NECK SPINE 2-3 VW: CPT

## 2021-07-28 PROCEDURE — 63710000001 FLUOXETINE 10 MG CAPSULE: Performed by: NURSE PRACTITIONER

## 2021-07-28 PROCEDURE — 22853 INSJ BIOMECHANICAL DEVICE: CPT | Performed by: NEUROLOGICAL SURGERY

## 2021-07-28 PROCEDURE — 25010000002 HYDROMORPHONE PER 4 MG: Performed by: ANESTHESIOLOGY

## 2021-07-28 PROCEDURE — 97165 OT EVAL LOW COMPLEX 30 MIN: CPT

## 2021-07-28 DEVICE — INTERBODY FUSION DEVICE NANOLOCK SURFACE TECHNOLOGY 6 DEGREE SMALL 8MM
Type: IMPLANTABLE DEVICE | Site: SPINE CERVICAL | Status: FUNCTIONAL
Brand: ENDOSKELETON TC

## 2021-07-28 DEVICE — PLATE 3003048 ZEVO 48MM 3 LVL
Type: IMPLANTABLE DEVICE | Site: SPINE CERVICAL | Status: FUNCTIONAL
Brand: ZEVO™ ANTERIOR CERVICAL PLATE SYSTEM

## 2021-07-28 DEVICE — HEMOST ABS SURGIFOAM SZ100 8X12 10MM: Type: IMPLANTABLE DEVICE | Site: SPINE CERVICAL | Status: FUNCTIONAL

## 2021-07-28 DEVICE — KT HEMOST ABS SURGIFOAM PORCN 1GRAM: Type: IMPLANTABLE DEVICE | Site: SPINE CERVICAL | Status: FUNCTIONAL

## 2021-07-28 DEVICE — DBM T43103 2.5CC GRAFTON PUTTY
Type: IMPLANTABLE DEVICE | Site: SPINE CERVICAL | Status: FUNCTIONAL
Brand: GRAFTON®AND GRAFTON PLUS®DEMINERALIZED BONE MATRIX (DBM)

## 2021-07-28 RX ORDER — ONDANSETRON 2 MG/ML
INJECTION INTRAMUSCULAR; INTRAVENOUS AS NEEDED
Status: DISCONTINUED | OUTPATIENT
Start: 2021-07-28 | End: 2021-07-28 | Stop reason: SURG

## 2021-07-28 RX ORDER — POLYETHYLENE GLYCOL 3350 17 G/17G
17 POWDER, FOR SOLUTION ORAL DAILY PRN
Status: DISCONTINUED | OUTPATIENT
Start: 2021-07-28 | End: 2021-07-30 | Stop reason: HOSPADM

## 2021-07-28 RX ORDER — LIDOCAINE HYDROCHLORIDE 10 MG/ML
0.5 INJECTION, SOLUTION EPIDURAL; INFILTRATION; INTRACAUDAL; PERINEURAL ONCE AS NEEDED
Status: DISCONTINUED | OUTPATIENT
Start: 2021-07-28 | End: 2021-07-28

## 2021-07-28 RX ORDER — FLUMAZENIL 0.1 MG/ML
0.2 INJECTION INTRAVENOUS AS NEEDED
Status: DISCONTINUED | OUTPATIENT
Start: 2021-07-28 | End: 2021-07-28 | Stop reason: HOSPADM

## 2021-07-28 RX ORDER — PROPOFOL 10 MG/ML
VIAL (ML) INTRAVENOUS AS NEEDED
Status: DISCONTINUED | OUTPATIENT
Start: 2021-07-28 | End: 2021-07-28 | Stop reason: SURG

## 2021-07-28 RX ORDER — ONDANSETRON 2 MG/ML
4 INJECTION INTRAMUSCULAR; INTRAVENOUS EVERY 6 HOURS PRN
Status: DISCONTINUED | OUTPATIENT
Start: 2021-07-28 | End: 2021-07-30 | Stop reason: HOSPADM

## 2021-07-28 RX ORDER — LEVOTHYROXINE SODIUM 112 UG/1
112 TABLET ORAL
Status: DISCONTINUED | OUTPATIENT
Start: 2021-07-29 | End: 2021-07-30 | Stop reason: HOSPADM

## 2021-07-28 RX ORDER — LIDOCAINE HYDROCHLORIDE 10 MG/ML
0.5 INJECTION, SOLUTION EPIDURAL; INFILTRATION; INTRACAUDAL; PERINEURAL ONCE AS NEEDED
Status: DISCONTINUED | OUTPATIENT
Start: 2021-07-28 | End: 2021-07-28 | Stop reason: HOSPADM

## 2021-07-28 RX ORDER — SODIUM CHLORIDE 9 MG/ML
INJECTION, SOLUTION INTRAVENOUS AS NEEDED
Status: DISCONTINUED | OUTPATIENT
Start: 2021-07-28 | End: 2021-07-28 | Stop reason: HOSPADM

## 2021-07-28 RX ORDER — NALOXONE HCL 0.4 MG/ML
0.04 VIAL (ML) INJECTION AS NEEDED
Status: DISCONTINUED | OUTPATIENT
Start: 2021-07-28 | End: 2021-07-28 | Stop reason: HOSPADM

## 2021-07-28 RX ORDER — KETAMINE HCL IN NACL, ISO-OSM 100MG/10ML
SYRINGE (ML) INJECTION AS NEEDED
Status: DISCONTINUED | OUTPATIENT
Start: 2021-07-28 | End: 2021-07-28 | Stop reason: SURG

## 2021-07-28 RX ORDER — SODIUM CHLORIDE 9 MG/ML
75 INJECTION, SOLUTION INTRAVENOUS CONTINUOUS
Status: DISCONTINUED | OUTPATIENT
Start: 2021-07-28 | End: 2021-07-30 | Stop reason: HOSPADM

## 2021-07-28 RX ORDER — BUPIVACAINE HCL/0.9 % NACL/PF 0.1 %
2 PLASTIC BAG, INJECTION (ML) EPIDURAL ONCE
Status: COMPLETED | OUTPATIENT
Start: 2021-07-28 | End: 2021-07-28

## 2021-07-28 RX ORDER — MONTELUKAST SODIUM 10 MG/1
10 TABLET ORAL NIGHTLY
Status: DISCONTINUED | OUTPATIENT
Start: 2021-07-28 | End: 2021-07-30 | Stop reason: HOSPADM

## 2021-07-28 RX ORDER — SODIUM CHLORIDE 0.9 % (FLUSH) 0.9 %
10 SYRINGE (ML) INJECTION AS NEEDED
Status: DISCONTINUED | OUTPATIENT
Start: 2021-07-28 | End: 2021-07-28 | Stop reason: HOSPADM

## 2021-07-28 RX ORDER — CYCLOBENZAPRINE HCL 10 MG
10 TABLET ORAL 3 TIMES DAILY PRN
Status: DISCONTINUED | OUTPATIENT
Start: 2021-07-28 | End: 2021-07-30 | Stop reason: HOSPADM

## 2021-07-28 RX ORDER — ACETAMINOPHEN 500 MG
1000 TABLET ORAL ONCE
Status: COMPLETED | OUTPATIENT
Start: 2021-07-28 | End: 2021-07-28

## 2021-07-28 RX ORDER — LIDOCAINE HYDROCHLORIDE 20 MG/ML
INJECTION, SOLUTION EPIDURAL; INFILTRATION; INTRACAUDAL; PERINEURAL AS NEEDED
Status: DISCONTINUED | OUTPATIENT
Start: 2021-07-28 | End: 2021-07-28 | Stop reason: SURG

## 2021-07-28 RX ORDER — DEXAMETHASONE SODIUM PHOSPHATE 4 MG/ML
INJECTION, SOLUTION INTRA-ARTICULAR; INTRALESIONAL; INTRAMUSCULAR; INTRAVENOUS; SOFT TISSUE AS NEEDED
Status: DISCONTINUED | OUTPATIENT
Start: 2021-07-28 | End: 2021-07-28 | Stop reason: SURG

## 2021-07-28 RX ORDER — SODIUM CHLORIDE, SODIUM LACTATE, POTASSIUM CHLORIDE, CALCIUM CHLORIDE 600; 310; 30; 20 MG/100ML; MG/100ML; MG/100ML; MG/100ML
1000 INJECTION, SOLUTION INTRAVENOUS CONTINUOUS
Status: DISCONTINUED | OUTPATIENT
Start: 2021-07-28 | End: 2021-07-28 | Stop reason: HOSPADM

## 2021-07-28 RX ORDER — KETOTIFEN FUMARATE 0.35 MG/ML
1 SOLUTION/ DROPS OPHTHALMIC 2 TIMES DAILY
Status: DISCONTINUED | OUTPATIENT
Start: 2021-07-28 | End: 2021-07-30 | Stop reason: HOSPADM

## 2021-07-28 RX ORDER — NALOXONE HCL 0.4 MG/ML
0.4 VIAL (ML) INJECTION
Status: DISCONTINUED | OUTPATIENT
Start: 2021-07-28 | End: 2021-07-30 | Stop reason: HOSPADM

## 2021-07-28 RX ORDER — FLUOXETINE 10 MG/1
10 CAPSULE ORAL DAILY
Status: DISCONTINUED | OUTPATIENT
Start: 2021-07-28 | End: 2021-07-30 | Stop reason: HOSPADM

## 2021-07-28 RX ORDER — BISACODYL 10 MG
10 SUPPOSITORY, RECTAL RECTAL DAILY PRN
Status: DISCONTINUED | OUTPATIENT
Start: 2021-07-28 | End: 2021-07-30 | Stop reason: HOSPADM

## 2021-07-28 RX ORDER — SODIUM CHLORIDE, SODIUM LACTATE, POTASSIUM CHLORIDE, CALCIUM CHLORIDE 600; 310; 30; 20 MG/100ML; MG/100ML; MG/100ML; MG/100ML
100 INJECTION, SOLUTION INTRAVENOUS CONTINUOUS
Status: DISCONTINUED | OUTPATIENT
Start: 2021-07-28 | End: 2021-07-28 | Stop reason: HOSPADM

## 2021-07-28 RX ORDER — HYDROCODONE BITARTRATE AND ACETAMINOPHEN 5; 325 MG/1; MG/1
1 TABLET ORAL ONCE AS NEEDED
Status: DISCONTINUED | OUTPATIENT
Start: 2021-07-28 | End: 2021-07-28 | Stop reason: HOSPADM

## 2021-07-28 RX ORDER — HYDROMORPHONE HYDROCHLORIDE 1 MG/ML
0.5 INJECTION, SOLUTION INTRAMUSCULAR; INTRAVENOUS; SUBCUTANEOUS
Status: DISCONTINUED | OUTPATIENT
Start: 2021-07-28 | End: 2021-07-28 | Stop reason: HOSPADM

## 2021-07-28 RX ORDER — PANTOPRAZOLE SODIUM 40 MG/1
40 TABLET, DELAYED RELEASE ORAL
Status: DISCONTINUED | OUTPATIENT
Start: 2021-07-29 | End: 2021-07-30 | Stop reason: HOSPADM

## 2021-07-28 RX ORDER — FENTANYL CITRATE 50 UG/ML
25 INJECTION, SOLUTION INTRAMUSCULAR; INTRAVENOUS
Status: DISCONTINUED | OUTPATIENT
Start: 2021-07-28 | End: 2021-07-28 | Stop reason: HOSPADM

## 2021-07-28 RX ORDER — LABETALOL HYDROCHLORIDE 5 MG/ML
5 INJECTION, SOLUTION INTRAVENOUS
Status: DISCONTINUED | OUTPATIENT
Start: 2021-07-28 | End: 2021-07-28 | Stop reason: HOSPADM

## 2021-07-28 RX ORDER — ACETAMINOPHEN 325 MG/1
650 TABLET ORAL EVERY 4 HOURS PRN
Status: DISCONTINUED | OUTPATIENT
Start: 2021-07-28 | End: 2021-07-30 | Stop reason: HOSPADM

## 2021-07-28 RX ORDER — MAGNESIUM HYDROXIDE 1200 MG/15ML
LIQUID ORAL AS NEEDED
Status: DISCONTINUED | OUTPATIENT
Start: 2021-07-28 | End: 2021-07-28 | Stop reason: HOSPADM

## 2021-07-28 RX ORDER — ONDANSETRON 2 MG/ML
4 INJECTION INTRAMUSCULAR; INTRAVENOUS AS NEEDED
Status: DISCONTINUED | OUTPATIENT
Start: 2021-07-28 | End: 2021-07-28 | Stop reason: HOSPADM

## 2021-07-28 RX ORDER — HYDROCODONE BITARTRATE AND ACETAMINOPHEN 7.5; 325 MG/1; MG/1
1 TABLET ORAL EVERY 4 HOURS PRN
Status: DISCONTINUED | OUTPATIENT
Start: 2021-07-28 | End: 2021-07-29

## 2021-07-28 RX ORDER — MIDAZOLAM HYDROCHLORIDE 1 MG/ML
1 INJECTION INTRAMUSCULAR; INTRAVENOUS
Status: DISCONTINUED | OUTPATIENT
Start: 2021-07-28 | End: 2021-07-28 | Stop reason: HOSPADM

## 2021-07-28 RX ORDER — SODIUM CHLORIDE 0.9 % (FLUSH) 0.9 %
3 SYRINGE (ML) INJECTION AS NEEDED
Status: DISCONTINUED | OUTPATIENT
Start: 2021-07-28 | End: 2021-07-28 | Stop reason: HOSPADM

## 2021-07-28 RX ORDER — SODIUM CHLORIDE 0.9 % (FLUSH) 0.9 %
3 SYRINGE (ML) INJECTION EVERY 12 HOURS SCHEDULED
Status: DISCONTINUED | OUTPATIENT
Start: 2021-07-28 | End: 2021-07-30 | Stop reason: HOSPADM

## 2021-07-28 RX ORDER — IBUPROFEN 600 MG/1
600 TABLET ORAL ONCE AS NEEDED
Status: DISCONTINUED | OUTPATIENT
Start: 2021-07-28 | End: 2021-07-28 | Stop reason: HOSPADM

## 2021-07-28 RX ORDER — MORPHINE SULFATE 2 MG/ML
1 INJECTION, SOLUTION INTRAMUSCULAR; INTRAVENOUS EVERY 4 HOURS PRN
Status: DISCONTINUED | OUTPATIENT
Start: 2021-07-28 | End: 2021-07-30 | Stop reason: HOSPADM

## 2021-07-28 RX ORDER — SUFENTANIL CITRATE 50 UG/ML
INJECTION EPIDURAL; INTRAVENOUS AS NEEDED
Status: DISCONTINUED | OUTPATIENT
Start: 2021-07-28 | End: 2021-07-28 | Stop reason: SURG

## 2021-07-28 RX ORDER — BUPIVACAINE HCL/0.9 % NACL/PF 0.1 %
2 PLASTIC BAG, INJECTION (ML) EPIDURAL EVERY 8 HOURS
Status: COMPLETED | OUTPATIENT
Start: 2021-07-28 | End: 2021-07-29

## 2021-07-28 RX ORDER — ROCURONIUM BROMIDE 10 MG/ML
INJECTION, SOLUTION INTRAVENOUS AS NEEDED
Status: DISCONTINUED | OUTPATIENT
Start: 2021-07-28 | End: 2021-07-28 | Stop reason: SURG

## 2021-07-28 RX ORDER — SODIUM CHLORIDE 0.9 % (FLUSH) 0.9 %
10 SYRINGE (ML) INJECTION AS NEEDED
Status: DISCONTINUED | OUTPATIENT
Start: 2021-07-28 | End: 2021-07-30 | Stop reason: HOSPADM

## 2021-07-28 RX ORDER — DOCUSATE SODIUM 100 MG/1
100 CAPSULE, LIQUID FILLED ORAL 2 TIMES DAILY
Status: DISCONTINUED | OUTPATIENT
Start: 2021-07-28 | End: 2021-07-30 | Stop reason: HOSPADM

## 2021-07-28 RX ORDER — SODIUM CHLORIDE 0.9 % (FLUSH) 0.9 %
3-10 SYRINGE (ML) INJECTION AS NEEDED
Status: DISCONTINUED | OUTPATIENT
Start: 2021-07-28 | End: 2021-07-28 | Stop reason: HOSPADM

## 2021-07-28 RX ORDER — SODIUM CHLORIDE 0.9 % (FLUSH) 0.9 %
3 SYRINGE (ML) INJECTION EVERY 12 HOURS SCHEDULED
Status: DISCONTINUED | OUTPATIENT
Start: 2021-07-28 | End: 2021-07-28 | Stop reason: HOSPADM

## 2021-07-28 RX ADMIN — SODIUM CHLORIDE, PRESERVATIVE FREE 3 ML: 5 INJECTION INTRAVENOUS at 21:12

## 2021-07-28 RX ADMIN — PROPOFOL 100 MG: 10 INJECTION, EMULSION INTRAVENOUS at 09:42

## 2021-07-28 RX ADMIN — KETOTIFEN FUMARATE 1 DROP: 0.35 SOLUTION/ DROPS OPHTHALMIC at 21:12

## 2021-07-28 RX ADMIN — Medication 2 G: at 09:50

## 2021-07-28 RX ADMIN — DOCUSATE SODIUM 100 MG: 100 CAPSULE ORAL at 21:12

## 2021-07-28 RX ADMIN — HYDROMORPHONE HYDROCHLORIDE 0.5 MG: 1 INJECTION, SOLUTION INTRAMUSCULAR; INTRAVENOUS; SUBCUTANEOUS at 12:53

## 2021-07-28 RX ADMIN — MORPHINE SULFATE 1 MG: 2 INJECTION, SOLUTION INTRAMUSCULAR; INTRAVENOUS at 21:23

## 2021-07-28 RX ADMIN — SUFENTANIL CITRATE 40 MCG: 50 INJECTION EPIDURAL; INTRAVENOUS at 10:28

## 2021-07-28 RX ADMIN — ACETAMINOPHEN 1000 MG: 500 TABLET, FILM COATED ORAL at 08:51

## 2021-07-28 RX ADMIN — SODIUM CHLORIDE, POTASSIUM CHLORIDE, SODIUM LACTATE AND CALCIUM CHLORIDE 1000 ML: 600; 310; 30; 20 INJECTION, SOLUTION INTRAVENOUS at 07:51

## 2021-07-28 RX ADMIN — HYDROCODONE BITARTRATE AND ACETAMINOPHEN 1 TABLET: 7.5; 325 TABLET ORAL at 14:47

## 2021-07-28 RX ADMIN — MONTELUKAST SODIUM 10 MG: 10 TABLET, FILM COATED ORAL at 21:12

## 2021-07-28 RX ADMIN — ONDANSETRON 4 MG: 2 INJECTION INTRAMUSCULAR; INTRAVENOUS at 11:06

## 2021-07-28 RX ADMIN — SODIUM CHLORIDE 75 ML/HR: 9 INJECTION, SOLUTION INTRAVENOUS at 14:29

## 2021-07-28 RX ADMIN — PROPOFOL 100 MCG/KG/MIN: 10 INJECTION, EMULSION INTRAVENOUS at 09:46

## 2021-07-28 RX ADMIN — DEXAMETHASONE SODIUM PHOSPHATE 4 MG: 4 INJECTION, SOLUTION INTRA-ARTICULAR; INTRALESIONAL; INTRAMUSCULAR; INTRAVENOUS; SOFT TISSUE at 11:05

## 2021-07-28 RX ADMIN — SUFENTANIL CITRATE 20 MCG: 50 INJECTION EPIDURAL; INTRAVENOUS at 10:09

## 2021-07-28 RX ADMIN — FLUOXETINE 10 MG: 10 CAPSULE ORAL at 15:14

## 2021-07-28 RX ADMIN — SODIUM CHLORIDE 2 G: 9 INJECTION, SOLUTION INTRAVENOUS at 16:50

## 2021-07-28 RX ADMIN — ROCURONIUM BROMIDE 25 MG: 50 INJECTION INTRAVENOUS at 09:42

## 2021-07-28 RX ADMIN — HYDROCODONE BITARTRATE AND ACETAMINOPHEN 1 TABLET: 7.5; 325 TABLET ORAL at 18:51

## 2021-07-28 RX ADMIN — LIDOCAINE HYDROCHLORIDE 100 MG: 20 INJECTION, SOLUTION EPIDURAL; INFILTRATION; INTRACAUDAL; PERINEURAL at 09:41

## 2021-07-28 RX ADMIN — Medication 50 MG: at 09:42

## 2021-07-28 RX ADMIN — SUFENTANIL CITRATE 40 MCG: 50 INJECTION EPIDURAL; INTRAVENOUS at 09:41

## 2021-07-28 NOTE — ANESTHESIA PROCEDURE NOTES
Airway  Date/Time: 7/28/2021 9:45 AM  Airway not difficult    General Information and Staff    Patient location during procedure: OR  CRNA: Blair Santamaria CRNA    Indications and Patient Condition  Indications for airway management: airway protection    Preoxygenated: yes  Mask difficulty assessment: 1 - vent by mask    Final Airway Details  Final airway type: endotracheal airway      Successful airway: ETT  Cuffed: yes   Successful intubation technique: direct laryngoscopy  Blade: Acevedo  Blade size: 2  ETT size (mm): 7.0  Cormack-Lehane Classification: grade I - full view of glottis  Placement verified by: chest auscultation and capnometry   Number of attempts at approach: 1  Assessment: lips, teeth, and gum same as pre-op and atraumatic intubation

## 2021-07-28 NOTE — ANESTHESIA POSTPROCEDURE EVALUATION
"Patient: Jodee Kovacs    Procedure Summary     Date: 07/28/21 Room / Location:  [facility] OR  /  [facility] OR    Anesthesia Start: 0938 Anesthesia Stop: 1206    Procedure: ANTERIOR CERVICAL DISCECTOMY, INTERBODY FUSION, ANTERIOR INSTRUMENTATION C4-5,5-6,6-7 (N/A Spine Cervical) Diagnosis:       Degeneration of cervical intervertebral disc      Spinal stenosis in cervical region      Cervical radiculopathy      (Degeneration of cervical intervertebral disc [M50.30])      (Spinal stenosis in cervical region [M48.02])      (Cervical radiculopathy [M54.12])    Surgeons: Earnest Gomez MD Provider: Blair Santamaria CRNA    Anesthesia Type: general ASA Status: 2          Anesthesia Type: general    Vitals  Vitals Value Taken Time   /81 07/28/21 1400   Temp 99.6 °F (37.6 °C) 07/28/21 1400   Pulse 62 07/28/21 1400   Resp 14 07/28/21 1400   SpO2 100 % 07/28/21 1400           Post Anesthesia Care and Evaluation    Patient location during evaluation: PACU  Patient participation: complete - patient participated  Level of consciousness: awake and alert  Pain management: adequate  Airway patency: patent  Anesthetic complications: No anesthetic complications    Cardiovascular status: acceptable  Respiratory status: acceptable  Hydration status: acceptable    Comments: Blood pressure 157/82, pulse 74, temperature 97.5 °F (36.4 °C), temperature source Oral, resp. rate 16, height 161 cm (63.39\"), weight 82.7 kg (182 lb 5.1 oz), SpO2 96 %, not currently breastfeeding.    Pt discharged from PACU based on markel score >8      "

## 2021-07-28 NOTE — ANESTHESIA PREPROCEDURE EVALUATION
Anesthesia Evaluation     Patient summary reviewed   no history of anesthetic complications:  NPO Solid Status: > 8 hours  NPO Liquid Status: > 8 hours           Airway   Mallampati: II  TM distance: >3 FB  Neck ROM: limited  Dental - normal exam     Pulmonary    (+) a smoker Former, asthma,  Cardiovascular   Exercise tolerance: good (4-7 METS)    (-) pacemaker, past MI, dysrhythmias, cardiac stents, CABG      Neuro/Psych  (+) neuromuscular disease,     (-) seizures, CVA  GI/Hepatic/Renal/Endo    (+) obesity,  GERD,  thyroid problem hypothyroidism  (-) liver disease, no renal disease, diabetes    Musculoskeletal     (+) neck pain,   Abdominal   (+) obese,    Substance History      OB/GYN          Other   arthritis,                      Anesthesia Plan    ASA 2     general   (RUE>; )  intravenous induction     Anesthetic plan, all risks, benefits, and alternatives have been provided, discussed and informed consent has been obtained with: patient.

## 2021-07-29 LAB
ANION GAP SERPL CALCULATED.3IONS-SCNC: 10 MMOL/L (ref 5–15)
BASOPHILS # BLD MANUAL: 0.13 10*3/MM3 (ref 0–0.2)
BASOPHILS NFR BLD AUTO: 1 % (ref 0–1.5)
BUN SERPL-MCNC: 9 MG/DL (ref 6–20)
BUN/CREAT SERPL: 16.1 (ref 7–25)
CALCIUM SPEC-SCNC: 8.7 MG/DL (ref 8.6–10.5)
CHLORIDE SERPL-SCNC: 106 MMOL/L (ref 98–107)
CO2 SERPL-SCNC: 22 MMOL/L (ref 22–29)
CREAT SERPL-MCNC: 0.56 MG/DL (ref 0.57–1)
DEPRECATED RDW RBC AUTO: 45.1 FL (ref 37–54)
ERYTHROCYTE [DISTWIDTH] IN BLOOD BY AUTOMATED COUNT: 13.6 % (ref 12.3–15.4)
GFR SERPL CREATININE-BSD FRML MDRD: 111 ML/MIN/1.73
GLUCOSE SERPL-MCNC: 125 MG/DL (ref 65–99)
HCT VFR BLD AUTO: 35.5 % (ref 34–46.6)
HGB BLD-MCNC: 11.9 G/DL (ref 12–15.9)
LYMPHOCYTES # BLD MANUAL: 2.66 10*3/MM3 (ref 0.7–3.1)
LYMPHOCYTES NFR BLD MANUAL: 21.2 % (ref 19.6–45.3)
LYMPHOCYTES NFR BLD MANUAL: 5.1 % (ref 5–12)
MCH RBC QN AUTO: 30.6 PG (ref 26.6–33)
MCHC RBC AUTO-ENTMCNC: 33.5 G/DL (ref 31.5–35.7)
MCV RBC AUTO: 91.3 FL (ref 79–97)
MONOCYTES # BLD AUTO: 0.64 10*3/MM3 (ref 0.1–0.9)
NEUTROPHILS # BLD AUTO: 9.11 10*3/MM3 (ref 1.7–7)
NEUTROPHILS NFR BLD MANUAL: 72.7 % (ref 42.7–76)
PLAT MORPH BLD: NORMAL
PLATELET # BLD AUTO: 186 10*3/MM3 (ref 140–450)
PMV BLD AUTO: 12.5 FL (ref 6–12)
POTASSIUM SERPL-SCNC: 4 MMOL/L (ref 3.5–5.2)
RBC # BLD AUTO: 3.89 10*6/MM3 (ref 3.77–5.28)
RBC MORPH BLD: NORMAL
SODIUM SERPL-SCNC: 138 MMOL/L (ref 136–145)
WBC # BLD AUTO: 12.53 10*3/MM3 (ref 3.4–10.8)
WBC MORPH BLD: NORMAL

## 2021-07-29 PROCEDURE — 63710000001 HYDROCODONE-ACETAMINOPHEN 10-325 MG TABLET: Performed by: NURSE PRACTITIONER

## 2021-07-29 PROCEDURE — 63710000001 FLUOXETINE 10 MG CAPSULE: Performed by: NURSE PRACTITIONER

## 2021-07-29 PROCEDURE — A9270 NON-COVERED ITEM OR SERVICE: HCPCS | Performed by: NURSE PRACTITIONER

## 2021-07-29 PROCEDURE — 25010000002 CEFAZOLIN PER 500 MG: Performed by: NURSE PRACTITIONER

## 2021-07-29 PROCEDURE — 25010000002 MORPHINE SULFATE (PF) 2 MG/ML SOLUTION: Performed by: NURSE PRACTITIONER

## 2021-07-29 PROCEDURE — 97116 GAIT TRAINING THERAPY: CPT

## 2021-07-29 PROCEDURE — 63710000001 MONTELUKAST 10 MG TABLET: Performed by: NURSE PRACTITIONER

## 2021-07-29 PROCEDURE — 63710000001 HYDROCODONE-ACETAMINOPHEN 7.5-325 MG TABLET: Performed by: NURSE PRACTITIONER

## 2021-07-29 PROCEDURE — 99024 POSTOP FOLLOW-UP VISIT: CPT | Performed by: NEUROLOGICAL SURGERY

## 2021-07-29 PROCEDURE — 63710000001 LEVOTHYROXINE 112 MCG TABLET: Performed by: NURSE PRACTITIONER

## 2021-07-29 PROCEDURE — 63710000001 PANTOPRAZOLE 40 MG TABLET DELAYED-RELEASE: Performed by: NURSE PRACTITIONER

## 2021-07-29 PROCEDURE — 80048 BASIC METABOLIC PNL TOTAL CA: CPT | Performed by: NURSE PRACTITIONER

## 2021-07-29 PROCEDURE — 97161 PT EVAL LOW COMPLEX 20 MIN: CPT

## 2021-07-29 PROCEDURE — 85007 BL SMEAR W/DIFF WBC COUNT: CPT | Performed by: NURSE PRACTITIONER

## 2021-07-29 PROCEDURE — 85025 COMPLETE CBC W/AUTO DIFF WBC: CPT | Performed by: NURSE PRACTITIONER

## 2021-07-29 PROCEDURE — 63710000001 DOCUSATE SODIUM 100 MG CAPSULE: Performed by: NURSE PRACTITIONER

## 2021-07-29 PROCEDURE — 63710000001 CYCLOBENZAPRINE 10 MG TABLET: Performed by: NURSE PRACTITIONER

## 2021-07-29 RX ORDER — HYDROCODONE BITARTRATE AND ACETAMINOPHEN 10; 325 MG/1; MG/1
1 TABLET ORAL EVERY 4 HOURS PRN
Status: DISCONTINUED | OUTPATIENT
Start: 2021-07-29 | End: 2021-07-30 | Stop reason: HOSPADM

## 2021-07-29 RX ORDER — HYDROCODONE BITARTRATE AND ACETAMINOPHEN 10; 325 MG/1; MG/1
1 TABLET ORAL EVERY 6 HOURS PRN
Status: DISCONTINUED | OUTPATIENT
Start: 2021-07-29 | End: 2021-07-29

## 2021-07-29 RX ADMIN — HYDROCODONE BITARTRATE AND ACETAMINOPHEN 1 TABLET: 7.5; 325 TABLET ORAL at 06:13

## 2021-07-29 RX ADMIN — SODIUM CHLORIDE 75 ML/HR: 9 INJECTION, SOLUTION INTRAVENOUS at 00:34

## 2021-07-29 RX ADMIN — HYDROCODONE BITARTRATE AND ACETAMINOPHEN 1 TABLET: 7.5; 325 TABLET ORAL at 10:36

## 2021-07-29 RX ADMIN — CYCLOBENZAPRINE 10 MG: 10 TABLET, FILM COATED ORAL at 12:22

## 2021-07-29 RX ADMIN — SODIUM CHLORIDE, PRESERVATIVE FREE 3 ML: 5 INJECTION INTRAVENOUS at 10:42

## 2021-07-29 RX ADMIN — KETOTIFEN FUMARATE 1 DROP: 0.35 SOLUTION/ DROPS OPHTHALMIC at 20:24

## 2021-07-29 RX ADMIN — LEVOTHYROXINE SODIUM 112 MCG: 112 TABLET ORAL at 06:10

## 2021-07-29 RX ADMIN — HYDROCODONE BITARTRATE AND ACETAMINOPHEN 1 TABLET: 10; 325 TABLET ORAL at 20:24

## 2021-07-29 RX ADMIN — SODIUM CHLORIDE 2 G: 9 INJECTION, SOLUTION INTRAVENOUS at 10:39

## 2021-07-29 RX ADMIN — SODIUM CHLORIDE 2 G: 9 INJECTION, SOLUTION INTRAVENOUS at 00:34

## 2021-07-29 RX ADMIN — MONTELUKAST SODIUM 10 MG: 10 TABLET, FILM COATED ORAL at 20:24

## 2021-07-29 RX ADMIN — FLUOXETINE 10 MG: 10 CAPSULE ORAL at 10:43

## 2021-07-29 RX ADMIN — KETOTIFEN FUMARATE 1 DROP: 0.35 SOLUTION/ DROPS OPHTHALMIC at 10:42

## 2021-07-29 RX ADMIN — PANTOPRAZOLE SODIUM 40 MG: 40 TABLET, DELAYED RELEASE ORAL at 06:10

## 2021-07-29 RX ADMIN — MORPHINE SULFATE 1 MG: 2 INJECTION, SOLUTION INTRAMUSCULAR; INTRAVENOUS at 03:40

## 2021-07-29 RX ADMIN — DOCUSATE SODIUM 100 MG: 100 CAPSULE ORAL at 20:24

## 2021-07-29 RX ADMIN — DOCUSATE SODIUM 100 MG: 100 CAPSULE ORAL at 10:43

## 2021-07-29 RX ADMIN — HYDROCODONE BITARTRATE AND ACETAMINOPHEN 1 TABLET: 7.5; 325 TABLET ORAL at 00:04

## 2021-07-29 RX ADMIN — SODIUM CHLORIDE, PRESERVATIVE FREE 3 ML: 5 INJECTION INTRAVENOUS at 20:25

## 2021-07-29 RX ADMIN — HYDROCODONE BITARTRATE AND ACETAMINOPHEN 1 TABLET: 10; 325 TABLET ORAL at 16:13

## 2021-07-30 VITALS
WEIGHT: 182.32 LBS | OXYGEN SATURATION: 95 % | BODY MASS INDEX: 32.3 KG/M2 | TEMPERATURE: 98.1 F | RESPIRATION RATE: 18 BRPM | HEART RATE: 71 BPM | DIASTOLIC BLOOD PRESSURE: 73 MMHG | SYSTOLIC BLOOD PRESSURE: 138 MMHG | HEIGHT: 63 IN

## 2021-07-30 LAB
CYTO UR: NORMAL
LAB AP CASE REPORT: NORMAL
PATH REPORT.FINAL DX SPEC: NORMAL
PATH REPORT.GROSS SPEC: NORMAL

## 2021-07-30 PROCEDURE — 63710000001 DOCUSATE SODIUM 100 MG CAPSULE: Performed by: NURSE PRACTITIONER

## 2021-07-30 PROCEDURE — A9270 NON-COVERED ITEM OR SERVICE: HCPCS | Performed by: NURSE PRACTITIONER

## 2021-07-30 PROCEDURE — 63710000001 PANTOPRAZOLE 40 MG TABLET DELAYED-RELEASE: Performed by: NURSE PRACTITIONER

## 2021-07-30 PROCEDURE — 63710000001 HYDROCODONE-ACETAMINOPHEN 10-325 MG TABLET: Performed by: NURSE PRACTITIONER

## 2021-07-30 PROCEDURE — 63710000001 LEVOTHYROXINE 112 MCG TABLET: Performed by: NURSE PRACTITIONER

## 2021-07-30 PROCEDURE — 63710000001 CYCLOBENZAPRINE 10 MG TABLET: Performed by: NURSE PRACTITIONER

## 2021-07-30 PROCEDURE — 63710000001 FLUOXETINE 10 MG CAPSULE: Performed by: NURSE PRACTITIONER

## 2021-07-30 PROCEDURE — 99024 POSTOP FOLLOW-UP VISIT: CPT | Performed by: NURSE PRACTITIONER

## 2021-07-30 RX ORDER — CYCLOBENZAPRINE HCL 10 MG
10 TABLET ORAL 3 TIMES DAILY PRN
Qty: 90 TABLET | Refills: 0 | Status: SHIPPED | OUTPATIENT
Start: 2021-07-30 | End: 2021-08-26 | Stop reason: SDUPTHER

## 2021-07-30 RX ORDER — HYDROCODONE BITARTRATE AND ACETAMINOPHEN 10; 325 MG/1; MG/1
1 TABLET ORAL EVERY 6 HOURS PRN
Qty: 120 TABLET | Refills: 0 | Status: SHIPPED | OUTPATIENT
Start: 2021-07-30 | End: 2021-08-26 | Stop reason: SDUPTHER

## 2021-07-30 RX ADMIN — SODIUM CHLORIDE, PRESERVATIVE FREE 3 ML: 5 INJECTION INTRAVENOUS at 08:57

## 2021-07-30 RX ADMIN — LEVOTHYROXINE SODIUM 112 MCG: 112 TABLET ORAL at 05:31

## 2021-07-30 RX ADMIN — HYDROCODONE BITARTRATE AND ACETAMINOPHEN 1 TABLET: 10; 325 TABLET ORAL at 11:00

## 2021-07-30 RX ADMIN — PANTOPRAZOLE SODIUM 40 MG: 40 TABLET, DELAYED RELEASE ORAL at 05:31

## 2021-07-30 RX ADMIN — KETOTIFEN FUMARATE 1 DROP: 0.35 SOLUTION/ DROPS OPHTHALMIC at 08:57

## 2021-07-30 RX ADMIN — CYCLOBENZAPRINE 10 MG: 10 TABLET, FILM COATED ORAL at 00:04

## 2021-07-30 RX ADMIN — FLUOXETINE 10 MG: 10 CAPSULE ORAL at 08:57

## 2021-07-30 RX ADMIN — CYCLOBENZAPRINE 10 MG: 10 TABLET, FILM COATED ORAL at 11:21

## 2021-07-30 RX ADMIN — DOCUSATE SODIUM 100 MG: 100 CAPSULE ORAL at 08:57

## 2021-08-02 ENCOUNTER — TELEPHONE (OUTPATIENT)
Dept: PRIMARY CARE CLINIC | Age: 61
End: 2021-08-02

## 2021-08-02 ENCOUNTER — NURSE TRIAGE (OUTPATIENT)
Dept: CALL CENTER | Facility: HOSPITAL | Age: 61
End: 2021-08-02

## 2021-08-02 NOTE — TELEPHONE ENCOUNTER
[facility] Transitions Initial Follow Up Call    Outreach made within 2 business days of discharge: Yes    Patient: Joni Bear   Patient : 1960   MRN: [id number]    Reason for Admission: Cervical Discectomy, Fusion  Discharge Date: 20       Spoke with: Salud    Discharge department/facility: [facility]    TCM Interactive Patient Contact:  Was patient able to fill all prescriptions: Yes  Was patient instructed to bring all medications to the follow-up visit: Yes  Is patient taking all medications as directed in the discharge summary? Yes  Does patient understand their discharge instructions: Yes  Does patient have questions or concerns that need addressed prior to 7-14 day follow up office visit: no    Spoke with Salud. She states she is doing OK. Her appetite is fine. She is having normal bowel movements with the help of daily stool softeners. Her urinary function is normal. The brace she has to wear is causing her to have some anxiety and she is frustrated because she can't wash her hair. She denies any fever, chills or signs of infection. Appointment made with ANGEL for this Friday.      Scheduled appointment with PCP within 7-14 days    Follow Up  Future Appointments   Date Time Provider David Shaffer   2021 10:30 AM ANGEL Delgado [facility]   3/3/2022 10:00 AM ANGEL Killian - CNP OB/GYN [address] [place] [address]

## 2021-08-03 NOTE — TELEPHONE ENCOUNTER
Reason for Disposition  • Skin tape (e.g., Steri-strips) removal, questions about    Additional Information  • Negative: [1] Major abdominal surgical incision AND [2] wound gaping open AND [3] visible internal organs  • Negative: Sounds like a life-threatening emergency to the triager  • Negative: Patient has a Negative Pressure Wound Therapy device  • Negative: Patient is followed by a wound clinic or wound specialist for this wound  • Negative: [1] Bleeding from incision AND [2] won't stop after 10 minutes of direct pressure  • Negative: [1] Widespread rash AND [2] bright red, sunburn-like  • Negative: Severe pain in the incision  • Negative: [1] Incision gaping open AND [2] < 48 hours since wound re-opened  • Negative: [1] Incision gaping open AND [2] length of opening > 2 inches (5 cm)  • Negative: Patient sounds very sick or weak to the triager  • Negative: Sounds like a serious complication to the triager  • Negative: Fever > 100.4 F (38.0 C)  • Negative: [1] Incision looks infected (spreading redness, pain) AND [2] fever > 99.5 F (37.5 C)  • Negative: [1] Incision looks infected (spreading redness, pain) AND [2] large red area (> 2 in. or 5 cm)  • Negative: [1] Incision looks infected (spreading redness, pain) AND [2] face wound  • Negative: [1] Red streak runs from the incision AND [2] longer than 1 inch (2.5 cm)  • Negative: [1] Pus or bad-smelling fluid draining from incision AND [2] no fever  • Negative: [1] Post-op pain AND [2] not controlled with pain medications  • Negative: Dressing soaked with blood or body fluid (e.g., drainage)  • Negative: [1] Raised bruise and [2] size > 2 inches (5 cm) and expanding  • Negative: [1] Caller has URGENT question AND [2] triager unable to answer question  • Negative: [1] INCREASING pain in incision AND [2] > 2 days (48 hours) since surgery  • Negative: [1] Small red area or streak AND [2] no fever  • Negative: [1] Clear or blood-tinged fluid draining from wound  "AND [2] no fever  • Negative: [1] Incision gaping open AND [2] > 48 hours since wound re-opened AND [3] length of opening > 1/2 inch (12 mm)  • Negative: [1] Incision on face gaping open after skin glue AND [2] > 48 hours since wound re-opened AND [3] length of opening > 1/4 inch (6 mm)  • Negative: Suture or staple removal is overdue  • Negative: [1] Suture or staple came out early AND [2] caller wants wound checked  • Negative: [1] Caller has NON-URGENT question AND [2] triager unable to answer question  • Negative: Pimple where a stitch comes through the skin  • Negative: Suture (or staple) came out early  • Negative: Mild bruising near incision site  • Negative: Suture removal date, questions about    Answer Assessment - Initial Assessment Questions  1. SYMPTOM: \"What's the main symptom you're concerned about?\" (e.g., redness, pain, drainage)      Dressing change  2. ONSET: \"When did dressing change    today  3. SURGERY: \"What surgery was performed  Cervical infusion  4. DATE of SURGERY: \"When was surgery performed?\"      7/28  5. INCISION SITE: \"Where is the incision located?\"       Cervical fusion  6. REDNESS: \"Is there any redness at the incision site?\" If yes, ask: \"How wide across is the redness?\" (Inches, centimeters)      no  7. PAIN: \"Is there any pain?\" If Yes, ask: \"How bad is it?\"  (Scale 1-10; or mild, moderate, severe)      no  8. BLEEDING: \"Is there any bleeding?\" If Yes, ask: \"How much?\" and \"Where?\"     no  9. DRAINAGE: \"Is there any drainage from the incision site?\" If yes, ask: \"What color and how much?\" (e.g., red, cloudy, pus; drops, teaspoon)    no  10. FEVER: \"Do you have a fever?\" If Yes, ask: \"What is your temperature, how was it measured, and when did it start?\"        no  11. OTHER SYMPTOMS: \"Do you have any other symptoms?\" (e.g., shaking chills, weakness, rash elsewhere on body)       no    Protocols used: POST-OP INCISION SYMPTOMS AND QUESTIONS-ADULT-      "

## 2021-08-05 DIAGNOSIS — R20.2 NUMBNESS AND TINGLING IN BOTH HANDS: ICD-10-CM

## 2021-08-05 DIAGNOSIS — M54.12 CERVICAL RADICULOPATHY: ICD-10-CM

## 2021-08-05 DIAGNOSIS — E66.09 CLASS 1 OBESITY DUE TO EXCESS CALORIES WITHOUT SERIOUS COMORBIDITY WITH BODY MASS INDEX (BMI) OF 32.0 TO 32.9 IN ADULT: ICD-10-CM

## 2021-08-05 DIAGNOSIS — Z87.891 FORMER SMOKER: ICD-10-CM

## 2021-08-05 DIAGNOSIS — M50.30 DEGENERATION OF CERVICAL INTERVERTEBRAL DISC: ICD-10-CM

## 2021-08-05 DIAGNOSIS — R20.0 NUMBNESS AND TINGLING IN BOTH HANDS: ICD-10-CM

## 2021-08-05 DIAGNOSIS — M48.02 SPINAL STENOSIS IN CERVICAL REGION: Primary | ICD-10-CM

## 2021-08-06 ENCOUNTER — VIRTUAL VISIT (OUTPATIENT)
Dept: PRIMARY CARE CLINIC | Age: 61
End: 2021-08-06
Payer: MEDICARE

## 2021-08-06 DIAGNOSIS — Z98.890 HISTORY OF CERVICAL DISCECTOMY: Primary | ICD-10-CM

## 2021-08-06 DIAGNOSIS — M54.2 CERVICAL PAIN: ICD-10-CM

## 2021-08-06 PROCEDURE — 99214 OFFICE O/P EST MOD 30 MIN: CPT | Performed by: NURSE PRACTITIONER

## 2021-08-06 PROCEDURE — 1111F DSCHRG MED/CURRENT MED MERGE: CPT | Performed by: NURSE PRACTITIONER

## 2021-08-06 RX ORDER — HYDROCODONE BITARTRATE AND ACETAMINOPHEN 10; 325 MG/1; MG/1
1 TABLET ORAL EVERY 6 HOURS PRN
COMMUNITY
Start: 2021-07-30 | End: 2021-08-29

## 2021-08-06 NOTE — PROGRESS NOTES
Post-Discharge Transitional Care Management Services or Hospital Follow Up      [address]   YOB: 1960    Date of Office Visit:  8/6/2021  Date of Hospital Admission: 7/24/20  Date of Hospital Discharge: 7/24/20  Readmission Risk Score(high >=14%.  Medium >=10%):No data recorded    Care management risk score Rising risk (score 2-5) and Complex Care (Scores >=6): 5     Non face to face  following discharge, date last encounter closed (first attempt may have been earlier): 8/2/2021  2:11 PM 8/2/2021  2:11 PM    Call initiated 2 business days of discharge: Yes     Patient Active Problem List   Diagnosis    Hyperthyroidism    Arthritis of neck    Headache    Chronic anxiety    Vitamin D deficiency    Postmenopausal atrophic vaginitis    Abdominal pain, other specified site    Chronic constipation    Polypharmacy    Narcotic dependence ([facility] [address]    Chronic pain    NSAID long-term use    Family history of colonic polyps    Stress incontinence, female    Reactive airway disease with wheezing    Allergic bronchitis    Irritable bowel syndrome with constipation    Breast density    Chronic GERD    Cystocele with uterine prolapse    Constipation due to opioid therapy    Gas bloat syndrome    PMB (postmenopausal bleeding)    Eustachian tube dysfunction, bilateral       Allergies   Allergen Reactions    Pollen Extract      Early Spring and early Fall    Effexor [Venlafaxine Hydrochloride] Other (See Comments)     Slurred speech, feelings of having a TIA, seeing double, unaware of her surroundings   [employer] Extract [Poison Ivy Extract] Itching       Medications listed as ordered at the time of discharge from hospital   Salud Funez \"Salud Funez\"   Home Medication Instructions LUZ ELENA:    Printed on:08/09/21 7802   Medication Information                      azelastine (ASTELIN) 0.1 % nasal spray  USE 2 SPRAYS IN EACH NOSTRIL TWICE A DAY AS DIRECTED             epinastine (ELESTAT) 0.05 % SOLN  INSTILL 1 DROP IN EACH EYE ONE TO TWICE A DAY AS NEEDED             EPINEPHrine (EPIPEN) 0.3 MG/0.3ML SOAJ injection  USE AS DIRECTED FOR ACUTE  ALLERGIC REACTION             estradiol (ESTRACE) 0.5 MG tablet  TAKE 1 TABLET BY MOUTH EVERY DAY             FLUoxetine (PROZAC) 10 MG capsule  TAKE 1 CAPSULE BY MOUTH EVERY DAY             fluticasone (FLONASE) 50 MCG/ACT nasal spray  USE 2 SPRAYS IN EACH NOSTRIL DAILY             HYDROcodone-acetaminophen (NORCO)  MG per tablet  Take 1 tablet by mouth every 6 hours as needed. levocetirizine (XYZAL) 5 MG tablet  TAKE 1 TABLET BY MOUTH EVERY DAY             medroxyPROGESTERone (PROVERA) 2.5 MG tablet  TAKE 1 TABLET BY MOUTH EVERY DAY             montelukast (SINGULAIR) 10 MG tablet  TAKE 1 TABLET BY MOUTH EVERY DAY AT NIGHT             naproxen (NAPROXEN) 500 MG EC tablet  Take 1 tablet by mouth every 12 hours Generic is okay             pantoprazole (PROTONIX) 40 MG tablet  TAKE 1 TABLET BY MOUTH EVERY MORNING BEFORE BREAKFAST             PROAIR  (90 Base) MCG/ACT inhaler  INHALE 2 PUFFS INTO THE LUNGS EVERY 6 HOURS AS NEEDED FOR WHEEZING             pseudoephedrine (DECONGESTANT) 30 MG tablet  Take 1 tablet by mouth 3 times daily             SYNTHROID 112 MCG tablet  TAKE 1 TABLET BY MOUTH EVERY DAY IN THE MORNING             vitamin D (CHOLECALCIFEROL) 125 MCG (5000 UT) CAPS capsule  Take 5,000 Units by mouth daily                   Medications marked \"taking\" at this time  Outpatient Medications Marked as Taking for the 8/6/21 encounter (Virtual Visit) with ANGEL Torres CNP   Medication Sig Dispense Refill    HYDROcodone-acetaminophen (NORCO)  MG per tablet Take 1 tablet by mouth every 6 hours as needed.       FLUoxetine (PROZAC) 10 MG capsule TAKE 1 CAPSULE BY MOUTH EVERY DAY 30 capsule 5    montelukast (SINGULAIR) 10 MG tablet TAKE 1 TABLET BY MOUTH EVERY DAY AT NIGHT 90 tablet 0    estradiol (ESTRACE) 0.5 MG tablet TAKE 1 TABLET BY MOUTH EVERY DAY 90 tablet 3    medroxyPROGESTERone (PROVERA) 2.5 MG tablet TAKE 1 TABLET BY MOUTH EVERY DAY 90 tablet 3    pantoprazole (PROTONIX) 40 MG tablet TAKE 1 TABLET BY MOUTH EVERY MORNING BEFORE BREAKFAST 90 tablet 1    levocetirizine (XYZAL) 5 MG tablet TAKE 1 TABLET BY MOUTH EVERY DAY 90 tablet 3    pseudoephedrine (DECONGESTANT) 30 MG tablet Take 1 tablet by mouth 3 times daily 30 tablet 3    fluticasone (FLONASE) 50 MCG/ACT nasal spray USE 2 SPRAYS IN EACH NOSTRIL DAILY 1 Bottle 6    SYNTHROID 112 MCG tablet TAKE 1 TABLET BY MOUTH EVERY DAY IN THE MORNING 90 tablet 2    azelastine (ASTELIN) 0.1 % nasal spray USE 2 SPRAYS IN EACH NOSTRIL TWICE A DAY AS DIRECTED 1 Bottle 5    vitamin D (CHOLECALCIFEROL) 125 MCG (5000 UT) CAPS capsule Take 5,000 Units by mouth daily      PROAIR  (90 Base) MCG/ACT inhaler INHALE 2 PUFFS INTO THE LUNGS EVERY 6 HOURS AS NEEDED FOR WHEEZING 8.5 Inhaler 1    EPINEPHrine (EPIPEN) 0.3 MG/0.3ML SOAJ injection USE AS DIRECTED FOR ACUTE  ALLERGIC REACTION  1    epinastine (ELESTAT) 0.05 % SOLN INSTILL 1 DROP IN EACH EYE ONE TO TWICE A DAY AS NEEDED  3    naproxen (NAPROXEN) 500 MG EC tablet Take 1 tablet by mouth every 12 hours Generic is okay 60 tablet 11        Medications patient taking as of now reconciled against medications ordered at time of hospital discharge: Yes    Chief Complaint   Patient presents with    Follow-Up from Hospital     Cervical,disectomy fusion       HPI    Inpatient course: Discharge summary reviewed- see chart. Interval history/Current status: reports she is having some neck pain but is taking lortab 10 and flexeril and her pain is reduced to 5/10 after she has taken her medication. Walking on her own with a neck brace one. Patient denies any numbness or tingling in her arms. Patient reports she is doing well, is able to eat and drink as she usually does.  She denies any headaches or vision disturbances. Review of Systems   Constitutional: Negative for activity change and fever. HENT: Negative for congestion, ear pain and sore throat. Respiratory: Negative for cough, chest tightness and shortness of breath. Cardiovascular: Negative for chest pain. Gastrointestinal: Negative for abdominal pain, diarrhea, nausea and vomiting. Genitourinary: Negative for frequency and urgency. Musculoskeletal: Positive for neck pain. Negative for arthralgias and myalgias. Skin: Negative for color change. Neurological: Negative for dizziness, weakness, numbness and headaches. Psychiatric/Behavioral: Negative for agitation. The patient is not nervous/anxious. There were no vitals filed for this visit. There is no height or weight on file to calculate BMI. Wt Readings from Last 3 Encounters:   07/20/21 181 lb (82.1 kg)   06/03/21 183 lb (83 kg)   02/25/21 179 lb (81.2 kg)     BP Readings from Last 3 Encounters:   07/20/21 122/80   06/03/21 124/82   02/25/21 (!) 143/79       Physical Exam  Vitals reviewed. Constitutional:       Appearance: Normal appearance. HENT:      Head: Normocephalic. Right Ear: Tympanic membrane normal.      Left Ear: Tympanic membrane normal.      Nose: Nose normal.      Mouth/Throat:      Mouth: Mucous membranes are moist.      Pharynx: Oropharynx is clear. Eyes:      Extraocular Movements: Extraocular movements intact. Pupils: Pupils are equal, round, and reactive to light. Cardiovascular:      Rate and Rhythm: Normal rate and regular rhythm. Pulses: Normal pulses. Heart sounds: Normal heart sounds. Pulmonary:      Effort: Pulmonary effort is normal.      Breath sounds: Normal breath sounds. Abdominal:      General: Bowel sounds are normal.      Palpations: Abdomen is soft. Musculoskeletal:      Cervical back: Pain with movement present. Decreased range of motion. Skin:     General: Skin is warm and dry.    Neurological:      Mental Status: She is alert and oriented to person, place, and time. Psychiatric:         Mood and Affect: Mood normal.         Behavior: Behavior normal.             Assessment/Plan:  1. History of cervical discectomy    - SD DISCHARGE MEDS RECONCILED W/ CURRENT OUTPATIENT MED LIST    2. Cervical pain  Patient will continue to take norco as needed for pain and follow up with  as scheduled.          Medical Decision Making: moderate complexity

## 2021-08-09 ASSESSMENT — ENCOUNTER SYMPTOMS
CHEST TIGHTNESS: 0
SHORTNESS OF BREATH: 0
VOMITING: 0
COUGH: 0
ABDOMINAL PAIN: 0
DIARRHEA: 0
NAUSEA: 0
COLOR CHANGE: 0
SORE THROAT: 0

## 2021-08-23 ENCOUNTER — HOSPITAL ENCOUNTER (OUTPATIENT)
Dept: GENERAL RADIOLOGY | Facility: HOSPITAL | Age: 61
Discharge: HOME | End: 2021-08-23
Admitting: NURSE PRACTITIONER

## 2021-08-23 ENCOUNTER — OFFICE VISIT (OUTPATIENT)
Dept: NEUROSURGERY | Facility: CLINIC | Age: 61
End: 2021-08-23

## 2021-08-23 VITALS
SYSTOLIC BLOOD PRESSURE: 130 MMHG | HEIGHT: 63 IN | WEIGHT: 182 LBS | BODY MASS INDEX: 32.25 KG/M2 | DIASTOLIC BLOOD PRESSURE: 82 MMHG

## 2021-08-23 DIAGNOSIS — M48.02 SPINAL STENOSIS IN CERVICAL REGION: ICD-10-CM

## 2021-08-23 DIAGNOSIS — Z87.891 FORMER SMOKER: ICD-10-CM

## 2021-08-23 DIAGNOSIS — M50.30 DEGENERATION OF CERVICAL INTERVERTEBRAL DISC: ICD-10-CM

## 2021-08-23 DIAGNOSIS — M50.30 DEGENERATION OF CERVICAL INTERVERTEBRAL DISC: Primary | ICD-10-CM

## 2021-08-23 DIAGNOSIS — E66.09 CLASS 1 OBESITY DUE TO EXCESS CALORIES WITHOUT SERIOUS COMORBIDITY WITH BODY MASS INDEX (BMI) OF 31.0 TO 31.9 IN ADULT: ICD-10-CM

## 2021-08-23 PROCEDURE — 99024 POSTOP FOLLOW-UP VISIT: CPT | Performed by: NURSE PRACTITIONER

## 2021-08-23 PROCEDURE — 72050 X-RAY EXAM NECK SPINE 4/5VWS: CPT

## 2021-08-23 NOTE — PROGRESS NOTES
"  Chief complaint:   Chief Complaint   Patient presents with   • Post-op     Crystal is returning for a post op for her cervical surgery.         Subjective     HPI: This is a 60 y.o. female patient who went to the operating room on 7/28/2021 for a C4-5, 5-6, 6-7 ACDF.  The patient is here in follow up today for postoperative visit.  Prior to the surgery the patient was complaining of neck pain and pain going over into her shoulders bilaterally as well as numbness and tingling in her hands.  The patient does state that the pain that she was having before surgery has improved.  She does complain of some neck soreness and still is having occasional numbness in her right hand but overall she is making progress with her pain in her neck.  I think that a lot of her pain issues are related to the collar at this time.  She is still taking 3-4 pain pills a day but states that she is also taking this for her back issues as well.        Review of Systems   Musculoskeletal: Positive for back pain, neck pain and neck stiffness.   Neurological: Positive for numbness.         Objective      Vital Signs  /82   Ht 161 cm (63.39\")   Wt 82.6 kg (182 lb)   BMI 31.84 kg/m²     Physical Exam  Constitutional:       Appearance: She is well-developed.   HENT:      Head: Normocephalic.   Eyes:      Extraocular Movements: EOM normal.      Pupils: Pupils are equal, round, and reactive to light.   Pulmonary:      Effort: Pulmonary effort is normal.   Musculoskeletal:         General: Normal range of motion.      Cervical back: Normal range of motion.      Comments: Neck and back pain   Skin:     General: Skin is warm.   Neurological:      Mental Status: She is alert and oriented to person, place, and time.      GCS: GCS eye subscore is 4. GCS verbal subscore is 5. GCS motor subscore is 6.      Cranial Nerves: No cranial nerve deficit.      Sensory: No sensory deficit.      Gait: Gait is intact. Gait normal.      Deep Tendon Reflexes: " Strength normal and reflexes are normal and symmetric.   Psychiatric:         Speech: Speech normal.         Behavior: Behavior normal.         Thought Content: Thought content normal.       Incisions clean dry and intact    Neurologic Exam     Mental Status   Oriented to person, place, and time.   Attention: normal. Concentration: normal.   Speech: speech is normal   Level of consciousness: alert  Normal comprehension.     Cranial Nerves     CN II   Visual fields full to confrontation.     CN III, IV, VI   Pupils are equal, round, and reactive to light.  Extraocular motions are normal.     CN V   Facial sensation intact.     CN VII   Facial expression full, symmetric.     CN VIII   CN VIII normal.     CN IX, X   CN IX normal.   CN X normal.     CN XI   CN XI normal.     CN XII   CN XII normal.     Motor Exam   Muscle bulk: normal    Strength   Strength 5/5 throughout.     Sensory Exam   Light touch normal.     Gait, Coordination, and Reflexes     Gait  Gait: normal    Reflexes   Reflexes 2+ except as noted.       Imaging review: No new imaging          Assessment/Plan: At this point I think the patient is making improvements.  I think her biggest issue is the fact that she is in the collar and this is causing her neck stiffness.  We will have her follow-up with Dr. Gomez in 8 to 10 weeks.  I will send her for x-rays of the cervical spine to see whenever she can come out of the collar.  She was told to start weaning herself off of the pain medication.    Patient is a nonsmoker  The patient's Body mass index is 31.84 kg/m².. BMI is above normal parameters. Recommendations include: educational material and nutrition counseling    Diagnoses and all orders for this visit:    1. Degeneration of cervical intervertebral disc (Primary)  -     XR Spine Cervical Complete 4 or 5 View; Future    2. Spinal stenosis in cervical region  -     XR Spine Cervical Complete 4 or 5 View; Future    3. Former smoker    4. Class 1 obesity  due to excess calories without serious comorbidity with body mass index (BMI) of 31.0 to 31.9 in adult        I discussed the patients findings and my recommendations with patient  Kb Mckeon, GALLO  08/23/21  09:44 CDT

## 2021-08-23 NOTE — PATIENT INSTRUCTIONS
"BMI for Adults  What is BMI?  Body mass index (BMI) is a number that is calculated from a person's weight and height. BMI can help estimate how much of a person's weight is composed of fat. BMI does not measure body fat directly. Rather, it is an alternative to procedures that directly measure body fat, which can be difficult and expensive.  BMI can help identify people who may be at higher risk for certain medical problems.  What are BMI measurements used for?  BMI is used as a screening tool to identify possible weight problems. It helps determine whether a person is obese, overweight, a healthy weight, or underweight.  BMI is useful for:  · Identifying a weight problem that may be related to a medical condition or may increase the risk for medical problems.  · Promoting changes, such as changes in diet and exercise, to help reach a healthy weight. BMI screening can be repeated to see if these changes are working.  How is BMI calculated?  BMI involves measuring your weight in relation to your height. Both height and weight are measured, and the BMI is calculated from those numbers. This can be done either in English (U[place] or metric measurements. Note that charts and online BMI calculators are available to help you find your BMI quickly and easily without having to do these calculations yourself.  To calculate your BMI in English (U[place] measurements:    1. Measure your weight in pounds (lb).  2. Multiply the number of pounds by 703.  ? For example, for a person who weighs 180 lb, multiply that number by 703, which equals 126,540.  3. Measure your height in inches. Then multiply that number by itself to get a measurement called \"inches squared.\"  ? For example, for a person who is 70 inches tall, the \"inches squared\" measurement is 70 inches x 70 inches, which equals 4,900 inches squared.  4. Divide the total from step 2 (number of lb x 703) by the total from step 3 (inches squared): 126,540 ÷ 4,900 = 25.8. This is " "your BMI.  To calculate your BMI in metric measurements:  1. Measure your weight in kilograms (kg).  2. Measure your height in meters (m). Then multiply that number by itself to get a measurement called \"meters squared.\"  ? For example, for a person who is 1.75 m tall, the \"meters squared\" measurement is 1.75 m x 1.75 m, which is equal to 3.1 meters squared.  3. Divide the number of kilograms (your weight) by the meters squared number. In this example: 70 ÷ 3.1 = 22.6. This is your BMI.  What do the results mean?  BMI charts are used to identify whether you are underweight, normal weight, overweight, or obese. The following guidelines will be used:  · Underweight: BMI less than 18.5.  · Normal weight: BMI between 18.5 and 24.9.  · Overweight: BMI between 25 and 29.9.  · Obese: BMI of 30 or above.  Keep these notes in mind:  · Weight includes both fat and muscle, so someone with a muscular build, such as an athlete, may have a BMI that is higher than 24.9. In cases like these, BMI is not an accurate measure of body fat.  · To determine if excess body fat is the cause of a BMI of 25 or higher, further assessments may need to be done by a health care provider.  · BMI is usually interpreted in the same way for men and women.  Where to find more information  For more information about BMI, including tools to quickly calculate your BMI, go to these websites:  · Centers for Disease Control and Prevention: www.cdc.gov  · American Heart Association: www.heart.org  · National Heart, Lung, and Blood [place]: www.nhlbi.nih.gov  Summary  · Body mass index (BMI) is a number that is calculated from a person's weight and height.  · BMI may help estimate how much of a person's weight is composed of fat. BMI can help identify those who may be at higher risk for certain medical problems.  · BMI can be measured using English measurements or metric measurements.  · BMI charts are used to identify whether you are underweight, normal " "weight, overweight, or obese.  This information is not intended to replace advice given to you by your health care provider. Make sure you discuss any questions you have with your health care provider.  Document Revised: 09/09/2020 Document Reviewed: 07/17/2020  [employer]ika Patient Education © 2021 [employer]      https://www.nhlbi.nih.gov/files/docs/public/heart/dash_brief.pdf\">   DASH Eating Plan  DASH stands for Dietary Approaches to Stop Hypertension. The DASH eating plan is a healthy eating plan that has been shown to:  · Reduce high blood pressure (hypertension).  · Reduce your risk for type 2 diabetes, heart disease, and stroke.  · Help with weight loss.  What are tips for following this plan?  Reading food labels  · Check food labels for the amount of salt (sodium) per serving. Choose foods with less than 5 percent of the Daily Value of sodium. Generally, foods with less than 300 milligrams (mg) of sodium per serving fit into this eating plan.  · To find whole grains, look for the word \"whole\" as the first word in the ingredient list.  Shopping  · Buy products labeled as \"low-sodium\" or \"no salt added.\"  · Buy fresh foods. Avoid canned foods and pre-made or frozen meals.  Cooking  · Avoid adding salt when cooking. Use salt-free seasonings or herbs instead of table salt or sea salt. Check with your health care provider or pharmacist before using salt substitutes.  · Do not fall foods. Cook foods using healthy methods such as baking, boiling, grilling, roasting, and broiling instead.  · Cook with heart-healthy oils, such as olive, canola, avocado, soybean, or sunflower oil.  Meal planning    · Eat a balanced diet that includes:  ? 4 or more servings of fruits and 4 or more servings of vegetables each day. Try to fill one-half of your plate with fruits and vegetables.  ? 6-8 servings of whole grains each day.  ? Less than 6 oz (170 g) of lean meat, poultry, or fish each day. A 3-oz (85-g) serving of meat is " about the same size as a deck of cards. One egg equals 1 oz (28 g).  ? 2-3 servings of low-fat dairy each day. One serving is 1 cup (237 mL).  ? 1 serving of nuts, seeds, or beans 5 times each week.  ? 2-3 servings of heart-healthy fats. Healthy fats called omega-3 fatty acids are found in foods such as walnuts, flaxseeds, fortified milks, and eggs. These fats are also found in cold-water fish, such as sardines, salmon, and mackerel.  · Limit how much you eat of:  ? Canned or prepackaged foods.  ? Food that is high in trans fat, such as some fried foods.  ? Food that is high in saturated fat, such as fatty meat.  ? Desserts and other sweets, sugary drinks, and other foods with added sugar.  ? Full-fat dairy products.  · Do not salt foods before eating.  · Do not eat more than 4 egg yolks a week.  · Try to eat at least 2 vegetarian meals a week.  · Eat more home-cooked food and less restaurant, buffet, and fast food.  Lifestyle  · When eating at a restaurant, ask that your food be prepared with less salt or no salt, if possible.  · If you drink alcohol:  ? Limit how much you use to:  § 0-1 drink a day for women who are not pregnant.  § 0-2 drinks a day for men.  ? Be aware of how much alcohol is in your drink. In the [place] one drink equals one 12 oz bottle of beer (355 mL), one 5 oz glass of wine (148 mL), or one 1½ oz glass of hard liquor (44 mL).  General information  · Avoid eating more than 2,300 mg of salt a day. If you have hypertension, you may need to reduce your sodium intake to 1,500 mg a day.  · Work with your health care provider to maintain a healthy body weight or to lose weight. Ask what an ideal weight is for you.  · Get at least 30 minutes of exercise that causes your heart to beat faster (aerobic exercise) most days of the week. Activities may include walking, swimming, or biking.  · Work with your health care provider or dietitian to adjust your eating plan to your individual calorie needs.  What  foods should I eat?  Fruits  All fresh, dried, or frozen fruit. Canned fruit in natural juice (without added sugar).  Vegetables  Fresh or frozen vegetables (raw, steamed, roasted, or grilled). Low-sodium or reduced-sodium tomato and vegetable juice. Low-sodium or reduced-sodium tomato sauce and tomato paste. Low-sodium or reduced-sodium canned vegetables.  Grains  Whole-grain or whole-wheat bread. Whole-grain or whole-wheat pasta. Brown rice. Oatmeal. Quinoa. Bulgur. Whole-grain and low-sodium cereals. Luz bread. Low-fat, low-sodium crackers. Whole-wheat flour tortillas.  Meats and other proteins  Skinless chicken or turkey. Ground chicken or turkey. Pork with fat trimmed off. Fish and seafood. Egg whites. Dried beans, peas, or lentils. Unsalted nuts, nut butters, and seeds. Unsalted canned beans. Lean cuts of beef with fat trimmed off. Low-sodium, lean precooked or cured meat, such as sausages or meat loaves.  Dairy  Low-fat (1%) or fat-free (skim) milk. Reduced-fat, low-fat, or fat-free cheeses. Nonfat, low-sodium ricotta or cottage cheese. Low-fat or nonfat yogurt. Low-fat, low-sodium cheese.  Fats and oils  Soft margarine without trans fats. Vegetable oil. Reduced-fat, low-fat, or light mayonnaise and salad dressings (reduced-sodium). Canola, safflower, olive, avocado, soybean, and sunflower oils. Avocado.  Seasonings and condiments  Herbs. Spices. Seasoning mixes without salt.  Other foods  Unsalted popcorn and pretzels. Fat-free sweets.  The items listed above may not be a complete list of foods and beverages you can eat. Contact a dietitian for more information.  What foods should I avoid?  Fruits  Canned fruit in a light or heavy syrup. Fried fruit. Fruit in cream or butter sauce.  Vegetables  Creamed or fried vegetables. Vegetables in a cheese sauce. Regular canned vegetables (not low-sodium or reduced-sodium). Regular canned tomato sauce and paste (not low-sodium or reduced-sodium). Regular tomato and  vegetable juice (not low-sodium or reduced-sodium). Pickles. Olives.  Grains  Baked goods made with fat, such as croissants, muffins, or some breads. Dry pasta or rice meal packs.  Meats and other proteins  Fatty cuts of meat. Ribs. Fried meat. Mcnamara. Bologna, salami, and other precooked or cured meats, such as sausages or meat loaves. Fat from the back of a pig (fatback). Bratwurst. Salted nuts and seeds. Canned beans with added salt. Canned or smoked fish. Whole eggs or egg yolks. Chicken or turkey with skin.  Dairy  Whole or 2% milk, cream, and half-and-half. Whole or full-fat cream cheese. Whole-fat or sweetened yogurt. Full-fat cheese. Nondairy creamers. Whipped toppings. Processed cheese and cheese spreads.  Fats and oils  Butter. Stick margarine. Lard. Shortening. Ghee. Mcnamara fat. Tropical oils, such as coconut, palm kernel, or palm oil.  Seasonings and condiments  Onion salt, garlic salt, seasoned salt, table salt, and sea salt. Worcestershire sauce. Tartar sauce. Barbecue sauce. Teriyaki sauce. Soy sauce, including reduced-sodium. Steak sauce. Canned and packaged gravies. Fish sauce. Oyster sauce. Cocktail sauce. Store-bought horseradish. Ketchup. Mustard. Meat flavorings and tenderizers. Bouillon cubes. Hot sauces. Pre-made or packaged marinades. Pre-made or packaged taco seasonings. Relishes. Regular salad dressings.  Other foods  Salted popcorn and pretzels.  The items listed above may not be a complete list of foods and beverages you should avoid. Contact a dietitian for more information.  Where to find more information  · National Heart, Lung, and Blood [place]: www.nhlbi.nih.gov  · American Heart Association: www.heart.org  · Academy of Nutrition and Dietetics: www.eatright.org  · National Kidney Foundation: www.kidney.org  Summary  · The DASH eating plan is a healthy eating plan that has been shown to reduce high blood pressure (hypertension). It may also reduce your risk for type 2 diabetes, heart  disease, and stroke.  · When on the DASH eating plan, aim to eat more fresh fruits and vegetables, whole grains, lean proteins, low-fat dairy, and heart-healthy fats.  · With the DASH eating plan, you should limit salt (sodium) intake to 2,300 mg a day. If you have hypertension, you may need to reduce your sodium intake to 1,500 mg a day.  · Work with your health care provider or dietitian to adjust your eating plan to your individual calorie needs.  This information is not intended to replace advice given to you by your health care provider. Make sure you discuss any questions you have with your health care provider.  Document Revised: 11/20/2020 Document Reviewed: 11/20/2020  Elsevier Patient Education © 2021 [employer].

## 2021-08-24 ENCOUNTER — TELEPHONE (OUTPATIENT)
Dept: NEUROSURGERY | Facility: CLINIC | Age: 61
End: 2021-08-24

## 2021-08-24 NOTE — TELEPHONE ENCOUNTER
Called patient with her x-ray results, explained that her x-ray looks good and she may come out of the brace on 09/04/2021, patient understood and will call with any problems.

## 2021-08-24 NOTE — TELEPHONE ENCOUNTER
----- Message from GALLO Glover sent at 8/23/2021 12:29 PM CDT -----  X-ray looks good she can come out of the brace on September 4  ----- Message -----  From: [facility] [place] [facility]  Sent: 8/23/2021  11:30 AM CDT  To: GALLO Glover

## 2021-08-26 DIAGNOSIS — M50.30 DEGENERATION OF CERVICAL INTERVERTEBRAL DISC: ICD-10-CM

## 2021-08-26 DIAGNOSIS — M48.02 SPINAL STENOSIS IN CERVICAL REGION: ICD-10-CM

## 2021-08-26 DIAGNOSIS — M54.12 CERVICAL RADICULOPATHY: ICD-10-CM

## 2021-08-26 NOTE — TELEPHONE ENCOUNTER
Patient called and left a voicemail stating she needs a refill on her medication (pain meds, muscle relaxer) b/c she will be out over the weekend.  She saw Kb garcia so I am going to send this to him for signature.      zuleyka jacobs CMA

## 2021-08-27 RX ORDER — CYCLOBENZAPRINE HCL 10 MG
10 TABLET ORAL 3 TIMES DAILY PRN
Qty: 90 TABLET | Refills: 0 | Status: SHIPPED | OUTPATIENT
Start: 2021-08-27 | End: 2021-09-27 | Stop reason: SDUPTHER

## 2021-08-27 RX ORDER — HYDROCODONE BITARTRATE AND ACETAMINOPHEN 10; 325 MG/1; MG/1
1 TABLET ORAL EVERY 8 HOURS PRN
Qty: 90 TABLET | Refills: 0 | Status: SHIPPED | OUTPATIENT
Start: 2021-08-27 | End: 2021-09-27 | Stop reason: SDUPTHER

## 2021-09-13 ENCOUNTER — OFFICE VISIT (OUTPATIENT)
Dept: PRIMARY CARE CLINIC | Age: 61
End: 2021-09-13
Payer: MEDICARE

## 2021-09-13 VITALS
DIASTOLIC BLOOD PRESSURE: 75 MMHG | HEIGHT: 62 IN | BODY MASS INDEX: 33.34 KG/M2 | OXYGEN SATURATION: 97 % | WEIGHT: 181.2 LBS | TEMPERATURE: 98 F | HEART RATE: 83 BPM | SYSTOLIC BLOOD PRESSURE: 130 MMHG

## 2021-09-13 DIAGNOSIS — L30.9 DERMATITIS: Primary | ICD-10-CM

## 2021-09-13 PROCEDURE — G0008 ADMIN INFLUENZA VIRUS VAC: HCPCS | Performed by: NURSE PRACTITIONER

## 2021-09-13 PROCEDURE — 90674 CCIIV4 VAC NO PRSV 0.5 ML IM: CPT | Performed by: NURSE PRACTITIONER

## 2021-09-13 PROCEDURE — 99213 OFFICE O/P EST LOW 20 MIN: CPT | Performed by: NURSE PRACTITIONER

## 2021-09-13 RX ORDER — HYDROCODONE BITARTRATE AND ACETAMINOPHEN 10; 325 MG/1; MG/1
1 TABLET ORAL EVERY 8 HOURS PRN
COMMUNITY
End: 2021-11-28 | Stop reason: ALTCHOICE

## 2021-09-13 RX ORDER — HYDROXYZINE HYDROCHLORIDE 25 MG/1
25 TABLET, FILM COATED ORAL EVERY 8 HOURS PRN
Qty: 30 TABLET | Refills: 0 | Status: SHIPPED | OUTPATIENT
Start: 2021-09-13 | End: 2021-09-23

## 2021-09-13 RX ORDER — CLOBETASOL PROPIONATE 0.5 MG/G
OINTMENT TOPICAL
Qty: 30 G | Refills: 0 | Status: SHIPPED | OUTPATIENT
Start: 2021-09-13 | End: 2021-12-09

## 2021-09-13 NOTE — PATIENT INSTRUCTIONS
1. Take clobetasol cream twice daily  2. Take atarax nightly as during the day as tolerated  3. Follow up as needed  4.  Dermatologist in [address] [place] [address] Dermatology  Dr. B[facility]

## 2021-09-13 NOTE — PROGRESS NOTES
ChiefComplaint:   Chief Complaint   Patient presents with    Rash     pt states that skin rash has been happening for 4-5 months now and it is spreading       History of Present Illness:  Luisa Marinelli is a 62 y.o. female who presents for evaluation of rash on her abdomen and back and genital area. Patient has used kenalog cream, triamcinolone, hydrocortisone cream, benadryl, witch hazel, and moisturizers. Patient reports she has used this since 6/3/2021 with minimal relief. Reports she the areas itch and at night she wakes up with blood on her hand where she has scratched in her sleep. Patient denies any pain or burning in these areas.      History:  Past Medical History:   Diagnosis Date    Allergic bronchitis     has wheezing related to dust and pollen    Allergic rhinitis     Asthma     Blepharitis of both eyes     Bone spur     cervical    Cervical disc disease     Chicken pox     age 26    Chronic anxiety     has been on valium after failing effexor and other medications    Coccyx disorder     history of f/x    Constipation     chronic since a teen    Degenerative disc disease at L5-S1 level     Genital warts     Dr Blake Mcwilliams GERD (gastroesophageal reflux disease)     Hemorrhoids     HPV in female     Hyperthyroidism     took radioactive iodine    Post menopausal syndrome     Reactive airway disease with wheezing     Scoliosis     Unspecified vitamin D deficiency        Family History   Problem Relation Age of Onset    Diabetes Sister     Thyroid Disease Sister     Rheum Arthritis Sister     Other Sister         fibromyalgia    Breast Cancer Sister 59    Rheum Arthritis Maternal Aunt     Colon Polyps Maternal Aunt     Heart Failure Father     Heart Disease Father          of acute MI at 58    Hypertension Mother     Cancer Mother         lung cancer    Colon Polyps Mother     High Blood Pressure Mother     Other Son         sleep apnea,anxiety and neck problems after  injuries    Breast Cancer Maternal Cousin 45    Colon Cancer Neg Hx     Esophageal Cancer Neg Hx     Stomach Cancer Neg Hx     Liver Cancer Neg Hx     Liver Disease Neg Hx     Rectal Cancer Neg Hx      Social History     Socioeconomic History    Marital status:      Spouse name: Not on file    Number of children: Not on file    Years of education: Not on file    Highest education level: Not on file   Occupational History    Not on file   Tobacco Use    Smoking status: Former Smoker     Packs/day: 2.00     Years: 25.00     Pack years: 50.00     Types: Cigarettes     Quit date: 2008     Years since quittin.4    Smokeless tobacco: Never Used    Tobacco comment: Quit smoking in  with Wellbutrin (had crazy dreams on it)   Vaping Use    Vaping Use: Never used   Substance and Sexual Activity    Alcohol use: No     Alcohol/week: 0.0 standard drinks    Drug use: No    Sexual activity: Not Currently     Birth control/protection: Surgical   Other Topics Concern    Not on file   Social History Narrative    Not on file     Social Determinants of Health     Financial Resource Strain: Low Risk     Difficulty of Paying Living Expenses: Not hard at all   Food Insecurity: No Food Insecurity    Worried About Running Out of Food in the Last Year: Never true    Griffin of Food in the Last Year: Never true   Transportation Needs:     Lack of Transportation (Medical):      Lack of Transportation (Non-Medical):    Physical Activity:     Days of Exercise per Week:     Minutes of Exercise per Session:    Stress:     Feeling of Stress :    Social Connections:     Frequency of Communication with Friends and Family:     Frequency of Social Gatherings with Friends and Family:     Attends Cheondoism Services:     Active Member of Clubs or Organizations:     Attends Club or Organization Meetings:     Marital Status:    Intimate Partner Violence:     Fear of Current or Ex-Partner:     Emotionally Abused:     Physically Abused:     Sexually Abused: Allergies: Allergies   Allergen Reactions    Pollen Extract      Early Spring and early Fall    Effexor [Venlafaxine Hydrochloride] Other (See Comments)     Slurred speech, feelings of having a TIA, seeing double, unaware of her surroundings   [employer] Extract [Poison Ivy Extract] Itching    Nickel Itching       Medications:  Current Outpatient Medications on File Prior to Visit   Medication Sig Dispense Refill    Cyclobenzaprine HCl (FLEXERIL PO) Take 10 mg by mouth 3 times daily      HYDROcodone-acetaminophen (NORCO)  MG per tablet Take 1 tablet by mouth every 8 hours as needed for Pain.       montelukast (SINGULAIR) 10 MG tablet TAKE 1 TABLET BY MOUTH EVERY DAY AT NIGHT 90 tablet 1    SYNTHROID 112 MCG tablet TAKE 1 TABLET BY MOUTH EVERY DAY IN THE MORNING 90 tablet 1    FLUoxetine (PROZAC) 10 MG capsule TAKE 1 CAPSULE BY MOUTH EVERY DAY 30 capsule 5    estradiol (ESTRACE) 0.5 MG tablet TAKE 1 TABLET BY MOUTH EVERY DAY 90 tablet 3    medroxyPROGESTERone (PROVERA) 2.5 MG tablet TAKE 1 TABLET BY MOUTH EVERY DAY 90 tablet 3    pantoprazole (PROTONIX) 40 MG tablet TAKE 1 TABLET BY MOUTH EVERY MORNING BEFORE BREAKFAST 90 tablet 1    levocetirizine (XYZAL) 5 MG tablet TAKE 1 TABLET BY MOUTH EVERY DAY 90 tablet 3    pseudoephedrine (DECONGESTANT) 30 MG tablet Take 1 tablet by mouth 3 times daily 30 tablet 3    fluticasone (FLONASE) 50 MCG/ACT nasal spray USE 2 SPRAYS IN EACH NOSTRIL DAILY 1 Bottle 6    azelastine (ASTELIN) 0.1 % nasal spray USE 2 SPRAYS IN EACH NOSTRIL TWICE A DAY AS DIRECTED 1 Bottle 5    vitamin D (CHOLECALCIFEROL) 125 MCG (5000 UT) CAPS capsule Take 5,000 Units by mouth daily      PROAIR  (90 Base) MCG/ACT inhaler INHALE 2 PUFFS INTO THE LUNGS EVERY 6 HOURS AS NEEDED FOR WHEEZING 8.5 Inhaler 1    EPINEPHrine (EPIPEN) 0.3 MG/0.3ML SOAJ injection USE AS DIRECTED FOR ACUTE ALLERGIC REACTION  1    epinastine (ELESTAT) 0.05 % SOLN INSTILL 1 DROP IN EACH EYE ONE TO TWICE A DAY AS NEEDED  3    naproxen (NAPROXEN) 500 MG EC tablet Take 1 tablet by mouth every 12 hours Generic is okay 60 tablet 11     No current facility-administered medications on file prior to visit. Review of Systems:  Review of Systems   Constitutional: Negative for activity change and fever. HENT: Negative for congestion, ear pain and sore throat. Respiratory: Negative for cough, chest tightness and shortness of breath. Cardiovascular: Negative for chest pain. Gastrointestinal: Negative for abdominal pain, diarrhea, nausea and vomiting. Genitourinary: Negative for frequency and urgency. Musculoskeletal: Negative for arthralgias and myalgias. Skin: Positive for rash. Negative for color change. Neurological: Negative for dizziness, weakness and numbness. Psychiatric/Behavioral: Negative for agitation. The patient is not nervous/anxious. Vital Signs:  /75 (Site: Left Upper Arm, Position: Sitting)   Pulse 83   Temp 98 °F (36.7 °C) (Temporal)   Ht 5' 2\" (1.575 m)   Wt 181 lb 3.2 oz (82.2 kg)   LMP 07/11/2009   SpO2 97%   BMI 33.14 kg/m²     Physical Exam:  Physical Exam  Constitutional:       Appearance: Normal appearance. HENT:      Head: Normocephalic. Right Ear: Tympanic membrane normal.      Left Ear: Tympanic membrane normal.      Nose: Nose normal.      Mouth/Throat:      Mouth: Mucous membranes are moist.      Pharynx: Oropharynx is clear. Eyes:      Extraocular Movements: Extraocular movements intact. Pupils: Pupils are equal, round, and reactive to light. Cardiovascular:      Rate and Rhythm: Normal rate and regular rhythm. Pulses: Normal pulses. Heart sounds: Normal heart sounds. Pulmonary:      Effort: Pulmonary effort is normal.      Breath sounds: Normal breath sounds.    Abdominal:      General: Bowel sounds are normal. Palpations: Abdomen is soft. Musculoskeletal:         General: Normal range of motion. Cervical back: Normal range of motion. Skin:     General: Skin is warm and dry. Neurological:      Mental Status: She is alert and oriented to person, place, and time. Psychiatric:         Mood and Affect: Mood normal.         Behavior: Behavior normal.          Assessment & Plan    1. Dermatitis  Patient will use clobetasol and atarax as needed. She also desires to follow up with a dermatologist in [place]. Names of offices provided. - hydrOXYzine (ATARAX) 25 MG tablet; Take 1 tablet by mouth every 8 hours as needed for Itching  Dispense: 30 tablet; Refill: 0  - clobetasol (TEMOVATE) 0.05 % ointment; Apply topically 2 times daily. Dispense: 30 g; Refill: 0       Return if symptoms worsen or fail to improve.

## 2021-09-14 ASSESSMENT — ENCOUNTER SYMPTOMS
COUGH: 0
VOMITING: 0
SORE THROAT: 0
SHORTNESS OF BREATH: 0
CHEST TIGHTNESS: 0
DIARRHEA: 0
ABDOMINAL PAIN: 0
COLOR CHANGE: 0
NAUSEA: 0

## 2021-09-16 RX ORDER — PANTOPRAZOLE SODIUM 40 MG/1
TABLET, DELAYED RELEASE ORAL
Qty: 90 TABLET | Refills: 1 | Status: SHIPPED | OUTPATIENT
Start: 2021-09-16 | End: 2022-03-20

## 2021-09-20 ENCOUNTER — TELEPHONE (OUTPATIENT)
Dept: NEUROSURGERY | Facility: CLINIC | Age: 61
End: 2021-09-20

## 2021-09-20 NOTE — TELEPHONE ENCOUNTER
"Patient is calling requesting a refill on her pain medicine & muscle relaxer.      She was given 30 day supply on 8/27/21 and shouldn't need a refill until next week but states she is completely out b/c she \"has been taking a few extra every day\".  She states her low back is bothering her too so she has been taking some for that as well as her neck.  She says she dusted and vacuumed her house last week and had some soreness after that so had to take extra then too.    I explained to her that she cannot take them more than prescribed.  There was no phone call or mention that she was taking more and no mention that we had given her approval to do so.    She is going to call again next Monday when she is due a refill.      zuleyka jacobs CMA  "

## 2021-09-27 DIAGNOSIS — M48.02 SPINAL STENOSIS IN CERVICAL REGION: ICD-10-CM

## 2021-09-27 DIAGNOSIS — M50.30 DEGENERATION OF CERVICAL INTERVERTEBRAL DISC: ICD-10-CM

## 2021-09-27 DIAGNOSIS — M54.12 CERVICAL RADICULOPATHY: ICD-10-CM

## 2021-09-27 RX ORDER — CYCLOBENZAPRINE HCL 10 MG
10 TABLET ORAL 3 TIMES DAILY PRN
Qty: 90 TABLET | Refills: 0 | Status: SHIPPED | OUTPATIENT
Start: 2021-09-27

## 2021-09-27 RX ORDER — HYDROCODONE BITARTRATE AND ACETAMINOPHEN 10; 325 MG/1; MG/1
1 TABLET ORAL EVERY 8 HOURS PRN
Qty: 90 TABLET | Refills: 0 | Status: SHIPPED | OUTPATIENT
Start: 2021-09-27 | End: 2021-10-27

## 2021-10-14 ENCOUNTER — HOSPITAL ENCOUNTER (OUTPATIENT)
Dept: MRI IMAGING | Age: 61
Discharge: HOME | End: 2021-10-14
Payer: MEDICARE

## 2021-10-14 ENCOUNTER — HOSPITAL ENCOUNTER (OUTPATIENT)
Dept: GENERAL RADIOLOGY | Age: 61
Discharge: HOME | End: 2021-10-14
Payer: MEDICARE

## 2021-10-14 DIAGNOSIS — M51.16 RADICULOPATHY DUE TO LUMBAR INTERVERTEBRAL DISC DISORDER: ICD-10-CM

## 2021-10-14 DIAGNOSIS — M51.16 INTERVERTEBRAL DISC DISORDER WITH RADICULOPATHY OF LUMBAR REGION: ICD-10-CM

## 2021-10-14 PROCEDURE — 72100 X-RAY EXAM L-S SPINE 2/3 VWS: CPT

## 2021-10-14 PROCEDURE — 72148 MRI LUMBAR SPINE W/O DYE: CPT

## 2021-10-27 ENCOUNTER — TELEPHONE (OUTPATIENT)
Dept: NEUROSURGERY | Facility: CLINIC | Age: 61
End: 2021-10-27

## 2021-10-27 NOTE — TELEPHONE ENCOUNTER
"Patient called stating today will be her last on her pain medication and she is wondering if we will continue to fill that or does she need to go back to Dr Chicas and let him start giving it to her again.  I explained she is 3 mos out from surgery and usually we don't continue pain medication long term like that and she then stated \"I have a prescription at [facility] that Dr Chicas sent in for me but didn't know if Dr Gomez would be giving it to me or not\".  I told her to get the script at [facility] and that I would call and let them know we would not be giving any more refills.    She expressed understanding.    I contacted [facility] and spoke w/[place] who states the patient has 2 scripts ready for  today & she will notify the patient when they are ready.        zuleyka jacobs CMA  "

## 2021-10-28 ENCOUNTER — OFFICE VISIT (OUTPATIENT)
Dept: NEUROSURGERY | Facility: CLINIC | Age: 61
End: 2021-10-28

## 2021-10-28 VITALS — WEIGHT: 183 LBS | HEIGHT: 63 IN | BODY MASS INDEX: 32.43 KG/M2

## 2021-10-28 DIAGNOSIS — M48.02 SPINAL STENOSIS IN CERVICAL REGION: Primary | ICD-10-CM

## 2021-10-28 DIAGNOSIS — E66.09 CLASS 1 OBESITY DUE TO EXCESS CALORIES WITHOUT SERIOUS COMORBIDITY WITH BODY MASS INDEX (BMI) OF 32.0 TO 32.9 IN ADULT: ICD-10-CM

## 2021-10-28 DIAGNOSIS — Z87.891 FORMER SMOKER: ICD-10-CM

## 2021-10-28 DIAGNOSIS — R20.2 NUMBNESS AND TINGLING IN BOTH HANDS: ICD-10-CM

## 2021-10-28 DIAGNOSIS — M54.12 CERVICAL RADICULOPATHY: ICD-10-CM

## 2021-10-28 DIAGNOSIS — M50.30 DEGENERATION OF CERVICAL INTERVERTEBRAL DISC: ICD-10-CM

## 2021-10-28 DIAGNOSIS — R20.0 NUMBNESS AND TINGLING IN BOTH HANDS: ICD-10-CM

## 2021-10-28 PROCEDURE — 99213 OFFICE O/P EST LOW 20 MIN: CPT | Performed by: NEUROLOGICAL SURGERY

## 2021-10-28 RX ORDER — HYDROXYZINE HYDROCHLORIDE 25 MG/1
25 TABLET, FILM COATED ORAL EVERY 8 HOURS PRN
COMMUNITY
Start: 2021-09-13

## 2021-10-28 RX ORDER — CLOBETASOL PROPIONATE 0.5 MG/G
OINTMENT TOPICAL
COMMUNITY
Start: 2021-09-13

## 2021-10-28 RX ORDER — PSEUDOEPHEDRINE HCL 30 MG
1 TABLET ORAL 3 TIMES DAILY
COMMUNITY
Start: 2020-12-10 | End: 2021-12-10

## 2021-10-28 NOTE — PROGRESS NOTES
SUBJECTIVE:  Patient ID: Jodee Kovacs is a 60 y.o. female is here today for follow-up.    Chief Complaint: Neck pain  Chief Complaint   Patient presents with   • Neck Pain     patient is here for a 3 month follow up: C4-7 ACDF on 7/28/2021; patient continues to have some neck soreness and RT hand numbness when she wakes up in the morning.       HPI  60-year-old female we did a 3 level anterior cervical fusion on at the end of July 2021.  She has occasional neck pain but it is better than it was before surgery and seems to be improving as time goes by.  She has no upper extremity radicular pain.  She does still have some persistent numbness and tingling in her right fingers.  She continues to take pain medicine but is prescribed that by her pain management doctor for a variety of arthralgias.  The following portions of the patient's history were reviewed and updated as appropriate: allergies, current medications, past family history, past medical history, past social history, past surgical history and problem list.    OBJECTIVE:    Review of Systems   Musculoskeletal: Positive for arthralgias, back pain and neck pain.   All other systems reviewed and are negative.         Physical Exam  Eyes:      Extraocular Movements: EOM normal.      Pupils: Pupils are equal, round, and reactive to light.   Neurological:      Mental Status: She is oriented to person, place, and time.      Coordination: Finger-Nose-Finger Test normal.      Gait: Gait is intact.      Deep Tendon Reflexes: Strength normal.   Psychiatric:         Speech: Speech normal.         Neurologic Exam     Mental Status   Oriented to person, place, and time.   Attention: normal.   Speech: speech is normal   Level of consciousness: alert  Knowledge: good.     Cranial Nerves     CN II   Visual fields full to confrontation.     CN III, IV, VI   Pupils are equal, round, and reactive to light.  Extraocular motions are normal.     CN V   Facial sensation intact.      CN VII   Facial expression full, symmetric.     CN VIII   CN VIII normal.     CN IX, X   CN IX normal.   CN X normal.     CN XI   CN XI normal.     CN XII   CN XII normal.     Motor Exam   Muscle bulk: normal  Overall muscle tone: normal  Right arm pronator drift: absent  Left arm pronator drift: absent    Strength   Strength 5/5 throughout.     Sensory Exam   Light touch normal.   Pinprick normal.     Gait, Coordination, and Reflexes     Gait  Gait: normal    Coordination   Finger to nose coordination: normal    Tremor   Resting tremor: absent  Intention tremor: absent  Action tremor: absent    Reflexes   Reflexes 2+ except as noted.       Independent Review of Radiographic Studies:       ASSESSMENT/PLAN:  Postop x-ray showed good positioning of autobody devices and hardware.  The patient seems to be doing well.  Her symptoms have all improved since the operation.  I am confident that with more time she will continue to improve.  I will see her in follow-up in about 3 months.      1. Spinal stenosis in cervical region    2. Degeneration of cervical intervertebral disc    3. Cervical radiculopathy    4. Numbness and tingling in both hands    5. Former smoker    6. Class 1 obesity due to excess calories without serious comorbidity with body mass index (BMI) of 32.0 to 32.9 in adult        The patient's Body mass index is 32.02 kg/m².. BMI is above normal parameters. Recommendations include: educational material    No follow-ups on file.      Earnest Gomez MD

## 2021-10-28 NOTE — PATIENT INSTRUCTIONS
"PATIENT TO CONTINUE TO FOLLOW UP WITH HER PRIMARY CARE PROVIDER FOR YEARLY PHYSICAL EXAMS TO ENSURE COMPLETE HEALTH MAINTENANCE      BMI for Adults  What is BMI?  Body mass index (BMI) is a number that is calculated from a person's weight and height. BMI can help estimate how much of a person's weight is composed of fat. BMI does not measure body fat directly. Rather, it is an alternative to procedures that directly measure body fat, which can be difficult and expensive.  BMI can help identify people who may be at higher risk for certain medical problems.  What are BMI measurements used for?  BMI is used as a screening tool to identify possible weight problems. It helps determine whether a person is obese, overweight, a healthy weight, or underweight.  BMI is useful for:  · Identifying a weight problem that may be related to a medical condition or may increase the risk for medical problems.  · Promoting changes, such as changes in diet and exercise, to help reach a healthy weight. BMI screening can be repeated to see if these changes are working.  How is BMI calculated?  BMI involves measuring your weight in relation to your height. Both height and weight are measured, and the BMI is calculated from those numbers. This can be done either in English ([place] or metric measurements. Note that charts and online BMI calculators are available to help you find your BMI quickly and easily without having to do these calculations yourself.  To calculate your BMI in English ([place] measurements:    1. Measure your weight in pounds (lb).  2. Multiply the number of pounds by 703.  ? For example, for a person who weighs 180 lb, multiply that number by 703, which equals 126,540.  3. Measure your height in inches. Then multiply that number by itself to get a measurement called \"inches squared.\"  ? For example, for a person who is 70 inches tall, the \"inches squared\" measurement is 70 inches x 70 inches, which equals 4,900 inches " "squared.  4. Divide the total from step 2 (number of lb x 703) by the total from step 3 (inches squared): 126,540 ÷ 4,900 = 25.8. This is your BMI.    To calculate your BMI in metric measurements:  1. Measure your weight in kilograms (kg).  2. Measure your height in meters (m). Then multiply that number by itself to get a measurement called \"meters squared.\"  ? For example, for a person who is 1.75 m tall, the \"meters squared\" measurement is 1.75 m x 1.75 m, which is equal to 3.1 meters squared.  3. Divide the number of kilograms (your weight) by the meters squared number. In this example: 70 ÷ 3.1 = 22.6. This is your BMI.  What do the results mean?  BMI charts are used to identify whether you are underweight, normal weight, overweight, or obese. The following guidelines will be used:  · Underweight: BMI less than 18.5.  · Normal weight: BMI between 18.5 and 24.9.  · Overweight: BMI between 25 and 29.9.  · Obese: BMI of 30 or above.  Keep these notes in mind:  · Weight includes both fat and muscle, so someone with a muscular build, such as an athlete, may have a BMI that is higher than 24.9. In cases like these, BMI is not an accurate measure of body fat.  · To determine if excess body fat is the cause of a BMI of 25 or higher, further assessments may need to be done by a health care provider.  · BMI is usually interpreted in the same way for men and women.  Where to find more information  For more information about BMI, including tools to quickly calculate your BMI, go to these websites:  · Centers for Disease Control and Prevention: www.cdc.gov  · American Heart Association: www.heart.org  · National Heart, Lung, and Blood [place]: www.nhlbi.nih.gov  Summary  · Body mass index (BMI) is a number that is calculated from a person's weight and height.  · BMI may help estimate how much of a person's weight is composed of fat. BMI can help identify those who may be at higher risk for certain medical problems.  · BMI " "can be measured using English measurements or metric measurements.  · BMI charts are used to identify whether you are underweight, normal weight, overweight, or obese.  This information is not intended to replace advice given to you by your health care provider. Make sure you discuss any questions you have with your health care provider.  Document Revised: 09/09/2020 Document Reviewed: 07/17/2020  [employer] Patient Education © 2021 [employer]      https://www.nhlbi.nih.gov/files/docs/public/heart/dash_brief.pdf\">   DASH Eating Plan  DASH stands for Dietary Approaches to Stop Hypertension. The DASH eating plan is a healthy eating plan that has been shown to:  · Reduce high blood pressure (hypertension).  · Reduce your risk for type 2 diabetes, heart disease, and stroke.  · Help with weight loss.  What are tips for following this plan?  Reading food labels  · Check food labels for the amount of salt (sodium) per serving. Choose foods with less than 5 percent of the Daily Value of sodium. Generally, foods with less than 300 milligrams (mg) of sodium per serving fit into this eating plan.  · To find whole grains, look for the word \"whole\" as the first word in the ingredient list.  Shopping  · Buy products labeled as \"low-sodium\" or \"no salt added.\"  · Buy fresh foods. Avoid canned foods and pre-made or frozen meals.  Cooking  · Avoid adding salt when cooking. Use salt-free seasonings or herbs instead of table salt or sea salt. Check with your health care provider or pharmacist before using salt substitutes.  · Do not fall foods. Cook foods using healthy methods such as baking, boiling, grilling, roasting, and broiling instead.  · Cook with heart-healthy oils, such as olive, canola, avocado, soybean, or sunflower oil.  Meal planning    · Eat a balanced diet that includes:  ? 4 or more servings of fruits and 4 or more servings of vegetables each day. Try to fill one-half of your plate with fruits and vegetables.  ? 6-8 " servings of whole grains each day.  ? Less than 6 oz (170 g) of lean meat, poultry, or fish each day. A 3-oz (85-g) serving of meat is about the same size as a deck of cards. One egg equals 1 oz (28 g).  ? 2-3 servings of low-fat dairy each day. One serving is 1 cup (237 mL).  ? 1 serving of nuts, seeds, or beans 5 times each week.  ? 2-3 servings of heart-healthy fats. Healthy fats called omega-3 fatty acids are found in foods such as walnuts, flaxseeds, fortified milks, and eggs. These fats are also found in cold-water fish, such as sardines, salmon, and mackerel.  · Limit how much you eat of:  ? Canned or prepackaged foods.  ? Food that is high in trans fat, such as some fried foods.  ? Food that is high in saturated fat, such as fatty meat.  ? Desserts and other sweets, sugary drinks, and other foods with added sugar.  ? Full-fat dairy products.  · Do not salt foods before eating.  · Do not eat more than 4 egg yolks a week.  · Try to eat at least 2 vegetarian meals a week.  · Eat more home-cooked food and less restaurant, buffet, and fast food.    Lifestyle  · When eating at a restaurant, ask that your food be prepared with less salt or no salt, if possible.  · If you drink alcohol:  ? Limit how much you use to:  § 0-1 drink a day for women who are not pregnant.  § 0-2 drinks a day for men.  ? Be aware of how much alcohol is in your drink. In the [place] one drink equals one 12 oz bottle of beer (355 mL), one 5 oz glass of wine (148 mL), or one 1½ oz glass of hard liquor (44 mL).  General information  · Avoid eating more than 2,300 mg of salt a day. If you have hypertension, you may need to reduce your sodium intake to 1,500 mg a day.  · Work with your health care provider to maintain a healthy body weight or to lose weight. Ask what an ideal weight is for you.  · Get at least 30 minutes of exercise that causes your heart to beat faster (aerobic exercise) most days of the week. Activities may include walking,  swimming, or biking.  · Work with your health care provider or dietitian to adjust your eating plan to your individual calorie needs.  What foods should I eat?  Fruits  All fresh, dried, or frozen fruit. Canned fruit in natural juice (without added sugar).  Vegetables  Fresh or frozen vegetables (raw, steamed, roasted, or grilled). Low-sodium or reduced-sodium tomato and vegetable juice. Low-sodium or reduced-sodium tomato sauce and tomato paste. Low-sodium or reduced-sodium canned vegetables.  Grains  Whole-grain or whole-wheat bread. Whole-grain or whole-wheat pasta. Brown rice. Oatmeal. Quinoa. Bulgur. Whole-grain and low-sodium cereals. Luz bread. Low-fat, low-sodium crackers. Whole-wheat flour tortillas.  Meats and other proteins  Skinless chicken or turkey. Ground chicken or turkey. Pork with fat trimmed off. Fish and seafood. Egg whites. Dried beans, peas, or lentils. Unsalted nuts, nut butters, and seeds. Unsalted canned beans. Lean cuts of beef with fat trimmed off. Low-sodium, lean precooked or cured meat, such as sausages or meat loaves.  Dairy  Low-fat (1%) or fat-free (skim) milk. Reduced-fat, low-fat, or fat-free cheeses. Nonfat, low-sodium ricotta or cottage cheese. Low-fat or nonfat yogurt. Low-fat, low-sodium cheese.  Fats and oils  Soft margarine without trans fats. Vegetable oil. Reduced-fat, low-fat, or light mayonnaise and salad dressings (reduced-sodium). Canola, safflower, olive, avocado, soybean, and sunflower oils. Avocado.  Seasonings and condiments  Herbs. Spices. Seasoning mixes without salt.  Other foods  Unsalted popcorn and pretzels. Fat-free sweets.  The items listed above may not be a complete list of foods and beverages you can eat. Contact a dietitian for more information.  What foods should I avoid?  Fruits  Canned fruit in a light or heavy syrup. Fried fruit. Fruit in cream or butter sauce.  Vegetables  Creamed or fried vegetables. Vegetables in a cheese sauce. Regular canned  vegetables (not low-sodium or reduced-sodium). Regular canned tomato sauce and paste (not low-sodium or reduced-sodium). Regular tomato and vegetable juice (not low-sodium or reduced-sodium). Pickles. Olives.  Grains  Baked goods made with fat, such as croissants, muffins, or some breads. Dry pasta or rice meal packs.  Meats and other proteins  Fatty cuts of meat. Ribs. Fried meat. [place]. Bologna, salami, and other precooked or cured meats, such as sausages or meat loaves. Fat from the back of a pig (fatback). Bratwurst. Salted nuts and seeds. Canned beans with added salt. Canned or smoked fish. Whole eggs or egg yolks. Chicken or turkey with skin.  Dairy  Whole or 2% milk, cream, and half-and-half. Whole or full-fat cream cheese. Whole-fat or sweetened yogurt. Full-fat cheese. Nondairy creamers. Whipped toppings. Processed cheese and cheese spreads.  Fats and oils  Butter. Stick margarine. Lard. Shortening. Ghee. [place]ara fat. Tropical oils, such as coconut, palm kernel, or palm oil.  Seasonings and condiments  Onion salt, garlic salt, seasoned salt, table salt, and sea salt. Worcestershire sauce. Tartar sauce. Barbecue sauce. Teriyaki sauce. Soy sauce, including reduced-sodium. Steak sauce. Canned and packaged gravies. Fish sauce. Oyster sauce. Cocktail sauce. Store-bought horseradish. Ketchup. Mustard. Meat flavorings and tenderizers. Bouillon cubes. Hot sauces. Pre-made or packaged marinades. Pre-made or packaged taco seasonings. Relishes. Regular salad dressings.  Other foods  Salted popcorn and pretzels.  The items listed above may not be a complete list of foods and beverages you should avoid. Contact a dietitian for more information.  Where to find more information  · National Heart, Lung, and Blood [place]: www.nhlbi.nih.gov  · American Heart Association: www.heart.org  · Academy of Nutrition and Dietetics: www.eatright.org  · National Kidney Foundation: www.kidney.org  Summary  · The DASH eating plan is a  healthy eating plan that has been shown to reduce high blood pressure (hypertension). It may also reduce your risk for type 2 diabetes, heart disease, and stroke.  · When on the DASH eating plan, aim to eat more fresh fruits and vegetables, whole grains, lean proteins, low-fat dairy, and heart-healthy fats.  · With the DASH eating plan, you should limit salt (sodium) intake to 2,300 mg a day. If you have hypertension, you may need to reduce your sodium intake to 1,500 mg a day.  · Work with your health care provider or dietitian to adjust your eating plan to your individual calorie needs.  This information is not intended to replace advice given to you by your health care provider. Make sure you discuss any questions you have with your health care provider.  Document Revised: 11/20/2020 Document Reviewed: 11/20/2020  Elsevier Patient Education © 2021 [employer]

## 2021-11-10 DIAGNOSIS — Z91.09 ENVIRONMENTAL ALLERGIES: ICD-10-CM

## 2021-11-10 RX ORDER — ESTRADIOL 0.5 MG/1
0.5 TABLET ORAL DAILY
Qty: 90 TABLET | Refills: 1 | Status: SHIPPED | OUTPATIENT
Start: 2021-11-10 | End: 2022-03-03 | Stop reason: SDUPTHER

## 2021-11-10 RX ORDER — FLUTICASONE PROPIONATE 50 MCG
SPRAY, SUSPENSION (ML) NASAL
Qty: 1 EACH | Refills: 3 | Status: SHIPPED | OUTPATIENT
Start: 2021-11-10 | End: 2022-03-04

## 2021-11-10 NOTE — TELEPHONE ENCOUNTER
Salud Funez called to request a refill on her medication.       Last office visit : 9/13/2021   Next office visit : 12/9/2021     Requested Prescriptions     Pending Prescriptions Disp Refills    fluticasone (FLONASE) 50 MCG/ACT nasal spray       Sig: USE 2 SPRAYS IN [facility] NOSTRIL DAILY            Mar[place] Kate, [address]

## 2021-11-16 ENCOUNTER — OFFICE VISIT (OUTPATIENT)
Dept: URGENT CARE | Age: 61
End: 2021-11-16
Payer: MEDICARE

## 2021-11-16 VITALS
TEMPERATURE: 97.6 F | WEIGHT: 184.2 LBS | OXYGEN SATURATION: 99 % | RESPIRATION RATE: 22 BRPM | BODY MASS INDEX: 33.9 KG/M2 | HEIGHT: 62 IN | DIASTOLIC BLOOD PRESSURE: 80 MMHG | SYSTOLIC BLOOD PRESSURE: 127 MMHG | HEART RATE: 88 BPM

## 2021-11-16 DIAGNOSIS — L30.9 DERMATITIS: Primary | ICD-10-CM

## 2021-11-16 PROCEDURE — 96372 THER/PROPH/DIAG INJ SC/IM: CPT | Performed by: NURSE PRACTITIONER

## 2021-11-16 PROCEDURE — 99213 OFFICE O/P EST LOW 20 MIN: CPT | Performed by: NURSE PRACTITIONER

## 2021-11-16 RX ORDER — PREDNISONE 10 MG/1
TABLET ORAL
Qty: 42 TABLET | Refills: 0 | Status: SHIPPED | OUTPATIENT
Start: 2021-11-17 | End: 2021-12-09 | Stop reason: ALTCHOICE

## 2021-11-16 RX ORDER — DEXAMETHASONE SODIUM PHOSPHATE 10 MG/ML
10 INJECTION INTRAMUSCULAR; INTRAVENOUS ONCE
Status: COMPLETED | OUTPATIENT
Start: 2021-11-16 | End: 2021-11-16

## 2021-11-16 RX ADMIN — DEXAMETHASONE SODIUM PHOSPHATE 10 MG: 10 INJECTION INTRAMUSCULAR; INTRAVENOUS at 17:47

## 2021-11-16 NOTE — PROGRESS NOTES
[address] [facility]  [address] [place] [address] Dane Viramontes [address]  Dept: [phone]  Dept Fax: [phone]  Loc: [phone]    Mark Monroy is a 62 y.o. female who presents today for her medical conditions/complaintsas noted below. Salud Funez is c/o of Rash (Hip, underarms, back )        HPI:     Rash  This is a new problem. Episode onset: 5 days. The problem is unchanged. The affected locations include the groin (hips, arms, back). The rash is characterized by itchiness and redness. It is unknown (maybe plant) if there was an exposure to a precipitant. Pertinent negatives include no fever. Past treatments include nothing. The treatment provided no relief. Past Medical History:   Diagnosis Date    Allergic bronchitis     has wheezing related to dust and pollen    Allergic rhinitis     Asthma     Blepharitis of both eyes     Bone spur     cervical    Cervical disc disease     Chicken pox     age 26    Chronic anxiety 2006    has been on valium after failing effexor and other medications    Coccyx disorder     history of f/x    Constipation     chronic since a teen    Degenerative disc disease at L5-S1 level     Genital warts     Dr Rose Began GERD (gastroesophageal reflux disease)     Hemorrhoids     HPV in female     Hyperthyroidism     took radioactive iodine    Post menopausal syndrome 2010    Reactive airway disease with wheezing     Scoliosis     Unspecified vitamin D deficiency      Past Surgical History:   Procedure Laterality Date    CERVICAL DISCECTOMY      COLONOSCOPY  9/16/2011    WB?     COLONOSCOPY N/A 11/11/2016    Dr Lynsey Marcial AP (-) dysplasia x 1, BCM x 1--3 yr recall    COLONOSCOPY N/A 11/11/2019    Dr Antonio Colón, large grade 3 internal and external hemorrhoids, suboptimal prep, 3 yr recall    DILATION AND CURETTAGE OF UTERUS N/A 7/24/2020    DILATATION AND CURETTAGE HYSTEROSCOPY performed by Gagan Jefferson MD at [facility] N/A 2019    INTERNAL AND EXTERNAL HEMORRHOIDECTOMY performed by Aguilar Sumner MD at [address]      UPPER GASTROINTESTINAL ENDOSCOPY  3/3/2011     ? Family History   Problem Relation Age of Onset    Diabetes Sister     Thyroid Disease Sister     Rheum Arthritis Sister     Other Sister         fibromyalgia    Breast Cancer Sister 59    Rheum Arthritis Maternal Aunt     Colon Polyps Maternal Aunt     Heart Failure Father     Heart Disease Father          of acute MI at 58    Hypertension Mother     Cancer Mother         lung cancer    Colon Polyps Mother     High Blood Pressure Mother     Other Son         sleep apnea,anxiety and neck problems after  injuries    Breast Cancer Maternal Cousin 45    Colon Cancer Neg Hx     Esophageal Cancer Neg Hx     Stomach Cancer Neg Hx     Liver Cancer Neg Hx     Liver Disease Neg Hx     Rectal Cancer Neg Hx        Social History     Tobacco Use    Smoking status: Former Smoker     Packs/day: 2.00     Years: 25.00     Pack years: 50.00     Types: Cigarettes     Quit date: 2008     Years since quittin.6    Smokeless tobacco: Never Used    Tobacco comment: Quit smoking in  with Wellbutrin (had crazy dreams on it)   Substance Use Topics    Alcohol use: No     Alcohol/week: 0.0 standard drinks      Current Outpatient Medications   Medication Sig Dispense Refill    [START ON 2021] predniSONE (DELTASONE) 10 MG tablet Take 6 tablets for 2 days, 5 tabs for 2 days, 4 tabs for 2 days, 3 tabs for 2 days, 2 tabs for 2 days, 1 tab for 2 days.  42 tablet 0    estradiol (ESTRACE) 0.5 MG tablet Take 1 tablet by mouth daily 90 tablet 1    fluticasone (FLONASE) 50 MCG/ACT nasal spray USE 2 SPRAYS IN EACH NOSTRIL DAILY 1 each 3    pantoprazole (PROTONIX) 40 MG tablet TAKE 1 TABLET BY MOUTH EVERY MORNING BEFORE BREAKFAST 90 12/04/2021    A1C test (Diabetic or Prediabetic)  12/08/2021    TSH testing  12/08/2021    Shingles Vaccine (2 of 2) 12/17/2021    Breast cancer screen  03/25/2022    Colon cancer screen colonoscopy  11/11/2022    DTaP/Tdap/Td vaccine (2 - Td or Tdap) 01/23/2023    Cervical cancer screen  02/13/2025    Lipid screen  12/08/2025    Flu vaccine  Completed    Hepatitis C screen  Completed    HIV screen  Completed    Hepatitis A vaccine  Aged Out    Hepatitis B vaccine  Aged Out    Hib vaccine  Aged Out    Meningococcal (ACWY) vaccine  Aged Out    Pneumococcal 0-64 years Vaccine  Aged Out       Subjective:     Review of Systems   Constitutional: Negative for chills and fever. Skin: Positive for rash. All other systems reviewed and are negative.      :Objective      Physical Exam  Vitals and nursing note reviewed. Constitutional:       Appearance: Normal appearance. She is not ill-appearing. HENT:      Head: Normocephalic and atraumatic. Eyes:      Conjunctiva/sclera: Conjunctivae normal.      Pupils: Pupils are equal, round, and reactive to light. Musculoskeletal:         General: Normal range of motion. Skin:     General: Skin is warm and dry. Findings: Rash present. Rash is papular. Neurological:      Mental Status: She is alert and oriented to person, place, and time. Psychiatric:         Mood and Affect: Mood normal.         Behavior: Behavior normal.       /80   Pulse 88   Temp 97.6 °F (36.4 °C)   Resp 22   Ht 5' 2\" (1.575 m)   Wt 184 lb 3.2 oz (83.6 kg)   LMP 07/11/2009   SpO2 99%   BMI 33.69 kg/m²     :Assessment       Diagnosis Orders   1. Dermatitis  predniSONE (DELTASONE) 10 MG tablet       :Plan   1. Steroid IM   2. Start steroid pack tomorrow   3. If patient is not improving or developing any new/worsening symptoms then return to clinic as needed. Patient is to follow up with PCP as needed.       No orders of the defined types were placed in this encounter. No follow-ups on file. Orders Placed This Encounter   Medications    dexamethasone (DECADRON) injection 10 mg    predniSONE (DELTASONE) 10 MG tablet     Sig: Take 6 tablets for 2 days, 5 tabs for 2 days, 4 tabs for 2 days, 3 tabs for 2 days, 2 tabs for 2 days, 1 tab for 2 days. Dispense:  42 tablet     Refill:  0       Patient given educational materials- see patient instructions. Discussed use, benefit, and side effects of prescribedmedications. All patient questions answered. Pt voiced understanding. Patient Instructions       Patient Education        Dermatitis: Care Instructions  Your Care Instructions  Dermatitis is the general name used for any rash or inflammation of the skin. Different kinds of dermatitis cause different kinds of rashes. Common causes of a rash include new medicines, plants (such as poison oak or poison ivy), heat, and stress. Certain illnesses can also cause a rash. An allergic reaction to something that touches your skin, such as latex, nickel, or poison ivy, is called contact dermatitis. Contact dermatitis may also be caused by something that irritates the skin, such as bleach, a chemical, or soap. These types of rashes cannot be spread from person to person. How long your rash will last depends on what caused it. Rashes may last a few days or months. Follow-up care is a key part of your treatment and safety. Be sure to make and go to all appointments, and call your doctor if you are having problems. It's also a good idea to know your test results and keep a list of the medicines you take. How can you care for yourself at home? · Do not scratch the rash. Cut your nails short, and file them smooth. Or wear gloves if this helps keep you from scratching. · Wash the area with water only. Pat dry. · Put cold, wet cloths on the rash to reduce itching. · Keep cool, and stay out of the sun. · Leave the rash open to the air as much as possible.   · If the rash itches, use hydrocortisone cream. Follow the directions on the label. Calamine lotion may help for plant rashes. · Take an over-the-counter antihistamine, such as diphenhydramine (Benadryl) or loratadine (Claritin), to help calm the itching. Read and follow all instructions on the label. · If your doctor prescribed a cream, use it as directed. If your doctor prescribed medicine, take it exactly as directed. When should you call for help? Call your doctor now or seek immediate medical care if:    · You have symptoms of infection, such as:  ? Increased pain, swelling, warmth, or redness. ? Red streaks leading from the area. ? Pus draining from the area. ? A fever.     · You have joint pain along with the rash. Watch closely for changes in your health, and be sure to contact your doctor if:    · Your rash is changing or getting worse.     · You are not getting better as expected. Where can you learn more? Go to https://Get Real Health.Leaguevine. org and sign in to your [employer] account. Enter [phone] in the [employer] box to learn more about \"Dermatitis: Care Instructions. \"     If you do not have an account, please click on the \"Sign Up Now\" link. Current as of: March 3, 2021               Content Version: 13.0  © 3588-3025 Healthwise, Incorporated. Care instructions adapted under license by [facility] ([facility]). If you have questions about a medical condition or this instruction, always ask your healthcare professional. [place] [address] any warranty or liability for your use of this information. 1. Steroid IM   2. Start steroid pack tomorrow   3. If patient is not improving or developing any new/worsening symptoms then return to clinic as needed. Patient is to follow up with PCP as needed.            Electronically signed by ANGEL Askew on 11/16/2021 at 5:50 PM

## 2021-11-16 NOTE — PATIENT INSTRUCTIONS
Patient Education        Dermatitis: Care Instructions  Your Care Instructions  Dermatitis is the general name used for any rash or inflammation of the skin. Different kinds of dermatitis cause different kinds of rashes. Common causes of a rash include new medicines, plants (such as poison oak or poison ivy), heat, and stress. Certain illnesses can also cause a rash. An allergic reaction to something that touches your skin, such as latex, nickel, or poison ivy, is called contact dermatitis. Contact dermatitis may also be caused by something that irritates the skin, such as bleach, a chemical, or soap. These types of rashes cannot be spread from person to person. How long your rash will last depends on what caused it. Rashes may last a few days or months. Follow-up care is a key part of your treatment and safety. Be sure to make and go to all appointments, and call your doctor if you are having problems. It's also a good idea to know your test results and keep a list of the medicines you take. How can you care for yourself at home? · Do not scratch the rash. Cut your nails short, and file them smooth. Or wear gloves if this helps keep you from scratching. · Wash the area with water only. Pat dry. · Put cold, wet cloths on the rash to reduce itching. · Keep cool, and stay out of the sun. · Leave the rash open to the air as much as possible. · If the rash itches, use hydrocortisone cream. Follow the directions on the label. Calamine lotion may help for plant rashes. · Take an over-the-counter antihistamine, such as diphenhydramine (Benadryl) or loratadine (Claritin), to help calm the itching. Read and follow all instructions on the label. · If your doctor prescribed a cream, use it as directed. If your doctor prescribed medicine, take it exactly as directed. When should you call for help?    Call your doctor now or seek immediate medical care if:    · You have symptoms of infection, such as:  ? Increased pain, swelling, warmth, or redness. ? Red streaks leading from the area. ? Pus draining from the area. ? A fever.     · You have joint pain along with the rash. Watch closely for changes in your health, and be sure to contact your doctor if:    · Your rash is changing or getting worse.     · You are not getting better as expected. Where can you learn more? Go to https://Mainstream Data.easyOwn.it. org and sign in to your [employer] account. Enter [phone] in the [facility] box to learn more about \"Dermatitis: Care Instructions. \"     If you do not have an account, please click on the \"Sign Up Now\" link. Current as of: March 3, 2021               Content Version: 13.0  © 1646-4959 Healthwise, Incorporated. Care instructions adapted under license by [facility] . If you have questions about a medical condition or this instruction, always ask your healthcare professional. [place] [address] any warranty or liability for your use of this information. 1. Steroid IM   2. Start steroid pack tomorrow   3. If patient is not improving or developing any new/worsening symptoms then return to clinic as needed. Patient is to follow up with PCP as needed.

## 2021-11-28 ENCOUNTER — OFFICE VISIT (OUTPATIENT)
Dept: URGENT CARE | Age: 61
End: 2021-11-28
Payer: MEDICARE

## 2021-11-28 VITALS
DIASTOLIC BLOOD PRESSURE: 82 MMHG | HEIGHT: 62 IN | HEART RATE: 72 BPM | OXYGEN SATURATION: 99 % | TEMPERATURE: 97.1 F | BODY MASS INDEX: 33.9 KG/M2 | WEIGHT: 184.2 LBS | SYSTOLIC BLOOD PRESSURE: 142 MMHG

## 2021-11-28 DIAGNOSIS — R49.0 HOARSE VOICE QUALITY: ICD-10-CM

## 2021-11-28 DIAGNOSIS — J30.2 SEASONAL ALLERGIES: ICD-10-CM

## 2021-11-28 DIAGNOSIS — J02.9 SORE THROAT: Primary | ICD-10-CM

## 2021-11-28 LAB — S PYO AG THROAT QL: NORMAL

## 2021-11-28 PROCEDURE — 99213 OFFICE O/P EST LOW 20 MIN: CPT | Performed by: NURSE PRACTITIONER

## 2021-11-28 PROCEDURE — 87880 STREP A ASSAY W/OPTIC: CPT | Performed by: NURSE PRACTITIONER

## 2021-11-28 ASSESSMENT — ENCOUNTER SYMPTOMS
COLOR CHANGE: 0
NAUSEA: 0
SHORTNESS OF BREATH: 0
VOMITING: 0
CHEST TIGHTNESS: 0
ABDOMINAL PAIN: 0
SORE THROAT: 1
DIARRHEA: 0
COUGH: 0

## 2021-11-28 NOTE — PROGRESS NOTES
ChiefComplaint:   Chief Complaint   Patient presents with    Pharyngitis    Otalgia     right ear    Hoarse       History of Present Illness:  Minoo Boston is a 62 y.o. female who presents for evaluation of pharyngitis, right ear otalgia, and hoarse voice that started 3 days ago. She denies any cough, or fevers. She is alternating flonase and astelin, and is taking xyzal and singulair. She reports she was unable to get her allergy shots at Dr. Waldo Camacho office last week due to having a medrol dose pack for a rash that has cleared.      History:  Past Medical History:   Diagnosis Date    Allergic bronchitis     has wheezing related to dust and pollen    Allergic rhinitis     Asthma     Blepharitis of both eyes     Bone spur     cervical    Cervical disc disease     Chicken pox     age 26    Chronic anxiety     has been on valium after failing effexor and other medications    Coccyx disorder     history of f/x    Constipation     chronic since a teen    Degenerative disc disease at L5-S1 level     Genital warts     Dr Davie Osgood GERD (gastroesophageal reflux disease)     Hemorrhoids     HPV in female     Hyperthyroidism     took radioactive iodine    Post menopausal syndrome 2010    Reactive airway disease with wheezing     Scoliosis     Unspecified vitamin D deficiency        Family History   Problem Relation Age of Onset    Diabetes Sister     Thyroid Disease Sister     Rheum Arthritis Sister     Other Sister         fibromyalgia    Breast Cancer Sister 59    Rheum Arthritis Maternal Aunt     Colon Polyps Maternal Aunt     Heart Failure Father     Heart Disease Father          of acute MI at 58    Hypertension Mother     Cancer Mother         lung cancer    Colon Polyps Mother     High Blood Pressure Mother     Other Son         sleep apnea,anxiety and neck problems after  injuries    Breast Cancer Maternal Cousin 45    Colon Cancer Neg Hx     Esophageal Cancer Neg Hx     Stomach Cancer Neg Hx     Liver Cancer Neg Hx     Liver Disease Neg Hx     Rectal Cancer Neg Hx      Social History     Socioeconomic History    Marital status:      Spouse name: Not on file    Number of children: Not on file    Years of education: Not on file    Highest education level: Not on file   Occupational History    Not on file   Tobacco Use    Smoking status: Former Smoker     Packs/day: 2.00     Years: 25.00     Pack years: 50.00     Types: Cigarettes     Quit date: 2008     Years since quittin.6    Smokeless tobacco: Never Used    Tobacco comment: Quit smoking in  with Wellbutrin (had crazy dreams on it)   Vaping Use    Vaping Use: Never used   Substance and Sexual Activity    Alcohol use: No     Alcohol/week: 0.0 standard drinks    Drug use: No    Sexual activity: Not Currently     Birth control/protection: Surgical   Other Topics Concern    Not on file   Social History Narrative    Not on file     Social Determinants of Health     Financial Resource Strain: Low Risk     Difficulty of Paying Living Expenses: Not hard at all   Food Insecurity: No Food Insecurity    Worried About Running Out of Food in the Last Year: Never true    Griffin of Food in the Last Year: Never true   Transportation Needs:     Lack of Transportation (Medical): Not on file    Lack of Transportation (Non-Medical):  Not on file   Physical Activity:     Days of Exercise per Week: Not on file    Minutes of Exercise per Session: Not on file   Stress:     Feeling of Stress : Not on file   Social Connections:     Frequency of Communication with Friends and Family: Not on file    Frequency of Social Gatherings with Friends and Family: Not on file    Attends [employer] Services: Not on file    Active Member of Clubs or Organizations: Not on file    Attends Club or Organization Meetings: Not on file    Marital Status: Not on file   Intimate Partner Violence:     Fear of Current or Ex-Partner: Not on file    Emotionally Abused: Not on file    Physically Abused: Not on file    Sexually Abused: Not on file   Housing Stability:     Unable to Pay for Housing in the Last Year: Not on file    Number of Places Lived in the Last Year: Not on file    Unstable Housing in the Last Year: Not on file       Allergies: Allergies   Allergen Reactions    Pollen Extract      Early Spring and early Fall    Effexor [facility] Hydrochloride] Other (See Comments)     Slurred speech, feelings of having a TIA, seeing double, unaware of her surroundings    Poison Ivy Extract [Poison Ivy Extract] Itching    Nickel Itching       Medications:  Current Outpatient Medications on File Prior to Visit   Medication Sig Dispense Refill    predniSONE (DELTASONE) 10 MG tablet Take 6 tablets for 2 days, 5 tabs for 2 days, 4 tabs for 2 days, 3 tabs for 2 days, 2 tabs for 2 days, 1 tab for 2 days. 42 tablet 0    estradiol (ESTRACE) 0.5 MG tablet Take 1 tablet by mouth daily 90 tablet 1    fluticasone (FLONASE) 50 MCG/ACT nasal spray USE 2 SPRAYS IN EACH NOSTRIL DAILY 1 each 3    pantoprazole (PROTONIX) 40 MG tablet TAKE 1 TABLET BY MOUTH EVERY MORNING BEFORE BREAKFAST 90 tablet 1    Cyclobenzaprine HCl (FLEXERIL PO) Take 10 mg by mouth 3 times daily      clobetasol (TEMOVATE) 0.05 % ointment Apply topically 2 times daily.  30 g 0    montelukast (SINGULAIR) 10 MG tablet TAKE 1 TABLET BY MOUTH EVERY DAY AT NIGHT 90 tablet 1    SYNTHROID 112 MCG tablet TAKE 1 TABLET BY MOUTH EVERY DAY IN THE MORNING 90 tablet 1    FLUoxetine (PROZAC) 10 MG capsule TAKE 1 CAPSULE BY MOUTH EVERY DAY 30 capsule 5    medroxyPROGESTERone (PROVERA) 2.5 MG tablet TAKE 1 TABLET BY MOUTH EVERY DAY 90 tablet 3    levocetirizine (XYZAL) 5 MG tablet TAKE 1 TABLET BY MOUTH EVERY DAY 90 tablet 3    pseudoephedrine (DECONGESTANT) 30 MG tablet Take 1 tablet by mouth 3 times daily 30 tablet 3    azelastine (ASTELIN) 0.1 % nasal spray USE 2 SPRAYS IN EACH NOSTRIL TWICE A DAY AS DIRECTED 1 Bottle 5    vitamin D (CHOLECALCIFEROL) 125 MCG (5000 UT) CAPS capsule Take 5,000 Units by mouth daily      PROAIR  (90 Base) MCG/ACT inhaler INHALE 2 PUFFS INTO THE LUNGS EVERY 6 HOURS AS NEEDED FOR WHEEZING 8.5 Inhaler 1    EPINEPHrine (EPIPEN) 0.3 MG/0.3ML SOAJ injection USE AS DIRECTED FOR ACUTE  ALLERGIC REACTION  1    epinastine (ELESTAT) 0.05 % SOLN INSTILL 1 DROP IN EACH EYE ONE TO TWICE A DAY AS NEEDED  3    naproxen (NAPROXEN) 500 MG EC tablet Take 1 tablet by mouth every 12 hours Generic is okay 60 tablet 11    HYDROcodone-acetaminophen (NORCO)  MG per tablet Take 1 tablet by mouth every 8 hours as needed for Pain. No current facility-administered medications on file prior to visit. Review of Systems:  Review of Systems   Constitutional: Negative for activity change and fever. HENT: Positive for ear pain and sore throat. Negative for congestion. Respiratory: Negative for cough, chest tightness and shortness of breath. Cardiovascular: Negative for chest pain. Gastrointestinal: Negative for abdominal pain, diarrhea, nausea and vomiting. Genitourinary: Negative for frequency and urgency. Musculoskeletal: Negative for arthralgias and myalgias. Skin: Negative for color change. Allergic/Immunologic: Positive for environmental allergies. Neurological: Negative for dizziness, weakness and numbness. Psychiatric/Behavioral: Negative for agitation. The patient is not nervous/anxious. Vital Signs:  BP (!) 142/82   Pulse 72   Temp 97.1 °F (36.2 °C)   Ht 5' 2\" (1.575 m)   Wt 184 lb 3.2 oz (83.6 kg)   LMP 07/11/2009   SpO2 99%   BMI 33.69 kg/m²     Physical Exam:  Physical Exam  Vitals reviewed. Constitutional:       Appearance: Normal appearance. HENT:      Head: Normocephalic. Right Ear: Hearing, tympanic membrane and ear canal normal. No swelling or tenderness. No middle ear effusion. There is no impacted cerumen. Tympanic membrane is not injected. Left Ear: Hearing, tympanic membrane and ear canal normal. No swelling or tenderness. No middle ear effusion. There is no impacted cerumen. Tympanic membrane is not injected. Nose: Congestion present. Mouth/Throat:      Lips: Pink. Mouth: Mucous membranes are moist.      Pharynx: Oropharynx is clear. Eyes:      Extraocular Movements: Extraocular movements intact. Pupils: Pupils are equal, round, and reactive to light. Cardiovascular:      Rate and Rhythm: Normal rate and regular rhythm. Pulses: Normal pulses. Heart sounds: Normal heart sounds. Pulmonary:      Effort: Pulmonary effort is normal.      Breath sounds: Normal breath sounds. Abdominal:      General: Bowel sounds are normal.      Palpations: Abdomen is soft. Musculoskeletal:         General: Normal range of motion. Cervical back: Normal range of motion. Skin:     General: Skin is warm and dry. Neurological:      Mental Status: She is alert and oriented to person, place, and time. Psychiatric:         Mood and Affect: Mood normal.         Behavior: Behavior normal.          Assessment & Plan    1. Sore throat    - POCT rapid strep A    2. Hoarse voice quality      3. Seasonal allergies    1. Drink plenty of water  2. Continue taking xyzal, singulair, astelin, and flonase daily  3. May use delsym as needed for cough  4. Drink hot tea with honey   5. May use cough drops as needed. 7. Follow up with provider as needed    Return if symptoms worsen or fail to improve.

## 2021-12-09 ENCOUNTER — OFFICE VISIT (OUTPATIENT)
Dept: PRIMARY CARE CLINIC | Age: 61
End: 2021-12-09
Payer: MEDICARE

## 2021-12-09 VITALS
OXYGEN SATURATION: 97 % | DIASTOLIC BLOOD PRESSURE: 80 MMHG | SYSTOLIC BLOOD PRESSURE: 128 MMHG | BODY MASS INDEX: 34.04 KG/M2 | WEIGHT: 185 LBS | HEIGHT: 62 IN | HEART RATE: 73 BPM | TEMPERATURE: 97.6 F

## 2021-12-09 DIAGNOSIS — Z71.89 ADVANCE CARE PLANNING: ICD-10-CM

## 2021-12-09 DIAGNOSIS — L30.9 DERMATITIS: ICD-10-CM

## 2021-12-09 DIAGNOSIS — Z00.00 ROUTINE GENERAL MEDICAL EXAMINATION AT A HEALTH CARE FACILITY: Primary | ICD-10-CM

## 2021-12-09 DIAGNOSIS — E03.9 HYPOTHYROIDISM, UNSPECIFIED TYPE: ICD-10-CM

## 2021-12-09 DIAGNOSIS — Z13.6 SCREENING FOR CARDIOVASCULAR CONDITION: ICD-10-CM

## 2021-12-09 DIAGNOSIS — Z13.220 LIPID SCREENING: ICD-10-CM

## 2021-12-09 DIAGNOSIS — Z87.891 PERSONAL HISTORY OF TOBACCO USE: ICD-10-CM

## 2021-12-09 DIAGNOSIS — R73.09 ELEVATED GLUCOSE: ICD-10-CM

## 2021-12-09 DIAGNOSIS — B88.9 MITE INFESTATION: ICD-10-CM

## 2021-12-09 LAB — HBA1C MFR BLD: 5.9 %

## 2021-12-09 PROCEDURE — 99214 OFFICE O/P EST MOD 30 MIN: CPT | Performed by: NURSE PRACTITIONER

## 2021-12-09 PROCEDURE — G0296 VISIT TO DETERM LDCT ELIG: HCPCS | Performed by: NURSE PRACTITIONER

## 2021-12-09 PROCEDURE — 83036 HEMOGLOBIN GLYCOSYLATED A1C: CPT | Performed by: NURSE PRACTITIONER

## 2021-12-09 PROCEDURE — 99497 ADVNCD CARE PLAN 30 MIN: CPT | Performed by: NURSE PRACTITIONER

## 2021-12-09 PROCEDURE — G0447 BEHAVIOR COUNSEL OBESITY 15M: HCPCS | Performed by: NURSE PRACTITIONER

## 2021-12-09 PROCEDURE — G0446 INTENS BEHAVE THER CARDIO DX: HCPCS | Performed by: NURSE PRACTITIONER

## 2021-12-09 PROCEDURE — G0439 PPPS, SUBSEQ VISIT: HCPCS | Performed by: NURSE PRACTITIONER

## 2021-12-09 RX ORDER — CLOBETASOL PROPIONATE 0.5 MG/G
OINTMENT TOPICAL
Qty: 30 G | Refills: 0 | Status: SHIPPED | OUTPATIENT
Start: 2021-12-09 | End: 2022-04-24

## 2021-12-09 RX ORDER — PERMETHRIN 50 MG/G
CREAM TOPICAL ONCE
Qty: 60 G | Refills: 0 | Status: SHIPPED | OUTPATIENT
Start: 2021-12-09 | End: 2021-12-09

## 2021-12-09 RX ORDER — TRIAMCINOLONE ACETONIDE 55 UG/1
2 SPRAY, METERED NASAL DAILY
Qty: 1 EACH | Refills: 3 | Status: SHIPPED | OUTPATIENT
Start: 2021-12-09

## 2021-12-09 ASSESSMENT — PATIENT HEALTH QUESTIONNAIRE - PHQ9
1. LITTLE INTEREST OR PLEASURE IN DOING THINGS: 0
SUM OF ALL RESPONSES TO PHQ9 QUESTIONS 1 & 2: 0
SUM OF ALL RESPONSES TO PHQ QUESTIONS 1-9: 0
2. FEELING DOWN, DEPRESSED OR HOPELESS: 0

## 2021-12-09 ASSESSMENT — LIFESTYLE VARIABLES
HOW OFTEN DO YOU HAVE A DRINK CONTAINING ALCOHOL: NEVER
AUDIT TOTAL SCORE: INCOMPLETE
HOW OFTEN DO YOU HAVE A DRINK CONTAINING ALCOHOL: 0
AUDIT-C TOTAL SCORE: INCOMPLETE

## 2021-12-09 NOTE — PROGRESS NOTES
[address] [facility]  [address] [address] [address]  [address] [address]  Dept: [phone]  Dept Fax: [phone]  Loc: [phone]    Dominga Melendez is a 62 y.o. female who presents today for her medical conditions/complaints as noted below. Salud Lewis Notice is c/o of Medicare AWV      Chief Complaint   Patient presents with    Medicare AWV       HPI:     HPI   Patient is here for 2 separate visits including Medicare Annual Wellness Visit and acute/chronic issues including increased allergies and a rash to lower legs and abd. She reports having this rash intermittently for the last few weeks. See attached note for Medicare annual wellness visit. The below review of systems and physical exam applies to both encounters. Past Medical History:   Diagnosis Date    Allergic bronchitis     has wheezing related to dust and pollen    Allergic rhinitis     Asthma     Blepharitis of both eyes     Bone spur     cervical    Cervical disc disease     Chicken pox     age 26    Chronic anxiety 2006    has been on valium after failing effexor and other medications    Coccyx disorder     history of f/x    Constipation     chronic since a teen    Degenerative disc disease at L5-S1 level     Genital warts     Dr Juan Diego Ruff GERD (gastroesophageal reflux disease)     Hemorrhoids     HPV in female     Hyperthyroidism     took radioactive iodine    Post menopausal syndrome 2010    Reactive airway disease with wheezing     Scoliosis     Unspecified vitamin D deficiency         Past Surgical History:   Procedure Laterality Date    CERVICAL DISC ARTHROPLASTY      CERVICAL DISCECTOMY      COLONOSCOPY  9/16/2011    [facility]?     COLONOSCOPY N/A 11/11/2016    Dr Nicole Hicks AP (-) dysplasia x 1, BCM x 1--3 yr recall    COLONOSCOPY N/A 11/11/2019    Dr Sanchez Safe, large grade 3 internal and external hemorrhoids, suboptimal prep, 3 yr recall   [facility] DILATION AND CURETTAGE OF UTERUS N/A 2020    DILATATION AND CURETTAGE HYSTEROSCOPY performed by Elo Peng MD at [address] N/A 2019    INTERNAL AND EXTERNAL HEMORRHOIDECTOMY performed by Lizette Israel MD at [address] [address]      UPPER GASTROINTESTINAL ENDOSCOPY  3/3/2011     ?        Social History     Tobacco Use    Smoking status: Former Smoker     Packs/day: 2.00     Years: 25.00     Pack years: 50.00     Types: Cigarettes     Quit date: 2008     Years since quittin.7    Smokeless tobacco: Never Used    Tobacco comment: Quit smoking in  with Wellbutrin (had crazy dreams on it)   Substance Use Topics    Alcohol use: No     Alcohol/week: 0.0 standard drinks        Current Outpatient Medications   Medication Sig Dispense Refill    triamcinolone (NASACORT ALLERGY 24HR) 55 MCG/ACT nasal inhaler 2 sprays by Each Nostril route daily 1 each 3    estradiol (ESTRACE) 0.5 MG tablet Take 1 tablet by mouth daily 90 tablet 1    fluticasone (FLONASE) 50 MCG/ACT nasal spray USE 2 SPRAYS IN EACH NOSTRIL DAILY 1 each 3    pantoprazole (PROTONIX) 40 MG tablet TAKE 1 TABLET BY MOUTH EVERY MORNING BEFORE BREAKFAST 90 tablet 1    Cyclobenzaprine HCl (FLEXERIL PO) Take 10 mg by mouth 3 times daily      montelukast (SINGULAIR) 10 MG tablet TAKE 1 TABLET BY MOUTH EVERY DAY AT NIGHT 90 tablet 1    SYNTHROID 112 MCG tablet TAKE 1 TABLET BY MOUTH EVERY DAY IN THE MORNING 90 tablet 1    FLUoxetine (PROZAC) 10 MG capsule TAKE 1 CAPSULE BY MOUTH EVERY DAY 30 capsule 5    medroxyPROGESTERone (PROVERA) 2.5 MG tablet TAKE 1 TABLET BY MOUTH EVERY DAY 90 tablet 3    levocetirizine (XYZAL) 5 MG tablet TAKE 1 TABLET BY MOUTH EVERY DAY 90 tablet 3    azelastine (ASTELIN) 0.1 % nasal spray USE 2 SPRAYS IN EACH NOSTRIL TWICE A DAY AS DIRECTED 1 Bottle 5    vitamin D (CHOLECALCIFEROL) 125 MCG (5000 UT) CAPS capsule Take 5,000 Units by mouth daily      PROAIR  (90 Base) MCG/ACT inhaler INHALE 2 PUFFS INTO THE LUNGS EVERY 6 HOURS AS NEEDED FOR WHEEZING 8.5 Inhaler 1    EPINEPHrine (EPIPEN) 0.3 MG/0.3ML SOAJ injection USE AS DIRECTED FOR ACUTE  ALLERGIC REACTION  1    epinastine (ELESTAT) 0.05 % SOLN INSTILL 1 DROP IN EACH EYE ONE TO TWICE A DAY AS NEEDED  3    naproxen (NAPROXEN) 500 MG EC tablet Take 1 tablet by mouth every 12 hours Generic is okay 60 tablet 11    clobetasol (TEMOVATE) 0.05 % ointment APPLY TO AFFECTED AREA TWICE A DAY 30 g 0     No current facility-administered medications for this visit. Allergies   Allergen Reactions    Pollen Extract      Early Spring and early     Effexor [facility] Hydrochloride] Other (See Comments)     Slurred speech, feelings of having a TIA, seeing double, unaware of her surroundings    Poison Ivy Extract [Poison Ivy Extract] Itching    Nickel Itching       Family History   Problem Relation Age of Onset    Diabetes Sister     Thyroid Disease Sister     Rheum Arthritis Sister     Other Sister         fibromyalgia    Breast Cancer Sister 59    Rheum Arthritis Maternal Aunt     Colon Polyps Maternal Aunt     Heart Failure Father     Heart Disease Father          of acute MI at 58    Hypertension Mother     Cancer Mother         lung cancer    Colon Polyps Mother     High Blood Pressure Mother     Other Son         sleep apnea,anxiety and neck problems after  injuries    Breast Cancer Maternal Cousin 45    Colon Cancer Neg Hx     Esophageal Cancer Neg Hx     Stomach Cancer Neg Hx     Liver Cancer Neg Hx     Liver Disease Neg Hx     Rectal Cancer Neg Hx                Subjective:      Review of Systems   Constitutional: Negative. HENT: Negative. Eyes: Negative. Respiratory: Negative. Cardiovascular: Negative. Gastrointestinal: Negative. Endocrine: Negative. Genitourinary: Negative. Musculoskeletal: Negative.     Skin: Positive for rash.   Neurological: Negative. Hematological: Negative. Psychiatric/Behavioral: Negative. Objective:     Physical Exam  Vitals and nursing note reviewed. Constitutional:       Appearance: Normal appearance. HENT:      Head: Normocephalic and atraumatic. Jaw: There is normal jaw occlusion. Right Ear: Hearing, tympanic membrane, ear canal and external ear normal.      Left Ear: Hearing, tympanic membrane, ear canal and external ear normal.      Nose: Nose normal.      Mouth/Throat:      Lips: Pink. Mouth: Mucous membranes are moist.      Pharynx: Oropharynx is clear. Eyes:      General: Lids are normal.      Extraocular Movements: Extraocular movements intact. Conjunctiva/sclera: Conjunctivae normal.      Pupils: Pupils are equal, round, and reactive to light. Neck:      Thyroid: No thyromegaly. Trachea: Trachea normal.   Cardiovascular:      Rate and Rhythm: Normal rate and regular rhythm. Pulses: Normal pulses. Dorsalis pedis pulses are 2+ on the right side and 2+ on the left side. Posterior tibial pulses are 2+ on the right side and 2+ on the left side. Heart sounds: Normal heart sounds. No murmur heard. Pulmonary:      Effort: Pulmonary effort is normal.      Breath sounds: Normal breath sounds and air entry. Abdominal:      General: Bowel sounds are normal.      Palpations: Abdomen is soft. Musculoskeletal:      Cervical back: Full passive range of motion without pain, normal range of motion and neck supple. Thoracic back: Normal range of motion. Lumbar back: Normal range of motion. Right lower leg: No edema. Left lower leg: No edema. Lymphadenopathy:      Cervical: No cervical adenopathy. Skin:     General: Skin is warm and dry. Capillary Refill: Capillary refill takes less than 2 seconds. Findings: Rash present. Neurological:      General: No focal deficit present.       Mental Status: She is alert and oriented to person, place, and time. Mental status is at baseline. Psychiatric:         Attention and Perception: Attention normal.         Mood and Affect: Mood normal.         Speech: Speech normal.         Behavior: Behavior normal.         Thought Content: Thought content normal.         Cognition and Memory: Cognition normal.         Judgment: Judgment normal.         /80 (Site: Left Upper Arm, Position: Sitting)   Pulse 73   Temp 97.6 °F (36.4 °C)   Ht 5' 2\" (1.575 m)   Wt 185 lb (83.9 kg)   LMP 07/11/2009   SpO2 97%   BMI 33.84 kg/m²     Assessment:      Diagnosis Orders   1. Routine general medical examination at a health care facility     2. Advance care planning  NM ADVANCED CARE PLAN FACE TO [address] [facility], [address] [[id number]]   3. BMI 33.0-33.9,adult  NM Behavior  obesity 15m []   4. Screening for cardiovascular condition  NM Intens behave ther cardio dx, 15 minutes []   5. Mite infestation  permethrin (ELIMITE) 5 % cream   6. Hypothyroidism, unspecified type  CBC    Comprehensive Metabolic Panel    TSH WITH REFLEX TO FT4   7. Elevated glucose  POCT glycosylated hemoglobin (Hb A1C)   8. Lipid screening  Lipid, Fasting   9. Personal history of tobacco use  NM VISIT TO DISCUSS LUNG CA SCREEN W LDCT    CT Lung Screen (Annual)       Results for orders placed or performed in visit on 12/09/21   POCT glycosylated hemoglobin (Hb A1C)   Result Value Ref Range    Hemoglobin A1C 5.9 %       Plan:     Plan #1: Medicare annual wellness visit  See below note. The below encounter contains the health risk assessment questions and verbal and documented form. This is performed with provider in office and plan as noted below as well and instructions to patient verbally and written. Plan #2:  Rash being treated with elimite cream.  I believe maybe caused by mite from animals. A1c doing well.  nasocort for seasonal allergies. Checking labs -we will call with these results.       Return for Medicare Annual Wellness Visit in 1 year. Orders Placed This Encounter   Procedures    CT Lung Screen (Annual)     Age: Patient is 62 y.o. Smoking History: Social History    Tobacco Use      Smoking status: Former Smoker        Packs/day: 2.00        Years: 25.00        Pack years: 50        Types: Cigarettes        Quit date: 2008        Years since quittin.6      Smokeless tobacco: Never Used      Tobacco comment: Quit smoking in  with Wellbutrin (had crazy dreams on it)    Vaping Use      Vaping Use: Never used    Alcohol use: No      Alcohol/week: 0.0 standard drinks    Drug use: No   Pack years: 50    Date of last lung cancer screenin2019     Standing Status:   Future     Standing Expiration Date:   2023     Order Specific Question:   Is there documentation of shared decision making? Answer:   Yes     Order Specific Question:   Is this a low dose CT or a routine CT? Answer:   Low Dose CT [1]     Order Specific Question:   Is this the first (baseline) CT or an annual exam?     Answer: Annual [2]     Order Specific Question:   Does the patient show any signs or symptoms of lung cancer? Answer:   No     Order Specific Question:   Smoking Status? Answer: Former Smoker [4]     Order Specific Question:   Date quit smoking? (must be within 15 years)     Answer:   2008     Order Specific Question:   Smoking packs per day? Answer:   2     Order Specific Question:   Years smoking?      Answer:   22    CBC     Standing Status:   Future     Number of Occurrences:   1     Standing Expiration Date:   2022    Comprehensive Metabolic Panel     Standing Status:   Future     Number of Occurrences:   1     Standing Expiration Date:   2022    TSH WITH REFLEX TO FT4     Standing Status:   Future     Number of Occurrences:   1     Standing Expiration Date:   2022    Lipid, Fasting     Standing Status:   Future     Number of Occurrences:   1 Standing Expiration Date:   2022    POCT glycosylated hemoglobin (Hb A1C)    SC ADVANCED CARE PLAN FACE TO FACE, 1ST 30MIN [[id number]]    SC Behavior  obesity 15m []    SC Intens behave ther cardio dx, 15 minutes []    SC VISIT TO DISCUSS LUNG CA SCREEN W LDCT       Orders Placed This Encounter   Medications    permethrin (ELIMITE) 5 % cream     Sig: Apply topically once for 1 dose Apply topically as directed leave for 8 hours then rinse in shower. Can repeat in 14 days     Dispense:  60 g     Refill:  0    triamcinolone (NASACORT ALLERGY 24HR) 55 MCG/ACT nasal inhaler     Si sprays by Each Nostril route daily     Dispense:  1 each     Refill:  3            Patient offered educational handouts and has had all questions answered. Patient voices understanding and agrees to plans along with risks and benefits of plan. Patient is instructed to continue prior meds, diet, and exercise plans as instructed. Patient agrees to follow up as instructed and sooner if needed. Patient agrees to go to ER if condition becomes emergent. EMR Dragon/transcription disclaimer: Some of this encounter note is an electronic transcription/translation of spoken language to printed text. The electronic translation of spoken language may permit erroneous, or at times, nonsensical words or phrases to be inadvertently transcribed.  Although I have reviewed the note for such errors, some may still exist.    Electronically signed by ANGEL Frey on 2021 at 6:39 AM

## 2021-12-09 NOTE — PATIENT INSTRUCTIONS
(Maybe you're afraid of having pain, losing your independence, or being kept alive by machines.)  · Where would you prefer to die? (Your home? A hospital? A nursing home?)  · Do you want to donate your organs when you die? · Do you want certain Yazidism practices performed before you die? When should you call for help? Be sure to contact your doctor if you have any questions. Where can you learn more? Go to [email or web address]://[email or web address] org and sign in to your Moxie Jean account. Enter R264 in the Profig box to learn more about \"Advance Directives: Care Instructions. \"     If you do not have an account, please click on the \"Sign Up Now\" link. Current as of: March 17, 2021               Content Version: 13.0  © 4671-2394 [employer]. Care instructions adapted under license by [employer]. If you have questions about a medical condition or this instruction, always ask your healthcare professional. [place] [address] any warranty or liability for your use of this information. Learning About Living Perroy  What is a living will? A living will, also called a declaration, is a legal form. It tells your family and your doctor your wishes when you can't speak for yourself. It's used by the health professionals who will treat you as you near the end of your life or if you get seriously hurt or ill. If you put your wishes in writing, your loved ones and others will know what kind of care you want. They won't need to guess. This can ease your mind and be helpful to others. And you can change or cancel your living will at any time. A living will is not the same as an estate or property will. An estate will explains what you want to happen with your money and property after you die. How do you use it? A living will is used to describe the kinds of treatment or life support you want as you near the end of your life or if you get seriously hurt or ill. Keep these facts in mind about living stafford. · Your living will is used only if you can't speak or make decisions for yourself. Most often, one or more doctors must certify that you can't speak or decide for yourself before your living will takes effect. · If you get better and can speak for yourself again, you can accept or refuse any treatment. It doesn't matter what you said in your living will. · Some states may limit your right to refuse treatment in certain cases. For example, you may need to clearly state in your living will that you don't want artificial hydration and nutrition, such as being fed through a tube. Is a living will a legal document? A living will is a legal document. Each state has its own laws about living stafford. And a living will may be called something else in your state. Here are some things to know about living stafford. · You don't need an  to complete a living will. But legal advice can be helpful if your state's laws are unclear. It can also help if your health history is complicated or your family can't agree on what should be in your living will. · You can change your living will at any time. Some people find that their wishes about end-of-life care change as their health changes. If you make big changes to your living will, complete a new form. · If you move to another state, make sure that your living will is legal in the state where you now live. In most cases, doctors will respect your wishes even if you have a form from a different state. · You might use a universal form that has been approved by many states. This kind of form can sometimes be filled out and stored online. Your digital copy will then be available wherever you have a connection to the internet. The doctors and nurses who need to treat you can find it right away. · Your state may offer an online registry. This is another place where you can store your living will online.   · It's a good idea to get your living will notarized. This means using a person called a  to watch two people sign, or witness, your living will. What should you know when you create a living will? Here are some questions to ask yourself as you make your living will:  · Do you know enough about life support methods that might be used? If not, talk to your doctor so you know what might be done if you can't breathe on your own, your heart stops, or you can't swallow. · What things would you still want to be able to do after you receive life-support methods? Would you want to be able to walk? To speak? To eat on your own? To live without the help of machines? · Do you want certain Uatsdin practices performed if you become very ill? · If you have a choice, where do you want to be cared for? In your home? At a hospital or nursing home? · If you have a choice at the end of your life, where would you prefer to die? At home? In a hospital or nursing home? Somewhere else? · Would you prefer to be buried or cremated? · Do you want your organs to be donated after you die? What should you do with your living will? · Make sure that your family members and your health care agent have copies of your living will (also called a declaration). · Give your doctor a copy of your living will. Ask him or her to keep it as part of your medical record. If you have more than one doctor, make sure that each one has a copy. · Put a copy of your living will where it can be easily found. For example, some people may put a copy on their refrigerator door. If you are using a digital copy, be sure your doctor, family members, and health care agent know how to find and access it. Where can you learn more? Go to https://chpenavya.[email or web address] org and sign in to your [employer] account. Enter X454 in the CoSchedule box to learn more about \"Learning About Living Leticia Lehman. \"     If you do not have an account, please click on the \"Sign Up Now\" link. Current as of: March 17, 2021               Content Version: 13.0  © 2006-2021 Healthwise, [employer]. Care instructions adapted under license by [facility] ([facility]). If you have questions about a medical condition or this instruction, always ask your healthcare professional. [address] any warranty or liability for your use of this information. Body Mass Index: Care Instructions  Your Care Instructions     Body mass index (BMI) can help you see if your weight is raising your risk for health problems. It uses a formula to compare how much you weigh with how tall you are. · A BMI lower than 18.5 is considered underweight. · A BMI between 18.5 and 24.9 is considered healthy. · A BMI between 25 and 29.9 is considered overweight. A BMI of 30 or higher is considered obese. If your BMI is in the normal range, it means that you have a lower risk for weight-related health problems. If your BMI is in the overweight or obese range, you may be at increased risk for weight-related health problems, such as high blood pressure, heart disease, stroke, arthritis or joint pain, and diabetes. If your BMI is in the underweight range, you may be at increased risk for health problems such as fatigue, lower protection (immunity) against illness, muscle loss, bone loss, hair loss, and hormone problems. BMI is just one measure of your risk for weight-related health problems. You may be at higher risk for health problems if you are not active, you eat an unhealthy diet, or you drink too much alcohol or use tobacco products. Follow-up care is a key part of your treatment and safety. Be sure to make and go to all appointments, and call your doctor if you are having problems. It's also a good idea to know your test results and keep a list of the medicines you take. How can you care for yourself at home? · Practice healthy eating habits.  This includes eating plenty of fruits, vegetables, whole condition or this instruction, always ask your healthcare professional. [address] any warranty or liability for your use of this information. Eating Healthy Foods: Care Instructions  Your Care Instructions     Eating healthy foods can help lower your risk for disease. Healthy food gives you energy and keeps your heart strong, your brain active, your muscles working, and your bones strong. A healthy diet includes a variety of foods from the basic food groups: grains, vegetables, fruits, milk and milk products, and meat and beans. Some people may eat more of their favorite foods from only one food group and, as a result, miss getting the nutrients they need. So, it is important to pay attention not only to what you eat but also to what you are missing from your diet. You can eat a healthy, balanced diet by making a few small changes. Follow-up care is a key part of your treatment and safety. Be sure to make and go to all appointments, and call your doctor if you are having problems. It's also a good idea to know your test results and keep a list of the medicines you take. How can you care for yourself at home? Look at what you eat  · Keep a food diary for a week or two and record everything you eat or drink. Track the number of servings you eat from each food group. · For a balanced diet every day, eat a variety of:  ? 6 or more ounce-equivalents of grains, such as cereals, breads, crackers, rice, or pasta, every day. An ounce-equivalent is 1 slice of bread, 1 cup of ready-to-eat cereal, or ½ cup of cooked rice, cooked pasta, or cooked cereal.  ? 2½ cups of vegetables, especially:  § Dark-green vegetables such as broccoli and spinach. § Orange vegetables such as carrots and sweet potatoes. § Dry beans (such as llamas and kidney beans) and peas (such as lentils). ? 2 cups of fresh, frozen, or canned fruit. A small apple or 1 banana or orange equals 1 cup.   ? 3 cups of nonfat or low-fat milk, yogurt, or other milk products. ? 5½ ounces of meat and beans, such as chicken, fish, lean meat, beans, nuts, and seeds. One egg, 1 tablespoon of peanut butter, ½ ounce nuts or seeds, or ¼ cup of cooked beans equals 1 ounce of meat. · Learn how to read food labels for serving sizes and ingredients. Fast-food and convenience-food meals often contain few or no fruits or vegetables. Make sure you eat some fruits and vegetables to make the meal more nutritious. · Look at your food diary. For each food group, add up what you have eaten and then divide the total by the number of days. This will give you an idea of how much you are eating from each food group. See if you can find some ways to change your diet to make it more healthy. Start small  · Do not try to make dramatic changes to your diet all at once. You might feel that you are missing out on your favorite foods and then be more likely to fail. · Start slowly, and gradually change your habits. Try some of the following:  ? Use whole wheat bread instead of white bread. ? Use nonfat or low-fat milk instead of whole milk. ? Eat brown rice instead of white rice, and eat whole wheat pasta instead of white-flour pasta. ? Try low-fat cheeses and low-fat yogurt. ? Add more fruits and vegetables to meals and have them for snacks. ? Add lettuce, tomato, cucumber, and onion to sandwiches. ? Add fruit to yogurt and cereal.  Enjoy food  · You can still eat your favorite foods. You just may need to eat less of them. If your favorite foods are high in fat, salt, and sugar, limit how often you eat them, but do not cut them out entirely. · Eat a wide variety of foods. Make healthy choices when eating out  · The type of restaurant you choose can help you make healthy choices. Even fast-food chains are now offering more low-fat or healthier choices on the menu. · Choose smaller portions, or take half of your meal home. · When eating out, try:  ?  A veggie pizza with a whole wheat crust or grilled chicken (instead of sausage or pepperoni). ? Pasta with roasted vegetables, grilled chicken, or marinara sauce instead of cream sauce. ? A vegetable wrap or grilled chicken wrap. ? Broiled or poached food instead of fried or breaded items. Make healthy choices easy  · Buy packaged, prewashed, ready-to-eat fresh vegetables and fruits, such as baby carrots, salad mixes, and chopped or shredded broccoli and cauliflower. · Buy packaged, presliced fruits, such as melon or pineapple. · Choose 100% fruit or vegetable juice instead of soda. Limit juice intake to 4 to 6 oz (½ to ¾ cup) a day. · Blend low-fat yogurt, fruit juice, and canned or frozen fruit to make a smoothie for breakfast or a snack. Where can you learn more? Go to https://ElementsLocalpeProject Dance.Evgen. org and sign in to your [employer] account. Enter X823 in the [employer] box to learn more about \"Eating Healthy Foods: Care Instructions. \"     If you do not have an account, please click on the \"Sign Up Now\" link. Current as of: December 17, 2020               Content Version: 13.0  © 9830-3182 Healthwise, Incorporated. Care instructions adapted under license by [facility] ([facility]). If you have questions about a medical condition or this instruction, always ask your healthcare professional. [place] [address] any warranty or liability for your use of this information. Personalized Preventive Plan for Salud Funez - 12/9/2021  Medicare offers a range of preventive health benefits. Some of the tests and screenings are paid in full while other may be subject to a deductible, co-insurance, and/or copay. Some of these benefits include a comprehensive review of your medical history including lifestyle, illnesses that may run in your family, and various assessments and screenings as appropriate.     After reviewing your medical record and screening and assessments performed today your provider may have ordered immunizations, labs, imaging, and/or referrals for you. A list of these orders (if applicable) as well as your Preventive Care list are included within your After Visit Summary for your review. Other Preventive Recommendations:    · A preventive eye exam performed by an eye specialist is recommended every 1-2 years to screen for glaucoma; cataracts, macular degeneration, and other eye disorders. · A preventive dental visit is recommended every 6 months. · Try to get at least 150 minutes of exercise per week or 10,000 steps per day on a pedometer . · Order or download the FREE \"Exercise & Physical Activity: Your Everyday Guide\" from [employer]. Call 5-944.921.3620 or search [employer] online. · You need 4774-5693 mg of calcium and 2520-8152 IU of vitamin D per day. It is possible to meet your calcium requirement with diet alone, but a vitamin D supplement is usually necessary to meet this goal.  · When exposed to the sun, use a sunscreen that protects against both UVA and UVB radiation with an SPF of 30 or greater. Reapply every 2 to 3 hours or after sweating, drying off with a towel, or swimming. · Always wear a seat belt when traveling in a car. Always wear a helmet when riding a bicycle or motorcycle. What is lung cancer screening? Lung cancer screening is a way in which doctors check the lungs for early signs of cancer in people who have no symptoms of lung cancer. A low-dose CT scan uses much less radiation than a normal CT scan and shows a more detailed image of the lungs than a standard X-ray. The goal of lung cancer screening is to find cancer early, before it has a chance to grow, spread, or cause problems. One large study found that smokers who were screened with low-dose CT scans were less likely to die of lung cancer than those who were screened with standard X-ray.     Below is a summary of the things you need to know regarding screening for lung cancer with low-dose computed tomography (LDCT). This is a screening program that involves routine annual screening with LDCT studies of the lung. The LDCTs are done using low-dose radiation that is not thought to increase your cancer risk. If you have other serious medical conditions (other cancers, congestive heart failure) that limit your life expectancy to less than 10 years, you should not undergo lung cancer screening with LDCT. The chance is 20%-60% that the LDCT result will show abnormalities. This would require additional testing which could include repeat imaging or even invasive procedures. Most (about 95%) of \"abnormal\" LDCT results are false in the sense that no lung cancer is ultimately found. Additionally, some (about 10%) of the cancers found would not affect your life expectancy, even if undetected and untreated. If you are still smoking, the single most important thing that you can do to reduce your risk of dying of lung cancer is to quit. For this screening to be covered by Medicare and most other insurers, strict criteria must be met. If you do not meet these criteria, but still wish to undergo LDCT testing, you will be required to sign a waiver indicating your willingness to pay for the scan.

## 2021-12-09 NOTE — PROGRESS NOTES
Medicare Annual Wellness Visit  Name: Bal Vidal Date: 2021   MRN: [id number] Sex: Female   Age: 62 y.o. Ethnicity: Non- / Non    : 1960 Race: White (non-)      Salud Funez is here for Medicare AWV    Screenings for behavioral, psychosocial and functional/safety risks, and cognitive dysfunction are all negative except as indicated below. These results, as well as other patient data from the [address] [facility] form, are documented in Flowsheets linked to this Encounter. Allergies   Allergen Reactions    Pollen Extract      Early Spring and early     Effexor [facility] Hydrochloride] Other (See Comments)     Slurred speech, feelings of having a TIA, seeing double, unaware of her surroundings    Poison Ivy Extract [Poison Ivy Extract] Itching    Nickel Itching         Prior to Visit Medications    Medication Sig Taking? Authorizing Provider   permethrin (ELIMITE) 5 % cream Apply topically once for 1 dose Apply topically as directed leave for 8 hours then rinse in shower. Can repeat in 14 days Yes ANGEL Bingham   estradiol (ESTRACE) 0.5 MG tablet Take 1 tablet by mouth daily Yes ANGEL Hong CNP   fluticasone (FLONASE) 50 MCG/ACT nasal spray USE 2 SPRAYS IN EACH NOSTRIL DAILY Yes ANGEL Bingham   pantoprazole (PROTONIX) 40 MG tablet TAKE 1 TABLET BY MOUTH EVERY MORNING BEFORE BREAKFAST Yes ANGEL Bingham   Cyclobenzaprine HCl (FLEXERIL PO) Take 10 mg by mouth 3 times daily Yes Historical Provider, MD   clobetasol (TEMOVATE) 0.05 % ointment Apply topically 2 times daily.  Yes ANGEL Brody CNP   montelukast (SINGULAIR) 10 MG tablet TAKE 1 TABLET BY MOUTH EVERY DAY AT NIGHT Yes ANGEL Bingham   SYNTHROID 112 MCG tablet TAKE 1 TABLET BY MOUTH EVERY DAY IN THE MORNING Yes ANGEL Bingham   FLUoxetine (PROZAC) 10 MG capsule TAKE 1 CAPSULE BY MOUTH EVERY DAY Yes ANGEL Bingham   medroxyPROGESTERone (PROVERA) 2.5 MG tablet TAKE 1 TABLET BY MOUTH EVERY DAY Yes ANGEL Mckeon - CNP   levocetirizine (XYZAL) 5 MG tablet TAKE 1 TABLET BY MOUTH EVERY DAY Yes ANGEL Marroquin   pseudoephedrine (DECONGESTANT) 30 MG tablet Take 1 tablet by mouth 3 times daily Yes Brooks Nash PA-C   azelastine (ASTELIN) 0.1 % nasal spray USE 2 SPRAYS IN EACH NOSTRIL TWICE A DAY AS DIRECTED Yes ANGEL Cool   vitamin D (CHOLECALCIFEROL) 125 MCG (5000 UT) CAPS capsule Take 5,000 Units by mouth daily Yes Historical Provider, MD   PROAIR  (90 Base) MCG/ACT inhaler INHALE 2 PUFFS INTO THE LUNGS EVERY 6 HOURS AS NEEDED FOR WHEEZING Yes Sybil Zavala MD   EPINEPHrine (EPIPEN) 0.3 MG/0.3ML SOAJ injection USE AS DIRECTED FOR ACUTE  ALLERGIC REACTION Yes Historical Provider, MD   epinastine (ELESTAT) 0.05 % SOLN INSTILL 1 DROP IN EACH EYE ONE TO TWICE A DAY AS NEEDED Yes Historical Provider, MD   naproxen (NAPROXEN) 500 MG EC tablet Take 1 tablet by mouth every 12 hours Generic is okay Yes Sybil Zavala MD         Past Medical History:   Diagnosis Date    Allergic bronchitis     has wheezing related to dust and pollen    Allergic rhinitis     Asthma     Blepharitis of both eyes     Bone spur     cervical    Cervical disc disease     Chicken pox     age 25    Chronic anxiety 2006    has been on valium after failing effexor and other medications    Coccyx disorder     history of f/x    Constipation     chronic since a teen    Degenerative disc disease at L5-S1 level     Genital warts     Dr Eugenia Gudino GERD (gastroesophageal reflux disease)     Hemorrhoids     HPV in female     Hyperthyroidism     took radioactive iodine    Post menopausal syndrome 2010    Reactive airway disease with wheezing     Scoliosis     Unspecified vitamin D deficiency        Past Surgical History:   Procedure Laterality Date    CERVICAL DISC ARTHROPLASTY      CERVICAL DISCECTOMY      COLONOSCOPY  9/16/2011    [facility]? Kevin Pean COLONOSCOPY N/A 2016    Dr Margarita Vega AP (-) dysplasia x 1, BCM x 1--3 yr recall    COLONOSCOPY N/A 2019    Dr Ryan Veronica, large grade 3 internal and external hemorrhoids, suboptimal prep, 3 yr recall    DILATION AND CURETTAGE OF UTERUS N/A 2020    DILATATION AND CURETTAGE HYSTEROSCOPY performed by Nigel Izquierdo MD at [address] N/A 2019    INTERNAL AND EXTERNAL HEMORRHOIDECTOMY performed by Dang Trivedi MD at [address] [facility] GASTROINTESTINAL ENDOSCOPY  3/3/2011     ?          Family History   Problem Relation Age of Onset    Diabetes Sister     Thyroid Disease Sister     Rheum Arthritis Sister     Other Sister         fibromyalgia    Breast Cancer Sister 59    Rheum Arthritis Maternal Aunt     Colon Polyps Maternal Aunt     Heart Failure Father     Heart Disease Father          of acute MI at 58    Hypertension Mother     Cancer Mother         lung cancer    Colon Polyps Mother     High Blood Pressure Mother     Other Son         sleep apnea,anxiety and neck problems after  injuries    Breast Cancer Maternal Cousin 45    Colon Cancer Neg Hx     Esophageal Cancer Neg Hx     Stomach Cancer Neg Hx     Liver Cancer Neg Hx     Liver Disease Neg Hx     Rectal Cancer Neg Hx        CareTeam (Including outside providers/suppliers regularly involved in providing care):   Patient Care Team:  ANGEL Sheppard as PCP - General (Nurse Practitioner Family)  ANGEL Sheppard [facility]  ANGEL Layne as Advanced Practice Nurse (Family Nurse Practitioner)    Wt Readings from Last 3 Encounters:   21 185 lb (83.9 kg)   21 184 lb 3.2 oz (83.6 kg)   21 184 lb 3.2 oz (83.6 kg)     Vitals:    21 1031   BP: 128/80   Site: Left Upper Arm   Position: Sitting   Pulse: 73   Temp: 97.6 °F (36.4 °C)   SpO2: 97%   Weight: 185 lb older) 09/13/2021    Influenza, Rudean Heman, IM, (6 mo and older Fluzone, Flulaval, Fluarix and 3 yrs and older Afluria) 02/02/2018    Influenza, Quadv, IM, PF (6 mo and older Fluzone, Flulaval, Fluarix, and 3 yrs and older Afluria) 09/20/2018    Pneumococcal Conjugate 13-valent ([id number]) 03/15/2017    Pneumococcal Polysaccharide ([id number]) 03/19/2018    Td, unspecified formulation 01/15/2013    Tdap (Boostrix, Adacel) 01/23/2013    Zoster Recombinant (Shingrix) 10/22/2021        Health Maintenance   Topic Date Due    Low dose CT lung screening  06/26/2021    Annual Wellness Visit (AWV)  12/04/2021    TSH testing  12/08/2021    A1C test (Diabetic or Prediabetic)  12/08/2021    Shingles Vaccine (2 of 2) 12/17/2021    COVID-19 Vaccine (3 - Booster for Moderna series) 01/20/2022    Breast cancer screen  03/25/2022    Colon cancer screen colonoscopy  11/11/2022    DTaP/Tdap/Td vaccine (2 - Td or Tdap) 01/23/2023    Cervical cancer screen  02/13/2025    Lipid screen  12/08/2025    Flu vaccine  Completed    Hepatitis C screen  Completed    HIV screen  Completed    Hepatitis A vaccine  Aged Out    Hepatitis B vaccine  Aged Out    Hib vaccine  Aged Out    Meningococcal (ACWY) vaccine  Aged Out    Pneumococcal 0-64 years Vaccine  Aged Out     Recommendations for [employer] Due: see orders and patient instructions/AVS.  . Recommended screening schedule for the next 5-10 years is provided to the patient in written form: see Patient Ita Bauer was seen today for medicare awv.     Diagnoses and all orders for this visit:    Routine general medical examination at a health care facility    Advance care planning  -     [address], [address] [[id number]]    BMI 33.0-33.9,adult  -     OR Behavior  obesity 15m []    Screening for cardiovascular condition  -     OR Intens behave ther cardio dx, 15 minutes []    Mite infestation  -     permethrin (ELIMITE) 5 % cream; Apply topically once for 1 dose Apply topically as directed leave for 8 hours then rinse in shower. Can repeat in 14 days                   Advance Care Planning   Advanced Care Planning: Discussed the patients choices for care and treatment in case of a health event that adversely affects decision-making abilities. Also discussed the patients long-term treatment options. Reviewed the process of designating a Health Care Surrogate as defined by the state of [place]. Reviewed the [facility] Will Directive process and the kinds of life-sustaining treatments the patient would like to receive should they become terminally ill or permanently unconscious. Explained the state requirement to complete the forms in the presence of two eligible witnesses OR in the presence of a . Patient was asked to provide a copy of the signed forms to the practice office. Time spent (minutes): 30     Obesity Counseling: Assessed behavioral health risks and factors affecting choice of behavior. Suggested weight control approaches, including dietary changes behavioral modification and follow up plan. Provided educational and support documentation. Time spent (minutes): 15    Cardiovascular Disease Risk Counseling: Assessed the patient's risk to develop cardiovascular disease and reviewed main risk factors.    Reviewed steps to reduce disease risk including:   · Quitting tobacco use, reducing amount smoked, or not starting the habit  · Making healthy food choices  · Being physically active and gradualy increasing activity levels   · Reduce weight and determine a healthy BMI goal  · Monitor blood pressure and treat if higher than 140/90 mmHg  · Maintain blood total cholesterol levels under 5 mmol/l or 190 mg/dl  · Maintain LDL cholesterol levels under 3.0 mmol/l or 115 mg/dl   · Control blood glucose levels  · Consider taking aspirin (75 mg daily), once blood pressure is controlled   Provided a follow up plan.  Time spent (minutes): 30    Low Dose CT (LDCT) Lung Screening criteria met:     Age 55-77(Medicare) or 50-80 ([facility])   Pack year smoking >30 (Medicare) or >20 ([facility])   Still smoking or less than 15 year since quit   No sign or symptoms of lung cancer   > 11 months since last LDCT     Risks and benefits of lung cancer screening with LDCT scans discussed:    Significance of positive screen - False-positive LDCT results often occur. 95% of all positive results do not lead to a diagnosis of cancer. Usually further imaging can resolve most false-positive results; however, some patients may require invasive procedures. Over diagnosis risk - 10% to 12% of screen-detected lung cancer cases are over diagnosed--that is, the cancer would not have been detected in the patient's lifetime without the screening. Need for follow up screens annually to continue lung cancer screening effectiveness     Risks associated with radiation from annual LDCT- Radiation exposure is about the same as for a mammogram, which is about 1/3 of the annual background radiation exposure from everyday life. Starting screening at age 54 is not likely to increase cancer risk from radiation exposure. Patients with comorbidities resulting in life expectancy of < 10 years, or that would preclude treatment of an abnormality identified on CT, should not be screened due to lack of benefit.     To obtain maximal benefit from this screening, smoking cessation and long-term abstinence from smoking is critical

## 2021-12-10 DIAGNOSIS — E03.9 HYPOTHYROIDISM, UNSPECIFIED TYPE: ICD-10-CM

## 2021-12-10 DIAGNOSIS — Z13.220 LIPID SCREENING: ICD-10-CM

## 2021-12-10 LAB
ALBUMIN SERPL-MCNC: 4.1 G/DL (ref 3.5–5.2)
ALP BLD-CCNC: 75 U/L (ref 35–104)
ALT SERPL-CCNC: 19 U/L (ref 5–33)
ANION GAP SERPL CALCULATED.3IONS-SCNC: 15 MMOL/L (ref 7–19)
AST SERPL-CCNC: 18 U/L (ref 5–32)
BILIRUB SERPL-MCNC: 0.5 MG/DL (ref 0.2–1.2)
BUN BLDV-MCNC: 12 MG/DL (ref 8–23)
CALCIUM SERPL-MCNC: 9.2 MG/DL (ref 8.8–10.2)
CHLORIDE BLD-SCNC: 105 MMOL/L (ref 98–111)
CHOLESTEROL, FASTING: 177 MG/DL (ref 160–199)
CO2: 22 MMOL/L (ref 22–29)
CREAT SERPL-MCNC: 0.7 MG/DL (ref 0.5–0.9)
GFR AFRICAN AMERICAN: >59
GFR NON-AFRICAN AMERICAN: >60
GLUCOSE BLD-MCNC: 105 MG/DL (ref 74–109)
HCT VFR BLD CALC: 36.9 % (ref 37–47)
HDLC SERPL-MCNC: 52 MG/DL (ref 65–121)
HEMOGLOBIN: 11.7 G/DL (ref 12–16)
LDL CHOLESTEROL CALCULATED: 95 MG/DL
MCH RBC QN AUTO: 29.3 PG (ref 27–31)
MCHC RBC AUTO-ENTMCNC: 31.7 G/DL (ref 33–37)
MCV RBC AUTO: 92.5 FL (ref 81–99)
PDW BLD-RTO: 14 % (ref 11.5–14.5)
PLATELET # BLD: 250 K/UL (ref 130–400)
PMV BLD AUTO: 10.8 FL (ref 9.4–12.3)
POTASSIUM SERPL-SCNC: 4.1 MMOL/L (ref 3.5–5)
RBC # BLD: 3.99 M/UL (ref 4.2–5.4)
SODIUM BLD-SCNC: 142 MMOL/L (ref 136–145)
TOTAL PROTEIN: 6.8 G/DL (ref 6.6–8.7)
TRIGLYCERIDE, FASTING: 149 MG/DL (ref 0–149)
TSH REFLEX FT4: 2.03 UIU/ML (ref 0.35–5.5)
WBC # BLD: 6 K/UL (ref 4.8–10.8)

## 2021-12-16 ASSESSMENT — ENCOUNTER SYMPTOMS
RESPIRATORY NEGATIVE: 1
EYES NEGATIVE: 1
GASTROINTESTINAL NEGATIVE: 1

## 2022-01-10 RX ORDER — FLUOXETINE 10 MG/1
CAPSULE ORAL
Qty: 30 CAPSULE | Refills: 5 | Status: SHIPPED | OUTPATIENT
Start: 2022-01-10 | End: 2022-06-14 | Stop reason: SDUPTHER

## 2022-01-10 NOTE — TELEPHONE ENCOUNTER
Salud TIARRA Armin called to request a refill on her medication.       Last office visit : 12/9/2021   Next office visit : 6/9/2022     Requested Prescriptions     Signed Prescriptions Disp Refills    FLUoxetine (PROZAC) 10 MG capsule 30 capsule 5     Sig: TAKE 1 CAPSULE BY MOUTH EVERY DAY     Authorizing Provider: Haile Emery     Ordering User: Negro Kelley MA

## 2022-01-15 ENCOUNTER — OFFICE VISIT (OUTPATIENT)
Age: 62
End: 2022-01-15
Payer: MEDICARE

## 2022-01-15 VITALS
DIASTOLIC BLOOD PRESSURE: 86 MMHG | HEART RATE: 77 BPM | SYSTOLIC BLOOD PRESSURE: 134 MMHG | WEIGHT: 185 LBS | TEMPERATURE: 97.7 F | BODY MASS INDEX: 34.04 KG/M2 | RESPIRATION RATE: 16 BRPM | HEIGHT: 62 IN | OXYGEN SATURATION: 98 %

## 2022-01-15 DIAGNOSIS — Z11.59 SCREENING FOR VIRAL DISEASE: ICD-10-CM

## 2022-01-15 DIAGNOSIS — J06.9 VIRAL URI WITH COUGH: Primary | ICD-10-CM

## 2022-01-15 LAB
INFLUENZA A ANTIBODY: NEGATIVE
INFLUENZA B ANTIBODY: NEGATIVE
SARS-COV-2, PCR: DETECTED

## 2022-01-15 PROCEDURE — 87804 INFLUENZA ASSAY W/OPTIC: CPT | Performed by: NURSE PRACTITIONER

## 2022-01-15 PROCEDURE — 99213 OFFICE O/P EST LOW 20 MIN: CPT | Performed by: NURSE PRACTITIONER

## 2022-01-15 RX ORDER — PROMETHAZINE HYDROCHLORIDE 6.25 MG/5ML
6.25 SYRUP ORAL 4 TIMES DAILY PRN
Qty: 120 ML | Refills: 0 | Status: SHIPPED | OUTPATIENT
Start: 2022-01-15 | End: 2022-01-22

## 2022-01-15 RX ORDER — METHYLPREDNISOLONE 4 MG/1
TABLET ORAL
Qty: 1 KIT | Refills: 0 | Status: SHIPPED | OUTPATIENT
Start: 2022-01-15 | End: 2022-01-21

## 2022-01-15 RX ORDER — HYDROCODONE BITARTRATE AND ACETAMINOPHEN 7.5; 325 MG/1; MG/1
1 TABLET ORAL 3 TIMES DAILY PRN
COMMUNITY
Start: 2021-11-26

## 2022-01-15 RX ORDER — HYDROXYZINE HYDROCHLORIDE 25 MG/1
25 TABLET, FILM COATED ORAL EVERY 8 HOURS PRN
COMMUNITY
Start: 2021-09-13 | End: 2022-06-26

## 2022-01-15 ASSESSMENT — ENCOUNTER SYMPTOMS: COUGH: 1

## 2022-01-15 NOTE — PROGRESS NOTES
[facility]  [address] [address] [place] [address]  Dept: [phone]  Dept Fax: [phone]: [phone]    Dary Patel is a 62 y.o. female who presents today for her medical conditions/complaints as noted below. Salud Funez is c/o of Cough, Fever, Chills, and Pharyngitis        HPI:     HPI   Chief Complaint   Patient presents with    Cough    Fever    Chills    Pharyngitis   sick for 3 days . She has tried motrin, norco , nasacort. Fever 101  Exposed to covid at painmanagement  Past Medical History:   Diagnosis Date    Allergic bronchitis     has wheezing related to dust and pollen    Allergic rhinitis     Asthma     Blepharitis of both eyes     Bone spur     cervical    Cervical disc disease     Chicken pox     age 26    Chronic anxiety 2006    has been on valium after failing effexor and other medications    Coccyx disorder     history of f/x    Constipation     chronic since a teen    Degenerative disc disease at L5-S1 level     Genital warts     Dr Abhilash Hu GERD (gastroesophageal reflux disease)     Hemorrhoids     HPV in female     Hyperthyroidism     took radioactive iodine    Post menopausal syndrome 2010    Reactive airway disease with wheezing     Scoliosis     Unspecified vitamin D deficiency       Past Surgical History:   Procedure Laterality Date    CERVICAL DISC ARTHROPLASTY      CERVICAL DISCECTOMY      COL[facility]  9/16/2011    [facility]?     COLONOSCOPY N/A 11/11/2016    Dr Opal Lopez AP (-) dysplasia x 1, BCM x 1--3 yr recall    COLONOSCOPY N/A 11/11/2019    Dr Sergio Nolen, large grade 3 internal and external hemorrhoids, suboptimal prep, 3 yr recall    DILATION AND CURETTAGE OF UTERUS N/A 7/24/2020    DILATATION AND CURETTAGE HYSTEROSCOPY performed by Sanjuan Lombard, MD at [facility] 12/6/2019    INTERNAL AND EXTERNAL HEMORRHOIDECTOMY performed by Gladys Singh MD at [address]      UPPER GASTROINTESTINAL ENDOSCOPY  3/3/2011     ?        Vitals 1/15/2022 2021 2021 2021 2021 3266   SYSTOLIC 137 [phone]   DIASTOLIC 86 80 82 79 80 75   Site - Left Upper Arm - - - Left Upper Arm   Position - Sitting - - - Sitting   Cuff Size - - - - - -   Pulse 77 73 - 72 88 83   Temp 97.7 97.6 - 97.1 97.6 98   Resp 16 - - - 22 -   SpO2 98 97 - 99 99 97   Weight 185 lb 185 lb - 184 lb 3.2 oz 184 lb 3.2 oz 181 lb 3.2 oz   Height 5' 2\" 5' 2\" - 5' 2\" 5' 2\" 5' 2\"   Body mass index 33.83 kg/m2 33.83 kg/m2 - 33.69 kg/m2 33.69 kg/m2 33.14 kg/m2   Pain Level - - - - - -   Some recent data might be hidden       Family History   Problem Relation Age of Onset    Diabetes Sister     Thyroid Disease Sister     Rheum Arthritis Sister     Other Sister         fibromyalgia    Breast Cancer Sister 59    Rheum Arthritis Maternal Aunt     Colon Polyps Maternal Aunt     Heart Failure Father     Heart Disease Father          of acute MI at 58    Hypertension Mother     Cancer Mother         lung cancer    Colon Polyps Mother     High Blood Pressure Mother     Other Son         sleep apnea,anxiety and neck problems after  injuries    Breast Cancer Maternal Cousin 45    Colon Cancer Neg Hx     Esophageal Cancer Neg Hx     Stomach Cancer Neg Hx     Liver Cancer Neg Hx     Liver Disease Neg Hx     Rectal Cancer Neg Hx        Social History     Tobacco Use    Smoking status: Former Smoker     Packs/day: 2.00     Years: 25.00     Pack years: 50.00     Types: Cigarettes     Quit date: 2008     Years since quittin.7    Smokeless tobacco: Never Used    Tobacco comment: Quit smoking in  with Wellbutrin (had crazy dreams on it)   Substance Use Topics    Alcohol use: No     Alcohol/week: 0.0 standard drinks      Current Outpatient Medications on File Prior to Visit   Medication Sig Dispense Refill    HYDROcodone-acetaminophen (NORCO) 7.5-325 MG per tablet Take 1 tablet by mouth 3 times daily as needed.  hydrOXYzine (ATARAX) 25 MG tablet Take 25 mg by mouth every 8 hours as needed      FLUoxetine (PROZAC) 10 MG capsule TAKE 1 CAPSULE BY MOUTH EVERY DAY 30 capsule 5    triamcinolone (NASACORT ALLERGY 24HR) 55 MCG/ACT nasal inhaler 2 sprays by Each Nostril route daily 1 each 3    clobetasol (TEMOVATE) 0.05 % ointment APPLY TO AFFECTED AREA TWICE A DAY 30 g 0    estradiol (ESTRACE) 0.5 MG tablet Take 1 tablet by mouth daily 90 tablet 1    fluticasone (FLONASE) 50 MCG/ACT nasal spray USE 2 SPRAYS IN EACH NOSTRIL DAILY 1 each 3    pantoprazole (PROTONIX) 40 MG tablet TAKE 1 TABLET BY MOUTH EVERY MORNING BEFORE BREAKFAST 90 tablet 1    Cyclobenzaprine HCl (FLEXERIL PO) Take 10 mg by mouth 3 times daily      montelukast (SINGULAIR) 10 MG tablet TAKE 1 TABLET BY MOUTH EVERY DAY AT NIGHT 90 tablet 1    SYNTHROID 112 MCG tablet TAKE 1 TABLET BY MOUTH EVERY DAY IN THE MORNING 90 tablet 1    medroxyPROGESTERone (PROVERA) 2.5 MG tablet TAKE 1 TABLET BY MOUTH EVERY DAY 90 tablet 3    levocetirizine (XYZAL) 5 MG tablet TAKE 1 TABLET BY MOUTH EVERY DAY 90 tablet 3    azelastine (ASTELIN) 0.1 % nasal spray USE 2 SPRAYS IN EACH NOSTRIL TWICE A DAY AS DIRECTED 1 Bottle 5    vitamin D (CHOLECALCIFEROL) 125 MCG (5000 UT) CAPS capsule Take 5,000 Units by mouth daily      PROAIR  (90 Base) MCG/ACT inhaler INHALE 2 PUFFS INTO THE LUNGS EVERY 6 HOURS AS NEEDED FOR WHEEZING 8.5 Inhaler 1    EPINEPHrine (EPIPEN) 0.3 MG/0.3ML SOAJ injection USE AS DIRECTED FOR ACUTE  ALLERGIC REACTION  1    epinastine (ELESTAT) 0.05 % SOLN INSTILL 1 DROP IN EACH EYE ONE TO TWICE A DAY AS NEEDED  3    naproxen (NAPROXEN) 500 MG EC tablet Take 1 tablet by mouth every 12 hours Generic is okay 60 tablet 11     No current facility-administered medications on file prior to visit. Allergies   Allergen Reactions    Oxycodone-Acetaminophen Hives    Pollen Extract      Early Spring and early Fall    Effexor [Venlafaxine Hydrochloride] Other (See Comments)     Slurred speech, feelings of having a TIA, seeing double, unaware of her surroundings   [address] Extract] Itching    Nickel Itching       Health Maintenance   Topic Date Due    Low dose CT lung screening  06/26/2021    Shingles Vaccine (2 of 2) 12/17/2021    COVID-19 Vaccine (3 - Booster for Moderna series) 01/20/2022    Breast cancer screen  03/25/2022    Colon cancer screen colonoscopy  11/11/2022    A1C test (Diabetic or Prediabetic)  12/09/2022    Depression Screen  12/09/2022    Annual Wellness Visit (AWV)  12/10/2022    TSH testing  12/10/2022    DTaP/Tdap/Td vaccine (2 - Td or Tdap) 01/23/2023    Cervical cancer screen  02/13/2025    Lipid screen  12/10/2026    Flu vaccine  Completed    Hepatitis C screen  Completed    HIV screen  Completed    Hepatitis A vaccine  Aged Out    Hepatitis B vaccine  Aged Out    Hib vaccine  Aged Out    Meningococcal (ACWY) vaccine  Aged Out    Pneumococcal 0-64 years Vaccine  Aged Out       Subjective:      Review of Systems   Constitutional: Positive for fatigue and fever. HENT: Positive for congestion. Respiratory: Positive for cough. Psychiatric/Behavioral: Negative. Objective:     Physical Exam  Vitals and nursing note reviewed. Constitutional:       Appearance: Normal appearance. She is well-developed. HENT:      Head: Normocephalic. Right Ear: Tympanic membrane and external ear normal.      Left Ear: Tympanic membrane and external ear normal.      Nose: Nose normal.      Mouth/Throat:      Pharynx: Posterior oropharyngeal erythema and uvula swelling present. Eyes:      Pupils: Pupils are equal, round, and reactive to light. Cardiovascular:      Rate and Rhythm: Normal rate and regular rhythm.       Heart sounds: Normal heart sounds. Pulmonary:      Effort: Pulmonary effort is normal.      Breath sounds: Normal breath sounds. Musculoskeletal:      Cervical back: Normal range of motion. Skin:     General: Skin is warm and dry. Capillary Refill: Capillary refill takes less than 2 seconds. Neurological:      General: No focal deficit present. Mental Status: She is alert and oriented to person, place, and time. Psychiatric:         Mood and Affect: Mood normal.         Behavior: Behavior normal.         Thought Content: Thought content normal.         Judgment: Judgment normal.       /86   Pulse 77   Temp 97.7 °F (36.5 °C)   Resp 16   Ht 5' 2\" (1.575 m)   Wt 185 lb (83.9 kg)   LMP 07/11/2009   SpO2 98%   BMI 33.84 kg/m²     Assessment:       Diagnosis Orders   1. Viral URI with cough  POCT Influenza A/B   2. Screening for viral disease  COVID-19         Plan:   More than 50% of the time was spent counseling and coordinating care for a total time of 22min face to face. Results for POC orders placed in visit on 01/15/22   POCT Influenza A/B   Result Value Ref Range    Influenza A Ab Negative     Influenza B Ab Negative      PDMP Monitoring:    Last PDMP Jonny as Reviewed [facility]):  Review User Review Instant Review Result            Urine Drug Screenings (1 yr)    No resulted procedures found. Medication Contract and Consent for Opioid Use Documents Filed      No documents found                 Patient given educational materials -see patient instructions. Discussed use, benefit, and side effects of prescribed medications. All patient questions answered. Pt voiced understanding. Reviewed health maintenance. Instructed to continue currentmedications, diet and exercise. Patient agreed with treatment plan. Follow up as directed. MEDICATIONS:  Orders Placed This Encounter   Medications    methylPREDNISolone (MEDROL DOSEPACK) 4 MG tablet     Sig: Take by mouth.      Dispense:  1 kit     Refill:  0  promethazine (PHENERGAN) 6.25 MG/5ML syrup     Sig: Take 5 mLs by mouth 4 times daily as needed for Nausea     Dispense:  120 mL     Refill:  0         ORDERS:  Orders Placed This Encounter   Procedures    COVID-19    COVID-19    COVID-19    POCT Influenza A/B       Follow-up:  No follow-ups on file. PATIENT INSTRUCTIONS:  Patient Instructions   You may start the steroids and cough medicine today  There are no need for an antibiotic right now as this is likely viral.  You can use your hydrocodone to for pain and continue using Motrin in between  I am going to give you the COVID guidelines below. Quarantine until your test is back it should be back by tomorrow  Covid Supportive Treatments  Zinc  mg daily for 5 days and then decrease to 50 mg a day. Sometimes higher dose may cause nausea  Vitamin C 1000 mg a day. Vitamin D 20 -25 mcg a day. Aspirin 325 mg a day x 14 days  Daily antihistamine of choice ( Claritin, Allegra, or Zyrtec)  Tylenol for aches, pain and fever. You must get up and move around. Look up Covid breathing exercises. Try to lay on your stomach for periods of time to help with the fluid in your lungs. You can look up Proning on the Internet. Your provider may also send in prescriptions for symptom specific treatment. Covid is a viral pneumonia and some antibiotics will not help this. Mucinex or delsym for chest congestion. Check oxygen saturation regularly and if below 90% go to the ER. You may qualify for home oxygen and breathing treatments if your oxygen is staying around 90%. According to current [place] guidelines if you have tested positive for COVID and have symptoms you are to isolate for 10 days from the day the symptoms began.   If you tested positive for COVID and have never had symptoms you must isolate for 5 days from the day of your test.  If you are not fully vaccinated or booster eligible but not yet boostered and have been in close contact with someone diagnosed with COVID quarantine from 10 days from the exposure. This may be shortened to 5 days if you have no symptoms and tested negative for COVID on day 5    Electronically signed by ANGEL Olivera CNP on 1/15/2022 at 11:08 AM    EMR Dragon/transcription disclaimer:  Much of thisencounter note is electronic transcription/translation of spoken language to printed texts. The electronic translation of spoken language may be erroneous, or at times, nonsensical words or phrases may be inadvertentlytranscribed.   Although I have reviewed the note for such errors, some may still exist.

## 2022-01-15 NOTE — PATIENT INSTRUCTIONS
You may start the steroids and cough medicine today  There are no need for an antibiotic right now as this is likely viral.  You can use your hydrocodone to for pain and continue using Motrin in between  I am going to give you the COVID guidelines below. Quarantine until your test is back it should be back by tomorrow  Covid Supportive Treatments  Zinc  mg daily for 5 days and then decrease to 50 mg a day. Sometimes higher dose may cause nausea  Vitamin C 1000 mg a day. Vitamin D 20 -25 mcg a day. Aspirin 325 mg a day x 14 days  Daily antihistamine of choice ( Claritin, Allegra, or Zyrtec)  Tylenol for aches, pain and fever. You must get up and move around. Look up Covid breathing exercises. Try to lay on your stomach for periods of time to help with the fluid in your lungs. You can look up Proning on the Internet. Your provider may also send in prescriptions for symptom specific treatment. Covid is a viral pneumonia and some antibiotics will not help this. Mucinex or delsym for chest congestion. Check oxygen saturation regularly and if below 90% go to the ER. You may qualify for home oxygen and breathing treatments if your oxygen is staying around 90%. According to current [place] guidelines if you have tested positive for COVID and have symptoms you are to isolate for 10 days from the day the symptoms began. If you tested positive for COVID and have never had symptoms you must isolate for 5 days from the day of your test.  If you are not fully vaccinated or booster eligible but not yet boostered and have been in close contact with someone diagnosed with COVID quarantine from 10 days from the exposure.   This may be shortened to 5 days if you have no symptoms and tested negative for COVID on day 5

## 2022-01-31 ENCOUNTER — OFFICE VISIT (OUTPATIENT)
Dept: NEUROSURGERY | Facility: CLINIC | Age: 62
End: 2022-01-31

## 2022-01-31 VITALS
DIASTOLIC BLOOD PRESSURE: 70 MMHG | HEIGHT: 62 IN | WEIGHT: 188.4 LBS | SYSTOLIC BLOOD PRESSURE: 138 MMHG | BODY MASS INDEX: 34.67 KG/M2

## 2022-01-31 DIAGNOSIS — E66.09 CLASS 1 OBESITY DUE TO EXCESS CALORIES WITHOUT SERIOUS COMORBIDITY WITH BODY MASS INDEX (BMI) OF 34.0 TO 34.9 IN ADULT: ICD-10-CM

## 2022-01-31 DIAGNOSIS — M50.30 DEGENERATION OF CERVICAL INTERVERTEBRAL DISC: Primary | ICD-10-CM

## 2022-01-31 DIAGNOSIS — Z87.891 FORMER SMOKER: ICD-10-CM

## 2022-01-31 PROCEDURE — 99213 OFFICE O/P EST LOW 20 MIN: CPT | Performed by: NURSE PRACTITIONER

## 2022-01-31 RX ORDER — HYDROCODONE BITARTRATE AND ACETAMINOPHEN 7.5; 325 MG/1; MG/1
1 TABLET ORAL 3 TIMES DAILY PRN
COMMUNITY
Start: 2021-11-26

## 2022-01-31 NOTE — PROGRESS NOTES
"    Chief complaint:   Chief Complaint   Patient presents with   • Neck Pain     Crystal returns for her routine follow up after a July cervical surgery, she says she is doing pretty well, she states she does still have some swelling in both hands.         Subjective     HPI: This is a 61-year-old female patient who went to the operating room on July 28, 2021 for C4-5, 5-6, 6-7 ACDF.  At her last office appointment the patient was complaining of some occasional neck pain but the pain was better than what it was from before surgery.  She was not complaining of any upper extremity radicular pain but did have some numbness and tingling in her right fingers.  Patient continued with pain medication from pain management for a variety of pain issues.  She states that she only takes 3 or 4 a day.  She is doing really good on her neck issues.  She says she still does have some occasional pain in her neck.  Denies any radicular pain into her arms.  She does complain of some numbness tingling and her fingertips but says this is more bothersome whenever she is driving.  She says occasionally it can wake her up at night.  She has not been using any braces consistently but states that she does have braces at home.  She has had a previous nerve conduction study which did show mild carpal tunnel syndrome.    Review of Systems   Musculoskeletal: Positive for arthralgias, back pain and neck pain.   Neurological: Positive for numbness.   Psychiatric/Behavioral: Negative.          Objective      Vital Signs  /70   Ht 157.5 cm (62\")   Wt 85.5 kg (188 lb 6.4 oz)   BMI 34.46 kg/m²     Physical Exam  Constitutional:       Appearance: She is well-developed.   HENT:      Head: Normocephalic.   Eyes:      Pupils: Pupils are equal, round, and reactive to light.   Pulmonary:      Effort: Pulmonary effort is normal.   Musculoskeletal:         General: Normal range of motion.      Cervical back: Normal range of motion.      Comments: " Neck and back pain   Skin:     General: Skin is warm.   Neurological:      Mental Status: She is alert and oriented to person, place, and time.      GCS: GCS eye subscore is 4. GCS verbal subscore is 5. GCS motor subscore is 6.      Cranial Nerves: No cranial nerve deficit.      Sensory: No sensory deficit.      Gait: Gait normal.      Deep Tendon Reflexes: Reflexes are normal and symmetric.   Psychiatric:         Speech: Speech normal.         Behavior: Behavior normal.         Thought Content: Thought content normal.         Neurologic Exam     Mental Status   Oriented to person, place, and time.   Speech: speech is normal     Cranial Nerves     CN III, IV, VI   Pupils are equal, round, and reactive to light.      Imaging review: No new imaging        Assessment/Plan: The patient is doing well from a neck surgery standpoint.  Her pain issues in her neck do seem to be under control.  She does deal with a lot of back pain issues and is in pain management for this at this time.  She also does have what sounds like carpal tunnel syndrome however it does not sound like it is at a point where she would like to have any surgical management done for the carpal tunnel syndrome.  At this point we will need to see her on an as-needed basis.  She was told to call us if any further problems or concerns    Patient is a nonsmoker  The patient's Body mass index is 34.46 kg/m².. BMI is above normal parameters. Recommendations include: educational material and nutrition counseling    Diagnoses and all orders for this visit:    1. Degeneration of cervical intervertebral disc (Primary)    2. Former smoker    3. Class 1 obesity due to excess calories without serious comorbidity with body mass index (BMI) of 34.0 to 34.9 in adult        I discussed the patients findings and my recommendations with patient  Kb Mckeon, GALLO  01/31/22  10:20 CST

## 2022-01-31 NOTE — PATIENT INSTRUCTIONS
"BMI for Adults  What is BMI?  Body mass index (BMI) is a number that is calculated from a person's weight and height. BMI can help estimate how much of a person's weight is composed of fat. BMI does not measure body fat directly. Rather, it is an alternative to procedures that directly measure body fat, which can be difficult and expensive.  BMI can help identify people who may be at higher risk for certain medical problems.  What are BMI measurements used for?  BMI is used as a screening tool to identify possible weight problems. It helps determine whether a person is obese, overweight, a healthy weight, or underweight.  BMI is useful for:  · Identifying a weight problem that may be related to a medical condition or may increase the risk for medical problems.  · Promoting changes, such as changes in diet and exercise, to help reach a healthy weight. BMI screening can be repeated to see if these changes are working.  How is BMI calculated?  BMI involves measuring your weight in relation to your height. Both height and weight are measured, and the BMI is calculated from those numbers. This can be done either in English ([place] or metric measurements. Note that charts and online BMI calculators are available to help you find your BMI quickly and easily without having to do these calculations yourself.  To calculate your BMI in English ([place] measurements:    1. Measure your weight in pounds (lb).  2. Multiply the number of pounds by 703.  ? For example, for a person who weighs 180 lb, multiply that number by 703, which equals 126,540.  3. Measure your height in inches. Then multiply that number by itself to get a measurement called \"inches squared.\"  ? For example, for a person who is 70 inches tall, the \"inches squared\" measurement is 70 inches x 70 inches, which equals 4,900 inches squared.  4. Divide the total from step 2 (number of lb x 703) by the total from step 3 (inches squared): 126,540 ÷ 4,900 = 25.8. This is " "your BMI.    To calculate your BMI in metric measurements:  1. Measure your weight in kilograms (kg).  2. Measure your height in meters (m). Then multiply that number by itself to get a measurement called \"meters squared.\"  ? For example, for a person who is 1.75 m tall, the \"meters squared\" measurement is 1.75 m x 1.75 m, which is equal to 3.1 meters squared.  3. Divide the number of kilograms (your weight) by the meters squared number. In this example: 70 ÷ 3.1 = 22.6. This is your BMI.  What do the results mean?  BMI charts are used to identify whether you are underweight, normal weight, overweight, or obese. The following guidelines will be used:  · Underweight: BMI less than 18.5.  · Normal weight: BMI between 18.5 and 24.9.  · Overweight: BMI between 25 and 29.9.  · Obese: BMI of 30 or above.  Keep these notes in mind:  · Weight includes both fat and muscle, so someone with a muscular build, such as an athlete, may have a BMI that is higher than 24.9. In cases like these, BMI is not an accurate measure of body fat.  · To determine if excess body fat is the cause of a BMI of 25 or higher, further assessments may need to be done by a health care provider.  · BMI is usually interpreted in the same way for men and women.  Where to find more information  For more information about BMI, including tools to quickly calculate your BMI, go to these websites:  · Centers for Disease Control and Prevention: www.cdc.gov  · American Heart Association: www.heart.org  · National Heart, Lung, and Blood [place]: www.nhlbi.nih.gov  Summary  · Body mass index (BMI) is a number that is calculated from a person's weight and height.  · BMI may help estimate how much of a person's weight is composed of fat. BMI can help identify those who may be at higher risk for certain medical problems.  · BMI can be measured using English measurements or metric measurements.  · BMI charts are used to identify whether you are underweight, normal " "weight, overweight, or obese.  This information is not intended to replace advice given to you by your health care provider. Make sure you discuss any questions you have with your health care provider.  Document Revised: 09/09/2020 Document Reviewed: 07/17/2020  [employer] Patient Education © 2021 [email or web address]      https://www.nhlbi.nih.gov/files/docs/public/heart/dash_brief.pdf\">   DASH Eating Plan  DASH stands for Dietary Approaches to Stop Hypertension. The DASH eating plan is a healthy eating plan that has been shown to:  · Reduce high blood pressure (hypertension).  · Reduce your risk for type 2 diabetes, heart disease, and stroke.  · Help with weight loss.  What are tips for following this plan?  Reading food labels  · Check food labels for the amount of salt (sodium) per serving. Choose foods with less than 5 percent of the Daily Value of sodium. Generally, foods with less than 300 milligrams (mg) of sodium per serving fit into this eating plan.  · To find whole grains, look for the word \"whole\" as the first word in the ingredient list.  Shopping  · Buy products labeled as \"low-sodium\" or \"no salt added.\"  · Buy fresh foods. Avoid canned foods and pre-made or frozen meals.  Cooking  · Avoid adding salt when cooking. Use salt-free seasonings or herbs instead of table salt or sea salt. Check with your health care provider or pharmacist before using salt substitutes.  · Do not fall foods. Cook foods using healthy methods such as baking, boiling, grilling, roasting, and broiling instead.  · Cook with heart-healthy oils, such as olive, canola, avocado, soybean, or sunflower oil.  Meal planning    · Eat a balanced diet that includes:  ? 4 or more servings of fruits and 4 or more servings of vegetables each day. Try to fill one-half of your plate with fruits and vegetables.  ? 6-8 servings of whole grains each day.  ? Less than 6 oz (170 g) of lean meat, poultry, or fish each day. A 3-oz (85-g) serving of meat is " about the same size as a deck of cards. One egg equals 1 oz (28 g).  ? 2-3 servings of low-fat dairy each day. One serving is 1 cup (237 mL).  ? 1 serving of nuts, seeds, or beans 5 times each week.  ? 2-3 servings of heart-healthy fats. Healthy fats called omega-3 fatty acids are found in foods such as walnuts, flaxseeds, fortified milks, and eggs. These fats are also found in cold-water fish, such as sardines, salmon, and mackerel.  · Limit how much you eat of:  ? Canned or prepackaged foods.  ? Food that is high in trans fat, such as some fried foods.  ? Food that is high in saturated fat, such as fatty meat.  ? Desserts and other sweets, sugary drinks, and other foods with added sugar.  ? Full-fat dairy products.  · Do not salt foods before eating.  · Do not eat more than 4 egg yolks a week.  · Try to eat at least 2 vegetarian meals a week.  · Eat more home-cooked food and less restaurant, buffet, and fast food.    Lifestyle  · When eating at a restaurant, ask that your food be prepared with less salt or no salt, if possible.  · If you drink alcohol:  ? Limit how much you use to:  § 0-1 drink a day for women who are not pregnant.  § 0-2 drinks a day for men.  ? Be aware of how much alcohol is in your drink. In the [place] one drink equals one 12 oz bottle of beer (355 mL), one 5 oz glass of wine (148 mL), or one 1½ oz glass of hard liquor (44 mL).  General information  · Avoid eating more than 2,300 mg of salt a day. If you have hypertension, you may need to reduce your sodium intake to 1,500 mg a day.  · Work with your health care provider to maintain a healthy body weight or to lose weight. Ask what an ideal weight is for you.  · Get at least 30 minutes of exercise that causes your heart to beat faster (aerobic exercise) most days of the week. Activities may include walking, swimming, or biking.  · Work with your health care provider or dietitian to adjust your eating plan to your individual calorie needs.  What  foods should I eat?  Fruits  All fresh, dried, or frozen fruit. Canned fruit in natural juice (without added sugar).  Vegetables  Fresh or frozen vegetables (raw, steamed, roasted, or grilled). Low-sodium or reduced-sodium tomato and vegetable juice. Low-sodium or reduced-sodium tomato sauce and tomato paste. Low-sodium or reduced-sodium canned vegetables.  Grains  Whole-grain or whole-wheat bread. Whole-grain or whole-wheat pasta. Brown rice. Oatmeal. Quinoa. Bulgur. Whole-grain and low-sodium cereals. Luz bread. Low-fat, low-sodium crackers. Whole-wheat flour tortillas.  Meats and other proteins  Skinless chicken or turkey. Ground chicken or turkey. Pork with fat trimmed off. Fish and seafood. Egg whites. Dried beans, peas, or lentils. Unsalted nuts, nut butters, and seeds. Unsalted canned beans. Lean cuts of beef with fat trimmed off. Low-sodium, lean precooked or cured meat, such as sausages or meat loaves.  Dairy  Low-fat (1%) or fat-free (skim) milk. Reduced-fat, low-fat, or fat-free cheeses. Nonfat, low-sodium ricotta or cottage cheese. Low-fat or nonfat yogurt. Low-fat, low-sodium cheese.  Fats and oils  Soft margarine without trans fats. Vegetable oil. Reduced-fat, low-fat, or light mayonnaise and salad dressings (reduced-sodium). Canola, safflower, olive, avocado, soybean, and sunflower oils. Avocado.  Seasonings and condiments  Herbs. Spices. Seasoning mixes without salt.  Other foods  Unsalted popcorn and pretzels. Fat-free sweets.  The items listed above may not be a complete list of foods and beverages you can eat. Contact a dietitian for more information.  What foods should I avoid?  Fruits  Canned fruit in a light or heavy syrup. Fried fruit. Fruit in cream or butter sauce.  Vegetables  Creamed or fried vegetables. Vegetables in a cheese sauce. Regular canned vegetables (not low-sodium or reduced-sodium). Regular canned tomato sauce and paste (not low-sodium or reduced-sodium). Regular tomato and  vegetable juice (not low-sodium or reduced-sodium). Pickles. Olives.  Grains  Baked goods made with fat, such as croissants, muffins, or some breads. Dry pasta or rice meal packs.  Meats and other proteins  Fatty cuts of meat. Ribs. Fried meat. [place]. Bologna, salami, and other precooked or cured meats, such as sausages or meat loaves. Fat from the back of a pig (fatback). Bratwurst. Salted nuts and seeds. Canned beans with added salt. Canned or smoked fish. Whole eggs or egg yolks. Chicken or turkey with skin.  Dairy  Whole or 2% milk, cream, and half-and-half. Whole or full-fat cream cheese. Whole-fat or sweetened yogurt. Full-fat cheese. Nondairy creamers. Whipped toppings. Processed cheese and cheese spreads.  Fats and oils  Butter. Stick margarine. Lard. Shortening. Ghee. [place] fat. Tropical oils, such as coconut, palm kernel, or palm oil.  Seasonings and condiments  Onion salt, garlic salt, seasoned salt, table salt, and sea salt. Worcestershire sauce. Tartar sauce. Barbecue sauce. Teriyaki sauce. Soy sauce, including reduced-sodium. Steak sauce. Canned and packaged gravies. Fish sauce. Oyster sauce. Cocktail sauce. Store-bought horseradish. Ketchup. Mustard. Meat flavorings and tenderizers. Bouillon cubes. Hot sauces. Pre-made or packaged marinades. Pre-made or packaged taco seasonings. Relishes. Regular salad dressings.  Other foods  Salted popcorn and pretzels.  The items listed above may not be a complete list of foods and beverages you should avoid. Contact a dietitian for more information.  Where to find more information  · National Heart, Lung, and Blood [place]: www.nhlbi.nih.gov  · American Heart Association: www.heart.org  · Academy of Nutrition and Dietetics: www.eatright.org  · National Kidney Foundation: www.kidney.org  Summary  · The DASH eating plan is a healthy eating plan that has been shown to reduce high blood pressure (hypertension). It may also reduce your risk for type 2 diabetes, heart  disease, and stroke.  · When on the DASH eating plan, aim to eat more fresh fruits and vegetables, whole grains, lean proteins, low-fat dairy, and heart-healthy fats.  · With the DASH eating plan, you should limit salt (sodium) intake to 2,300 mg a day. If you have hypertension, you may need to reduce your sodium intake to 1,500 mg a day.  · Work with your health care provider or dietitian to adjust your eating plan to your individual calorie needs.  This information is not intended to replace advice given to you by your health care provider. Make sure you discuss any questions you have with your health care provider.  Document Revised: 11/20/2020 Document Reviewed: 11/20/2020  [employer] Patient Education © 2021 [employer]

## 2022-02-01 ENCOUNTER — HOSPITAL ENCOUNTER (OUTPATIENT)
Dept: CT IMAGING | Age: 62
Discharge: HOME | End: 2022-02-01
Payer: MEDICARE

## 2022-02-01 DIAGNOSIS — Z87.891 PERSONAL HISTORY OF TOBACCO USE: ICD-10-CM

## 2022-02-01 PROCEDURE — 71271 CT THORAX LUNG CANCER SCR C-: CPT

## 2022-02-03 ENCOUNTER — TELEPHONE (OUTPATIENT)
Dept: PRIMARY CARE CLINIC | Age: 62
End: 2022-02-03

## 2022-02-03 NOTE — TELEPHONE ENCOUNTER
Physical Therapy Daily Note    Visit Count: 2  Plan of Care Dates: Initial: 2/12/2018 Through: 5/7/2018  Insurance Information:   ]THERAPY BENEFITS:  PAYOR: [facility]  VISIT LIMIT: 50   PER YEAR  AUTHORIZATION NEEDED: NO  NOTES:   Next Referring Provider Visit: none scheduled    Referred by: Jared Smith MD  Medical Diagnosis (from order):  Internal derangement of left knee [M23.92]  - Primary   Treatment Diagnosis: Knee Symptoms with Pain, Impaired Posture, Impaired Joint Mobility, Impaired Range of Motion, Impaired Motor Function/Muscle Performance, Impaired Gait/Locomotion Deficits, Impaired Mobility, Impaired Balance and Impaired Motor Function and Sensory Integration  Insurance: 1. [employer]  2.     Date of onset/injury: 1.16.18  Diagnosis Precautions: none  Chart reviewed: Relevant co-morbidities, allergies, tests and medications: borderline diabetic -- metformin, hyper cholesterol -- medication. Cervical fusion - 2014.   Outcome Measures:   Lower Extremity Functional Scale: Initial Evaluation: 36 (0=extreme difficulty; 80=no difficulty)     SUBJECTIVE   Patient [place] the mall this [place] wearing her knee brace. Patient notes increased medial knee discomfort during her second lap.     Pain:  Intensity: Now: 5-6/10  Location: Right knee medial joint line    Function: Continued pain with prolonged ambulation or standing activities    OBJECTIVE   Range of Motion (degrees)  [] All motions within functional/normal limits except those noted.   [] Only those motions that were assessed are noted.  [] Uninvolved extremity motion within functional/normal limits.    Norm Left Right Left   Date  Initial Initial 2.15.18  Pre/post   Knee Flexion 135  120* 125 115/120*   Knee Extension 0-5 0 0    standard testing positions unless otherwise noted; Key: ranges are reported in active range of motion unless noted as AA=active assistive or P=passive range of motion, * denotes pain   Comments:    Strength (out  ----- Message from ANGEL Villeda sent at 2/2/2022 10:40 AM CST -----  Please let patient know that low dose CT scan of lungs has returned and was normal without any pulmonary nodules or masses. We can repeat scan in 1 year to continue to monitor for any changes related to tobacco use. Thanks! of 5)  [] Gross muscle strength within functional/normal limits except as noted.  [] Only muscle strength that was assessed are noted.  [] Uninvolved muscle strength within functional/normal limits.   Left Right   Date Initial Initial   Hip Flexion 4 4   Hip Extension 4- 4-   Hip Abduction 4- 4-   Hip Glut Medius 3+ 3+   Hip Adduction      Hip Internal Rotation 4* 5   Hip External Rotation 4-* 4   Knee Flexion 4* 5   Knee Extension 4* 5   Ankle Dorsiflexion 5 5   Ankle Plantar flexion 5 5   Ankle Inversion 5 5   Ankle Eversion 5 5   standard testing positions unless otherwise noted,* denotes pain  Comments: Bilateral ankle dorsiflexion limited to 0°, with patient reporting significantly calf tightness    Palpation:  Increased tenderness to palpation left medial knee joint line    Joint Play Assessment:  Limited left tibiofemoral internal rotation and posterior glide as compared to right knee    Treatment   Manual Therapy:   Left knee internal rotation mobilization in varying degrees of knee flexion followed by left knee posterior glide mobilization and varying degrees of flexion grade 3. Left knee passive range of motion flexion/extension with concurrent internal rotation/external rotation glides to the corresponding motion ×20 for reeducation of the joint mechanics.    Therapeutic exercise:  Left heel slides ×10  Left quadricepts set 6 seconds x 10  Left straight leg raise ×10  sidelying hip abduction/extension right and left ×10  Bridge with red theraband for gluteal activation ×15  hooklying hip abduction with red theraband - opposite leg stabilizing right and left ×15 each    Un-attended Electrical Stimulation (50991/): Russian   Patient has been made aware of potential contraindications and possible risks associated with the use of modality and has agreed.  Location: left quadricepts  Position: supine  Electrodes placed: left quadricepts On/off: 10/30seconds; intensity: patient tolerance  Duration: 10  minutes   Results: decreased pain; no adverse reaction to treatment    Home Program:  * above denotes home program issuance as well  Patient educated to avoid weightbearing and twisting or lateral movements.      ASSESSMENT   Patient continues to demonstrate left medial knee joint line pain, with possible left meniscal pathology. Patient tolerated treatment well this date.    Goals:  To be obtained by end of this plan of care:  1. Patient independent with modified and progressed home exercise program.  2. Patient will decrease involved knee maximum pain/symptoms to less than or equal to 2/10  to aid in ambulating on level and unlevel surfaces.   3. Patient will increase involved knee active range of motion to 0-125° to aid in squatting for lifting for household independent living.   4. Patient will increase involved knee strength to 5/5 to aid in stair ambulation.  5. Lower Extremity Functional Scale: Patient will complete form to reflect an improved score from initial score of 36 to greater than or equal to 50 (0=extreme difficulty; 80=no difficulty) to indicate pt reported improvement in function/disability/impairment (minimal detectable change: 9 points).     PLAN   Assess left knee joint mechanics and correct as needed, issue home exercise program for strengthening of the gluteal muscles, hip external rotator muscles, transverse abdominis for proximal core stability.    Frequency/Duration: 2 times per week for 8 weeks with tapering as the patient progresses  Skilled training and instruction for the following interventions:  Gait Training (64899)  Manual Therapy (95270)  Neuromuscular Re-Education (40892)  Therapeutic Activity (53122)  Therapeutic Exercise (38259)  Electrical Stimulation (33030//48363)   Heat/Cold (90038)  Ultrasound/Phonophoresis (11333)    The plan of care and goals were established with the patient who concurs.  Patient has been given attendance policy at time of initial  evaluation.    Patient Education:  Who will be receiving education: patient  Are they ready to learn: yes  Preferred learning style: written, verbal, demonstration  Barriers to learning: no barriers apparent at this time   Result of initial outlined education: Verbalizes understanding and Needs reinforcement    THERAPY DAILY BILLING   Primary Insurance: [facility]  Secondary Insurance:     Evaluation Procedures:  No evaluation codes were used on this date of service    Timed Procedures:  Manual Therapy, 10 minutes  Therapeutic Exercise, 18 minutes    Untimed Procedures:  Electrostimulation Unattended    Total Treatment Time: 38 minutes    Electronically sent for physician signature

## 2022-02-06 DIAGNOSIS — E03.9 HYPOTHYROIDISM, UNSPECIFIED TYPE: ICD-10-CM

## 2022-02-07 RX ORDER — LEVOCETIRIZINE DIHYDROCHLORIDE 5 MG/1
TABLET, FILM COATED ORAL
Qty: 90 TABLET | Refills: 3 | Status: SHIPPED | OUTPATIENT
Start: 2022-02-07

## 2022-02-07 RX ORDER — LEVOTHYROXINE SODIUM 112 MCG
TABLET ORAL
Qty: 90 TABLET | Refills: 1 | Status: SHIPPED | OUTPATIENT
Start: 2022-02-07 | End: 2022-08-03

## 2022-02-07 NOTE — TELEPHONE ENCOUNTER
Salud Funez called to request a refill on her medication.       Last office visit : 12/9/2021   Next office visit : 6/9/2022     Requested Prescriptions     Signed Prescriptions Disp Refills    SYNTHROID 112 MCG tablet 90 tablet 1     Sig: TAKE 1 TABLET BY MOUTH EVERY DAY IN THE MORNING     Authorizing Provider: Jocelyn Dubose     Ordering User: Taras Do    levocetirizine (XYZAL) 5 MG tablet 90 tablet 3     Sig: TAKE 1 TABLET BY MOUTH EVERY DAY     Authorizing Provider: Jocelyn Valente User: Brennan Larson MA

## 2022-02-15 ENCOUNTER — TELEPHONE (OUTPATIENT)
Dept: OBGYN CLINIC | Age: 62
End: 2022-02-15

## 2022-02-15 NOTE — TELEPHONE ENCOUNTER
Pt is calling stating she has an appointment scheduled in March to see Pradeep Johnston, but she was curious when she would be able to wean herself off of her hormone repalcements.

## 2022-02-15 NOTE — TELEPHONE ENCOUNTER
Called and spoke with patient, she will take hormones replacements every other day until her physical on 3/3 and touch base with Wilfredo Payan then.

## 2022-03-03 ENCOUNTER — OFFICE VISIT (OUTPATIENT)
Dept: OBGYN CLINIC | Age: 62
End: 2022-03-03
Payer: COMMERCIAL

## 2022-03-03 VITALS
SYSTOLIC BLOOD PRESSURE: 137 MMHG | HEART RATE: 86 BPM | WEIGHT: 190 LBS | DIASTOLIC BLOOD PRESSURE: 85 MMHG | BODY MASS INDEX: 34.96 KG/M2 | HEIGHT: 62 IN

## 2022-03-03 DIAGNOSIS — Z01.419 ENCOUNTER FOR GYNECOLOGICAL EXAMINATION WITHOUT ABNORMAL FINDING: Primary | ICD-10-CM

## 2022-03-03 DIAGNOSIS — Z78.0 POSTMENOPAUSAL: ICD-10-CM

## 2022-03-03 DIAGNOSIS — N95.1 SYMPTOMATIC MENOPAUSAL OR FEMALE CLIMACTERIC STATES: ICD-10-CM

## 2022-03-03 DIAGNOSIS — Z12.31 ENCOUNTER FOR SCREENING MAMMOGRAM FOR MALIGNANT NEOPLASM OF BREAST: ICD-10-CM

## 2022-03-03 DIAGNOSIS — Z12.4 SCREENING FOR CERVICAL CANCER: ICD-10-CM

## 2022-03-03 DIAGNOSIS — Z12.39 SCREENING BREAST EXAMINATION: ICD-10-CM

## 2022-03-03 PROCEDURE — 99396 PREV VISIT EST AGE 40-64: CPT | Performed by: NURSE PRACTITIONER

## 2022-03-03 RX ORDER — ESTRADIOL 0.5 MG/1
0.5 TABLET ORAL DAILY
Qty: 90 TABLET | Refills: 1 | Status: SHIPPED | OUTPATIENT
Start: 2022-03-03 | End: 2022-10-18

## 2022-03-03 RX ORDER — MEDROXYPROGESTERONE ACETATE 2.5 MG/1
TABLET ORAL
Qty: 90 TABLET | Refills: 3 | Status: SHIPPED | OUTPATIENT
Start: 2022-03-03

## 2022-03-03 ASSESSMENT — ENCOUNTER SYMPTOMS
CONSTIPATION: 0
DIARRHEA: 0
ALLERGIC/IMMUNOLOGIC NEGATIVE: 1
EYES NEGATIVE: 1
GASTROINTESTINAL NEGATIVE: 1
RESPIRATORY NEGATIVE: 1

## 2022-03-03 NOTE — PATIENT INSTRUCTIONS
Patient Education        Breast Self-Exam: Care Instructions  Your Care Instructions     A breast self-exam is when you check your breasts for lumps or changes. This regular exam helps you learn how your breasts normally look and feel. Most breast problems or changes are not because of cancer. Breast self-exam is not a substitute for a mammogram. Having regular breast exams by your doctor and regular mammograms improve your chances of finding any problems with your breasts. Some women set a time each month to do a step-by-step breast self-exam. Other women like a less formal system. They might look at their breasts as they brush their teeth, or feel their breasts once in a while in the shower. If you notice a change in your breast, tell your doctor. Follow-up care is a key part of your treatment and safety. Be sure to make and go to all appointments, and call your doctor if you are having problems. It's also a good idea to know your test results and keep a list of the medicines you take. How do you do a breast self-exam?  · The best time to examine your breasts is usually one week after your menstrual period begins. Your breasts should not be tender then. If you do not have periods, you might do your exam on a day of the month that is easy to remember. · To examine your breasts:  ? Remove all your clothes above the waist and lie down. When you are lying down, your breast tissue spreads evenly over your chest wall, which makes it easier to feel all your breast tissue. ? Use the pads--not the fingertips--of the 3 middle fingers of your left hand to check your right breast. Move your fingers slowly in small coin-sized circles that overlap. ? Use three levels of pressure to feel of all your breast tissue. Use light pressure to feel the tissue close to the skin surface. Use medium pressure to feel a little deeper. Use firm pressure to feel your tissue close to your breastbone and ribs.  Use each pressure level to feel your breast tissue before moving on to the next spot. ? Check your entire breast, moving up and down as if following a strip from the collarbone to the bra line, and from the armpit to the ribs. Repeat until you have covered the entire breast.  ? Repeat this procedure for your left breast, using the pads of the 3 middle fingers of your right hand. · To examine your breasts while in the shower:  ? Place one arm over your head and lightly soap your breast on that side. ? Using the pads of your fingers, gently move your hand over your breast (in the strip pattern described above), feeling carefully for any lumps or changes. ? Repeat for the other breast.  · Have your doctor inspect anything you notice to see if you need further testing. Where can you learn more? Go to https://"[email or web address].[email or web address]. org and sign in to your [employer] account. Enter P148 in the [employer] box to learn more about \"Breast Self-Exam: Care Instructions. \"     If you do not have an account, please click on the \"Sign Up Now\" link. Current as of: September 8, 2021               Content Version: 13.1  © 5841-4807 Healthwise, Incorporated. Care instructions adapted under license by [facility] ([facility]). If you have questions about a medical condition or this instruction, always ask your healthcare professional. [address]  any warranty or liability for your use of this information.

## 2022-03-03 NOTE — PROGRESS NOTES
Debora Whitney is a 62 y.o. female who presents today for her medical conditions/ complaints as noted below. Salud Funez is c/o of Gynecologic Exam        HPI  Pt presents for annual exam and pap smear. Needs order for screening mammogram. PCP draws labs and manages meds. Having some issues with eczema, has taken multiple treatments without improvement. Taking HRT every other day, and having some break through hot flashes. Sister was dx with breast cancer, pt states she is doing well. Mammo:2021  Pap smear:  Contraception:TL     P:2  Ab:0  Bone density:  Colonoscopy:  Patient's last menstrual period was 2009. [id number]    Past Medical History:   Diagnosis Date    Allergic bronchitis     has wheezing related to dust and pollen    Allergic rhinitis     Asthma     Blepharitis of both eyes     Bone spur     cervical    Cervical disc disease     Chicken pox     age 25    Chronic anxiety 2006    has been on valium after failing effexor and other medications    Coccyx disorder     history of f/x    Constipation     chronic since a teen    Degenerative disc disease at L5-S1 level     Genital warts     Dr Sandi Contreras GERD (gastroesophageal reflux disease)     Hemorrhoids     HPV in female     Hyperthyroidism     took radioactive iodine    Post menopausal syndrome     Reactive airway disease with wheezing     Scoliosis     Unspecified vitamin D deficiency      Past Surgical History:   Procedure Laterality Date    CERVICAL DISC ARTHROPLASTY      CERVICAL DISCECTOMY      COLONOSCOPY  2011    [facility]?     COLONOSCOPY N/A 2016    Dr Cristiana Persaud AP (-) dysplasia x 1, BCM x 1--3 yr recall    COLONOSCOPY N/A 2019    Dr Rica Darnell, large grade 3 internal and external hemorrhoids, suboptimal prep, 3 yr recall    DILATION AND CURETTAGE OF UTERUS N/A 2020    DILATATION AND CURETTAGE HYSTEROSCOPY performed by Mason Benoit, MD at [address] [place] [address] N/A 2019    INTERNAL AND EXTERNAL HEMORRHOIDECTOMY performed by Ashley Grijalva MD at [address] [facility] [address] [facility]  2021    replaced discs    TUBAL LIGATION      UPPER GASTROINTESTINAL ENDOSCOPY  2011     ?      Family History   Problem Relation Age of Onset    Diabetes Sister     Thyroid Disease Sister     Rheum Arthritis Sister     Other Sister         fibromyalgia    Breast Cancer Sister 59    Rheum Arthritis Maternal Aunt     Colon Polyps Maternal Aunt     Heart Failure Father     Heart Disease Father          of acute MI at 58    Hypertension Mother     Cancer Mother         lung cancer    Colon Polyps Mother     High Blood Pressure Mother     Other Son         sleep apnea,anxiety and neck problems after  injuries    Breast Cancer Maternal Cousin 45    Colon Cancer Neg Hx     Esophageal Cancer Neg Hx     Stomach Cancer Neg Hx     Liver Cancer Neg Hx     Liver Disease Neg Hx     Rectal Cancer Neg Hx      Social History     Tobacco Use    Smoking status: Former Smoker     Packs/day: 2.00     Years: 25.00     Pack years: 50.00     Types: Cigarettes     Quit date: 2008     Years since quittin.9    Smokeless tobacco: Never Used    Tobacco comment: Quit smoking in  with Wellbutrin (had crazy dreams on it)   Substance Use Topics    Alcohol use: No     Alcohol/week: 0.0 standard drinks       Current Outpatient Medications   Medication Sig Dispense Refill    estradiol (ESTRACE) 0.5 MG tablet Take 1 tablet by mouth daily 90 tablet 1    medroxyPROGESTERone (PROVERA) 2.5 MG tablet TAKE 1 TABLET BY MOUTH EVERY DAY 90 tablet 3    SYNTHROID 112 MCG tablet TAKE 1 TABLET BY MOUTH EVERY DAY IN THE MORNING 90 tablet 1    levocetirizine (XYZAL) 5 MG tablet TAKE 1 TABLET BY MOUTH EVERY DAY 90 tablet 3    HYDROcodone-acetaminophen (NORCO) 7.5-325 MG per tablet Take 1 tablet by mouth 3 times daily as needed.  hydrOXYzine (ATARAX) 25 MG tablet Take 25 mg by mouth every 8 hours as needed      FLUoxetine (PROZAC) 10 MG capsule TAKE 1 CAPSULE BY MOUTH EVERY DAY 30 capsule 5    triamcinolone (NASACORT ALLERGY 24HR) 55 MCG/ACT nasal inhaler 2 sprays by Each Nostril route daily 1 each 3    clobetasol (TEMOVATE) 0.05 % ointment APPLY TO AFFECTED AREA TWICE A DAY 30 g 0    fluticasone (FLONASE) 50 MCG/ACT nasal spray USE 2 SPRAYS IN EACH NOSTRIL DAILY 1 each 3    pantoprazole (PROTONIX) 40 MG tablet TAKE 1 TABLET BY MOUTH EVERY MORNING BEFORE BREAKFAST 90 tablet 1    Cyclobenzaprine HCl (FLEXERIL PO) Take 10 mg by mouth 3 times daily      montelukast (SINGULAIR) 10 MG tablet TAKE 1 TABLET BY MOUTH EVERY DAY AT NIGHT 90 tablet 1    azelastine (ASTELIN) 0.1 % nasal spray USE 2 SPRAYS IN EACH NOSTRIL TWICE A DAY AS DIRECTED 1 Bottle 5    vitamin D (CHOLECALCIFEROL) 125 MCG (5000 UT) CAPS capsule Take 5,000 Units by mouth daily      PROAIR  (90 Base) MCG/ACT inhaler INHALE 2 PUFFS INTO THE LUNGS EVERY 6 HOURS AS NEEDED FOR WHEEZING 8.5 Inhaler 1    EPINEPHrine (EPIPEN) 0.3 MG/0.3ML SOAJ injection USE AS DIRECTED FOR ACUTE  ALLERGIC REACTION  1    epinastine (ELESTAT) 0.05 % SOLN INSTILL 1 DROP IN EACH EYE ONE TO TWICE A DAY AS NEEDED  3    naproxen (NAPROXEN) 500 MG EC tablet Take 1 tablet by mouth every 12 hours Generic is okay 60 tablet 11     No current facility-administered medications for this visit. Allergies   Allergen Reactions    Oxycodone-Acetaminophen Hives    Pollen Extract      Early Spring and early Fall    Effexor [Venlafaxine Hydrochloride] Other (See Comments)     Slurred speech, feelings of having a TIA, seeing double, unaware of her surroundings   [employer] Extract [Poison Ivy Extract] Itching    Nickel Itching     Vitals:    03/03/22 1010   BP: 137/85   Pulse: 86     Body mass index is 34.75 kg/m².     Review of Systems   Constitutional: Positive for diaphoresis (hot flashes). HENT: Negative. Eyes: Negative. Respiratory: Negative. Cardiovascular: Negative. Gastrointestinal: Negative. Negative for constipation and diarrhea. Endocrine: Negative. Genitourinary: Negative. Negative for frequency, menstrual problem and urgency. Musculoskeletal: Negative. Skin: Positive for rash. Allergic/Immunologic: Negative. Neurological: Negative. Hematological: Negative. Psychiatric/Behavioral: Negative. All other systems reviewed and are negative. Physical Exam  Vitals and nursing note reviewed. Constitutional:       Appearance: She is well-developed. HENT:      Head: Normocephalic. Neck:      Thyroid: No thyroid mass or thyromegaly. Cardiovascular:      Rate and Rhythm: Normal rate and regular rhythm. Pulmonary:      Effort: Pulmonary effort is normal.      Breath sounds: Normal breath sounds. Chest:   Breasts:      Right: No inverted nipple, mass, nipple discharge or skin change. Left: No inverted nipple, mass, nipple discharge or skin change. Abdominal:      General: Abdomen is protuberant. Palpations: Abdomen is soft. There is no mass. Tenderness: There is no abdominal tenderness. Comments: Diffuse erythematic papule rash, scabbing noted in some areas from scratching     Genitourinary:     General: Normal vulva. Vagina: Normal.      Cervix: No cervical motion tenderness. Uterus: Normal. Not enlarged. Adnexa:         Right: No mass or tenderness. Left: No mass or tenderness. Comments: Pap collected  Atrophic vaginal changes  Musculoskeletal:         General: Normal range of motion. Cervical back: Normal range of motion and neck supple. Skin:     General: Skin is warm and dry. Neurological:      Mental Status: She is alert and oriented to person, place, and time.    Psychiatric:         Attention and Perception: Attention normal.         Mood and Affect: Mood normal. Speech: Speech normal.         Behavior: Behavior normal.         Thought Content: Thought content normal.         Cognition and Memory: Cognition normal.         Judgment: Judgment normal.          Diagnosis Orders   1. Encounter for gynecological examination without abnormal finding     2. Screening for cervical cancer  PAP SMEAR    Human papillomavirus (HPV) DNA probe thin prep high risk   3. Encounter for screening mammogram for malignant neoplasm of breast  JAME DIGITAL SCREEN W OR WO CAD BILATERAL   4. Screening breast examination     5. Postmenopausal     6. Symptomatic menopausal or female climacteric states         MEDICATIONS:  Orders Placed This Encounter   Medications    estradiol (ESTRACE) 0.5 MG tablet     Sig: Take 1 tablet by mouth daily     Dispense:  90 tablet     Refill:  1    medroxyPROGESTERone (PROVERA) 2.5 MG tablet     Sig: TAKE 1 TABLET BY MOUTH EVERY DAY     Dispense:  90 tablet     Refill:  3       ORDERS:  Orders Placed This Encounter   Procedures    JAME DIGITAL SCREEN W OR WO CAD BILATERAL    PAP SMEAR    Human papillomavirus (HPV) DNA probe thin prep high risk       PLAN:  Pap collected  Ordered screening mammogram  Instructed to try and wean further down on HRT if possible- pt states understanding    Patient Instructions   Patient Education        Breast Self-Exam: Care Instructions  Your Care Instructions     A breast self-exam is when you check your breasts for lumps or changes. This regular exam helps you learn how your breasts normally look and feel. Most breast problems or changes are not because of cancer. Breast self-exam is not a substitute for a mammogram. Having regular breast exams by your doctor and regular mammograms improve your chances of finding any problems with your breasts. Some women set a time each month to do a step-by-step breast self-exam. Other women like a less formal system.  They might look at their breasts as they brush their teeth, or feel their breasts once in a while in the shower. If you notice a change in your breast, tell your doctor. Follow-up care is a key part of your treatment and safety. Be sure to make and go to all appointments, and call your doctor if you are having problems. It's also a good idea to know your test results and keep a list of the medicines you take. How do you do a breast self-exam?  · The best time to examine your breasts is usually one week after your menstrual period begins. Your breasts should not be tender then. If you do not have periods, you might do your exam on a day of the month that is easy to remember. · To examine your breasts:  ? Remove all your clothes above the waist and lie down. When you are lying down, your breast tissue spreads evenly over your chest wall, which makes it easier to feel all your breast tissue. ? Use the pads--not the fingertips--of the 3 middle fingers of your left hand to check your right breast. Move your fingers slowly in small coin-sized circles that overlap. ? Use three levels of pressure to feel of all your breast tissue. Use light pressure to feel the tissue close to the skin surface. Use medium pressure to feel a little deeper. Use firm pressure to feel your tissue close to your breastbone and ribs. Use each pressure level to feel your breast tissue before moving on to the next spot. ? Check your entire breast, moving up and down as if following a strip from the collarbone to the bra line, and from the armpit to the ribs. Repeat until you have covered the entire breast.  ? Repeat this procedure for your left breast, using the pads of the 3 middle fingers of your right hand. · To examine your breasts while in the shower:  ? Place one arm over your head and lightly soap your breast on that side. ? Using the pads of your fingers, gently move your hand over your breast (in the strip pattern described above), feeling carefully for any lumps or changes.   ? Repeat for the other breast.  · Have your doctor inspect anything you notice to see if you need further testing. Where can you learn more? Go to [email or web address] org and sign in to your [employer] account. Enter P148 in the [facility] box to learn more about \"Breast Self-Exam: Care Instructions. \"     If you do not have an account, please click on the \"Sign Up Now\" link. Current as of: September 8, 2021               Content Version: 13.1  © 5277-4590 Healthwise, Incorporated. Care instructions adapted under license by [facility] ([facility]). If you have questions about a medical condition or this instruction, always ask your healthcare professional. [address] any warranty or liability for your use of this information.

## 2022-03-03 NOTE — PROGRESS NOTES
Pt presents today for pap smear and breast exam.      Mammo:2021  Pap smear:  Contraception:TL     P:2  Ab:0  Bone density:  Colonoscopy:

## 2022-03-04 ENCOUNTER — OFFICE VISIT (OUTPATIENT)
Dept: PRIMARY CARE CLINIC | Age: 62
End: 2022-03-04
Payer: COMMERCIAL

## 2022-03-04 VITALS
TEMPERATURE: 97.1 F | BODY MASS INDEX: 35.07 KG/M2 | OXYGEN SATURATION: 97 % | DIASTOLIC BLOOD PRESSURE: 78 MMHG | HEIGHT: 62 IN | HEART RATE: 81 BPM | SYSTOLIC BLOOD PRESSURE: 139 MMHG | WEIGHT: 190.6 LBS

## 2022-03-04 DIAGNOSIS — L20.9 ATOPIC DERMATITIS, UNSPECIFIED TYPE: Primary | ICD-10-CM

## 2022-03-04 DIAGNOSIS — R21 RASH: ICD-10-CM

## 2022-03-04 DIAGNOSIS — J45.909 UNCOMPLICATED ASTHMA, UNSPECIFIED ASTHMA SEVERITY, UNSPECIFIED WHETHER PERSISTENT: ICD-10-CM

## 2022-03-04 DIAGNOSIS — J30.81 ALLERGIC RHINITIS DUE TO ANIMAL HAIR AND DANDER: ICD-10-CM

## 2022-03-04 PROCEDURE — 99214 OFFICE O/P EST MOD 30 MIN: CPT | Performed by: STUDENT IN AN ORGANIZED HEALTH CARE EDUCATION/TRAINING PROGRAM

## 2022-03-04 RX ORDER — HYDROXYZINE HYDROCHLORIDE 25 MG/1
25 TABLET, FILM COATED ORAL NIGHTLY
Qty: 30 TABLET | Refills: 0 | Status: SHIPPED | OUTPATIENT
Start: 2022-03-04 | End: 2022-03-26

## 2022-03-04 RX ORDER — PREDNISONE 10 MG/1
TABLET ORAL
Qty: 11 TABLET | Refills: 0 | Status: SHIPPED | OUTPATIENT
Start: 2022-03-04 | End: 2022-03-09

## 2022-03-04 RX ORDER — CAMPHOR 0.45 %
GEL (GRAM) TOPICAL
Qty: 1 EACH | Refills: 1 | Status: SHIPPED | OUTPATIENT
Start: 2022-03-04

## 2022-03-04 NOTE — PROGRESS NOTES
[address] [facility]  [address] [address] [place] [id number] [place] [address]  Dept: [phone]  Dept Fax: [phone]  Loc: [phone]      Subjective:     Chief Complaint   Patient presents with    Rash     Has had rash for 1 month and its spreading and sometime itches        HPI:  Binh Hernandez is a 62 y.o. female presenting for    Rash  -Pt has h/o atopic dermatitis (diffuse, on face, arm, legs, abdomen), allergic rhinitis, asthma.   -Main complaint today is new bothersome rash on sides/flanks. Started about 1 week ago. -It is itchy and she is scratching a lot. She is using topical clobetasol previously previously prescribed w/o significant relief.   -She notes previously being treated for scabies (abd/genital area) that resolved. -Pt has cats w/ known cat dander allergy. She has carpets and aggravating trees around house. She sees allergist and receives shots. On nasal steroid, oral 2nd gen antihistamine, and singulair already  -No new foods, clothes or detergents. No specific contact suspected.     ROS:   Reviewed with patient and noted to be negative except that listed in HPI    PMHx:  Past Medical History:   Diagnosis Date    Allergic bronchitis     has wheezing related to dust and pollen    Allergic rhinitis     Asthma     Blepharitis of both eyes     Bone spur     cervical    Cervical disc disease     Chicken pox     age 26    Chronic anxiety 2006    has been on valium after failing effexor and other medications    Coccyx disorder     history of f/x    Constipation     chronic since a teen    Degenerative disc disease at L5-S1 level     Genital warts     Dr Vinny Grey GERD (gastroesophageal reflux disease)     Hemorrhoids     HPV in female     Hyperthyroidism     took radioactive iodine    Post menopausal syndrome 2010    Reactive airway disease with wheezing     Scoliosis     Unspecified vitamin D deficiency      Patient Active Problem List Diagnosis    Hyperthyroidism    Arthritis of neck    Headache    Chronic anxiety    Vitamin D deficiency    Postmenopausal atrophic vaginitis    Abdominal pain, other specified site    Chronic constipation    Polypharmacy    Narcotic dependence (HCC)    Chronic pain    NSAID long-term use    Family history of colonic polyps    Stress incontinence, female    Reactive airway disease with wheezing    Allergic bronchitis    Irritable bowel syndrome with constipation    Breast density    Chronic GERD    Cystocele with uterine prolapse    Constipation due to opioid therapy    Gas bloat syndrome    PMB (postmenopausal bleeding)    Eustachian tube dysfunction, bilateral       PSHx:  Past Surgical History:   Procedure Laterality Date    CERVICAL DISC ARTHROPLASTY      CERVICAL DISCECTOMY      COLONOSCOPY  2011    WB?     COLONOSCOPY N/A 2016    Dr Ryan Rachel AP (-) dysplasia x 1, BCM x 1--3 yr recall    COLONOSCOPY N/A 2019    Dr Mahendra Padron, large grade 3 internal and external hemorrhoids, suboptimal prep, 3 yr recall    DILATION AND CURETTAGE OF UTERUS N/A 2020    DILATATION AND CURETTAGE HYSTEROSCOPY performed by Delia Ortiz MD at [facility] N/A 2019    INTERNAL AND EXTERNAL HEMORRHOIDECTOMY performed by Liv Biggs MD at [place] [address]  2021    replaced discs    TUBAL LIGATION      UPPER GASTROINTESTINAL ENDOSCOPY  2011     ?        PFHx:  Family History   Problem Relation Age of Onset    Diabetes Sister     Thyroid Disease Sister     Rheum Arthritis Sister     Other Sister         fibromyalgia    Breast Cancer Sister 59    Rheum Arthritis Maternal Aunt     Colon Polyps Maternal Aunt     Heart Failure Father     Heart Disease Father          of acute MI at 58    Hypertension Mother     Cancer Mother         lung cancer    Colon Polyps Mother     High Blood Pressure Mother     Other Son         sleep apnea,anxiety and neck problems after  injuries    Breast Cancer Maternal Cousin 45    Colon Cancer Neg Hx     Esophageal Cancer Neg Hx     Stomach Cancer Neg Hx     Liver Cancer Neg Hx     Liver Disease Neg Hx     Rectal Cancer Neg Hx        SocialHx:  Social History     Tobacco Use    Smoking status: Former Smoker     Packs/day: 2.00     Years: 25.00     Pack years: 50.00     Types: Cigarettes     Quit date: 2008     Years since quittin.9    Smokeless tobacco: Never Used    Tobacco comment: Quit smoking in  with Wellbutrin (had crazy dreams on it)   Substance Use Topics    Alcohol use: No     Alcohol/week: 0.0 standard drinks       Allergies: Allergies   Allergen Reactions    Oxycodone-Acetaminophen Hives    Pollen Extract      Early Spring and early     Effexor [Venlafaxine Hydrochloride] Other (See Comments)     Slurred speech, feelings of having a TIA, seeing double, unaware of her surroundings   [employer] Extract [Poison Ivy Extract] Itching    Nickel Itching       Medications:  Current Outpatient Medications   Medication Sig Dispense Refill    diphenhydrAMINE-zinc acetate (BENADRYL ITCH STOPPING) 1-0.1 % cream Apply topically 3 times daily as needed. 1 each 1    hydrOXYzine (ATARAX) 25 MG tablet Take 1 tablet by mouth nightly 30 tablet 0    predniSONE (DELTASONE) 10 MG tablet Take 4 tablets by mouth daily for 1 day, THEN 3 tablets daily for 1 day, THEN 2 tablets daily for 1 day, THEN 1 tablet daily for 1 day, THEN 0.5 tablets daily for 1 day.  11 tablet 0    estradiol (ESTRACE) 0.5 MG tablet Take 1 tablet by mouth daily 90 tablet 1    medroxyPROGESTERone (PROVERA) 2.5 MG tablet TAKE 1 TABLET BY MOUTH EVERY DAY 90 tablet 3    SYNTHROID 112 MCG tablet TAKE 1 TABLET BY MOUTH EVERY DAY IN THE MORNING 90 tablet 1    levocetirizine (XYZAL) 5 MG tablet TAKE 1 TABLET BY MOUTH EVERY DAY 90 tablet 3    HYDROcodone-acetaminophen (NORCO) 7.5-325 MG per tablet Take 1 tablet by mouth 3 times daily as needed.  hydrOXYzine (ATARAX) 25 MG tablet Take 25 mg by mouth every 8 hours as needed      FLUoxetine (PROZAC) 10 MG capsule TAKE 1 CAPSULE BY MOUTH EVERY DAY 30 capsule 5    triamcinolone (NASACORT ALLERGY 24HR) 55 MCG/ACT nasal inhaler 2 sprays by Each Nostril route daily 1 each 3    clobetasol (TEMOVATE) 0.05 % ointment APPLY TO AFFECTED AREA TWICE A DAY 30 g 0    pantoprazole (PROTONIX) 40 MG tablet TAKE 1 TABLET BY MOUTH EVERY MORNING BEFORE BREAKFAST 90 tablet 1    Cyclobenzaprine HCl (FLEXERIL PO) Take 10 mg by mouth 3 times daily      montelukast (SINGULAIR) 10 MG tablet TAKE 1 TABLET BY MOUTH EVERY DAY AT NIGHT 90 tablet 1    azelastine (ASTELIN) 0.1 % nasal spray USE 2 SPRAYS IN EACH NOSTRIL TWICE A DAY AS DIRECTED 1 Bottle 5    vitamin D (CHOLECALCIFEROL) 125 MCG (5000 UT) CAPS capsule Take 5,000 Units by mouth daily      PROAIR  (90 Base) MCG/ACT inhaler INHALE 2 PUFFS INTO THE LUNGS EVERY 6 HOURS AS NEEDED FOR WHEEZING 8.5 Inhaler 1    EPINEPHrine (EPIPEN) 0.3 MG/0.3ML SOAJ injection USE AS DIRECTED FOR ACUTE  ALLERGIC REACTION  1    epinastine (ELESTAT) 0.05 % SOLN INSTILL 1 DROP IN EACH EYE ONE TO TWICE A DAY AS NEEDED  3    naproxen (NAPROXEN) 500 MG EC tablet Take 1 tablet by mouth every 12 hours Generic is okay 60 tablet 11     No current facility-administered medications for this visit. Objective:   PE:  /78   Pulse 81   Temp 97.1 °F (36.2 °C)   Ht 5' 2\" (1.575 m)   Wt 190 lb 9.6 oz (86.5 kg)   LMP 07/11/2009   SpO2 97%   BMI 34.86 kg/m²   Physical Exam  Constitutional:       General: She is not in acute distress. Appearance: Normal appearance. HENT:      Head: Normocephalic. Nose: Nose normal.      Mouth/Throat:      Mouth: Mucous membranes are moist.      Pharynx: Oropharynx is clear.  No oropharyngeal exudate or posterior oropharyngeal erythema. Eyes:      General: No scleral icterus. Extraocular Movements: Extraocular movements intact. Conjunctiva/sclera: Conjunctivae normal.   Musculoskeletal:         General: Normal range of motion. Cervical back: Normal range of motion. Skin:     General: Skin is warm and dry. Capillary Refill: Capillary refill takes less than 2 seconds. Comments: There is barely raised hive-like rash on BL flanks. No tenderness. Scaly erythematous rash on extensor surfaces arms. Facial cheeks have flushed, red appearance w/ mild inflammation periorbitally and around eyebrows  Scattered punctate petechiae-like lesions over abdomen   Neurological:      General: No focal deficit present. Mental Status: She is alert and oriented to person, place, and time. Psychiatric:         Mood and Affect: Mood normal.         Behavior: Behavior normal.         Thought Content: Thought content normal.            Assessment & Plan   Salud was seen today for rash. Diagnoses and all orders for this visit:    Rash  Atopic dermatitis, unspecified type  Allergic rhinitis  Asthma  -Pt has allergic triad and I suspect very sensitive to things she comes in contact to. Rash in question appears hive-like, unclear what contact/substance she is reacting to. Will treat w/ topical antihistamine, oral at night and short steroid course. I recommend she see dermatologist. No acute changes in nasal allergy or asthma sx. Continue nasal steroid/antihistamine/singulair per allergist.  -     External Referral To Dermatology  -     diphenhydrAMINE-zinc acetate (BENADRYL ITCH STOPPING) 1-0.1 % cream; Apply topically 3 times daily as needed. -     hydrOXYzine (ATARAX) 25 MG tablet; Take 1 tablet by mouth nightly  -     predniSONE (DELTASONE) 10 MG tablet;  Take 4 tablets by mouth daily for 1 day, THEN 3 tablets daily for 1 day, THEN 2 tablets daily for 1 day, THEN 1 tablet daily for 1 day, THEN 0.5 tablets daily for 1 day. -F/u if not improving/worsening    All questions were answered. Medications, including possible adverse effects, and instructions were reviewed and  understanding was confirmed. Follow-up recommendations, including when to contact or return to office (ie; if symptoms worsen or fail to improve), were discussed and acknowledged.     Electronically signed by Debora Rachel MD on 3/4/22 at 1:52 PM CST

## 2022-03-20 RX ORDER — PANTOPRAZOLE SODIUM 40 MG/1
TABLET, DELAYED RELEASE ORAL
Qty: 90 TABLET | Refills: 1 | Status: SHIPPED | OUTPATIENT
Start: 2022-03-20 | End: 2022-09-13

## 2022-03-25 ENCOUNTER — TELEPHONE (OUTPATIENT)
Dept: PRIMARY CARE CLINIC | Age: 62
End: 2022-03-25

## 2022-03-25 DIAGNOSIS — R21 RASH: Primary | ICD-10-CM

## 2022-03-25 NOTE — TELEPHONE ENCOUNTER
----- Message from Aston Faust sent at 3/25/2022 12:49 PM CDT -----  Subject: Message to Provider    QUESTIONS  Information for Provider? Patient called in she would like the nurse to   give her a call regarding the rash that she has please call patient  ---------------------------------------------------------------------------  --------------  [address]  What is the best way for the office to contact you? OK to leave message on   voicemail  Preferred Call Back Phone Number? [phone]  ---------------------------------------------------------------------------  --------------  SCRIPT ANSWERS  Relationship to Patient?  Self

## 2022-03-25 NOTE — TELEPHONE ENCOUNTER
Spoke with PT she stated her sister has had similar rash and she sees Dr Tootie Reyes and and she was DX: with alpha gal she wants to her a blood test for this as well. Rash has been going on 6mo, she was diagnosed with scabies 4 months ago she states its everywhere her vaginal area butt crack and all over derm area. Nothing has changed it appears the same nothing has changed she is taking the hydroxyzine she is still scratching with spray Hydrocortisone spray applied .

## 2022-03-26 DIAGNOSIS — L20.9 ATOPIC DERMATITIS, UNSPECIFIED TYPE: ICD-10-CM

## 2022-03-26 RX ORDER — HYDROXYZINE HYDROCHLORIDE 25 MG/1
25 TABLET, FILM COATED ORAL NIGHTLY
Qty: 30 TABLET | Refills: 3 | Status: SHIPPED | OUTPATIENT
Start: 2022-03-26 | End: 2022-04-25

## 2022-03-30 DIAGNOSIS — R21 RASH: ICD-10-CM

## 2022-04-01 LAB
ALLERGEN BEEF: <0.1 KU/L
ALLERGEN LAMB IGE: <0.1 KU/L
ALLERGEN PORK: <0.1 KU/L
ALLERGEN SEE NOTE: NORMAL
ALLERGEN,FOOD, ALPHA-GAL IGE: 0.51 KU/L

## 2022-04-04 ENCOUNTER — TELEPHONE (OUTPATIENT)
Dept: PRIMARY CARE CLINIC | Age: 62
End: 2022-04-04

## 2022-04-04 NOTE — TELEPHONE ENCOUNTER
Contacted patient to advise [facility]. Provided patient with options of following provider or changing to new PCP in office. Patient chose to stay in office and change provider.  Changed to TYRA PARRY

## 2022-04-05 ENCOUNTER — HOSPITAL ENCOUNTER (OUTPATIENT)
Dept: WOMENS IMAGING | Age: 62
Discharge: HOME | End: 2022-04-05
Payer: MEDICARE

## 2022-04-05 DIAGNOSIS — Z12.31 ENCOUNTER FOR SCREENING MAMMOGRAM FOR MALIGNANT NEOPLASM OF BREAST: ICD-10-CM

## 2022-04-05 PROCEDURE — 77063 BREAST TOMOSYNTHESIS BI: CPT

## 2022-04-06 ENCOUNTER — PATIENT MESSAGE (OUTPATIENT)
Dept: PRIMARY CARE CLINIC | Age: 62
End: 2022-04-06

## 2022-04-06 DIAGNOSIS — J45.909 BRONCHITIS, ALLERGIC, UNSPECIFIED ASTHMA SEVERITY, UNCOMPLICATED: ICD-10-CM

## 2022-04-06 DIAGNOSIS — J45.909 REACTIVE AIRWAY DISEASE WITH WHEEZING WITHOUT COMPLICATION, UNSPECIFIED ASTHMA SEVERITY, UNSPECIFIED WHETHER PERSISTENT: ICD-10-CM

## 2022-04-06 DIAGNOSIS — Z91.09 ENVIRONMENTAL ALLERGIES: ICD-10-CM

## 2022-04-06 DIAGNOSIS — R21 RASH: Primary | ICD-10-CM

## 2022-04-06 DIAGNOSIS — T78.1XXA ALLERGIC REACTION TO ALPHA-GAL: ICD-10-CM

## 2022-04-06 RX ORDER — FLUTICASONE PROPIONATE 50 MCG
SPRAY, SUSPENSION (ML) NASAL
Refills: 1 | OUTPATIENT
Start: 2022-04-06

## 2022-04-06 NOTE — TELEPHONE ENCOUNTER
----- Message from ANGEL Woo sent at 4/5/2022 10:16 PM CDT -----  It does appear that labs have returned abnormal.  She may have alpha-gal.  I recommend that we refer to allergist for office diagnosis.

## 2022-04-22 DIAGNOSIS — L30.9 DERMATITIS: ICD-10-CM

## 2022-04-24 RX ORDER — CLOBETASOL PROPIONATE 0.5 MG/G
OINTMENT TOPICAL
Qty: 30 G | Refills: 0 | Status: SHIPPED | OUTPATIENT
Start: 2022-04-24

## 2022-04-27 ENCOUNTER — OFFICE VISIT (OUTPATIENT)
Dept: PRIMARY CARE CLINIC | Age: 62
End: 2022-04-27
Payer: MEDICARE

## 2022-04-27 VITALS
TEMPERATURE: 97.5 F | BODY MASS INDEX: 34.78 KG/M2 | HEIGHT: 62 IN | HEART RATE: 75 BPM | WEIGHT: 189 LBS | OXYGEN SATURATION: 98 % | DIASTOLIC BLOOD PRESSURE: 78 MMHG | SYSTOLIC BLOOD PRESSURE: 132 MMHG

## 2022-04-27 DIAGNOSIS — A08.4 VIRAL GASTROENTERITIS: Primary | ICD-10-CM

## 2022-04-27 PROCEDURE — 99213 OFFICE O/P EST LOW 20 MIN: CPT | Performed by: FAMILY MEDICINE

## 2022-04-27 RX ORDER — ONDANSETRON 4 MG/1
4 TABLET, ORALLY DISINTEGRATING ORAL EVERY 8 HOURS PRN
Qty: 20 TABLET | Refills: 0 | Status: SHIPPED | OUTPATIENT
Start: 2022-04-27

## 2022-04-27 ASSESSMENT — PATIENT HEALTH QUESTIONNAIRE - PHQ9
2. FEELING DOWN, DEPRESSED OR HOPELESS: 0
SUM OF ALL RESPONSES TO PHQ9 QUESTIONS 1 & 2: 0
SUM OF ALL RESPONSES TO PHQ QUESTIONS 1-9: 0
1. LITTLE INTEREST OR PLEASURE IN DOING THINGS: 0

## 2022-04-27 NOTE — PROGRESS NOTES
Damian Garcia is a 62 y.o. female who presents today for   Chief Complaint   Patient presents with    Nausea     started 3 days ago    Fever    Headache       HPI  Patient is here for fever and headache started about 3 days ago. Nausea also began around that time. Not having any vomiting but notes having issues with regular meal    No change in PMH, family, social, or surgical history unless mentioned above. I have reviewed the above chief complaint and HPI details charted by staff and claim ownership of the documentation. Review of Systems   Constitutional: Positive for fatigue. Negative for chills and fever. Respiratory: Negative for cough, chest tightness, shortness of breath and wheezing. Cardiovascular: Negative for chest pain, palpitations and leg swelling. Gastrointestinal: Positive for nausea. Negative for abdominal pain, constipation, diarrhea and vomiting. Genitourinary: Negative for difficulty urinating, dysuria and frequency.        Past Medical History:   Diagnosis Date    Allergic bronchitis     has wheezing related to dust and pollen    Allergic rhinitis     Asthma     Blepharitis of both eyes     Bone spur     cervical    Cervical disc disease     Chicken pox     age 26    Chronic anxiety 2006    has been on valium after failing effexor and other medications    Coccyx disorder     history of f/x    Constipation     chronic since a teen    Degenerative disc disease at L5-S1 level     Genital warts     Dr Hannah Morgan GERD (gastroesophageal reflux disease)     Hemorrhoids     HPV in female     Hyperthyroidism     took radioactive iodine    Post menopausal syndrome 2010    Reactive airway disease with wheezing     Scoliosis     Unspecified vitamin D deficiency        Current Outpatient Medications   Medication Sig Dispense Refill    clobetasol (TEMOVATE) 0.05 % ointment APPLY TO AFFECTED AREA TWICE A DAY 30 g 0    pantoprazole (PROTONIX) 40 MG tablet TAKE 1 TABLET BY MOUTH EVERY MORNING BEFORE BREAKFAST 90 tablet 1    diphenhydrAMINE-zinc acetate (BENADRYL ITCH STOPPING) 1-0.1 % cream Apply topically 3 times daily as needed. 1 each 1    estradiol (ESTRACE) 0.5 MG tablet Take 1 tablet by mouth daily 90 tablet 1    medroxyPROGESTERone (PROVERA) 2.5 MG tablet TAKE 1 TABLET BY MOUTH EVERY DAY 90 tablet 3    SYNTHROID 112 MCG tablet TAKE 1 TABLET BY MOUTH EVERY DAY IN THE MORNING 90 tablet 1    levocetirizine (XYZAL) 5 MG tablet TAKE 1 TABLET BY MOUTH EVERY DAY 90 tablet 3    HYDROcodone-acetaminophen (NORCO) 7.5-325 MG per tablet Take 1 tablet by mouth 3 times daily as needed.  hydrOXYzine (ATARAX) 25 MG tablet Take 25 mg by mouth every 8 hours as needed      FLUoxetine (PROZAC) 10 MG capsule TAKE 1 CAPSULE BY MOUTH EVERY DAY 30 capsule 5    triamcinolone (NASACORT ALLERGY 24HR) 55 MCG/ACT nasal inhaler 2 sprays by Each Nostril route daily 1 each 3    Cyclobenzaprine HCl (FLEXERIL PO) Take 10 mg by mouth 3 times daily      montelukast (SINGULAIR) 10 MG tablet TAKE 1 TABLET BY MOUTH EVERY DAY AT NIGHT 90 tablet 1    azelastine (ASTELIN) 0.1 % nasal spray USE 2 SPRAYS IN EACH NOSTRIL TWICE A DAY AS DIRECTED 1 Bottle 5    PROAIR  (90 Base) MCG/ACT inhaler INHALE 2 PUFFS INTO THE LUNGS EVERY 6 HOURS AS NEEDED FOR WHEEZING 8.5 Inhaler 1    EPINEPHrine (EPIPEN) 0.3 MG/0.3ML SOAJ injection USE AS DIRECTED FOR ACUTE  ALLERGIC REACTION  1    epinastine (ELESTAT) 0.05 % SOLN INSTILL 1 DROP IN EACH EYE ONE TO TWICE A DAY AS NEEDED  3    naproxen (NAPROXEN) 500 MG EC tablet Take 1 tablet by mouth every 12 hours Generic is okay 60 tablet 11    vitamin D (CHOLECALCIFEROL) 125 MCG (5000 UT) CAPS capsule Take 5,000 Units by mouth daily       No current facility-administered medications for this visit.        Allergies   Allergen Reactions    Oxycodone-Acetaminophen Hives    Pollen Extract      Early Spring and early Fall    Effexor [facility] Hydrochloride] Other (See Comments)     Slurred speech, feelings of having a TIA, seeing double, unaware of her surroundings    Poison Ivy Extract [Poison Ivy Extract] Itching    Nickel Itching       Past Surgical History:   Procedure Laterality Date    CERVICAL DISC ARTHROPLASTY      CERVICAL DISCECTOMY      COLONOSCOPY  2011    WB?     COLONOSCOPY N/A 2016    Dr Kirti Garcia AP (-) dysplasia x 1, BCM x 1--3 yr recall    COLONOSCOPY N/A 2019    Dr Mariela Heart, large grade 3 internal and external hemorrhoids, suboptimal prep, 3 yr recall    DILATION AND CURETTAGE OF UTERUS N/A 2020    DILATATION AND CURETTAGE HYSTEROSCOPY performed by Marylee Starks, MD at [address] [facility] [address] N/A 2019    INTERNAL AND EXTERNAL HEMORRHOIDECTOMY performed by Case Syed MD at [place] [address]  2021    replaced discs    TUBAL LIGATION      UPPER GASTROINTESTINAL ENDOSCOPY  2011     ?        Social History     Tobacco Use    Smoking status: Former Smoker     Packs/day: 2.00     Years: 25.00     Pack years: 50.00     Types: Cigarettes     Quit date: 2008     Years since quittin.0    Smokeless tobacco: Never Used    Tobacco comment: Quit smoking in  with Wellbutrin (had crazy dreams on it)   Vaping Use    Vaping Use: Never used   Substance Use Topics    Alcohol use: No     Alcohol/week: 0.0 standard drinks    Drug use: No       Family History   Problem Relation Age of Onset    Diabetes Sister     Thyroid Disease Sister     Rheum Arthritis Sister     Other Sister         fibromyalgia    Breast Cancer Sister 59    Rheum Arthritis Maternal Aunt     Colon Polyps Maternal Aunt     Heart Failure Father     Heart Disease Father          of acute MI at 58    Hypertension Mother     Cancer Mother         lung cancer    Colon Polyps Mother     High Blood Pressure Mother    Maya Me Other Son sleep apnea,anxiety and neck problems after  injuries    Breast Cancer Maternal Cousin 45    Colon Cancer Neg Hx     Esophageal Cancer Neg Hx     Stomach Cancer Neg Hx     Liver Cancer Neg Hx     Liver Disease Neg Hx     Rectal Cancer Neg Hx        /78   Pulse 75   Temp 97.5 °F (36.4 °C) (Temporal)   Ht 5' 2\" (1.575 m)   Wt 189 lb (85.7 kg)   LMP 07/11/2009   SpO2 98%   BMI 34.57 kg/m²     Physical Exam  Vitals and nursing note reviewed. Constitutional:       General: She is not in acute distress. Appearance: She is well-developed. She is not diaphoretic. HENT:      Head: Normocephalic and atraumatic. Eyes:      General: No scleral icterus. Cardiovascular:      Rate and Rhythm: Normal rate and regular rhythm. Heart sounds: Normal heart sounds. No murmur heard. Pulmonary:      Effort: Pulmonary effort is normal. No respiratory distress. Breath sounds: Normal breath sounds. No wheezing or rales. Abdominal:      General: Bowel sounds are normal. There is no distension. Palpations: Abdomen is soft. Tenderness: There is no abdominal tenderness. Hernia: No hernia is present. Musculoskeletal:      Right lower leg: No edema. Left lower leg: No edema. Skin:     General: Skin is warm and dry. Neurological:      Mental Status: She is alert and oriented to person, place, and time. Psychiatric:         Mood and Affect: Mood is not anxious or depressed. Cognition and Memory: Cognition is not impaired. Assessment:  No diagnosis found. Plan:   Suspect likely Viral gastroenteritis, reassurance, focus on rehydration and antinausea medications prescribed. No orders of the defined types were placed in this encounter. No orders of the defined types were placed in this encounter. There are no discontinued medications. There are no Patient Instructions on file for this visit.    Patient given educational handouts and has had all questions answered. Patient voices understanding and agrees to plans along with risks and benefits of plan. Patient isinstructed to continue prior meds, diet, and exercise plans unless instructed otherwise. Patient agrees to follow up as instructed and sooner if needed. Patient agrees to go to ER if condition becomes emergent. Notesmay be completed with dictation device and spelling errors may occur. Materials may be copied and pasted from a notepad outside of EMR, all of which, I, Dr. Stephen Phelps MD, take sole intellectual ownership of and have approved adding to my note. No follow-ups on file.

## 2022-05-10 RX ORDER — MONTELUKAST SODIUM 10 MG/1
TABLET ORAL
Qty: 90 TABLET | Refills: 1 | Status: SHIPPED | OUTPATIENT
Start: 2022-05-10 | End: 2022-11-03

## 2022-05-25 ASSESSMENT — ENCOUNTER SYMPTOMS
SHORTNESS OF BREATH: 0
NAUSEA: 1
CONSTIPATION: 0
DIARRHEA: 0
VOMITING: 0
COUGH: 0
ABDOMINAL PAIN: 0
WHEEZING: 0
CHEST TIGHTNESS: 0

## 2022-06-14 ENCOUNTER — OFFICE VISIT (OUTPATIENT)
Dept: PRIMARY CARE CLINIC | Age: 62
End: 2022-06-14
Payer: MEDICARE

## 2022-06-14 VITALS
OXYGEN SATURATION: 98 % | BODY MASS INDEX: 32.78 KG/M2 | DIASTOLIC BLOOD PRESSURE: 74 MMHG | HEART RATE: 72 BPM | HEIGHT: 63 IN | TEMPERATURE: 98 F | WEIGHT: 185 LBS | SYSTOLIC BLOOD PRESSURE: 126 MMHG

## 2022-06-14 DIAGNOSIS — E03.9 HYPOTHYROIDISM, UNSPECIFIED TYPE: Primary | ICD-10-CM

## 2022-06-14 DIAGNOSIS — F41.9 ANXIETY AND DEPRESSION: ICD-10-CM

## 2022-06-14 DIAGNOSIS — Z13.220 LIPID SCREENING: ICD-10-CM

## 2022-06-14 DIAGNOSIS — F32.A ANXIETY AND DEPRESSION: ICD-10-CM

## 2022-06-14 DIAGNOSIS — R73.09 ELEVATED GLUCOSE: ICD-10-CM

## 2022-06-14 DIAGNOSIS — E55.9 VITAMIN D DEFICIENCY: ICD-10-CM

## 2022-06-14 DIAGNOSIS — R21 RASH: ICD-10-CM

## 2022-06-14 PROCEDURE — 99214 OFFICE O/P EST MOD 30 MIN: CPT | Performed by: NURSE PRACTITIONER

## 2022-06-14 RX ORDER — BETAMETHASONE DIPROPIONATE 0.5 MG/G
OINTMENT TOPICAL
COMMUNITY
Start: 2022-05-25

## 2022-06-14 RX ORDER — L.RHAMNOSUS/B.ANIMALIS(LACTIS) 3B CELL
CAPSULE ORAL
COMMUNITY
Start: 2022-03-02

## 2022-06-14 RX ORDER — FLUOXETINE 10 MG/1
10 CAPSULE ORAL DAILY
Qty: 90 CAPSULE | Refills: 1 | Status: SHIPPED | OUTPATIENT
Start: 2022-06-14

## 2022-06-14 SDOH — ECONOMIC STABILITY: FOOD INSECURITY: WITHIN THE PAST 12 MONTHS, THE FOOD YOU BOUGHT JUST DIDN'T LAST AND YOU DIDN'T HAVE MONEY TO GET MORE.: NEVER TRUE

## 2022-06-14 SDOH — ECONOMIC STABILITY: FOOD INSECURITY: WITHIN THE PAST 12 MONTHS, YOU WORRIED THAT YOUR FOOD WOULD RUN OUT BEFORE YOU GOT MONEY TO BUY MORE.: NEVER TRUE

## 2022-06-14 ASSESSMENT — SOCIAL DETERMINANTS OF HEALTH (SDOH): HOW HARD IS IT FOR YOU TO PAY FOR THE VERY BASICS LIKE FOOD, HOUSING, MEDICAL CARE, AND HEATING?: NOT HARD AT ALL

## 2022-06-14 NOTE — PROGRESS NOTES
[address], [facility]     Phone:  [phone]  Fax:  [phone]      Júnior Hernández is a 62 y.o. female who presents today for her medical conditions/complaints as noted below. Júnior Hernández is c/o of Hypothyroidism and Rash      Chief Complaint   Patient presents with    Hypothyroidism    Rash       HPI:     HPI      Patient presents for six month follow up. Patient has a history of hypothyroidism. Patient also reports has a rash follow by Dr.Ford Latisha Morales gal\" and is doing well. Rash has improved has lost weight. Anxiety and depression are well controlled. Past Medical History:   Diagnosis Date    Allergic bronchitis     has wheezing related to dust and pollen    Allergic rhinitis     Asthma     Blepharitis of both eyes     Bone spur     cervical    Cervical disc disease     Chicken pox     age 26    Chronic anxiety 2006    has been on valium after failing effexor and other medications    Coccyx disorder     history of f/x    Constipation     chronic since a teen    Degenerative disc disease at L5-S1 level     Genital warts     Dr Madina Clifton GERD (gastroesophageal reflux disease)     Hemorrhoids     HPV in female     Hyperthyroidism     took radioactive iodine    Post menopausal syndrome 2010    Reactive airway disease with wheezing     Scoliosis     Unspecified vitamin D deficiency         Past Surgical History:   Procedure Laterality Date    CERVICAL DISC ARTHROPLASTY      CERVICAL DISCECTOMY      COLONOSCOPY  09/16/2011    WB?     COLONOSCOPY N/A 11/11/2016    Dr Chrissy Mac AP (-) dysplasia x 1, BCM x 1--3 yr recall    COLONOSCOPY N/A 11/11/2019    Dr Nita Callahan, large grade 3 internal and external hemorrhoids, suboptimal prep, 3 yr recall    DILATION AND CURETTAGE OF UTERUS N/A 07/24/2020    DILATATION AND CURETTAGE HYSTEROSCOPY performed by Alyssa Noel MD at [facility] 2019    INTERNAL AND EXTERNAL HEMORRHOIDECTOMY performed by Nathalie Khan MD at [facility] [address] [facility]  2021    replaced discs    TUBAL LIGATION      UPPER GASTROINTESTINAL ENDOSCOPY  2011     ? Social History     Tobacco Use    Smoking status: Former Smoker     Packs/day: 2.00     Years: 25.00     Pack years: 50.00     Types: Cigarettes     Quit date: 2008     Years since quittin.2    Smokeless tobacco: Never Used    Tobacco comment: Quit smoking in  with Wellbutrin (had crazy dreams on it)   Substance Use Topics    Alcohol use: No     Alcohol/week: 0.0 standard drinks        Current Outpatient Medications   Medication Sig Dispense Refill    Multiple Vitamins-Minerals (STARR MULTI FOR WOMEN PO) Take by mouth Has Vit D      augmented betamethasone dipropionate (DIPROLENE-AF) 0.05 % ointment APPLY TWICE A DAY TO THE AFFECTED AREA ON THE ABDOMEN AS NEEDED WHEN AND WHERE RASH IS PRESENT      metroNIDAZOLE (METROCREAM) 0.75 % cream APPLY NIGHTLY TO AFFECTED AREAS ON FACE FOR ROSACEA      Probiotic Product (ADstruc) CAPS       FLUoxetine (PROZAC) 10 MG capsule Take 1 capsule by mouth daily 90 capsule 1    montelukast (SINGULAIR) 10 MG tablet TAKE 1 TABLET BY MOUTH EVERY DAY AT NIGHT 90 tablet 1    ondansetron (ZOFRAN ODT) 4 MG disintegrating tablet Take 1 tablet by mouth every 8 hours as needed for Nausea or Vomiting 20 tablet 0    clobetasol (TEMOVATE) 0.05 % ointment APPLY TO AFFECTED AREA TWICE A DAY 30 g 0    pantoprazole (PROTONIX) 40 MG tablet TAKE 1 TABLET BY MOUTH EVERY MORNING BEFORE BREAKFAST 90 tablet 1    diphenhydrAMINE-zinc acetate (BENADRYL ITCH STOPPING) 1-0.1 % cream Apply topically 3 times daily as needed.  1 each 1    estradiol (ESTRACE) 0.5 MG tablet Take 1 tablet by mouth daily 90 tablet 1    medroxyPROGESTERone (PROVERA) 2.5 MG tablet TAKE 1 TABLET BY MOUTH EVERY DAY 90 tablet 3    SYNTHROID 112 MCG tablet TAKE 1 TABLET BY MOUTH EVERY DAY IN THE MORNING 90 tablet 1    levocetirizine (XYZAL) 5 MG tablet TAKE 1 TABLET BY MOUTH EVERY DAY 90 tablet 3    HYDROcodone-acetaminophen (NORCO) 7.5-325 MG per tablet Take 1 tablet by mouth 3 times daily as needed.  triamcinolone (NASACORT ALLERGY 24HR) 55 MCG/ACT nasal inhaler 2 sprays by Each Nostril route daily 1 each 3    Cyclobenzaprine HCl (FLEXERIL PO) Take 10 mg by mouth 3 times daily      azelastine (ASTELIN) 0.1 % nasal spray USE 2 SPRAYS IN EACH NOSTRIL TWICE A DAY AS DIRECTED 1 Bottle 5    PROAIR  (90 Base) MCG/ACT inhaler INHALE 2 PUFFS INTO THE LUNGS EVERY 6 HOURS AS NEEDED FOR WHEEZING 8.5 Inhaler 1    EPINEPHrine (EPIPEN) 0.3 MG/0.3ML SOAJ injection USE AS DIRECTED FOR ACUTE  ALLERGIC REACTION  1    epinastine (ELESTAT) 0.05 % SOLN INSTILL 1 DROP IN EACH EYE ONE TO TWICE A DAY AS NEEDED  3    naproxen (NAPROXEN) 500 MG EC tablet Take 1 tablet by mouth every 12 hours Generic is okay 60 tablet 11    hydrOXYzine HCl (ATARAX) 25 MG tablet TAKE 1 TABLET BY MOUTH NIGHTLY 90 tablet 1     No current facility-administered medications for this visit.        Allergies   Allergen Reactions    Oxycodone-Acetaminophen Hives    Pollen Extract      Early Spring and early Fall    Effexor [Venlafaxine Hydrochloride] Other (See Comments)     Slurred speech, feelings of having a TIA, seeing double, unaware of her surroundings    Poison Ivy Extract [Poison Ivy Extract] Itching    Nickel Itching       Family History   Problem Relation Age of Onset    Diabetes Sister     Thyroid Disease Sister     Rheum Arthritis Sister     Other Sister         fibromyalgia    Breast Cancer Sister 59    Rheum Arthritis Maternal Aunt     Colon Polyps Maternal Aunt     Heart Failure Father     Heart Disease Father          of acute MI at 58    Hypertension Mother     Cancer Mother         lung cancer    Colon Polyps Mother     High Blood Pressure Mother  Other Son         sleep apnea,anxiety and neck problems after  injuries    Breast Cancer Maternal Cousin 45    Colon Cancer Neg Hx     Esophageal Cancer Neg Hx     Stomach Cancer Neg Hx     Liver Cancer Neg Hx     Liver Disease Neg Hx     Rectal Cancer Neg Hx                Subjective:      Review of Systems   Constitutional: Negative for activity change and fever. HENT: Negative for congestion, ear pain and sore throat. Respiratory: Negative for cough, chest tightness and shortness of breath. Cardiovascular: Negative for chest pain. Gastrointestinal: Negative for abdominal pain, diarrhea, nausea and vomiting. Genitourinary: Negative for frequency and urgency. Musculoskeletal: Negative for arthralgias and myalgias. Skin: Positive for rash. Negative for color change. Neurological: Negative for dizziness, weakness and numbness. Psychiatric/Behavioral: Negative for agitation. The patient is not nervous/anxious. Objective:     Physical Exam  Vitals reviewed. Constitutional:       Appearance: Normal appearance. HENT:      Head: Normocephalic. Right Ear: Tympanic membrane normal.      Left Ear: Tympanic membrane normal.      Nose: Nose normal.      Mouth/Throat:      Mouth: Mucous membranes are moist.      Pharynx: Oropharynx is clear. Eyes:      Extraocular Movements: Extraocular movements intact. Pupils: Pupils are equal, round, and reactive to light. Cardiovascular:      Rate and Rhythm: Normal rate and regular rhythm. Pulses: Normal pulses. Heart sounds: Normal heart sounds. Pulmonary:      Effort: Pulmonary effort is normal.      Breath sounds: Normal breath sounds. Abdominal:      General: Bowel sounds are normal.      Palpations: Abdomen is soft. Musculoskeletal:         General: Normal range of motion. Cervical back: Normal range of motion. Skin:     General: Skin is warm and dry. Findings: Rash present.    Neurological: Mental Status: She is alert and oriented to person, place, and time. Psychiatric:         Mood and Affect: Mood normal.         Behavior: Behavior normal.         Thought Content: Thought content normal.         Judgment: Judgment normal.         /74   Pulse 72   Temp 98 °F (36.7 °C) (Temporal)   Ht 5' 3\" (1.6 m)   Wt 185 lb (83.9 kg)   LMP 07/11/2009   SpO2 98%   BMI 32.77 kg/m²     Assessment:      Diagnosis Orders   1. Hypothyroidism, unspecified type  TSH    T4, Free   2. Anxiety and depression  FLUoxetine (PROZAC) 10 MG capsule   3. Rash  CBC with Auto Differential   4. Vitamin D deficiency  Vitamin D 25 Hydroxy   5. Elevated glucose  Comprehensive Metabolic Panel    Hemoglobin A1C   6. Lipid screening  Lipid Panel       No results found for this visit on 06/14/22. Plan:     1. Hypothyroidism, unspecified type    - TSH; Future  - T4, Free; Future    2. Anxiety and depression    - FLUoxetine (PROZAC) 10 MG capsule; Take 1 capsule by mouth daily  Dispense: 90 capsule; Refill: 1    3. Rash    - CBC with Auto Differential; Future    4. Vitamin D deficiency    - Vitamin D 25 Hydroxy; Future    5. Elevated glucose    - Comprehensive Metabolic Panel; Future  - Hemoglobin A1C; Future    6. Lipid screening    - Lipid Panel; Future       Return in about 6 months (around 12/14/2022) for awv .     Orders Placed This Encounter   Procedures    CBC with Auto Differential     Standing Status:   Future     Standing Expiration Date:   6/30/2023    Comprehensive Metabolic Panel     Standing Status:   Future     Standing Expiration Date:   6/30/2023    Lipid Panel     Standing Status:   Future     Standing Expiration Date:   6/30/2023     Order Specific Question:   Is Patient Fasting?/# of Hours     Answer:   8    Hemoglobin A1C     Standing Status:   Future     Standing Expiration Date:   6/30/2023    TSH     Standing Status:   Future     Standing Expiration Date:   6/30/2023    T4, Free     Standing Status:   Future     Standing Expiration Date:   6/30/2023    Vitamin D 25 Hydroxy     Standing Status:   Future     Standing Expiration Date:   6/30/2023       Orders Placed This Encounter   Medications    FLUoxetine (PROZAC) 10 MG capsule     Sig: Take 1 capsule by mouth daily     Dispense:  90 capsule     Refill:  1            Patient offered educational handouts and has had all questions answered. Patient voices understanding and agrees to plans along with risks and benefits of plan. Patient is instructed to continue prior meds, diet, and exercise plans as instructed. Patient agrees to follow up as instructed and sooner if needed. Patient agrees to go to ER if condition becomes emergent. EMR Dragon/transcription disclaimer: Some of this encounter note is an electronic transcription/translation of spoken language to printed text. The electronic translation of spoken language may permit erroneous, or at times, nonsensical words or phrases to be inadvertently transcribed.  Although I have reviewed the note for such errors, some may still exist.    Electronically signed by ANGEL Atkinson CNP on 6/30/2022 at 5:41 PM

## 2022-06-24 DIAGNOSIS — L20.9 ATOPIC DERMATITIS, UNSPECIFIED: ICD-10-CM

## 2022-06-26 RX ORDER — HYDROXYZINE HYDROCHLORIDE 25 MG/1
TABLET, FILM COATED ORAL
Qty: 90 TABLET | Refills: 1 | Status: SHIPPED | OUTPATIENT
Start: 2022-06-26

## 2022-06-30 ASSESSMENT — ENCOUNTER SYMPTOMS
ABDOMINAL PAIN: 0
CHEST TIGHTNESS: 0
VOMITING: 0
DIARRHEA: 0
NAUSEA: 0
SHORTNESS OF BREATH: 0
COLOR CHANGE: 0
COUGH: 0
SORE THROAT: 0

## 2022-08-03 DIAGNOSIS — E03.9 HYPOTHYROIDISM, UNSPECIFIED TYPE: ICD-10-CM

## 2022-08-03 RX ORDER — LEVOTHYROXINE SODIUM 112 MCG
TABLET ORAL
Qty: 90 TABLET | Refills: 1 | Status: SHIPPED | OUTPATIENT
Start: 2022-08-03

## 2022-08-03 NOTE — TELEPHONE ENCOUNTER
Salud Funez called to request a refill on her medication.       Last office visit : 6/14/2022   Next office visit : 12/9/2022     Requested Prescriptions     Pending Prescriptions Disp Refills    SYNTHROID 112 MCG tablet [Pharmacy Med Name: SYNTHROID 112 MCG TABLET] 90 tablet 1     Sig: TAKE 1 TABLET BY MOUTH EVERY DAY IN THE MORNING            Calendly

## 2022-09-13 RX ORDER — PANTOPRAZOLE SODIUM 40 MG/1
TABLET, DELAYED RELEASE ORAL
Qty: 90 TABLET | Refills: 1 | Status: SHIPPED | OUTPATIENT
Start: 2022-09-13

## 2022-10-18 RX ORDER — ESTRADIOL 0.5 MG/1
TABLET ORAL
Qty: 90 TABLET | Refills: 1 | Status: SHIPPED | OUTPATIENT
Start: 2022-10-18

## 2022-11-03 RX ORDER — MONTELUKAST SODIUM 10 MG/1
TABLET ORAL
Qty: 90 TABLET | Refills: 1 | Status: SHIPPED | OUTPATIENT
Start: 2022-11-03

## 2022-11-04 ENCOUNTER — OFFICE VISIT (OUTPATIENT)
Age: 62
End: 2022-11-04
Payer: MEDICARE

## 2022-11-04 VITALS
SYSTOLIC BLOOD PRESSURE: 130 MMHG | RESPIRATION RATE: 20 BRPM | HEIGHT: 63 IN | OXYGEN SATURATION: 98 % | BODY MASS INDEX: 33.13 KG/M2 | DIASTOLIC BLOOD PRESSURE: 74 MMHG | TEMPERATURE: 97.4 F | WEIGHT: 187 LBS | HEART RATE: 77 BPM

## 2022-11-04 DIAGNOSIS — Z11.52 ENCOUNTER FOR SCREENING FOR COVID-19: ICD-10-CM

## 2022-11-04 DIAGNOSIS — R50.9 FEVER, UNSPECIFIED FEVER CAUSE: Primary | ICD-10-CM

## 2022-11-04 DIAGNOSIS — R05.9 COUGH, UNSPECIFIED TYPE: ICD-10-CM

## 2022-11-04 LAB
INFLUENZA A ANTIBODY: NEGATIVE
INFLUENZA B ANTIBODY: NEGATIVE
SARS-COV-2, PCR: NOT DETECTED

## 2022-11-04 PROCEDURE — 99213 OFFICE O/P EST LOW 20 MIN: CPT | Performed by: NURSE PRACTITIONER

## 2022-11-04 PROCEDURE — 87804 INFLUENZA ASSAY W/OPTIC: CPT | Performed by: NURSE PRACTITIONER

## 2022-11-04 RX ORDER — BENZONATATE 100 MG/1
200 CAPSULE ORAL 3 TIMES DAILY PRN
Qty: 30 CAPSULE | Refills: 0 | Status: SHIPPED | OUTPATIENT
Start: 2022-11-04 | End: 2022-11-14

## 2022-11-04 ASSESSMENT — VISUAL ACUITY: OU: 1

## 2022-11-04 ASSESSMENT — ENCOUNTER SYMPTOMS
SORE THROAT: 1
NAUSEA: 0
ABDOMINAL PAIN: 0
RHINORRHEA: 1
COUGH: 1
VOMITING: 0
SINUS PRESSURE: 0
DIARRHEA: 0
EYES NEGATIVE: 1
SHORTNESS OF BREATH: 0

## 2022-11-04 NOTE — PROGRESS NOTES
[id number]  [address] [address] [place] [address]  Dept: [phone]  Dept Fax: [phone]  Loc: [phone]    Justin Nowak is a 62 y.o. female who presents today for her medical conditions/complaintsas noted below. Salud Funez is c/o of Cough, Congestion, Otalgia, Fever, and Headache        HPI:     Cough  This is a new problem. The current episode started yesterday (2-3 days). The problem has been unchanged. The problem occurs every few minutes. The cough is Productive of sputum (sputum clear). Associated symptoms include ear pain, a fever, headaches, myalgias, nasal congestion, rhinorrhea and a sore throat. Pertinent negatives include no chills, rash or shortness of breath. Associated symptoms comments: Watery eyes. Nothing aggravates the symptoms. Risk factors for lung disease include smoking/tobacco exposure. She has tried rest (Sudafed) for the symptoms. The treatment provided mild relief. Her past medical history is significant for bronchitis and environmental allergies. Fever   This is a new problem. The current episode started yesterday. The problem occurs constantly. The problem has been unchanged. The maximum temperature noted was 101 to 101.9 F (101). Associated symptoms include congestion, coughing, ear pain, headaches, muscle aches and a sore throat. Pertinent negatives include no abdominal pain, diarrhea, nausea, rash or vomiting. Associated symptoms comments: Ear pain  . She has tried fluids for the symptoms. The treatment provided mild relief. Risk factors: no recent sickness and no sick contacts    She is concerned she may have COVID. She has had COVID in the past but her symptoms were different. She would like to be tested for COVID if her flu test is negative.   Past Medical History:   Diagnosis Date    Allergic bronchitis     has wheezing related to dust and pollen    Allergic rhinitis     Asthma     Blepharitis of both eyes     Bone spur     cervical    Cervical disc disease     Chicken pox     age 26    Chronic anxiety 2006    has been on valium after failing effexor and other medications    Coccyx disorder     history of f/x    Constipation     chronic since a teen    Degenerative disc disease at L5-S1 level     Genital warts     Dr Ciaran Amaro    GERD (gastroesophageal reflux disease)     Hemorrhoids     HPV in female     Hyperthyroidism     took radioactive iodine    Post menopausal syndrome     Reactive airway disease with wheezing     Scoliosis     Unspecified vitamin D deficiency      Past Surgical History:   Procedure Laterality Date    CERVICAL DISC ARTHROPLASTY      CERVICAL DISCECTOMY      COLONOSCOPY  2011    [facility]?     COLONOSCOPY N/A 2016    Dr Jez Maloney AP (-) dysplasia x 1, BCM x 1--3 yr recall    COLONOSCOPY N/A 2019    Dr Sanchez Sanchez, large grade 3 internal and external hemorrhoids, suboptimal prep, 3 yr recall    [facility] N/A 2020    DILATATION AND CURETTAGE HYSTEROSCOPY performed by Milady Nunez MD at [place] [address] N/A 2019    INTERNAL AND EXTERNAL HEMORRHOIDECTOMY performed by Ingris Guerrero MD at [facility]  2021    replaced discs    TUBAL LIGATION      UPPER GASTROINTESTINAL ENDOSCOPY  2011     ?        Family History   Problem Relation Age of Onset    Diabetes Sister     Thyroid Disease Sister     Rheum Arthritis Sister     Other Sister         fibromyalgia    Breast Cancer Sister 59    Rheum Arthritis Maternal Aunt     Colon Polyps Maternal Aunt     Heart Failure Father     Heart Disease Father          of acute MI at 58    Hypertension Mother     Cancer Mother         lung cancer    Colon Polyps Mother     High Blood Pressure Mother     Other Son         sleep apnea,anxiety and neck problems after  injuries    Breast Cancer Maternal Cousin 45    Colon Cancer Neg Hx     Esophageal Cancer Neg Hx     Stomach Cancer Neg Hx     Liver Cancer Neg Hx     Liver Disease Neg Hx     Rectal Cancer Neg Hx        Social History     Tobacco Use    Smoking status: Former     Packs/day: 2.00     Years: 25.00     Pack years: 50.00     Types: Cigarettes     Quit date: 2008     Years since quittin.5    Smokeless tobacco: Never    Tobacco comments:     Quit smoking in  with Wellbutrin (had crazy dreams on it)   Substance Use Topics    Alcohol use: No     Alcohol/week: 0.0 standard drinks      Current Outpatient Medications   Medication Sig Dispense Refill    benzonatate (TESSALON PERLES) 100 MG capsule Take 2 capsules by mouth 3 times daily as needed for Cough 30 capsule 0    montelukast (SINGULAIR) 10 MG tablet TAKE 1 TABLET BY MOUTH EVERY DAY AT NIGHT 90 tablet 1    estradiol (ESTRACE) 0.5 MG tablet TAKE 1 TABLET BY MOUTH EVERY DAY 90 tablet 1    pantoprazole (PROTONIX) 40 MG tablet TAKE 1 TABLET BY MOUTH EVERY DAY BEFORE BREAKFAST 90 tablet 1    SYNTHROID 112 MCG tablet TAKE 1 TABLET BY MOUTH EVERY DAY IN THE MORNING 90 tablet 1    hydrOXYzine HCl (ATARAX) 25 MG tablet TAKE 1 TABLET BY MOUTH NIGHTLY 90 tablet 1    Multiple Vitamins-Minerals (STARR MULTI FOR WOMEN PO) Take by mouth Has Vit D      augmented betamethasone dipropionate (DIPROLENE-AF) 0.05 % ointment APPLY TWICE A DAY TO THE AFFECTED AREA ON THE ABDOMEN AS NEEDED WHEN AND WHERE RASH IS PRESENT      metroNIDAZOLE (METROCREAM) 0.75 % cream APPLY NIGHTLY TO AFFECTED AREAS ON FACE FOR ROSACEA      Probiotic Product (eSpark) CAPS       FLUoxetine (PROZAC) 10 MG capsule Take 1 capsule by mouth daily 90 capsule 1    ondansetron (ZOFRAN ODT) 4 MG disintegrating tablet Take 1 tablet by mouth every 8 hours as needed for Nausea or Vomiting 20 tablet 0    clobetasol (TEMOVATE) 0.05 % ointment APPLY TO AFFECTED AREA TWICE A DAY 30 g 0    diphenhydrAMINE-zinc acetate (BENADRYL ITCH STOPPING) 1-0.1 % cream Apply topically 3 times daily as needed. 1 each 1    medroxyPROGESTERone (PROVERA) 2.5 MG tablet TAKE 1 TABLET BY MOUTH EVERY DAY 90 tablet 3    levocetirizine (XYZAL) 5 MG tablet TAKE 1 TABLET BY MOUTH EVERY DAY 90 tablet 3    HYDROcodone-acetaminophen (NORCO) 7.5-325 MG per tablet Take 1 tablet by mouth 3 times daily as needed. triamcinolone (NASACORT ALLERGY 24HR) 55 MCG/ACT nasal inhaler 2 sprays by Each Nostril route daily 1 each 3    Cyclobenzaprine HCl (FLEXERIL PO) Take 10 mg by mouth 3 times daily      azelastine (ASTELIN) 0.1 % nasal spray USE 2 SPRAYS IN EACH NOSTRIL TWICE A DAY AS DIRECTED 1 Bottle 5    PROAIR  (90 Base) MCG/ACT inhaler INHALE 2 PUFFS INTO THE LUNGS EVERY 6 HOURS AS NEEDED FOR WHEEZING 8.5 Inhaler 1    EPINEPHrine (EPIPEN) 0.3 MG/0.3ML SOAJ injection USE AS DIRECTED FOR ACUTE  ALLERGIC REACTION  1    epinastine (ELESTAT) 0.05 % SOLN INSTILL 1 DROP IN EACH EYE ONE TO TWICE A DAY AS NEEDED  3    naproxen (NAPROXEN) 500 MG EC tablet Take 1 tablet by mouth every 12 hours Generic is okay 60 tablet 11     No current facility-administered medications for this visit.      Allergies   Allergen Reactions    Oxycodone-Acetaminophen Hives    Pollen Extract      Early Spring and early Fall    Effexor [facility] NATCHEZ Hydrochloride] Other (See Comments)     Slurred speech, feelings of having a TIA, seeing double, unaware of her surroundings    [address] [facility] Extract] Itching    Nickel Itching       Health Maintenance   Topic Date Due    COVID-19 Vaccine (3 - Booster for Moderna series) 09/14/2021    Shingles vaccine (2 of 2) 12/17/2021    Flu vaccine (1) 08/01/2022    Colorectal Cancer Screen  11/11/2022    A1C test (Diabetic or Prediabetic)  12/09/2022    Annual Wellness Visit (AWV)  12/10/2022    DTaP/Tdap/Td vaccine (2 - Td or Tdap) 01/23/2023    Low dose CT lung screening  02/01/2023    Breast cancer screen  04/05/2023    Depression Monitoring 04/27/2023    Lipids  12/10/2026    Cervical cancer screen  03/03/2027    Hepatitis C screen  Completed    HIV screen  Completed    Hepatitis A vaccine  Aged Out    Hib vaccine  Aged Out    Meningococcal (ACWY) vaccine  Aged Out    Pneumococcal 0-64 years Vaccine  Aged Out       Subjective:     Review of Systems   Constitutional:  Positive for fever. Negative for activity change, appetite change and chills. HENT:  Positive for congestion, ear pain, rhinorrhea and sore throat. Negative for ear discharge and sinus pressure. Eyes: Negative. Watery eyes   Respiratory:  Positive for cough. Negative for shortness of breath. Gastrointestinal:  Negative for abdominal pain, diarrhea, nausea and vomiting. Musculoskeletal:  Positive for arthralgias and myalgias. Skin:  Negative for rash. Allergic/Immunologic: Positive for environmental allergies. Neurological:  Positive for headaches.     :Objective      Physical Exam  Vitals and nursing note reviewed. Constitutional:       General: She is awake. She is not in acute distress. Appearance: Normal appearance. She is well-developed, well-groomed and normal weight. She is ill-appearing. HENT:      Head: Normocephalic. Right Ear: Hearing, ear canal and external ear normal. A middle ear effusion is present. Left Ear: Hearing, tympanic membrane, ear canal and external ear normal.  No middle ear effusion. Nose: Congestion and rhinorrhea present. Rhinorrhea is clear. Right Sinus: No frontal sinus tenderness. Left Sinus: No frontal sinus tenderness. Mouth/Throat:      Lips: Pink. Mouth: Mucous membranes are moist.      Pharynx: Oropharynx is clear. Uvula midline. Tonsils: 0 on the right. 0 on the left. Eyes:      General: Lids are normal. Vision grossly intact. Neck:      Trachea: Phonation normal.   Cardiovascular:      Rate and Rhythm: Normal rate and regular rhythm.       Heart sounds: Normal heart sounds, S1 normal and S2 normal. No murmur heard. No friction rub. No gallop. Pulmonary:      Effort: Pulmonary effort is normal. No respiratory distress. Breath sounds: Normal air entry. Examination of the right-lower field reveals wheezing. Examination of the left-lower field reveals wheezing. Wheezing present. No rhonchi or rales. Comments: Exp wheezes heard in bilateral lower lung fields  Abdominal:      Palpations: Abdomen is soft. Musculoskeletal:         General: No tenderness or deformity. Normal range of motion. Cervical back: Full passive range of motion without pain and neck supple. Lymphadenopathy:      Head:      Right side of head: No tonsillar adenopathy. Left side of head: No tonsillar adenopathy. Skin:     General: Skin is warm and dry. Capillary Refill: Capillary refill takes less than 2 seconds. Neurological:      General: No focal deficit present. Mental Status: She is alert, oriented to person, place, and time and easily aroused. Mental status is at baseline. Psychiatric:         Attention and Perception: Attention normal.         Mood and Affect: Mood normal.         Speech: Speech normal.         Behavior: Behavior normal. Behavior is cooperative. /74   Pulse 77   Temp 97.4 °F (36.3 °C) (Temporal)   Resp 20   Ht 5' 3\" (1.6 m)   Wt 187 lb (84.8 kg)   LMP 07/11/2009   SpO2 98%   BMI 33.13 kg/m²     :Assessment       Diagnosis Orders   1. Fever, unspecified fever cause  POCT Influenza A/B      2. Cough, unspecified type        3. Encounter for screening for COVID-19  COVID-19          :Plan      Orders Placed This Encounter   Procedures    COVID-19     Scheduling Instructions:      1) Due to current limited availability of the COVID-19 test, tests will be prioritized based on responses to questions above. Testing may be delayed due to volume.             2) Print and instruct patient to adhere to [facility] home isolation program. (Link Above) 3) Set up or refer patient for a monitoring program.              4) Have patient sign up for and leverage MyChart (if not previously done). Order Specific Question:   Is this test for diagnosis or screening? Answer:   Screening     Order Specific Question:   Symptomatic for COVID-19 as defined by CDC? Answer:   No     Order Specific Question:   Date of Symptom Onset     Answer:   N/A     Order Specific Question:   Hospitalized for COVID-19? Answer:   No     Order Specific Question:   Admitted to ICU for COVID-19? Answer:   No     Order Specific Question:   Employed in healthcare setting? Answer:   Unknown     Order Specific Question:   Resident in a congregate (group) care setting? Answer:   Unknown     Order Specific Question:   Pregnant? Answer:   No     Order Specific Question:   Previously tested for COVID-19? Answer:   Yes    COVID-19    COVID-19    POCT Influenza A/B     Results for orders placed or performed in visit on 11/04/22   POCT Influenza A/B   Result Value Ref Range    Influenza A Ab negative     Influenza B Ab negative        No follow-ups on file. Orders Placed This Encounter   Medications    benzonatate (TESSALON PERLES) 100 MG capsule     Sig: Take 2 capsules by mouth 3 times daily as needed for Cough     Dispense:  30 capsule     Refill:  0          Patient Instructions   Plenty of fluids  Rest  OTC Tylenol or Motrin as needed   Stay home and stay in until we call with COVID results  Tessalon pearles as directed for cough  Follow up with PCP or return to Urgent Care for worsening or unresolved symptoms. Patient given educational materials- see patient instructions. Discussed use, benefit, and side effects of prescribedmedications. All patient questions answered. Pt voiced understanding.        Electronically signed by ANGEL Tee CNP on 11/4/2022 at 11:39 AM

## 2022-11-04 NOTE — PATIENT INSTRUCTIONS
Plenty of fluids  Rest  OTC Tylenol or Motrin as needed   Stay home and stay in until we call with COVID results  Tessalon pearles as directed for cough  Follow up with PCP or return to Urgent Care for worsening or unresolved symptoms.

## 2022-11-18 ENCOUNTER — TELEPHONE (OUTPATIENT)
Dept: GASTROENTEROLOGY | Age: 62
End: 2022-11-18

## 2022-11-18 NOTE — TELEPHONE ENCOUNTER
Pt received her CLN recall letter. She has a couple weeks in December that she can have the CLN done. Please, give her a call to see if she qualifies for OA CLN.

## 2022-12-16 ENCOUNTER — HOSPITAL ENCOUNTER (OUTPATIENT)
Age: 62
Setting detail: OUTPATIENT SURGERY
Discharge: HOME | End: 2022-12-16
Attending: INTERNAL MEDICINE | Admitting: INTERNAL MEDICINE
Payer: MEDICARE

## 2022-12-16 ENCOUNTER — ANESTHESIA EVENT (OUTPATIENT)
Dept: ENDOSCOPY | Age: 62
End: 2022-12-16
Payer: MEDICARE

## 2022-12-16 ENCOUNTER — ANESTHESIA (OUTPATIENT)
Dept: ENDOSCOPY | Age: 62
End: 2022-12-16
Payer: MEDICARE

## 2022-12-16 VITALS
OXYGEN SATURATION: 99 % | DIASTOLIC BLOOD PRESSURE: 77 MMHG | RESPIRATION RATE: 18 BRPM | BODY MASS INDEX: 32.78 KG/M2 | WEIGHT: 185 LBS | HEART RATE: 65 BPM | TEMPERATURE: 97.7 F | HEIGHT: 63 IN | SYSTOLIC BLOOD PRESSURE: 137 MMHG

## 2022-12-16 PROCEDURE — 2580000003 HC RX 258: Performed by: INTERNAL MEDICINE

## 2022-12-16 PROCEDURE — 2709999900 HC NON-CHARGEABLE SUPPLY: Performed by: INTERNAL MEDICINE

## 2022-12-16 PROCEDURE — 6360000002 HC RX W HCPCS: Performed by: NURSE ANESTHETIST, CERTIFIED REGISTERED

## 2022-12-16 PROCEDURE — 7100000011 HC PHASE II RECOVERY - ADDTL 15 MIN: Performed by: INTERNAL MEDICINE

## 2022-12-16 PROCEDURE — 3700000001 HC ADD 15 MINUTES (ANESTHESIA): Performed by: INTERNAL MEDICINE

## 2022-12-16 PROCEDURE — 3700000000 HC ANESTHESIA ATTENDED CARE: Performed by: INTERNAL MEDICINE

## 2022-12-16 PROCEDURE — 3609027000 HC COLONOSCOPY: Performed by: INTERNAL MEDICINE

## 2022-12-16 PROCEDURE — 2500000003 HC RX 250 WO HCPCS: Performed by: NURSE ANESTHETIST, CERTIFIED REGISTERED

## 2022-12-16 PROCEDURE — G0105 COLORECTAL SCRN; HI RISK IND: HCPCS | Performed by: INTERNAL MEDICINE

## 2022-12-16 PROCEDURE — 7100000010 HC PHASE II RECOVERY - FIRST 15 MIN: Performed by: INTERNAL MEDICINE

## 2022-12-16 RX ORDER — SODIUM CHLORIDE, SODIUM LACTATE, POTASSIUM CHLORIDE, CALCIUM CHLORIDE 600; 310; 30; 20 MG/100ML; MG/100ML; MG/100ML; MG/100ML
INJECTION, SOLUTION INTRAVENOUS CONTINUOUS
Status: DISCONTINUED | OUTPATIENT
Start: 2022-12-16 | End: 2022-12-16 | Stop reason: HOSPADM

## 2022-12-16 RX ORDER — LIDOCAINE HYDROCHLORIDE 10 MG/ML
INJECTION, SOLUTION EPIDURAL; INFILTRATION; INTRACAUDAL; PERINEURAL PRN
Status: DISCONTINUED | OUTPATIENT
Start: 2022-12-16 | End: 2022-12-16 | Stop reason: SDUPTHER

## 2022-12-16 RX ORDER — PROPOFOL 10 MG/ML
INJECTION, EMULSION INTRAVENOUS PRN
Status: DISCONTINUED | OUTPATIENT
Start: 2022-12-16 | End: 2022-12-16 | Stop reason: SDUPTHER

## 2022-12-16 RX ADMIN — SODIUM CHLORIDE, POTASSIUM CHLORIDE, SODIUM LACTATE AND CALCIUM CHLORIDE: 600; 310; 30; 20 INJECTION, SOLUTION INTRAVENOUS at 09:42

## 2022-12-16 RX ADMIN — PROPOFOL 300 MG: 10 INJECTION, EMULSION INTRAVENOUS at 10:06

## 2022-12-16 RX ADMIN — LIDOCAINE HYDROCHLORIDE 30 MG: 10 INJECTION, SOLUTION EPIDURAL; INFILTRATION; INTRACAUDAL; PERINEURAL at 10:06

## 2022-12-16 ASSESSMENT — PAIN DESCRIPTION - DESCRIPTORS: DESCRIPTORS: DISCOMFORT;SORE

## 2022-12-16 ASSESSMENT — PAIN - FUNCTIONAL ASSESSMENT
PAIN_FUNCTIONAL_ASSESSMENT: 0-10
PAIN_FUNCTIONAL_ASSESSMENT: ACTIVITIES ARE NOT PREVENTED

## 2022-12-16 NOTE — H&P
Patient Name: Saida Gilliam  : 1960  MRN: [id number]  DATE: 22    Allergies: Allergies   Allergen Reactions    Oxycodone-Acetaminophen Hives    Pollen Extract      Early Spring and early     Effexor [facility] NATCHEZ Hydrochloride] Other (See Comments)     Slurred speech, feelings of having a TIA, seeing double, unaware of her surroundings    Poison Ivy Extract [Poison Ivy Extract] Itching    Nickel Itching        ENDOSCOPY  History and Physical    Procedure:    [] Diagnostic Colonoscopy       [] Screening Colonoscopy  [x] EGD      [] ERCP      [] EUS       [] Other    [x] Previous office notes/History and Physical reviewed from the patients chart. Please see EMR for further details of HPI. I have examined the patient's status immediately prior to the procedure and:      Indications/HPI:    []Abdominal Pain   []Cancer- GI/Lung     [x]Fhx of colon CA/polyps  []History of Polyps  []Barretts            []Melena  []Abnormal Imaging              []Dysphagia              []Persistent Pneumonia   []Anemia                            []Food Impaction        [x]History of Polyps  [] GI Bleed             []Pulmonary nodule/Mass   []Change in bowel habits []Heartburn/Reflux  []Rectal Bleed (BRBPR)  []Chest Pain - Non Cardiac []Heme (+) Stool []Ulcers  []Constipation  []Hemoptysis  []Varices  []Diarrhea  []Hypoxemia    []Nausea/Vomiting   []Screening   []Crohns/Colitis  [x]Other: Diverticulosis and hemorrhoids    Anesthesia:   [x] MAC [] Moderate Sedation   [] General   [] None     ROS: 12 pt Review of Symptoms was negative unless mentioned above    Medications:   Prior to Admission medications    Medication Sig Start Date End Date Taking?  Authorizing Provider   montelukast (SINGULAIR) 10 MG tablet TAKE 1 TABLET BY MOUTH EVERY DAY AT NIGHT 11/3/22   ANGEL Stephen CNP   estradiol (ESTRACE) 0.5 MG tablet TAKE 1 TABLET BY MOUTH EVERY DAY 10/18/22   ANGEL Shaw CNP   pantoprazole (PROTONIX) 40 MG tablet TAKE 1 TABLET BY MOUTH EVERY DAY BEFORE BREAKFAST 9/13/22   ANGEL Dillon CNP   SYNTHROID 112 MCG tablet TAKE 1 TABLET BY MOUTH EVERY DAY IN THE MORNING 8/3/22   ANGEL Dillon CNP   hydrOXYzine HCl (ATARAX) 25 MG tablet TAKE 1 TABLET BY MOUTH NIGHTLY 6/26/22   Diane Navarro MD   Multiple Vitamins-Minerals (STARR MULTI FOR WOMEN PO) Take by mouth Has Vit D    Historical Provider, MD   augmented betamethasone dipropionate (DIPROLENE-AF) 0.05 % ointment APPLY TWICE A DAY TO THE AFFECTED AREA ON THE ABDOMEN AS NEEDED WHEN AND WHERE RASH IS PRESENT 5/25/22   Historical Provider, MD   metroNIDAZOLE (METROCREAM) 0.75 % cream APPLY NIGHTLY TO AFFECTED AREAS ON FACE FOR ROSACEA 5/25/22   Historical Provider, MD   Probiotic Product ([address]) [facility]  3/2/22   Historical Provider, MD   FLUoxetine (PROZAC) 10 MG capsule Take 1 capsule by mouth daily 6/14/22   ANGEL Dillon CNP   ondansetron (ZOFRAN ODT) 4 MG disintegrating tablet Take 1 tablet by mouth every 8 hours as needed for Nausea or Vomiting 4/27/22   Dafne Zavala MD   clobetasol (TEMOVATE) 0.05 % ointment APPLY TO AFFECTED AREA TWICE A DAY 4/24/22   ANGEL Dillon CNP   diphenhydrAMINE-zinc acetate (BENADRYL ITCH STOPPING) 1-0.1 % cream Apply topically 3 times daily as needed. 3/4/22   Diane Navarro MD   medroxyPROGESTERone (PROVERA) 2.5 MG tablet TAKE 1 TABLET BY MOUTH EVERY DAY 3/3/22   ANGEL Mccollum CNP   levocetirizine (XYZAL) 5 MG tablet TAKE 1 TABLET BY MOUTH EVERY DAY 2/7/22   ANGEL Villeda   HYDROcodone-acetaminophen (NORCO) 7.5-325 MG per tablet Take 1 tablet by mouth 3 times daily as needed.  11/26/21   Historical Provider, MD   triamcinolone (NASACORT ALLERGY 24HR) 55 MCG/ACT nasal inhaler 2 sprays by Each Nostril route daily 12/9/21   ANGEL Villeda   Cyclobenzaprine HCl (FLEXERIL PO) Take 10 mg by mouth 3 times daily    Historical Provider, MD   azelastine (ASTELIN) 0.1 % nasal spray USE 2 SPRAYS IN [facility] NOSTRIL TWICE A DAY AS DIRECTED 0/0/62   Tracie Single, APRN   PROAIR  (66 Base) MCG/ACT inhaler INHALE 2 PUFFS INTO THE LUNGS EVERY 6 HOURS AS NEEDED FOR WHEEZING 3/5/18   Lakeisha Simeon MD   EPINEPHrine [facility]) 0.3 MG/0.3ML SOAJ injection USE AS DIRECTED FOR ACUTE  ALLERGIC REACTION 8/29/17   Historical Provider, MD   epinastine (ELESTAT) 0.05 % SOLN INSTILL 1 DROP IN EACH EYE ONE TO TWICE A DAY AS NEEDED 8/29/17   Historical Provider, MD   naproxen (NAPROXEN) 500 MG EC tablet Take 1 tablet by mouth every 12 hours Generic is okay 3/30/17   Lakeisha Simeon MD       Past Medical History:  Past Medical History:   Diagnosis Date    Allergic bronchitis     has wheezing related to dust and pollen    Allergic rhinitis     Asthma     Blepharitis of both eyes     Bone spur     cervical    Cervical disc disease     Chicken pox     age 26    Chronic anxiety 2006    has been on valium after failing effexor and other medications    Coccyx disorder     history of f/x    Constipation     chronic since a teen    Degenerative disc disease at L5-S1 level     Genital warts     Dr Gamaliel Link    GERD (gastroesophageal reflux disease)     Hemorrhoids     HPV in female     Hyperthyroidism     took radioactive iodine    Post menopausal syndrome 2010    Reactive airway disease with wheezing     Scoliosis     Unspecified vitamin D deficiency        Past Surgical History:  Past Surgical History:   Procedure Laterality Date    CERVICAL DISC ARTHROPLASTY      CERVICAL DISCECTOMY      COLONOSCOPY  09/16/2011    [facility]?     COLONOSCOPY N/A 11/11/2016    Dr Chaim Rangel AP (-) dysplasia x 1, BCM x 1--3 yr recall    COLONOSCOPY N/A 11/11/2019    Dr Sarah Lynn, large grade 3 internal and external hemorrhoids, suboptimal prep, 3 yr recall    DILATION AND CURETTAGE OF UTERUS N/A 07/24/2020    DILATATION AND CURETTAGE HYSTEROSCOPY performed by Riya Claudio MD at [address] [place] [address] 2019    INTERNAL AND EXTERNAL HEMORRHOIDECTOMY performed by Yony Dowd MD at [address] [facility] SURGERY  2021    replaced discs    TUBAL LIGATION      UPPER GASTROINTESTINAL ENDOSCOPY  2011     ? Social History:  Social History     Tobacco Use    Smoking status: Former     Packs/day: 2.00     Years: 25.00     Pack years: 50.00     Types: Cigarettes     Quit date: 2008     Years since quittin.7    Smokeless tobacco: Never    Tobacco comments:     Quit smoking in  with Wellbutrin (had crazy dreams on it)   Vaping Use    Vaping Use: Never used   Substance Use Topics    Alcohol use: No     Alcohol/week: 0.0 standard drinks    Drug use: No       Vital Signs:   Vitals:    22 0933   BP: (!) 144/75   Pulse: 73   Resp: 22   Temp: 97.7 °F (36.5 °C)   SpO2: 98%        Physical Exam:  Cardiac:  [x]WNL  []Comments:  Pulmonary:  [x]WNL   []Comments:  Neuro/Mental Status:  [x]WNL  []Comments:  Abdominal:  [x]WNL    []Comments:  Other:   []WNL  []Comments:    Informed Consent:  The risks and benefits of the procedure have been discussed with either the patient or if they cannot consent, their representative. Assessment:  Patient examined and appropriate for planned sedation and procedure. Plan:  Proceed with planned sedation and procedure as above.          Floresita Bañuelos MD

## 2022-12-16 NOTE — OP NOTE
Patient: Heber Rodriguez : 1960  Med Rec#: [id number] Acc#: [id number]   Primary Care Provider ANGEL Alba CNP    Date of Procedure:  2022    Endoscopist: Basim Santos MD, MD    Referring Provider: ANGEL Alba CNP,     Operation Performed: Colonoscopy to the terminal ileum    Indications: History of colon polyps; family history of colon polyps-needs colorectal cancer surveillance    Anesthesia:  Sedation was administered by anesthesia who monitored the patient during the procedure. I met with Salud Funez prior to procedure. We discussed the procedure itself, and I have discussed the risks of endoscopy (including-- but not limited to-- pain, discomfort, bleeding potentially requiring second endoscopic procedure and/or blood transfusion, organ perforation requiring operative repair, damage to organs near the colon, infection, aspiration, cardiopulmonary/allergic reaction), benefits, indications to endoscopy. Additionally, we discussed options other than colonoscopy. The patient expressed understanding. All questions answered. The patient decided to proceed with the procedure. Signed informed consent was placed on the chart. Blood Loss: minimal    Withdrawal time: More than 9 minutes  Bowel Prep: Fair  with small amounts of thick solid and semisolid stool and a moderate amount of thick, opaque liquid scattered in patchy segments throughout the colon obscuring the underlying mucosa. Lesions including polyps may have been missed. Complications: no immediate complications    DESCRIPTION OF PROCEDURE:     A time out was performed. After written informed consent was obtained, the patient was placed in the left lateral position. The perianal area was inspected, and a digital rectal exam was performed. A rectal exam was performed: normal tone, no palpable lesions.  At this point, a forward viewing Olympus colonoscope was inserted into the anus and carefully advanced to the terminal ileum. The cecum was identified by the ileocecal valve and the appendiceal orifice. The colonoscope was then slowly withdrawn with careful inspection of the mucosa in a linear and circumferential fashion. The scope was retroflexed in the rectum. Suction was utilized during the procedure to remove as much air as possible from the bowel. The colonoscope was removed from the patient, and the procedure was terminated. Findings are listed below. Findings:     Large grade 3 internal and external hemorrhoids without bleeding stigmata      NO large polyps or masses or strictures or colitis or ileitis. Suboptimal exam due to prep quality as described above. Mild Diverticulosis in the left colon     Where it was clearly visible, the mucosa appeared normal throughout the entire examined colon  Retroflexion in the rectum was otherwise normal and revealed no further abnormalities     Recommendations:  1. Repeat colonoscopy: pending pathology - in 3 years due to her hx of polyps and her family history  2. Resume previous meds and high fiber diet; avoid straining at defecation. - GI clinic f/u PRN   - Keep scheduled f/u appts with other MDs     Findings and recommendations were discussed w/ the patient. A copy of the images was provided.     (Please note that portions of this note were completed with a voice recognition program. Efforts were made to edit the dictations but occasionally words may be mis-transcribed.)     Mila Yeager MD, MD  12/16/2022  10:13 AM

## 2022-12-16 NOTE — ANESTHESIA PRE PROCEDURE
Department of Anesthesiology  Preprocedure Note       Name:  Raquel Tolliver   Age:  65 y.o.  :  1960                                          MRN:  [id number]         Date:  2022      Surgeon: Hugh Subramanian):  Kaelyn Renteria MD    Procedure: Procedure(s):  COLORECTAL CANCER SCREENING, NOT HIGH RISK    Medications prior to admission:   Prior to Admission medications    Medication Sig Start Date End Date Taking?  Authorizing Provider   montelukast (SINGULAIR) 10 MG tablet TAKE 1 TABLET BY MOUTH EVERY DAY AT NIGHT 11/3/22   ANGEL Hannah CNP   estradiol (ESTRACE) 0.5 MG tablet TAKE 1 TABLET BY MOUTH EVERY DAY 10/18/22   Tadeo ANGEL Campbell CNP   pantoprazole (PROTONIX) 40 MG tablet TAKE 1 TABLET BY MOUTH EVERY DAY BEFORE BREAKFAST 22   ANGEL Hannah CNP   SYNTHROID 112 MCG tablet TAKE 1 TABLET BY MOUTH EVERY DAY IN THE MORNING 8/3/22   ANGEL Hannah CNP   hydrOXYzine HCl (ATARAX) 25 MG tablet TAKE 1 TABLET BY MOUTH NIGHTLY 22   Raúl Loaiza MD   Multiple Vitamins-Minerals (STARR MULTI FOR WOMEN PO) Take by mouth Has Vit D    Historical Provider, MD   augmented betamethasone dipropionate (DIPROLENE-AF) 0.05 % ointment APPLY TWICE A DAY TO THE AFFECTED AREA ON THE ABDOMEN AS NEEDED WHEN AND WHERE RASH IS PRESENT 22   Historical Provider, MD   metroNIDAZOLE (METROCREAM) 0.75 % cream APPLY NIGHTLY TO AFFECTED AREAS ON FACE FOR ROSACEA 22   Historical Provider, MD   Probiotic Product ([address]) [facility]  3/2/22   Historical Provider, MD   FLUoxetine (PROZAC) 10 MG capsule Take 1 capsule by mouth daily 22   ANGEL Hannah CNP   ondansetron (ZOFRAN ODT) 4 MG disintegrating tablet Take 1 tablet by mouth every 8 hours as needed for Nausea or Vomiting 22   Omaira Nieto MD   clobetasol (TEMOVATE) 0.05 % ointment APPLY TO AFFECTED AREA TWICE A DAY 22   ANGEL Hannah CNP   diphenhydrAMINE-zinc acetate (BENADRYL ITCH STOPPING) 1-0.1 % cream Apply topically 3 times daily as needed. 3/4/22   David Tsai MD   medroxyPROGESTERone (PROVERA) 2.5 MG tablet TAKE 1 TABLET BY MOUTH EVERY DAY 3/3/22   ANGEL Chery - CNP   levocetirizine (XYZAL) 5 MG tablet TAKE 1 TABLET BY MOUTH EVERY DAY 2/7/22   ANGEL Tam   HYDROcodone-acetaminophen (NORCO) 7.5-325 MG per tablet Take 1 tablet by mouth 3 times daily as needed. 11/26/21   Historical Provider, MD   triamcinolone (NASACORT ALLERGY 24HR) 55 MCG/ACT nasal inhaler 2 sprays by Each Nostril route daily 12/9/21   ANGEL Tam   Cyclobenzaprine HCl (FLEXERIL PO) Take 10 mg by mouth 3 times daily    Historical Provider, MD   azelastine (ASTELIN) 0.1 % nasal spray USE 2 SPRAYS IN EACH NOSTRIL TWICE A DAY AS DIRECTED 4/1/53   ANGEL Rivera   PROAIR  (53 Base) MCG/ACT inhaler INHALE 2 PUFFS INTO THE LUNGS EVERY 6 HOURS AS NEEDED FOR WHEEZING 3/5/18   Susannah La MD   EPINEPHrine (EPIPEN) 0.3 MG/0.3ML SOAJ injection USE AS DIRECTED FOR ACUTE  ALLERGIC REACTION 8/29/17   Historical Provider, MD   epinastine (ELESTAT) 0.05 % SOLN INSTILL 1 DROP IN [facility] EYE ONE TO TWICE A DAY AS NEEDED 8/29/17   Historical Provider, MD   naproxen (NAPROXEN) 500 MG EC tablet Take 1 tablet by mouth every 12 hours Generic is okay 3/30/17   Susannah La MD       Current medications:    Current Facility-Administered Medications   Medication Dose Route Frequency Provider Last Rate Last Admin    lactated ringers infusion   IntraVENous Continuous Danise Scheuermann,  mL/hr at 12/16/22 0942 New Bag at 12/16/22 0942       Allergies:     Allergies   Allergen Reactions    Oxycodone-Acetaminophen Hives    Pollen Extract      Early Spring and early Fall    Effexor [Venlafaxine Hydrochloride] Other (See Comments)     Slurred speech, feelings of having a TIA, seeing double, unaware of her surroundings   [employer] Extract [Poison Ivy Extract] Itching    Nickel Itching       Problem List:    Patient Active Problem List   Diagnosis Code    Hyperthyroidism E05.90    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    Headache R51.9    Chronic anxiety F41.9    Vitamin D deficiency E55.9    Postmenopausal atrophic vaginitis N95.2    Abdominal pain, other specified site R10.9    Chronic constipation K59.09    Polypharmacy Z79.899    Narcotic dependence (HCC) F11.20    Chronic pain G89.29    NSAID long-term use Z79.1    Family history of colonic polyps Z83.71    Stress incontinence, female N39.3    Reactive airway disease with wheezing J45.909    Allergic bronchitis J45.909    Irritable bowel syndrome with constipation K58.1    Breast density R92.2    Chronic GERD K21.9    Cystocele with uterine prolapse N81.4    Constipation due to opioid therapy K59.03, T40.2X5A    Gas bloat syndrome K92.89    PMB (postmenopausal bleeding) N95.0    Eustachian tube dysfunction, bilateral H69.83       Past Medical History:        Diagnosis Date    Allergic bronchitis     has wheezing related to dust and pollen    Allergic rhinitis     Asthma     Blepharitis of both eyes     Bone spur     cervical    Cervical disc disease     Chicken pox     age 26    Chronic anxiety 2006    has been on valium after failing effexor and other medications    Coccyx disorder     history of f/x    Constipation     chronic since a teen    Degenerative disc disease at L5-S1 level     Genital warts     Dr Angelika Holman GERD (gastroesophageal reflux disease)     Hemorrhoids     HPV in female     Hyperthyroidism     took radioactive iodine    Post menopausal syndrome 2010    Reactive airway disease with wheezing     Scoliosis     Unspecified vitamin D deficiency        Past Surgical History:        Procedure Laterality Date    CERVICAL DISC ARTHROPLASTY      CERVICAL DISCECTOMY      COLONOSCOPY  09/16/2011    [facility]?     COLONOSCOPY N/A 11/11/2016    Dr Aníbal Chanel AP (-) dysplasia x 1, BCM x 1--3 yr recall    COLONOSCOPY N/A 2019    Dr Mae Mehta, large grade 3 internal and external hemorrhoids, suboptimal prep, 3 yr recall   [facility] N/A 2020    DILATATION AND CURETTAGE HYSTEROSCOPY performed by Eric Jenkins MD at [address] [facility] N/A 2019    INTERNAL AND EXTERNAL HEMORRHOIDECTOMY performed by Eddi Kate MD at [facility]  2021    replaced discs    TUBAL LIGATION      UPPER GASTROINTESTINAL ENDOSCOPY  2011     ? Social History:    Social History     Tobacco Use    Smoking status: Former     Packs/day: 2.00     Years: 25.00     Pack years: 50.00     Types: Cigarettes     Quit date: 2008     Years since quittin.7    Smokeless tobacco: Never    Tobacco comments:     Quit smoking in  with Wellbutrin (had crazy dreams on it)   Substance Use Topics    Alcohol use: No     Alcohol/week: 0.0 standard drinks                                Counseling given: Not Answered  Tobacco comments: Quit smoking in  with Wellbutrin (had crazy dreams on it)      Vital Signs (Current):   Vitals:    22 0933   BP: (!) 144/75   Pulse: 73   Resp: 22   Temp: 97.7 °F (36.5 °C)   TempSrc: Temporal   SpO2: 98%   Weight: 185 lb (83.9 kg)   Height: 5' 3\" (1.6 m)                                              BP Readings from Last 3 Encounters:   22 (!) 144/75   22 130/72   22 130/74       NPO Status: Time of last liquid consumption: 0545 (COMPLETED PREP)                        Time of last solid consumption: 1800                        Date of last liquid consumption: 22                        Date of last solid food consumption: 22    BMI:   Wt Readings from Last 3 Encounters:   22 185 lb (83.9 kg)   22 185 lb (83.9 kg)   22 187 lb (84.8 kg)     Body mass index is 32.77 kg/m².     CBC:   Lab Results   Component Value Date/Time    WBC 6.0 12/10/2021 09:46 AM    RBC 3.99 12/10/2021 09:46 AM    HGB 11.7 12/10/2021 09:46 AM    HCT 36.9 12/10/2021 09:46 AM    MCV 92.5 12/10/2021 09:46 AM    RDW 14.0 12/10/2021 09:46 AM     12/10/2021 09:46 AM       CMP:   Lab Results   Component Value Date/Time     12/10/2021 09:46 AM    K 4.1 12/10/2021 09:46 AM     12/10/2021 09:46 AM    CO2 22 12/10/2021 09:46 AM    BUN 12 12/10/2021 09:46 AM    CREATININE 0.7 12/10/2021 09:46 AM    GFRAA >59 12/10/2021 09:46 AM    LABGLOM >60 12/10/2021 09:46 AM    GLUCOSE 105 12/10/2021 09:46 AM    PROT 6.8 12/10/2021 09:46 AM    CALCIUM 9.2 12/10/2021 09:46 AM    BILITOT 0.5 12/10/2021 09:46 AM    ALKPHOS 75 12/10/2021 09:46 AM    AST 18 12/10/2021 09:46 AM    ALT 19 12/10/2021 09:46 AM       POC Tests: No results for input(s): POCGLU, POCNA, POCK, POCCL, POCBUN, POCHEMO, POCHCT in the last 72 hours.     Coags: No results found for: PROTIME, INR, APTT    HCG (If Applicable):   Lab Results   Component Value Date    PREGTESTUR Negative 07/24/2020        ABGs: No results found for: PHART, PO2ART, CEE7UOV, WCB3XHD, BEART, C8BHYSID     Type & Screen (If Applicable):  No results found for: LABABO, LABRH    Drug/Infectious Status (If Applicable):  No results found for: HIV, HEPCAB    COVID-19 Screening (If Applicable):   Lab Results   Component Value Date/Time    COVID19 Not Detected 11/04/2022 10:36 AM           Anesthesia Evaluation  Patient summary reviewed and Nursing notes reviewed  Airway: Mallampati: II  TM distance: >3 FB   Neck ROM: full  Mouth opening: > = 3 FB   Dental: normal exam         Pulmonary:normal exam    (+) asthma:                            Cardiovascular:Negative CV ROS  Exercise tolerance: good (>4 METS),            Beta Blocker:  Not on Beta Blocker         Neuro/Psych:   (+) headaches:,             GI/Hepatic/Renal:   (+) GERD:,           Endo/Other:    (+) hyperthyroidism::., .                 Abdominal:             Vascular: negative vascular ROS.         Other Findings:           Anesthesia Plan      general and TIVA     ASA 3       Induction: intravenous. Anesthetic plan and risks discussed with patient. Plan discussed with CRNA.                     ANGEL Ricci - CRNA   12/16/2022

## 2022-12-16 NOTE — ANESTHESIA POSTPROCEDURE EVALUATION
Department of Anesthesiology  Postprocedure Note    Patient: Kaylynn Ventura  MRN: [id number]  YOB: 1960  Date of evaluation: 12/16/2022      Procedure Summary     Date: 12/16/22 Room / Location: [address]    Anesthesia Start: 1002 Anesthesia Stop:     Procedure: COLORECTAL CANCER SCREENING, NOT HIGH RISK Diagnosis:       Hx of colonic polyps      FH: colon polyps      (Hx of colonic polyps [Z86.010])      (FH: colon polyps [Z83.71])    Surgeons: Manisha Kirby MD Responsible Provider: ANGEL Morel CRNA    Anesthesia Type: general, TIVA ASA Status: 3          Anesthesia Type: No value filed.     Mansi Phase I: Mansi Score: 10    Mansi Phase II:        Anesthesia Post Evaluation    Patient location during evaluation: bedside  Patient participation: complete - patient participated  Level of consciousness: sleepy but conscious  Pain score: 0  Airway patency: patent  Nausea & Vomiting: no nausea and no vomiting  Complications: no  Cardiovascular status: blood pressure returned to baseline  Respiratory status: acceptable, room air and spontaneous ventilation  Hydration status: euvolemic

## 2022-12-16 NOTE — DISCHARGE INSTRUCTIONS
1. Repeat colonoscopy: pending pathology - in 3 years due to her hx of polyps and her family history  2. Resume previous meds and high fiber diet; avoid straining at defecation.   - GI clinic f/u PRN   - Keep scheduled f/u appts with other MDs

## 2022-12-22 DIAGNOSIS — R21 RASH: ICD-10-CM

## 2022-12-22 DIAGNOSIS — E55.9 VITAMIN D DEFICIENCY: ICD-10-CM

## 2022-12-22 DIAGNOSIS — E03.9 HYPOTHYROIDISM, UNSPECIFIED TYPE: ICD-10-CM

## 2022-12-22 DIAGNOSIS — Z13.220 LIPID SCREENING: ICD-10-CM

## 2022-12-22 DIAGNOSIS — R73.09 ELEVATED GLUCOSE: ICD-10-CM

## 2022-12-22 LAB
ALBUMIN SERPL-MCNC: 4.5 G/DL (ref 3.5–5.2)
ALP BLD-CCNC: 80 U/L (ref 35–104)
ALT SERPL-CCNC: 34 U/L (ref 5–33)
ANION GAP SERPL CALCULATED.3IONS-SCNC: 13 MMOL/L (ref 7–19)
AST SERPL-CCNC: 30 U/L (ref 5–32)
BASOPHILS ABSOLUTE: 0 K/UL (ref 0–0.2)
BASOPHILS RELATIVE PERCENT: 0.4 % (ref 0–1)
BILIRUB SERPL-MCNC: 0.7 MG/DL (ref 0.2–1.2)
BUN BLDV-MCNC: 16 MG/DL (ref 8–23)
CALCIUM SERPL-MCNC: 9.5 MG/DL (ref 8.8–10.2)
CHLORIDE BLD-SCNC: 102 MMOL/L (ref 98–111)
CHOLESTEROL, TOTAL: 186 MG/DL (ref 160–199)
CO2: 25 MMOL/L (ref 22–29)
CREAT SERPL-MCNC: 0.7 MG/DL (ref 0.5–0.9)
EOSINOPHILS ABSOLUTE: 0.1 K/UL (ref 0–0.6)
EOSINOPHILS RELATIVE PERCENT: 1.5 % (ref 0–5)
GFR SERPL CREATININE-BSD FRML MDRD: >60 ML/MIN/{1.73_M2}
GLUCOSE BLD-MCNC: 106 MG/DL (ref 74–109)
HBA1C MFR BLD: 6 % (ref 4–6)
HCT VFR BLD CALC: 36.3 % (ref 37–47)
HDLC SERPL-MCNC: 54 MG/DL (ref 65–121)
HEMOGLOBIN: 12.3 G/DL (ref 12–16)
IMMATURE GRANULOCYTES #: 0 K/UL
LDL CHOLESTEROL CALCULATED: 110 MG/DL
LYMPHOCYTES ABSOLUTE: 2.5 K/UL (ref 1.1–4.5)
LYMPHOCYTES RELATIVE PERCENT: 46.5 % (ref 20–40)
MCH RBC QN AUTO: 30.3 PG (ref 27–31)
MCHC RBC AUTO-ENTMCNC: 33.9 G/DL (ref 33–37)
MCV RBC AUTO: 89.4 FL (ref 81–99)
MONOCYTES ABSOLUTE: 0.4 K/UL (ref 0–0.9)
MONOCYTES RELATIVE PERCENT: 7.5 % (ref 0–10)
NEUTROPHILS ABSOLUTE: 2.3 K/UL (ref 1.5–7.5)
NEUTROPHILS RELATIVE PERCENT: 43.7 % (ref 50–65)
PDW BLD-RTO: 13.1 % (ref 11.5–14.5)
PLATELET # BLD: 257 K/UL (ref 130–400)
PLATELET SLIDE REVIEW: ADEQUATE
PMV BLD AUTO: 11 FL (ref 9.4–12.3)
POTASSIUM SERPL-SCNC: 4.6 MMOL/L (ref 3.5–5)
RBC # BLD: 4.06 M/UL (ref 4.2–5.4)
SODIUM BLD-SCNC: 140 MMOL/L (ref 136–145)
T4 FREE: 1.57 NG/DL (ref 0.93–1.7)
TOTAL PROTEIN: 7.1 G/DL (ref 6.6–8.7)
TRIGL SERPL-MCNC: 111 MG/DL (ref 0–149)
TSH SERPL DL<=0.05 MIU/L-ACNC: 0.24 UIU/ML (ref 0.27–4.2)
VITAMIN D 25-HYDROXY: 53.9 NG/ML
WBC # BLD: 5.4 K/UL (ref 4.8–10.8)

## 2022-12-22 RX ORDER — LEVOTHYROXINE SODIUM 0.1 MG/1
100 TABLET ORAL DAILY
Qty: 30 TABLET | Refills: 1 | Status: SHIPPED | OUTPATIENT
Start: 2022-12-22

## 2022-12-29 DIAGNOSIS — F32.A ANXIETY AND DEPRESSION: ICD-10-CM

## 2022-12-29 DIAGNOSIS — F41.9 ANXIETY AND DEPRESSION: ICD-10-CM

## 2022-12-29 RX ORDER — FLUOXETINE 10 MG/1
CAPSULE ORAL
Qty: 90 CAPSULE | Refills: 1 | Status: SHIPPED | OUTPATIENT
Start: 2022-12-29

## 2023-01-09 ENCOUNTER — TELEPHONE (OUTPATIENT)
Dept: PRIMARY CARE CLINIC | Age: 63
End: 2023-01-09

## 2023-01-13 NOTE — TELEPHONE ENCOUNTER
Called patient and lab results given from 12-22. Patient reports that she received the message. Reports feeling sleepy all the time.   Discussed,declined appointment and will call if decides she wants to schedule

## 2023-01-15 DIAGNOSIS — E03.9 HYPOTHYROIDISM, UNSPECIFIED TYPE: ICD-10-CM

## 2023-01-16 RX ORDER — LEVOTHYROXINE SODIUM 100 MCG
TABLET ORAL
Qty: 30 TABLET | Refills: 1 | OUTPATIENT
Start: 2023-01-16

## 2023-02-01 DIAGNOSIS — E03.9 HYPOTHYROIDISM, UNSPECIFIED TYPE: ICD-10-CM

## 2023-02-03 RX ORDER — LEVOTHYROXINE SODIUM 112 MCG
TABLET ORAL
Qty: 90 TABLET | Refills: 1 | OUTPATIENT
Start: 2023-02-03

## 2023-02-03 RX ORDER — LEVOCETIRIZINE DIHYDROCHLORIDE 5 MG/1
TABLET, FILM COATED ORAL
Qty: 90 TABLET | Refills: 3 | Status: SHIPPED | OUTPATIENT
Start: 2023-02-03

## 2023-02-11 DIAGNOSIS — E03.9 HYPOTHYROIDISM, UNSPECIFIED TYPE: ICD-10-CM

## 2023-02-13 RX ORDER — LEVOTHYROXINE SODIUM 100 MCG
TABLET ORAL
Qty: 30 TABLET | Refills: 1 | Status: SHIPPED | OUTPATIENT
Start: 2023-02-13

## 2023-02-20 RX ORDER — PANTOPRAZOLE SODIUM 40 MG/1
TABLET, DELAYED RELEASE ORAL
Qty: 90 TABLET | Refills: 1 | Status: SHIPPED | OUTPATIENT
Start: 2023-02-20

## 2023-03-09 ENCOUNTER — OFFICE VISIT (OUTPATIENT)
Dept: OBGYN CLINIC | Age: 63
End: 2023-03-09
Payer: MEDICARE

## 2023-03-09 VITALS
SYSTOLIC BLOOD PRESSURE: 138 MMHG | DIASTOLIC BLOOD PRESSURE: 81 MMHG | HEART RATE: 80 BPM | WEIGHT: 178 LBS | HEIGHT: 63 IN | BODY MASS INDEX: 31.54 KG/M2

## 2023-03-09 DIAGNOSIS — Z12.39 SCREENING BREAST EXAMINATION: ICD-10-CM

## 2023-03-09 DIAGNOSIS — Z12.4 SCREENING FOR CERVICAL CANCER: ICD-10-CM

## 2023-03-09 DIAGNOSIS — Z01.419 ENCOUNTER FOR GYNECOLOGICAL EXAMINATION WITHOUT ABNORMAL FINDING: Primary | ICD-10-CM

## 2023-03-09 DIAGNOSIS — Z12.31 ENCOUNTER FOR SCREENING MAMMOGRAM FOR MALIGNANT NEOPLASM OF BREAST: ICD-10-CM

## 2023-03-09 DIAGNOSIS — N95.1 SYMPTOMATIC MENOPAUSAL OR FEMALE CLIMACTERIC STATES: ICD-10-CM

## 2023-03-09 LAB — HPV COMMENT: NORMAL

## 2023-03-09 PROCEDURE — G0101 CA SCREEN;PELVIC/BREAST EXAM: HCPCS | Performed by: NURSE PRACTITIONER

## 2023-03-09 ASSESSMENT — ENCOUNTER SYMPTOMS
ALLERGIC/IMMUNOLOGIC NEGATIVE: 1
GASTROINTESTINAL NEGATIVE: 1
DIARRHEA: 0
RESPIRATORY NEGATIVE: 1
CONSTIPATION: 0
EYES NEGATIVE: 1

## 2023-03-09 NOTE — PROGRESS NOTES
Pt presents today for pap smear and breast exam.      Mammo:2022  Pap smear:  Contraception:TL     P:  Ab:   Bone density:  Colonoscopy:

## 2023-03-09 NOTE — PATIENT INSTRUCTIONS
Patient Education        Breast Self-Exam: Care Instructions  Your Care Instructions     A breast self-exam is when you check your breasts for lumps or changes. This regular exam helps you learn how your breasts normally look and feel. Most breast problems or changes are not because of cancer. Breast self-exam is not a substitute for a mammogram. Having regular breast exams by your doctor and regular mammograms improve your chances of finding any problems with your breasts. Some women set a time each month to do a step-by-step breast self-exam. Other women like a less formal system. They might look at their breasts as they brush their teeth, or feel their breasts once in a while in the shower. If you notice a change in your breast, tell your doctor. Follow-up care is a key part of your treatment and safety. Be sure to make and go to all appointments, and call your doctor if you are having problems. It's also a good idea to know your test results and keep a list of the medicines you take. How do you do a breast self-exam?  The best time to examine your breasts is usually one week after your menstrual period begins. Your breasts should not be tender then. If you do not have periods, you might do your exam on a day of the month that is easy to remember. To examine your breasts:  Remove all your clothes above the waist and lie down. When you are lying down, your breast tissue spreads evenly over your chest wall, which makes it easier to feel all your breast tissue. Use the pads--not the fingertips--of the 3 middle fingers of your left hand to check your right breast. Move your fingers slowly in small coin-sized circles that overlap. Use three levels of pressure to feel of all your breast tissue. Use light pressure to feel the tissue close to the skin surface. Use medium pressure to feel a little deeper. Use firm pressure to feel your tissue close to your breastbone and ribs.  Use each pressure level to feel your breast tissue before moving on to the next spot. Check your entire breast, moving up and down as if following a strip from the collarbone to the bra line, and from the armpit to the ribs. Repeat until you have covered the entire breast.  Repeat this procedure for your left breast, using the pads of the 3 middle fingers of your right hand. To examine your breasts while in the shower:  Place one arm over your head and lightly soap your breast on that side. Using the pads of your fingers, gently move your hand over your breast (in the strip pattern described above), feeling carefully for any lumps or changes. Repeat for the other breast.  Have your doctor inspect anything you notice to see if you need further testing. Where can you learn more? Go to http://www.woods.com/ and enter P148 to learn more about \"Breast Self-Exam: Care Instructions. \"  Current as of: August 2, 2022               Content Version: 13.5  © 2006-2022 [employer]. Care instructions adapted under license by [facility] ([facility]). If you have questions about a medical condition or this instruction, always ask your healthcare professional. [place] [address] any warranty or liability for your use of this information.

## 2023-03-09 NOTE — PROGRESS NOTES
Denise Marcus is a 65 y.o. female who presents today for her medical conditions/ complaints as noted below. Salud Marcial is c/o of Annual Exam        HPI  Pt presents for annual exam. Current with normal pap smear . Needs order for screening mammogram. Has been able to wean off hormones without problems. Recently started working for [employer] and has been able to lose some weight. Wears a pad though in case she has some leaking while she is out and it makes it itchy down there. Mammo:2022  Pap smear:  Contraception:TL     Bone density:  Colonoscopy:  Patient's last menstrual period was 2009. [id number]    Past Medical History:   Diagnosis Date    Allergic bronchitis     has wheezing related to dust and pollen    Allergic rhinitis     Asthma     Blepharitis of both eyes     Bone spur     cervical    Cervical disc disease     Chicken pox     age 26    Chronic anxiety     has been on valium after failing effexor and other medications    Coccyx disorder     history of f/x    Constipation     chronic since a teen    Degenerative disc disease at L5-S1 level     Genital warts     Dr Ariadna Doshi    GERD (gastroesophageal reflux disease)     Hemorrhoids     HPV in female     Hyperthyroidism     took radioactive iodine    Post menopausal syndrome 2010    Reactive airway disease with wheezing     Scoliosis     Unspecified vitamin D deficiency      Past Surgical History:   Procedure Laterality Date    CERVICAL DISC ARTHROPLASTY      CERVICAL DISCECTOMY      COLONOSCOPY  2011    WB?     COLONOSCOPY N/A 2016    Dr Margarita Vega AP (-) dysplasia x 1, BCM x 1--3 yr recall    COLONOSCOPY N/A 2019    Dr Ryan Veronica, large grade 3 internal and external hemorrhoids, suboptimal prep, 3 yr recall    COLONOSCOPY N/A 2022    Dr Shweta Keith,  lg int hem Gr 3, ext hem wo bleeding, mild diverticulosis left colon, 3 year recall    [address] OF UTERUS N/A 2020    DILATATION AND CURETTAGE HYSTEROSCOPY performed by Urszula Lozano MD at [facility] OR    HEMORRHOID SURGERY N/A 2019    INTERNAL AND EXTERNAL HEMORRHOIDECTOMY performed by Christin Franklin MD at [facility] OR    NASAL SEPTUM SURGERY      NECK SURGERY  2021    replaced discs    TUBAL LIGATION      UPPER GASTROINTESTINAL ENDOSCOPY  2011     ?     Family History   Problem Relation Age of Onset    Diabetes Sister     Thyroid Disease Sister     Rheum Arthritis Sister     Other Sister         fibromyalgia    Breast Cancer Sister 64    Rheum Arthritis Maternal Aunt     Colon Polyps Maternal Aunt     Heart Failure Father     Heart Disease Father          of acute MI at 62    Hypertension Mother     Cancer Mother         lung cancer    Colon Polyps Mother     High Blood Pressure Mother     Other Son         sleep apnea,anxiety and neck problems after  injuries    Breast Cancer Maternal Cousin 38    Colon Cancer Neg Hx     Esophageal Cancer Neg Hx     Stomach Cancer Neg Hx     Liver Cancer Neg Hx     Liver Disease Neg Hx     Rectal Cancer Neg Hx      Social History     Tobacco Use    Smoking status: Former     Packs/day: 2.00     Years: 25.00     Pack years: 50.00     Types: Cigarettes     Quit date: 2008     Years since quittin.9    Smokeless tobacco: Never    Tobacco comments:     Quit smoking in  with Wellbutrin (had crazy dreams on it)   Substance Use Topics    Alcohol use: No     Alcohol/week: 0.0 standard drinks       Current Outpatient Medications   Medication Sig Dispense Refill    pantoprazole (PROTONIX) 40 MG tablet TAKE 1 TABLET BY MOUTH EVERY DAY BEFORE BREAKFAST 90 tablet 1    SYNTHROID 100 MCG tablet TAKE 1 TABLET BY MOUTH EVERY DAY 30 tablet 1    levocetirizine (XYZAL) 5 MG tablet TAKE 1 TABLET BY MOUTH EVERY DAY 90 tablet 3    FLUoxetine (PROZAC) 10 MG capsule TAKE 1 CAPSULE BY MOUTH EVERY DAY 90 capsule 1    montelukast (SINGULAIR) 10 MG tablet  TAKE 1 TABLET BY MOUTH EVERY DAY AT NIGHT 90 tablet 1    hydrOXYzine HCl (ATARAX) 25 MG tablet TAKE 1 TABLET BY MOUTH NIGHTLY 90 tablet 1    Multiple Vitamins-Minerals (STARR MULTI FOR WOMEN PO) Take by mouth Has Vit D      augmented betamethasone dipropionate (DIPROLENE-AF) 0.05 % ointment APPLY TWICE A DAY TO THE AFFECTED AREA ON THE ABDOMEN AS NEEDED WHEN AND WHERE RASH IS PRESENT      metroNIDAZOLE (METROCREAM) 0.75 % cream APPLY NIGHTLY TO AFFECTED AREAS ON FACE FOR ROSACEA      Probiotic Product (Well[employer]) CAPS       ondansetron (ZOFRAN ODT) 4 MG disintegrating tablet Take 1 tablet by mouth every 8 hours as needed for Nausea or Vomiting 20 tablet 0    clobetasol (TEMOVATE) 0.05 % ointment APPLY TO AFFECTED AREA TWICE A DAY 30 g 0    diphenhydrAMINE-zinc acetate (BENADRYL ITCH STOPPING) 1-0.1 % cream Apply topically 3 times daily as needed. 1 each 1    HYDROcodone-acetaminophen (NORCO) 7.5-325 MG per tablet Take 1 tablet by mouth 3 times daily as needed. triamcinolone (NASACORT ALLERGY 24HR) 55 MCG/ACT nasal inhaler 2 sprays by Each Nostril route daily 1 each 3    Cyclobenzaprine HCl (FLEXERIL PO) Take 10 mg by mouth 3 times daily      azelastine (ASTELIN) 0.1 % nasal spray USE 2 SPRAYS IN EACH NOSTRIL TWICE A DAY AS DIRECTED 1 Bottle 5    PROAIR  (90 Base) MCG/ACT inhaler INHALE 2 PUFFS INTO THE LUNGS EVERY 6 HOURS AS NEEDED FOR WHEEZING 8.5 Inhaler 1    EPINEPHrine (EPIPEN) 0.3 MG/0.3ML SOAJ injection USE AS DIRECTED FOR ACUTE  ALLERGIC REACTION  1    epinastine (ELESTAT) 0.05 % SOLN INSTILL 1 DROP IN EACH EYE ONE TO TWICE A DAY AS NEEDED  3    naproxen (NAPROXEN) 500 MG EC tablet Take 1 tablet by mouth every 12 hours Generic is okay 60 tablet 11     No current facility-administered medications for this visit.      Allergies   Allergen Reactions    Oxycodone-Acetaminophen Hives    Pollen Extract      Early Spring and early Fall    Effexor [facility] NATCHEZ Hydrochloride] Other (See Comments) Slurred speech, feelings of having a TIA, seeing double, unaware of her surroundings    Poison Ivy Extract [Poison Ivy Extract] Itching    Nickel Itching     Vitals:    03/09/23 1023   BP: 138/81   Pulse: 80     Body mass index is 31.53 kg/m². Review of Systems   Constitutional: Negative. HENT: Negative. Eyes: Negative. Respiratory: Negative. Cardiovascular: Negative. Gastrointestinal: Negative. Negative for constipation and diarrhea. Endocrine: Negative. Genitourinary:  Positive for genital sores (itching). Negative for frequency, menstrual problem and urgency. Musculoskeletal: Negative. Skin: Negative. Allergic/Immunologic: Negative. Neurological: Negative. Hematological: Negative. Psychiatric/Behavioral: Negative. All other systems reviewed and are negative. Physical Exam  Vitals and nursing note reviewed. Constitutional:       Appearance: She is well-developed. HENT:      Head: Normocephalic. Neck:      Thyroid: No thyroid mass or thyromegaly. Cardiovascular:      Rate and Rhythm: Normal rate and regular rhythm. Pulmonary:      Effort: Pulmonary effort is normal.      Breath sounds: Normal breath sounds. Chest:   Breasts:     Right: No inverted nipple, mass, nipple discharge or skin change. Left: No inverted nipple, mass, nipple discharge or skin change. Abdominal:      Palpations: Abdomen is soft. There is no mass. Tenderness: There is no abdominal tenderness. Genitourinary:     General: Normal vulva. Labia:         Right: Rash present. Left: Rash present. Urethra: No prolapse. Vagina: Erythema present. No bleeding. Cervix: No cervical motion tenderness. Uterus: Normal. Not enlarged. Adnexa:         Right: No mass or tenderness. Left: No mass or tenderness. Rectum: No external hemorrhoid.           Comments:   Atrophic vaginal changes  Excoriation on perineum from scratching and pad use  Musculoskeletal:         General: Normal range of motion. Cervical back: Normal range of motion and neck supple. Skin:     General: Skin is warm and dry. Neurological:      Mental Status: She is alert and oriented to person, place, and time. Psychiatric:         Attention and Perception: Attention normal.         Mood and Affect: Mood normal.         Speech: Speech normal.         Behavior: Behavior normal.         Thought Content: Thought content normal.         Cognition and Memory: Cognition normal.         Judgment: Judgment normal.        Diagnosis Orders   1. Encounter for gynecological examination without abnormal finding        2. Screening for cervical cancer  Human papillomavirus (HPV) DNA probe thin prep high risk      3. Encounter for screening mammogram for malignant neoplasm of breast  JAME DIGITAL SCREEN W OR WO CAD BILATERAL      4. Screening breast examination        5. Symptomatic menopausal or female climacteric states            MEDICATIONS:  No orders of the defined types were placed in this encounter. ORDERS:  Orders Placed This Encounter   Procedures    JAME DIGITAL SCREEN W OR WO CAD BILATERAL    Human papillomavirus (HPV) DNA probe thin prep high risk       PLAN:  Pap deferred  Ordered screening mammogram  Discussed starting Vitamin A&D ointment or Desitin if wearing pads for protection  Patient Instructions   Patient Education       Breast Self-Exam: Care Instructions  Your Care Instructions     A breast self-exam is when you check your breasts for lumps or changes. This regular exam helps you learn how your breasts normally look and feel. Most breast problems or changes are not because of cancer. Breast self-exam is not a substitute for a mammogram. Having regular breast exams by your doctor and regular mammograms improve your chances of finding any problems with your breasts.   Some women set a time each month to do a step-by-step breast self-exam. Other women like a less formal system. They might look at their breasts as they brush their teeth, or feel their breasts once in a while in the shower. If you notice a change in your breast, tell your doctor. Follow-up care is a key part of your treatment and safety. Be sure to make and go to all appointments, and call your doctor if you are having problems. It's also a good idea to know your test results and keep a list of the medicines you take. How do you do a breast self-exam?  The best time to examine your breasts is usually one week after your menstrual period begins. Your breasts should not be tender then. If you do not have periods, you might do your exam on a day of the month that is easy to remember. To examine your breasts:  Remove all your clothes above the waist and lie down. When you are lying down, your breast tissue spreads evenly over your chest wall, which makes it easier to feel all your breast tissue. Use the pads--not the fingertips--of the 3 middle fingers of your left hand to check your right breast. Move your fingers slowly in small coin-sized circles that overlap. Use three levels of pressure to feel of all your breast tissue. Use light pressure to feel the tissue close to the skin surface. Use medium pressure to feel a little deeper. Use firm pressure to feel your tissue close to your breastbone and ribs. Use each pressure level to feel your breast tissue before moving on to the next spot. Check your entire breast, moving up and down as if following a strip from the collarbone to the bra line, and from the armpit to the ribs. Repeat until you have covered the entire breast.  Repeat this procedure for your left breast, using the pads of the 3 middle fingers of your right hand. To examine your breasts while in the shower:  Place one arm over your head and lightly soap your breast on that side.   Using the pads of your fingers, gently move your hand over your breast (in the strip pattern described above), feeling carefully for any lumps or changes.  Repeat for the other breast.  Have your doctor inspect anything you notice to see if you need further testing.  Where can you learn more?  Go to https://www.[employer].net/patientEd and enter P148 to learn more about \"Breast Self-Exam: Care Instructions.\"  Current as of: August 2, 2022               Content Version: 13.5  © 2006-2022 [employer].   Care instructions adapted under license by [employer]. If you have questions about a medical condition or this instruction, always ask your healthcare professional. [employer] disclaims any warranty or liability for your use of this information.

## 2023-03-10 ENCOUNTER — OFFICE VISIT (OUTPATIENT)
Dept: PRIMARY CARE CLINIC | Age: 63
End: 2023-03-10
Payer: MEDICARE

## 2023-03-10 VITALS
TEMPERATURE: 97.2 F | HEIGHT: 63 IN | WEIGHT: 180 LBS | BODY MASS INDEX: 31.89 KG/M2 | OXYGEN SATURATION: 98 % | DIASTOLIC BLOOD PRESSURE: 84 MMHG | HEART RATE: 69 BPM | SYSTOLIC BLOOD PRESSURE: 134 MMHG

## 2023-03-10 DIAGNOSIS — S69.91XA: Primary | ICD-10-CM

## 2023-03-10 DIAGNOSIS — E03.9 HYPOTHYROIDISM, UNSPECIFIED TYPE: ICD-10-CM

## 2023-03-10 LAB
T4 FREE: 1.48 NG/DL (ref 0.93–1.7)
TSH SERPL DL<=0.05 MIU/L-ACNC: 0.29 UIU/ML (ref 0.27–4.2)

## 2023-03-10 PROCEDURE — 99213 OFFICE O/P EST LOW 20 MIN: CPT | Performed by: NURSE PRACTITIONER

## 2023-03-10 SDOH — ECONOMIC STABILITY: INCOME INSECURITY: HOW HARD IS IT FOR YOU TO PAY FOR THE VERY BASICS LIKE FOOD, HOUSING, MEDICAL CARE, AND HEATING?: SOMEWHAT HARD

## 2023-03-10 SDOH — ECONOMIC STABILITY: FOOD INSECURITY: WITHIN THE PAST 12 MONTHS, THE FOOD YOU BOUGHT JUST DIDN'T LAST AND YOU DIDN'T HAVE MONEY TO GET MORE.: NEVER TRUE

## 2023-03-10 SDOH — ECONOMIC STABILITY: TRANSPORTATION INSECURITY
IN THE PAST 12 MONTHS, HAS LACK OF TRANSPORTATION KEPT YOU FROM MEETINGS, WORK, OR FROM GETTING THINGS NEEDED FOR DAILY LIVING?: NO

## 2023-03-10 SDOH — ECONOMIC STABILITY: HOUSING INSECURITY
IN THE LAST 12 MONTHS, WAS THERE A TIME WHEN YOU DID NOT HAVE A STEADY PLACE TO SLEEP OR SLEPT IN A SHELTER (INCLUDING NOW)?: NO

## 2023-03-10 SDOH — ECONOMIC STABILITY: FOOD INSECURITY: WITHIN THE PAST 12 MONTHS, YOU WORRIED THAT YOUR FOOD WOULD RUN OUT BEFORE YOU GOT MONEY TO BUY MORE.: NEVER TRUE

## 2023-03-10 ASSESSMENT — PATIENT HEALTH QUESTIONNAIRE - PHQ9
1. LITTLE INTEREST OR PLEASURE IN DOING THINGS: 0
2. FEELING DOWN, DEPRESSED OR HOPELESS: 0
8. MOVING OR SPEAKING SO SLOWLY THAT OTHER PEOPLE COULD HAVE NOTICED. OR THE OPPOSITE, BEING SO FIGETY OR RESTLESS THAT YOU HAVE BEEN MOVING AROUND A LOT MORE THAN USUAL: 0
SUM OF ALL RESPONSES TO PHQ QUESTIONS 1-9: 0
SUM OF ALL RESPONSES TO PHQ QUESTIONS 1-9: 0
4. FEELING TIRED OR HAVING LITTLE ENERGY: 0
5. POOR APPETITE OR OVEREATING: 0
SUM OF ALL RESPONSES TO PHQ9 QUESTIONS 1 & 2: 0
3. TROUBLE FALLING OR STAYING ASLEEP: 0
7. TROUBLE CONCENTRATING ON THINGS, SUCH AS READING THE NEWSPAPER OR WATCHING TELEVISION: 0
SUM OF ALL RESPONSES TO PHQ QUESTIONS 1-9: 0
SUM OF ALL RESPONSES TO PHQ QUESTIONS 1-9: 0
10. IF YOU CHECKED OFF ANY PROBLEMS, HOW DIFFICULT HAVE THESE PROBLEMS MADE IT FOR YOU TO DO YOUR WORK, TAKE CARE OF THINGS AT HOME, OR GET ALONG WITH OTHER PEOPLE: 0
9. THOUGHTS THAT YOU WOULD BE BETTER OFF DEAD, OR OF HURTING YOURSELF: 0
6. FEELING BAD ABOUT YOURSELF - OR THAT YOU ARE A FAILURE OR HAVE LET YOURSELF OR YOUR FAMILY DOWN: 0

## 2023-03-10 NOTE — PROGRESS NOTES
[address] [address]     Phone:  [phone]  Fax:  [phone]      Jaxon Ohara is a 65 y.o. female who presents today for her medical conditions/complaints as noted below. Salud Funez is c/o of Follow-up (Having issues with thumb nail on right hand)      Chief Complaint   Patient presents with    Follow-up     Having issues with thumb nail on right hand       HPI:     HPI    Patient presents today for evaluation of her right thumb nail irregularly being shaped. Reports about 6 months ago she accidentally pushed the end of a metal brush under her right thumbnail and it has continued to grow out with a hole under that side of her nail. Denies any bleeding or pain. Past Medical History:   Diagnosis Date    Allergic bronchitis     has wheezing related to dust and pollen    Allergic rhinitis     Asthma     Blepharitis of both eyes     Bone spur     cervical    Cervical disc disease     Chicken pox     age 26    Chronic anxiety 2006    has been on valium after failing effexor and other medications    Coccyx disorder     history of f/x    Constipation     chronic since a teen    Degenerative disc disease at L5-S1 level     Genital warts     Dr Jimenez Flatter    GERD (gastroesophageal reflux disease)     Hemorrhoids     HPV in female     Hyperthyroidism     took radioactive iodine    Post menopausal syndrome 2010    Reactive airway disease with wheezing     Scoliosis     Unspecified vitamin D deficiency         Past Surgical History:   Procedure Laterality Date    CERVICAL DISC ARTHROPLASTY      CERVICAL DISCECTOMY      COLONOSCOPY  09/16/2011    WB?     COLONOSCOPY N/A 11/11/2016    Dr Tootie Shelton AP (-) dysplasia x 1, BCM x 1--3 yr recall    COLONOSCOPY N/A 11/11/2019    Dr Franklin Maria, large grade 3 internal and external hemorrhoids, suboptimal prep, 3 yr recall    COLONOSCOPY N/A 12/16/2022    Dr Nick Flanagan,  lg int hem Gr 3, ext hem wo bleeding, mild diverticulosis left colon, 3 year recall    DILATION AND CURETTAGE OF UTERUS N/A 2020    DILATATION AND CURETTAGE HYSTEROSCOPY performed by Ho Bañuelos MD at [place] [address] N/A 2019    INTERNAL AND EXTERNAL HEMORRHOIDECTOMY performed by Sandhya Enriquez MD at [facility]  2021    replaced discs    TUBAL LIGATION      UPPER GASTROINTESTINAL ENDOSCOPY  2011     ?        Social History     Tobacco Use    Smoking status: Former     Packs/day: 2.00     Years: 25.00     Pack years: 50.00     Types: Cigarettes     Quit date: 2008     Years since quittin.9    Smokeless tobacco: Never    Tobacco comments:     Quit smoking in  with Wellbutrin (had crazy dreams on it)   Substance Use Topics    Alcohol use: No     Alcohol/week: 0.0 standard drinks        Current Outpatient Medications   Medication Sig Dispense Refill    pantoprazole (PROTONIX) 40 MG tablet TAKE 1 TABLET BY MOUTH EVERY DAY BEFORE BREAKFAST 90 tablet 1    levocetirizine (XYZAL) 5 MG tablet TAKE 1 TABLET BY MOUTH EVERY DAY 90 tablet 3    FLUoxetine (PROZAC) 10 MG capsule TAKE 1 CAPSULE BY MOUTH EVERY DAY 90 capsule 1    montelukast (SINGULAIR) 10 MG tablet TAKE 1 TABLET BY MOUTH EVERY DAY AT NIGHT 90 tablet 1    hydrOXYzine HCl (ATARAX) 25 MG tablet TAKE 1 TABLET BY MOUTH NIGHTLY 90 tablet 1    Multiple Vitamins-Minerals (STARR MULTI FOR WOMEN PO) Take by mouth Has Vit D      augmented betamethasone dipropionate (DIPROLENE-AF) 0.05 % ointment APPLY TWICE A DAY TO THE AFFECTED AREA ON THE ABDOMEN AS NEEDED WHEN AND WHERE RASH IS PRESENT      metroNIDAZOLE (METROCREAM) 0.75 % cream APPLY NIGHTLY TO AFFECTED AREAS ON FACE FOR ROSACEA      Probiotic Product ([employer]) CAPS       ondansetron (ZOFRAN ODT) 4 MG disintegrating tablet Take 1 tablet by mouth every 8 hours as needed for Nausea or Vomiting 20 tablet 0    clobetasol (TEMOVATE) 0.05 % ointment APPLY TO AFFECTED AREA TWICE A DAY 30 g 0    diphenhydrAMINE-zinc acetate (BENADRYL ITCH STOPPING) 1-0.1 % cream Apply topically 3 times daily as needed. 1 each 1    HYDROcodone-acetaminophen (NORCO) 7.5-325 MG per tablet Take 1 tablet by mouth 3 times daily as needed. triamcinolone (NASACORT ALLERGY 24HR) 55 MCG/ACT nasal inhaler 2 sprays by Each Nostril route daily 1 each 3    Cyclobenzaprine HCl (FLEXERIL PO) Take 10 mg by mouth 3 times daily      azelastine (ASTELIN) 0.1 % nasal spray USE 2 SPRAYS IN EACH NOSTRIL TWICE A DAY AS DIRECTED 1 Bottle 5    PROAIR  (90 Base) MCG/ACT inhaler INHALE 2 PUFFS INTO THE LUNGS EVERY 6 HOURS AS NEEDED FOR WHEEZING 8.5 Inhaler 1    EPINEPHrine (EPIPEN) 0.3 MG/0.3ML SOAJ injection USE AS DIRECTED FOR ACUTE  ALLERGIC REACTION  1    epinastine (ELESTAT) 0.05 % SOLN INSTILL 1 DROP IN EACH EYE ONE TO TWICE A DAY AS NEEDED  3    naproxen (NAPROXEN) 500 MG EC tablet Take 1 tablet by mouth every 12 hours Generic is okay 60 tablet 11    SYNTHROID 100 MCG tablet TAKE 1 TABLET BY MOUTH EVERY DAY 30 tablet 1     No current facility-administered medications for this visit.        Allergies   Allergen Reactions    Oxycodone-Acetaminophen Hives    Pollen Extract      Early Spring and early Fall    Effexor [facility] NATCHEZ Hydrochloride] Other (See Comments)     Slurred speech, feelings of having a TIA, seeing double, unaware of her surroundings    Poison Ivy Extract [Poison Ivy Extract] Itching    Nickel Itching       Family History   Problem Relation Age of Onset    Diabetes Sister     Thyroid Disease Sister     Rheum Arthritis Sister     Other Sister         fibromyalgia    Breast Cancer Sister 59    Rheum Arthritis Maternal Aunt     Colon Polyps Maternal Aunt     Heart Failure Father     Heart Disease Father          of acute MI at 58    Hypertension Mother     Cancer Mother         lung cancer    Colon Polyps Mother     High Blood Pressure Mother     Other Son sleep apnea,anxiety and neck problems after  injuries    Breast Cancer Maternal Cousin 38    Colon Cancer Neg Hx     Esophageal Cancer Neg Hx     Stomach Cancer Neg Hx     Liver Cancer Neg Hx     Liver Disease Neg Hx     Rectal Cancer Neg Hx                Subjective:      Review of Systems   Constitutional:  Negative for activity change and fever. HENT:  Negative for congestion, ear pain and sore throat. Respiratory:  Negative for cough, chest tightness and shortness of breath. Cardiovascular:  Negative for chest pain. Gastrointestinal:  Negative for abdominal pain, diarrhea, nausea and vomiting. Genitourinary:  Negative for frequency and urgency. Musculoskeletal:  Negative for arthralgias and myalgias. Skin:  Negative for color change. Right thumb nail irregular. Neurological:  Negative for dizziness, weakness and numbness. Psychiatric/Behavioral:  Negative for agitation. The patient is not nervous/anxious. Objective:     Physical Exam  Vitals reviewed. Constitutional:       Appearance: Normal appearance. HENT:      Head: Normocephalic. Right Ear: Tympanic membrane normal.      Left Ear: Tympanic membrane normal.      Nose: Nose normal.      Mouth/Throat:      Mouth: Mucous membranes are moist.      Pharynx: Oropharynx is clear. Eyes:      Extraocular Movements: Extraocular movements intact. Pupils: Pupils are equal, round, and reactive to light. Cardiovascular:      Rate and Rhythm: Normal rate and regular rhythm. Pulses: Normal pulses. Heart sounds: Normal heart sounds. Pulmonary:      Effort: Pulmonary effort is normal.      Breath sounds: Normal breath sounds. Abdominal:      General: Bowel sounds are normal.      Palpations: Abdomen is soft. Musculoskeletal:         General: Normal range of motion. Cervical back: Normal range of motion. Skin:     General: Skin is warm and dry.       Comments: Right thumb nail with hole on right side.    Neurological:      Mental Status: She is alert and oriented to person, place, and time. Psychiatric:         Mood and Affect: Mood normal.         Behavior: Behavior normal.       /84   Pulse 69   Temp 97.2 °F (36.2 °C) (Temporal)   Ht 5' 3\" (1.6 m)   Wt 180 lb (81.6 kg)   LMP 07/11/2009   SpO2 98%   BMI 31.89 kg/m²     Assessment:      Diagnosis Orders   1. Injury of nail bed of thumb, right, initial encounter            No results found for this visit on 03/10/23. Plan:     1. Injury of nail bed of thumb, right, initial encounter  With patient's consent understanding risk of bleeding and infection, I cleaned out right area under right thumb nail. Patient tolerated this very well and on bleeding was present. Return in about 6 months (around 9/10/2023) for depression. No orders of the defined types were placed in this encounter. No orders of the defined types were placed in this encounter. Patient offered educational handouts and has had all questions answered. Patient voices understanding and agrees to plans along with risks and benefits of plan. Patient is instructed to continue prior meds, diet, and exercise plans as instructed. Patient agrees to follow up as instructed and sooner if needed. Patient agrees to go to ER if condition becomes emergent. EMR Dragon/transcription disclaimer: Some of this encounter note is an electronic transcription/translation of spoken language to printed text. The electronic translation of spoken language may permit erroneous, or at times, nonsensical words or phrases to be inadvertently transcribed.  Although I have reviewed the note for such errors, some may still exist.    Electronically signed by ANGEL Bradford CNP on 3/18/2023 at 5:09 PM

## 2023-03-11 NOTE — PATIENT INSTRUCTIONS
pain, swelling, warmth, or redness. ? Red streaks leading from the area. ? Pus draining from the area. ? A fever.     · You have joint pain along with the rash.    Watch closely for changes in your health, and be sure to contact your doctor if:    · Your rash is changing or getting worse.     · You are not getting better as expected. Where can you learn more? Go to https://[employer]u.com.br.[employer]. org and sign in to your emere account. Enter [phone] in the [facility] box to learn more about \"Dermatitis: Care Instructions. \"     If you do not have an account, please click on the \"Sign Up Now\" link. Current as of: April 1, 2019  Content Version: 12.1  © 6661-7671 [employer]. Care instructions adapted under license by [facility] ([facility]). If you have questions about a medical condition or this instruction, always ask your healthcare professional. [address] [address] any warranty or liability for your use of this information. Patient Education        Poison [place], [place], and [place]: Care Instructions  Your Care Instructions    Poison ivy, poison oak, and poison sumac are plants that can cause a skin rash upon contact. The red, itchy rash often shows up in lines or streaks and may cause fluid-filled blisters or large, raised hives. The rash is caused by an allergic reaction to an oil in poison ivy, oak, and sumac. The rash may occur when you touch the plant or when you touch clothing, pet fur, sporting gear, gardening tools, or other objects that have come in contact with one of these plants. You cannot catch or spread the rash, even if you touch it or the blister fluid, because the plant oil will already have been absorbed or washed off the skin. The rash may seem to be spreading, but either it is still developing from earlier contact or you have touched something that still has the plant oil on it. Follow-up care is a key part of your treatment and safety.  Be sure to Yes

## 2023-03-13 ENCOUNTER — TELEPHONE (OUTPATIENT)
Dept: PRIMARY CARE CLINIC | Age: 63
End: 2023-03-13

## 2023-03-13 NOTE — TELEPHONE ENCOUNTER
----- Message from ANGEL Alba CNP sent at 3/13/2023  6:00 AM CDT -----  Please let patient know her thyroid is stable. Continue synthroid 100 mcg.

## 2023-03-14 DIAGNOSIS — E03.9 HYPOTHYROIDISM, UNSPECIFIED TYPE: ICD-10-CM

## 2023-03-14 RX ORDER — LEVOTHYROXINE SODIUM 100 MCG
TABLET ORAL
Qty: 30 TABLET | Refills: 1 | Status: SHIPPED | OUTPATIENT
Start: 2023-03-14

## 2023-03-18 ASSESSMENT — ENCOUNTER SYMPTOMS
SHORTNESS OF BREATH: 0
SORE THROAT: 0
COLOR CHANGE: 0
CHEST TIGHTNESS: 0
ABDOMINAL PAIN: 0
DIARRHEA: 0
VOMITING: 0
COUGH: 0
NAUSEA: 0

## 2023-03-27 ENCOUNTER — TELEPHONE (OUTPATIENT)
Dept: PRIMARY CARE CLINIC | Age: 63
End: 2023-03-27

## 2023-05-13 DIAGNOSIS — E03.9 HYPOTHYROIDISM, UNSPECIFIED TYPE: ICD-10-CM

## 2023-05-15 ENCOUNTER — HOSPITAL ENCOUNTER (EMERGENCY)
Facility: HOSPITAL | Age: 63
Discharge: HOME | End: 2023-05-15
Admitting: EMERGENCY MEDICINE
Payer: MEDICARE

## 2023-05-15 VITALS
SYSTOLIC BLOOD PRESSURE: 158 MMHG | HEART RATE: 72 BPM | WEIGHT: 176 LBS | BODY MASS INDEX: 31.18 KG/M2 | HEIGHT: 63 IN | RESPIRATION RATE: 16 BRPM | TEMPERATURE: 97.9 F | DIASTOLIC BLOOD PRESSURE: 83 MMHG | OXYGEN SATURATION: 100 %

## 2023-05-15 DIAGNOSIS — M54.41 CHRONIC BILATERAL LOW BACK PAIN WITH RIGHT-SIDED SCIATICA: Primary | ICD-10-CM

## 2023-05-15 DIAGNOSIS — F32.A ANXIETY AND DEPRESSION: ICD-10-CM

## 2023-05-15 DIAGNOSIS — E03.9 HYPOTHYROIDISM, UNSPECIFIED TYPE: ICD-10-CM

## 2023-05-15 DIAGNOSIS — G89.29 CHRONIC BILATERAL LOW BACK PAIN WITH RIGHT-SIDED SCIATICA: Primary | ICD-10-CM

## 2023-05-15 DIAGNOSIS — F41.9 ANXIETY AND DEPRESSION: ICD-10-CM

## 2023-05-15 PROCEDURE — 25010000002 METHYLPREDNISOLONE PER 125 MG: Performed by: PHYSICIAN ASSISTANT

## 2023-05-15 PROCEDURE — 99283 EMERGENCY DEPT VISIT LOW MDM: CPT

## 2023-05-15 PROCEDURE — 96372 THER/PROPH/DIAG INJ SC/IM: CPT

## 2023-05-15 PROCEDURE — 99282 EMERGENCY DEPT VISIT SF MDM: CPT

## 2023-05-15 RX ORDER — LEVOTHYROXINE SODIUM 112 MCG
TABLET ORAL
Qty: 90 TABLET | Refills: 1 | OUTPATIENT
Start: 2023-05-15

## 2023-05-15 RX ORDER — PANTOPRAZOLE SODIUM 40 MG/1
40 TABLET, DELAYED RELEASE ORAL
Qty: 90 TABLET | Refills: 2 | Status: SHIPPED | OUTPATIENT
Start: 2023-05-15

## 2023-05-15 RX ORDER — METHYLPREDNISOLONE SODIUM SUCCINATE 125 MG/2ML
125 INJECTION, POWDER, LYOPHILIZED, FOR SOLUTION INTRAMUSCULAR; INTRAVENOUS ONCE
Status: COMPLETED | OUTPATIENT
Start: 2023-05-15 | End: 2023-05-15

## 2023-05-15 RX ORDER — FLUOXETINE 10 MG/1
CAPSULE ORAL
Qty: 90 CAPSULE | Refills: 1 | Status: SHIPPED | OUTPATIENT
Start: 2023-05-15

## 2023-05-15 RX ORDER — LEVOTHYROXINE SODIUM 100 MCG
TABLET ORAL
Qty: 30 TABLET | Refills: 5 | Status: SHIPPED | OUTPATIENT
Start: 2023-05-15

## 2023-05-15 RX ADMIN — METHYLPREDNISOLONE SODIUM SUCCINATE 125 MG: 125 INJECTION, POWDER, FOR SOLUTION INTRAMUSCULAR; INTRAVENOUS at 11:58

## 2023-05-15 NOTE — ED PROVIDER NOTES
"Subjective   History of Present Illness    Patient is a pleasant 62-year-old female chief complaint of chronic low back pain with radiation to the right lower extremity.  The patient describes she does have a history of chronic back pain for years.  She is under the care of pain management.  She has worsening pain since February 2023.  She describes she has continued to see orthopedic Bernard and pain management.  Patient did receive injections in her spine approximately March 2023 without any change in her pain.  The patient describes that her pain is particularly worse whenever she bends over or tries to put her clothes on.  It is constantly present.  She denies any bladder or bowel dysfunction.  She denies any saddle anesthesia.  She formed pain management today that her pain has persisted and they made appointment next week but she wanted to come to the ER hoping get an emergent MRI and \"get some answers for why I am hurting.\"  Patient denies any fever.  She is able to ambulate.  She denies any neurological deficits.    Review of Systems   Constitutional: Positive for activity change.   HENT: Negative.    Respiratory: Negative.    Cardiovascular: Negative.    Gastrointestinal: Negative for constipation and diarrhea.   Genitourinary: Negative for decreased urine volume and difficulty urinating.   Musculoskeletal: Positive for back pain.   Neurological: Negative for weakness and numbness.   All other systems reviewed and are negative.      Past Medical History:   Diagnosis Date   • Arthritis    • Asthma    • Claustrophobia    • Eczema    • Environmental and seasonal allergies    • Female bladder prolapse    • GERD (gastroesophageal reflux disease)    • Hx of bladder infections    • Low back pain    • Neck pain    • Overactive bladder    • Panic attacks    • Thyroid disease        Allergies   Allergen Reactions   • Oxycodone-Acetaminophen Hives   • Poison Ivy Extract Itching and Rash     BLISTERS   • Pollen " Extract Other (See Comments)     Early Spring and early    • Venlafaxine Other (See Comments)     Slurred speech, feelings of having a TIA, seeing double, unaware of her surroundings     • Nickel Itching       Past Surgical History:   Procedure Laterality Date   • ANTERIOR CERVICAL DISCECTOMY W/ FUSION N/A 2021    Procedure: ANTERIOR CERVICAL DISCECTOMY, INTERBODY FUSION, ANTERIOR INSTRUMENTATION C4-5,5-6,6-7;  Surgeon: Earnest Gomez MD;  Location: [facility];  Service: Neurosurgery;  Laterality: N/A;   •  SECTION     • SEPTOPLASTY         Family History   Problem Relation Age of Onset   • Cancer Mother    • Heart disease Father        Social History     Socioeconomic History   • Marital status:    Tobacco Use   • Smoking status: Former   • Smokeless tobacco: Never   • Tobacco comments:     QUIT    Vaping Use   • Vaping Use: Never used   Substance and Sexual Activity   • Alcohol use: Never   • Drug use: Never   • Sexual activity: Defer       Prior to Admission medications    Medication Sig Start Date End Date Taking? Authorizing Provider   albuterol sulfate HFA (ProAir HFA) 108 (90 Base) MCG/ACT inhaler Inhale 2 puffs Every 4 (Four) Hours As Needed for Wheezing.    Dolly Montgomery MD   azelastine (ASTELIN) 0.1 % nasal spray 1 spray into the nostril(s) as directed by provider 2 (Two) Times a Day. USUALLY USES ONLY DAILY IN AM 7/3/20   Dolly Montgomery MD   clobetasol (TEMOVATE) 0.05 % ointment Apply topically 2 times daily. 21   Dolly Montgomery MD   cyclobenzaprine (FLEXERIL) 10 MG tablet Take 1 tablet by mouth 3 (Three) Times a Day As Needed for Muscle Spasms. 21   Earnest Gomez MD   Epinastine HCl 0.05 % ophthalmic solution Administer 1 drop to both eyes 2 (Two) Times a Day.    Dolly Montgomery MD   estradiol (ESTRACE) 0.5 MG tablet Take 0.5 mg by mouth Daily. 20   Dolly Montgomery MD   FLUoxetine (PROzac) 10 MG capsule Take 10 mg by  "mouth Daily. 7/2/20   Dolly Montgomery MD   fluticasone (FLONASE) 50 MCG/ACT nasal spray 1 spray into the nostril(s) as directed by provider Every Night. 12/3/20   Dolly Montgomery MD   HYDROcodone-acetaminophen (NORCO) 7.5-325 MG per tablet Take 1 tablet by mouth 3 (Three) Times a Day As Needed. 11/26/21   Dolly Montgomery MD   hydrOXYzine (ATARAX) 25 MG tablet Take 25 mg by mouth Every 8 (Eight) Hours As Needed. 9/13/21   Dolly Montgomery MD   levocetirizine (XYZAL) 5 MG tablet Take 5 mg by mouth Daily. 11/23/20   Dolly Montgomery MD   levothyroxine (Synthroid) 112 MCG tablet Take 112 mcg by mouth Daily. 11/23/20   Dolly Montgomery MD   medroxyPROGESTERone (PROVERA) 2.5 MG tablet Take 2.5 mg by mouth Daily. 10/21/20   Dolly Montgomery MD   montelukast (Singulair) 10 MG tablet Take 10 mg by mouth Every Night.    Dolly Montgomery MD   pantoprazole (PROTONIX) 40 MG EC tablet Take 40 mg by mouth Daily. 9/18/20   Dolly Montgomery MD   vitamin D3 (Vitamin D) 125 MCG (5000 UT) capsule capsule Take 5,000 Units by mouth Daily.    Dolly Montgomery MD       Medications   methylPREDNISolone sodium succinate (SOLU-Medrol) injection 125 mg (has no administration in time range)       /80 (BP Location: Right arm, Patient Position: Sitting)   Pulse 69   Temp 97.5 °F (36.4 °C) (Axillary)   Resp 18   Ht 160 cm (63\")   Wt 79.8 kg (176 lb)   SpO2 100%   BMI 31.18 kg/m²       Objective   Physical Exam  Vitals and nursing note reviewed.   Constitutional:       General: She is not in acute distress.     Appearance: She is well-developed. She is not diaphoretic.   HENT:      Head: Normocephalic and atraumatic.   Eyes:      Conjunctiva/sclera: Conjunctivae normal.      Pupils: Pupils are equal, round, and reactive to light.   Neck:      Trachea: No tracheal deviation.   Cardiovascular:      Rate and Rhythm: Normal rate and regular rhythm.      Heart sounds: Normal heart sounds. " No murmur heard.  Pulmonary:      Effort: Pulmonary effort is normal.      Breath sounds: Normal breath sounds.   Abdominal:      General: Bowel sounds are normal. There is no distension.      Palpations: Abdomen is soft. There is no mass.      Tenderness: There is no abdominal tenderness. There is no guarding or rebound.   Musculoskeletal:         General: No swelling or tenderness. Normal range of motion.      Cervical back: Normal range of motion and neck supple.   Skin:     General: Skin is warm and dry.      Capillary Refill: Capillary refill takes less than 2 seconds.   Neurological:      General: No focal deficit present.      Mental Status: She is alert and oriented to person, place, and time.      Deep Tendon Reflexes: Reflexes are normal and symmetric.   Psychiatric:         Mood and Affect: Mood normal.         Behavior: Behavior normal.         Thought Content: Thought content normal.         Judgment: Judgment normal.         Procedures         Lab Results (last 24 hours)     ** No results found for the last 24 hours. **        [facility]  Outside Information  Results  MRI LUMBAR SPINE WO CONTRAST (Order [id number])      suggestion  Information displayed in this report may not trend or trigger automated decision support.     MRI LUMBAR SPINE WO CONTRAST  Order: [id number]  Impression    Multilevel degenerative changes with no evidence of a disc   herniation. Bulging discs are present at the lower 3 lumbar levels.   There is moderate left-sided neural foraminal narrowing at L3-4. There   is borderline canal stenosis at L4-5 and mild left-sided neural   foraminal narrowing at L4-5. The distal spinal cord appears   unremarkable.   Signed by Dr Bahman Yusuf  Narrative    EXAMINATION: MRI LUMBAR SPINE WO CONTRAST 10/14/2021 2:14 PM   HISTORY: Low back pain.   Report: Multiplanar multisequence MR imaging of the lumbar spine was   performed without contrast.   COMPARISON: X-rays of  the lumbar spine on the same day.   Coronal images demonstrate slight dextroscoliosis centered at the L4-5   level, probably positional. There is no spondylolisthesis. There is   mild disc space narrowing at L3-4, L4-5 and L5-S1. Small endplate   spurs are present at these levels. The conus tip is identified at the   L1 level and appears normal. Sagittal STIR images demonstrate normal   homogeneous marrow signal. The axial images are reviewed level by   level.   L5-S1: Mild broad-based bulging of the discs present and there is a   small superimposed central disc protrusion with no focal HNP. There is   normal patency of the spinal canal and neural foramina. The facet   joints appear unremarkable.   L4-5: Mild facet overgrowth with ligament flavum thickening and   broad-based bulging the disc, asymmetric to the left. There is   borderline canal stenosis, mild left-sided neural foraminal narrowing   is present.   L3-4: Broad-based bulging of the discs present without evidence for   disc herniation. There is a superimposed left lateral recess disc   protrusion, with associated moderate left-sided neural foraminal   narrowing. No significant spinal canal stenosis at this level.   L2-3: Unremarkable.   L1-2: Unremarkable.  Exam End: 10/14/21 14:30 Last Resulted: 10/14/21 14:30   Received From: [facility]          No results found.    ED Course  ED Course as of 05/15/23 1153   Mon May 15, 2023   1146 With STEVE Preciado, present at bedside chaperoning my examination, I politely explained to the patient at length that she does not present with a medical emergency.  She is not a candidate for an emergent MRI.  She denies bowel or bladder dysfunction.  She denies any saddle anesthesia.  She is able to ambulate.  She does not have a fever.  There is no evidence of cauda equina on examination or epidural abscess.  I do believe that she is hurting and I offered a nonnarcotic.  Patient is driving herself.   Therefore, we will give her a shot of steroids.  I told her that I doubt I will provide resolution to her pain and I cannot order the MRI as she is not a candidate through emergency basis.  I do recommend the patient follow-up with pain management as she has been trying to do so.  She is welcome to the ER for any acute issues.  She will be discharged in stable condition. [TK]      ED Course User Index  [TK] Scott Dotson PA          MDM  Number of Diagnoses or Management Options     Amount and/or Complexity of Data Reviewed  Tests in the radiology section of CPT®: reviewed        Final diagnoses:   Chronic bilateral low back pain with right-sided sciatica          Scott Dotson PA  05/15/23 1147       Scott Dotson PA  05/15/23 1153

## 2023-05-16 RX ORDER — MONTELUKAST SODIUM 10 MG/1
10 TABLET ORAL NIGHTLY
Qty: 90 TABLET | Refills: 1 | Status: SHIPPED | OUTPATIENT
Start: 2023-05-16

## 2023-06-05 ENCOUNTER — HOSPITAL ENCOUNTER (OUTPATIENT)
Dept: WOMENS IMAGING | Age: 63
Discharge: HOME | End: 2023-06-05
Payer: MEDICARE

## 2023-06-05 DIAGNOSIS — Z12.31 ENCOUNTER FOR SCREENING MAMMOGRAM FOR MALIGNANT NEOPLASM OF BREAST: ICD-10-CM

## 2023-06-05 PROCEDURE — 77067 SCR MAMMO BI INCL CAD: CPT | Performed by: RADIOLOGY

## 2023-06-05 PROCEDURE — 77063 BREAST TOMOSYNTHESIS BI: CPT

## 2023-08-02 RX ORDER — PANTOPRAZOLE SODIUM 40 MG/1
40 TABLET, DELAYED RELEASE ORAL
Qty: 90 TABLET | Refills: 0 | Status: SHIPPED | OUTPATIENT
Start: 2023-08-02

## 2023-09-01 ENCOUNTER — OFFICE VISIT (OUTPATIENT)
Dept: PRIMARY CARE CLINIC | Age: 63
End: 2023-09-01
Payer: MEDICAID

## 2023-09-01 VITALS
TEMPERATURE: 97.5 F | HEIGHT: 63 IN | WEIGHT: 180 LBS | HEART RATE: 77 BPM | OXYGEN SATURATION: 98 % | BODY MASS INDEX: 31.89 KG/M2 | SYSTOLIC BLOOD PRESSURE: 128 MMHG | DIASTOLIC BLOOD PRESSURE: 84 MMHG

## 2023-09-01 DIAGNOSIS — F32.A ANXIETY AND DEPRESSION: ICD-10-CM

## 2023-09-01 DIAGNOSIS — E03.9 ACQUIRED HYPOTHYROIDISM: Primary | ICD-10-CM

## 2023-09-01 DIAGNOSIS — F41.9 ANXIETY AND DEPRESSION: ICD-10-CM

## 2023-09-01 DIAGNOSIS — J30.2 SEASONAL ALLERGIES: ICD-10-CM

## 2023-09-01 DIAGNOSIS — Z13.220 SCREENING FOR HYPERLIPIDEMIA: ICD-10-CM

## 2023-09-01 DIAGNOSIS — E03.9 HYPOTHYROIDISM, UNSPECIFIED TYPE: ICD-10-CM

## 2023-09-01 DIAGNOSIS — G44.209 TENSION-TYPE HEADACHE, NOT INTRACTABLE, UNSPECIFIED CHRONICITY PATTERN: ICD-10-CM

## 2023-09-01 DIAGNOSIS — K21.9 GASTROESOPHAGEAL REFLUX DISEASE WITHOUT ESOPHAGITIS: ICD-10-CM

## 2023-09-01 DIAGNOSIS — E03.9 HYPOTHYROIDISM, UNSPECIFIED TYPE: Primary | ICD-10-CM

## 2023-09-01 LAB
T4 FREE SERPL-MCNC: 1.34 NG/DL (ref 0.93–1.7)
TSH SERPL DL<=0.005 MIU/L-ACNC: 0.86 UIU/ML (ref 0.27–4.2)

## 2023-09-01 PROCEDURE — 99214 OFFICE O/P EST MOD 30 MIN: CPT | Performed by: NURSE PRACTITIONER

## 2023-09-01 NOTE — PROGRESS NOTES
[address], [facility]     Phone:  [phone]  Fax:  [phone]      Kobe Partida is a 65 y.o. female who presents today for her medical conditions/complaints as noted below. Salud Funez is c/o of Follow-up (Patient present for follow up. Pt would like to discuss bilateral feet pain. Pt stated her feet are constantly sore. Pt denied swelling,numbness and tingling. Pt also denied itching.  //)      Chief Complaint   Patient presents with    Follow-up     Patient present for follow up. Pt would like to discuss bilateral feet pain. Pt stated her feet are constantly sore. Pt denied swelling,numbness and tingling. Pt also denied itching. HPI:     HPI    Patient for a 6 month follow up for hypothyroidism, seasonal allergies, GERD, anxiety and depression. Reports both feet are hurting. Has calouses on left foot and used salycylic acid with relief. Has been soaking her feet. Has been having intermittent  headaches. Denies any vision changes. Uses tea tree oil wipes daily for blepharitis with relief.      Past Medical History:   Diagnosis Date    Allergic bronchitis     has wheezing related to dust and pollen    Allergic rhinitis     Asthma     Blepharitis of both eyes     Bone spur     cervical    Cervical disc disease     Chicken pox     age 26    Chronic anxiety 2006    has been on valium after failing effexor and other medications    Coccyx disorder     history of f/x    Constipation     chronic since a teen    Degenerative disc disease at L5-S1 level     Genital warts     Dr Allie Whiteside    GERD (gastroesophageal reflux disease)     Hemorrhoids     HPV in female     Hyperthyroidism     took radioactive iodine    Post menopausal syndrome 2010    Reactive airway disease with wheezing     Scoliosis     Unspecified vitamin D deficiency         Past Surgical History:   Procedure Laterality Date    CERVICAL DISC ARTHROPLASTY      CERVICAL DISCECTOMY      COLONOSCOPY

## 2023-09-07 PROBLEM — J30.2 SEASONAL ALLERGIES: Status: ACTIVE | Noted: 2023-09-07

## 2023-09-07 ASSESSMENT — ENCOUNTER SYMPTOMS
COLOR CHANGE: 0
VOMITING: 0
ABDOMINAL PAIN: 0
DIARRHEA: 0
SHORTNESS OF BREATH: 0
NAUSEA: 0
SORE THROAT: 0
CHEST TIGHTNESS: 0
COUGH: 0

## 2023-11-03 DIAGNOSIS — E03.9 HYPOTHYROIDISM, UNSPECIFIED TYPE: ICD-10-CM

## 2023-11-06 NOTE — TELEPHONE ENCOUNTER
Salud called requesting a refill of the below medication which has been pended for you:     Requested Prescriptions     Pending Prescriptions Disp Refills    montelukast (SINGULAIR) 10 MG tablet [Pharmacy Med Name: MONTELUKAST SOD 10 MG TABLET] 90 tablet 1     Sig: TAKE 1 TABLET BY MOUTH NIGHTLY    SYNTHROID 100 MCG tablet [Pharmacy Med Name: SYNTHROID 100 MCG TABLET] 90 tablet 1     Sig: TAKE 1 TABLET BY MOUTH EVERY DAY       Last Appointment Date: 9/1/2023  Next Appointment Date: 12/11/2023    Allergies   Allergen Reactions    Oxycodone-Acetaminophen Hives    Pollen Extract      Early Spring and early Fall    Effexor [Venlafaxine Hydrochloride] Other (See Comments)     Slurred speech, feelings of having a TIA, seeing double, unaware of her surroundings    Poison Ivy Extract [Poison Ivy Extract] Itching    Adhesive Tape Rash     Leaves burn layla     Nickel Itching

## 2023-11-07 RX ORDER — MONTELUKAST SODIUM 10 MG/1
10 TABLET ORAL NIGHTLY
Qty: 90 TABLET | Refills: 1 | Status: SHIPPED | OUTPATIENT
Start: 2023-11-07

## 2023-11-07 RX ORDER — LEVOTHYROXINE SODIUM 100 MCG
TABLET ORAL
Qty: 90 TABLET | Refills: 1 | Status: SHIPPED | OUTPATIENT
Start: 2023-11-07

## 2023-12-01 ENCOUNTER — TELEPHONE (OUTPATIENT)
Dept: PRIMARY CARE CLINIC | Age: 63
End: 2023-12-01

## 2023-12-01 NOTE — TELEPHONE ENCOUNTER
Please let patient know she can take miralax twice daily x 3-4 days to help clean her out. She can then try colace or dulcolax as needed and take when she takes her opioids. She needs to be drinking 64 ounces of water per day and eating a high fiber diet.

## 2023-12-01 NOTE — TELEPHONE ENCOUNTER
Patient calls having constipation due to opiod  use. She states she is taking Miralax and it is not helping her. She is asking for prescription medication for her bowels. She uses [facility] on [address]. Please advise.

## 2023-12-11 ENCOUNTER — OFFICE VISIT (OUTPATIENT)
Dept: PRIMARY CARE CLINIC | Age: 63
End: 2023-12-11
Payer: MEDICARE

## 2023-12-11 VITALS
HEART RATE: 87 BPM | BODY MASS INDEX: 32.43 KG/M2 | OXYGEN SATURATION: 97 % | HEIGHT: 63 IN | DIASTOLIC BLOOD PRESSURE: 70 MMHG | WEIGHT: 183 LBS | SYSTOLIC BLOOD PRESSURE: 114 MMHG | TEMPERATURE: 97.3 F

## 2023-12-11 DIAGNOSIS — J06.9 VIRAL UPPER RESPIRATORY INFECTION: ICD-10-CM

## 2023-12-11 DIAGNOSIS — Z00.00 MEDICARE ANNUAL WELLNESS VISIT, SUBSEQUENT: Primary | ICD-10-CM

## 2023-12-11 PROCEDURE — 99213 OFFICE O/P EST LOW 20 MIN: CPT | Performed by: NURSE PRACTITIONER

## 2023-12-11 PROCEDURE — G0439 PPPS, SUBSEQ VISIT: HCPCS | Performed by: NURSE PRACTITIONER

## 2023-12-11 RX ORDER — PREDNISONE 10 MG/1
10 TABLET ORAL 2 TIMES DAILY
Qty: 10 TABLET | Refills: 0 | Status: SHIPPED | OUTPATIENT
Start: 2023-12-11 | End: 2023-12-16

## 2023-12-11 SDOH — HEALTH STABILITY: PHYSICAL HEALTH: ON AVERAGE, HOW MANY MINUTES DO YOU ENGAGE IN EXERCISE AT THIS LEVEL?: 10 MIN

## 2023-12-11 SDOH — HEALTH STABILITY: PHYSICAL HEALTH: ON AVERAGE, HOW MANY DAYS PER WEEK DO YOU ENGAGE IN MODERATE TO STRENUOUS EXERCISE (LIKE A BRISK WALK)?: 2 DAYS

## 2023-12-11 ASSESSMENT — LIFESTYLE VARIABLES
HOW MANY STANDARD DRINKS CONTAINING ALCOHOL DO YOU HAVE ON A TYPICAL DAY: PATIENT DOES NOT DRINK
HOW MANY STANDARD DRINKS CONTAINING ALCOHOL DO YOU HAVE ON A TYPICAL DAY: 0
HOW OFTEN DO YOU HAVE A DRINK CONTAINING ALCOHOL: NEVER
HOW OFTEN DO YOU HAVE SIX OR MORE DRINKS ON ONE OCCASION: 1
HOW OFTEN DO YOU HAVE A DRINK CONTAINING ALCOHOL: 1

## 2023-12-11 ASSESSMENT — COLUMBIA-SUICIDE SEVERITY RATING SCALE - C-SSRS
4. HAVE YOU HAD THESE THOUGHTS AND HAD SOME INTENTION OF ACTING ON THEM?: NO
7. DID THIS OCCUR IN THE LAST THREE MONTHS: NO
3. HAVE YOU BEEN THINKING ABOUT HOW YOU MIGHT KILL YOURSELF?: NO
5. HAVE YOU STARTED TO WORK OUT OR WORKED OUT THE DETAILS OF HOW TO KILL YOURSELF? DO YOU INTEND TO CARRY OUT THIS PLAN?: NO

## 2023-12-11 ASSESSMENT — PATIENT HEALTH QUESTIONNAIRE - PHQ9
9. THOUGHTS THAT YOU WOULD BE BETTER OFF DEAD, OR OF HURTING YOURSELF: 0
7. TROUBLE CONCENTRATING ON THINGS, SUCH AS READING THE NEWSPAPER OR WATCHING TELEVISION: 0
4. FEELING TIRED OR HAVING LITTLE ENERGY: 0
SUM OF ALL RESPONSES TO PHQ QUESTIONS 1-9: 0
3. TROUBLE FALLING OR STAYING ASLEEP: 0
1. LITTLE INTEREST OR PLEASURE IN DOING THINGS: 0
8. MOVING OR SPEAKING SO SLOWLY THAT OTHER PEOPLE COULD HAVE NOTICED. OR THE OPPOSITE, BEING SO FIGETY OR RESTLESS THAT YOU HAVE BEEN MOVING AROUND A LOT MORE THAN USUAL: 0
SUM OF ALL RESPONSES TO PHQ9 QUESTIONS 1 & 2: 0
2. FEELING DOWN, DEPRESSED OR HOPELESS: 0
5. POOR APPETITE OR OVEREATING: 0
SUM OF ALL RESPONSES TO PHQ QUESTIONS 1-9: 0
10. IF YOU CHECKED OFF ANY PROBLEMS, HOW DIFFICULT HAVE THESE PROBLEMS MADE IT FOR YOU TO DO YOUR WORK, TAKE CARE OF THINGS AT HOME, OR GET ALONG WITH OTHER PEOPLE: 0
SUM OF ALL RESPONSES TO PHQ QUESTIONS 1-9: 0
6. FEELING BAD ABOUT YOURSELF - OR THAT YOU ARE A FAILURE OR HAVE LET YOURSELF OR YOUR FAMILY DOWN: 0
SUM OF ALL RESPONSES TO PHQ QUESTIONS 1-9: 0

## 2023-12-11 NOTE — PATIENT INSTRUCTIONS
Continue miralax       Preventing Falls: Care Instructions  Injuries and health problems such as trouble walking or poor eyesight can increase your risk of falling. So can some medicines. But there are things you can do to help prevent falls. You can exercise to get stronger. You can also arrange your home to make it safer. Talk to your doctor about the medicines you take. Ask if any of them increase the risk of falls and whether they can be changed or stopped. Try to exercise regularly. It can help improve your strength and balance. This can help lower your risk of falling. Practice fall safety and prevention. Wear low-heeled shoes that fit well and give your feet good support. Talk to your doctor if you have foot problems that make this hard. Carry a cellphone or wear a medical alert device that you can use to call for help. Use stepladders instead of chairs to reach high objects. Don't climb if you're at risk for falls. Ask for help, if needed. Wear the correct eyeglasses, if you need them. Make your home safer. Remove rugs, cords, clutter, and furniture from walkways. Keep your house well lit. Use night-lights in hallways and bathrooms. Install and use sturdy handrails on stairways. Wear nonskid footwear, even inside. Don't walk barefoot or in socks without shoes. Be safe outside. Use handrails, curb cuts, and ramps whenever possible. Keep your hands free by using a shoulder bag or backpack. Try to walk in well-lit areas. Watch out for uneven ground, changes in pavement, and debris. Be careful in the winter. Walk on the grass or gravel when sidewalks are slippery. Use de-icer on steps and walkways. Add non-slip devices to shoes. Put grab bars and nonskid mats in your shower or tub and near the toilet. Try to use a shower chair or bath bench when bathing.    Get into a tub or shower by putting in your weaker leg first. Get out with your strong side first. Have a phone or medical

## 2023-12-11 NOTE — PROGRESS NOTES
Medicare Annual Wellness Visit    Julian Ballard is here for Medicare AWV    Assessment & Plan   Medicare annual wellness visit, subsequent  Viral upper respiratory infection  -     predniSONE (DELTASONE) 10 MG tablet; Take 1 tablet by mouth 2 times daily for 5 days, Disp-10 tablet, R-0Normal  Recommendations for Preventive Services Due: see orders and patient instructions/AVS.  Recommended screening schedule for the next 5-10 years is provided to the patient in written form: see Patient Instructions/AVS.     Return in 3 months (on 3/11/2024). Subjective   The following acute and/or chronic problems were also addressed today:  Has a cough and congestion with wheezing at night. Reports constipation     Patient's complete Health Risk Assessment and screening values have been reviewed and are found in Flowsheets. The following problems were reviewed today and where indicated follow up appointments were made and/or referrals ordered. Positive Risk Factor Screenings with Interventions:    Fall Risk:  Do you feel unsteady or are you worried about falling? : no  2 or more falls in past year?: (!) yes  Fall with injury in past year?: (!) yes     Interventions:    Patient comments: has kept walkways clear, fell recently but did not use handrail, left ankle is sore. Controlled Medication Review: Today's Pain Level: No data recorded     Opioid Risk: (High risk score ?55) Opioid risk score: 58      Last PDMP Jonny as Reviewed:  Review User Review Instant Review Result     @        Last Controlled Substance Monitoring Documentation      [facility] Office Visit from 12/9/2022 in [facility]   Periodic Controlled Substance Monitoring No signs of potential drug abuse or diversion identified. filed at 12/09/2022 0440               General HRA Questions:  Select all that apply: (!) New or Increased Pain    Pain Interventions:  Patient comments: left ankle is sore but is getting better.         Weight and

## 2024-01-03 DIAGNOSIS — F41.9 ANXIETY AND DEPRESSION: ICD-10-CM

## 2024-01-03 DIAGNOSIS — F32.A ANXIETY AND DEPRESSION: ICD-10-CM

## 2024-01-03 RX ORDER — FLUOXETINE 10 MG/1
CAPSULE ORAL
Qty: 90 CAPSULE | Refills: 1 | Status: SHIPPED | OUTPATIENT
Start: 2024-01-03

## 2024-01-19 RX ORDER — PANTOPRAZOLE SODIUM 40 MG/1
40 TABLET, DELAYED RELEASE ORAL
Qty: 90 TABLET | Refills: 0 | Status: SHIPPED | OUTPATIENT
Start: 2024-01-19

## 2024-02-07 RX ORDER — LEVOCETIRIZINE DIHYDROCHLORIDE 5 MG/1
TABLET, FILM COATED ORAL
Qty: 90 TABLET | Refills: 0 | Status: SHIPPED | OUTPATIENT
Start: 2024-02-07

## 2024-02-07 NOTE — TELEPHONE ENCOUNTER
Requested Prescriptions     Pending Prescriptions Disp Refills    levocetirizine (XYZAL) 5 MG tablet [Pharmacy Med Name: LEVOCETIRIZINE 5 MG TABLET] 90 tablet 0     Sig: TAKE 1 TABLET BY MOUTH EVERY DAY       Last Appointment Date: 12/11/2023  Next Appointment Date: 3/1/2024    Allergies   Allergen Reactions    Oxycodone-Acetaminophen Hives    Pollen Extract      Early Spring and early Fall    Effexor [Venlafaxine Hydrochloride] Other (See Comments)     Slurred speech, feelings of having a TIA, seeing double, unaware of her surroundings    Poison Ivy Extract [Poison Ivy Extract] Itching    Adhesive Tape Rash     Leaves burn layla     Nickel Itching

## 2024-03-03 DIAGNOSIS — E03.9 HYPOTHYROIDISM, UNSPECIFIED TYPE: ICD-10-CM

## 2024-03-04 RX ORDER — LEVOTHYROXINE SODIUM 100 MCG
TABLET ORAL
Qty: 90 TABLET | Refills: 1 | Status: SHIPPED | OUTPATIENT
Start: 2024-03-04

## 2024-03-04 RX ORDER — MONTELUKAST SODIUM 10 MG/1
10 TABLET ORAL NIGHTLY
Qty: 90 TABLET | Refills: 1 | Status: SHIPPED | OUTPATIENT
Start: 2024-03-04

## 2024-03-11 ENCOUNTER — OFFICE VISIT (OUTPATIENT)
Dept: PRIMARY CARE CLINIC | Age: 64
End: 2024-03-11
Payer: MEDICARE

## 2024-03-11 VITALS
SYSTOLIC BLOOD PRESSURE: 120 MMHG | BODY MASS INDEX: 31.82 KG/M2 | HEIGHT: 63 IN | OXYGEN SATURATION: 98 % | HEART RATE: 72 BPM | WEIGHT: 179.6 LBS | DIASTOLIC BLOOD PRESSURE: 76 MMHG | TEMPERATURE: 98 F

## 2024-03-11 DIAGNOSIS — R51.9 NONINTRACTABLE HEADACHE, UNSPECIFIED CHRONICITY PATTERN, UNSPECIFIED HEADACHE TYPE: ICD-10-CM

## 2024-03-11 DIAGNOSIS — M25.541 ARTHRALGIA OF BOTH HANDS: ICD-10-CM

## 2024-03-11 DIAGNOSIS — M25.542 ARTHRALGIA OF BOTH HANDS: ICD-10-CM

## 2024-03-11 DIAGNOSIS — K21.9 GASTROESOPHAGEAL REFLUX DISEASE WITHOUT ESOPHAGITIS: ICD-10-CM

## 2024-03-11 DIAGNOSIS — M54.2 ARTHRALGIA OF NECK: ICD-10-CM

## 2024-03-11 DIAGNOSIS — E03.9 ACQUIRED HYPOTHYROIDISM: Primary | ICD-10-CM

## 2024-03-11 DIAGNOSIS — M62.838 MUSCLE SPASM: ICD-10-CM

## 2024-03-11 PROCEDURE — 99214 OFFICE O/P EST MOD 30 MIN: CPT | Performed by: NURSE PRACTITIONER

## 2024-03-11 RX ORDER — NAPROXEN 500 MG/1
500 TABLET ORAL EVERY 12 HOURS
Qty: 60 TABLET | Refills: 1 | Status: CANCELLED | OUTPATIENT
Start: 2024-03-11

## 2024-03-11 RX ORDER — MELOXICAM 15 MG/1
15 TABLET ORAL DAILY
Qty: 30 TABLET | Refills: 2 | Status: SHIPPED | OUTPATIENT
Start: 2024-03-11

## 2024-03-11 RX ORDER — PANTOPRAZOLE SODIUM 40 MG/1
40 TABLET, DELAYED RELEASE ORAL
Qty: 90 TABLET | Refills: 0 | Status: SHIPPED | OUTPATIENT
Start: 2024-03-11

## 2024-03-11 SDOH — ECONOMIC STABILITY: FOOD INSECURITY: WITHIN THE PAST 12 MONTHS, YOU WORRIED THAT YOUR FOOD WOULD RUN OUT BEFORE YOU GOT MONEY TO BUY MORE.: NEVER TRUE

## 2024-03-11 SDOH — ECONOMIC STABILITY: INCOME INSECURITY: HOW HARD IS IT FOR YOU TO PAY FOR THE VERY BASICS LIKE FOOD, HOUSING, MEDICAL CARE, AND HEATING?: SOMEWHAT HARD

## 2024-03-11 SDOH — ECONOMIC STABILITY: FOOD INSECURITY: WITHIN THE PAST 12 MONTHS, THE FOOD YOU BOUGHT JUST DIDN'T LAST AND YOU DIDN'T HAVE MONEY TO GET MORE.: NEVER TRUE

## 2024-03-11 ASSESSMENT — PATIENT HEALTH QUESTIONNAIRE - PHQ9
10. IF YOU CHECKED OFF ANY PROBLEMS, HOW DIFFICULT HAVE THESE PROBLEMS MADE IT FOR YOU TO DO YOUR WORK, TAKE CARE OF THINGS AT HOME, OR GET ALONG WITH OTHER PEOPLE: 0
1. LITTLE INTEREST OR PLEASURE IN DOING THINGS: 0
SUM OF ALL RESPONSES TO PHQ QUESTIONS 1-9: 0
4. FEELING TIRED OR HAVING LITTLE ENERGY: 0
5. POOR APPETITE OR OVEREATING: 0
6. FEELING BAD ABOUT YOURSELF - OR THAT YOU ARE A FAILURE OR HAVE LET YOURSELF OR YOUR FAMILY DOWN: 0
SUM OF ALL RESPONSES TO PHQ QUESTIONS 1-9: 0
SUM OF ALL RESPONSES TO PHQ QUESTIONS 1-9: 0
3. TROUBLE FALLING OR STAYING ASLEEP: 0
8. MOVING OR SPEAKING SO SLOWLY THAT OTHER PEOPLE COULD HAVE NOTICED. OR THE OPPOSITE, BEING SO FIGETY OR RESTLESS THAT YOU HAVE BEEN MOVING AROUND A LOT MORE THAN USUAL: 0
7. TROUBLE CONCENTRATING ON THINGS, SUCH AS READING THE NEWSPAPER OR WATCHING TELEVISION: 0
SUM OF ALL RESPONSES TO PHQ9 QUESTIONS 1 & 2: 0
SUM OF ALL RESPONSES TO PHQ QUESTIONS 1-9: 0
9. THOUGHTS THAT YOU WOULD BE BETTER OFF DEAD, OR OF HURTING YOURSELF: 0
2. FEELING DOWN, DEPRESSED OR HOPELESS: 0

## 2024-03-11 ASSESSMENT — ENCOUNTER SYMPTOMS
COLOR CHANGE: 0
COUGH: 0
DIARRHEA: 0
ABDOMINAL PAIN: 0
NAUSEA: 0
VOMITING: 0
SHORTNESS OF BREATH: 0
SORE THROAT: 0
CHEST TIGHTNESS: 0

## 2024-03-11 NOTE — PROGRESS NOTES
[address] [place] [address] Drive   [address] [facility], [address]     Phone:  [phone]  Fax:  [phone]      Salud Funez is a 63 y.o. female who presents today for her medical conditions/complaints as noted below.  Salud Funez is c/o of 3 Month Follow-Up, Hypothyroidism, and Headache      Chief Complaint   Patient presents with    3 Month Follow-Up    Hypothyroidism    Headache       HPI:     HPI     Patient presents for 3 month follow up for hypothyroidism, headaches, GERD, and arthralgias of both hands and neck. Is having headaches twice weekly. No nausea but does have light sensitivity. Has arthritis in both hands are sore and swollen.  Denies any concerns for hypothyroidism.  GERD is well-controlled with pantoprazole    Past Medical History:   Diagnosis Date    Allergic bronchitis     has wheezing related to dust and pollen    Allergic rhinitis     Asthma     Blepharitis of both eyes     Bone spur     cervical    Cervical disc disease     Chicken pox     age 19    Chronic anxiety 2006    has been on valium after failing effexor and other medications    Coccyx disorder     history of f/x    Constipation     chronic since a teen    Degenerative disc disease at L5-S1 level     Genital warts     Dr Elaine    GERD (gastroesophageal reflux disease)     Hemorrhoids     HPV in female     Hyperthyroidism     took radioactive iodine    Post menopausal syndrome 2010    Reactive airway disease with wheezing     Scoliosis     Unspecified vitamin D deficiency         Past Surgical History:   Procedure Laterality Date    CERVICAL DISC ARTHROPLASTY      CERVICAL DISCECTOMY      COLONOSCOPY  09/16/2011    [facility]?    COLONOSCOPY N/A 11/11/2016    Dr Joshi-Tubular AP (-) dysplasia x 1, BCM x 1--3 yr recall    COLONOSCOPY N/A 11/11/2019    Dr Brian-Diverticulosis, large grade 3 internal and external hemorrhoids, suboptimal prep, 3 yr recall    COLONOSCOPY N/A 12/16/2022    Dr Brian,  lg int hem [facility]

## 2024-03-12 ENCOUNTER — OFFICE VISIT (OUTPATIENT)
Dept: OBGYN CLINIC | Age: 64
End: 2024-03-12
Payer: MEDICARE

## 2024-03-12 VITALS
DIASTOLIC BLOOD PRESSURE: 90 MMHG | HEART RATE: 60 BPM | SYSTOLIC BLOOD PRESSURE: 156 MMHG | WEIGHT: 176 LBS | BODY MASS INDEX: 31.18 KG/M2

## 2024-03-12 DIAGNOSIS — Z12.4 SCREENING FOR CERVICAL CANCER: ICD-10-CM

## 2024-03-12 DIAGNOSIS — B37.2 YEAST INFECTION OF THE SKIN: ICD-10-CM

## 2024-03-12 DIAGNOSIS — L29.2 VULVAR ITCHING: ICD-10-CM

## 2024-03-12 DIAGNOSIS — Z12.31 ENCOUNTER FOR SCREENING MAMMOGRAM FOR MALIGNANT NEOPLASM OF BREAST: ICD-10-CM

## 2024-03-12 DIAGNOSIS — Z01.419 ENCOUNTER FOR GYNECOLOGICAL EXAMINATION WITHOUT ABNORMAL FINDING: Primary | ICD-10-CM

## 2024-03-12 DIAGNOSIS — N95.1 SYMPTOMATIC MENOPAUSAL OR FEMALE CLIMACTERIC STATES: ICD-10-CM

## 2024-03-12 DIAGNOSIS — Z12.39 SCREENING BREAST EXAMINATION: ICD-10-CM

## 2024-03-12 PROCEDURE — 99396 PREV VISIT EST AGE 40-64: CPT | Performed by: NURSE PRACTITIONER

## 2024-03-12 ASSESSMENT — ENCOUNTER SYMPTOMS
ALLERGIC/IMMUNOLOGIC NEGATIVE: 1
RESPIRATORY NEGATIVE: 1
GASTROINTESTINAL NEGATIVE: 1
DIARRHEA: 0
EYES NEGATIVE: 1
CONSTIPATION: 0

## 2024-03-12 NOTE — PROGRESS NOTES
Pt presents today for pap smear and breast exam. She thinks she has been very dry in vaginal area and rectum and is itching has used chafing cream and dial soap and thinks it is alpha-gal.     Mammo:2023  Pap smear:  Contraception:Postmenopausal     P:  Ab:  Bone density:  Colonoscopy:  
motion.      Cervical back: Normal range of motion and neck supple.   Skin:     General: Skin is warm and dry.   Neurological:      Mental Status: She is alert and oriented to person, place, and time.   Psychiatric:         Attention and Perception: Attention normal.         Mood and Affect: Mood normal.         Speech: Speech normal.         Behavior: Behavior normal.         Thought Content: Thought content normal.         Cognition and Memory: Cognition normal.         Judgment: Judgment normal.              Diagnosis Orders   1. Encounter for gynecological examination without abnormal finding        2. Screening for cervical cancer  PAP SMEAR    Human papillomavirus (HPV) DNA probe thin prep high risk      3. Encounter for screening mammogram for malignant neoplasm of breast  JAME DIGITAL SCREEN W OR WO CAD BILATERAL      4. Screening breast examination        5. Symptomatic menopausal or female climacteric states        6. Yeast infection of the skin        7. Vulvar itching            MEDICATIONS:  Orders Placed This Encounter   Medications    nystatin-triamcinolone (MYCOLOG II) 491655-2.1 UNIT/GM-% cream     Sig: Apply topically 2 times daily.     Dispense:  1 each     Refill:  1       ORDERS:  Orders Placed This Encounter   Procedures    JAME DIGITAL SCREEN W OR WO CAD BILATERAL    PAP SMEAR    Human papillomavirus (HPV) DNA probe thin prep high risk       PLAN:  Pap collected  Ordered screening mammogram  Discussed exam findings and will start Mycolog and mix with thick emollient cream- f/u if no relief    There are no Patient Instructions on file for this visit.

## 2024-03-14 LAB
HPV HR 12 DNA SPEC QL NAA+PROBE: NOT DETECTED
HPV16 DNA SPEC QL NAA+PROBE: NOT DETECTED
HPV16+18+H RISK 12 DNA SPEC-IMP: NORMAL
HPV18 DNA SPEC QL NAA+PROBE: NOT DETECTED

## 2024-03-22 DIAGNOSIS — K21.9 GASTROESOPHAGEAL REFLUX DISEASE WITHOUT ESOPHAGITIS: ICD-10-CM

## 2024-03-22 DIAGNOSIS — Z13.220 SCREENING FOR HYPERLIPIDEMIA: ICD-10-CM

## 2024-03-22 LAB
ALBUMIN SERPL-MCNC: 4.5 G/DL (ref 3.5–5.2)
ALP SERPL-CCNC: 96 U/L (ref 35–104)
ALT SERPL-CCNC: 24 U/L (ref 5–33)
ANION GAP SERPL CALCULATED.3IONS-SCNC: 9 MMOL/L (ref 7–19)
AST SERPL-CCNC: 23 U/L (ref 5–32)
BASOPHILS # BLD: 0 K/UL (ref 0–0.2)
BASOPHILS NFR BLD: 0.2 % (ref 0–1)
BILIRUB SERPL-MCNC: 0.6 MG/DL (ref 0.2–1.2)
BUN SERPL-MCNC: 11 MG/DL (ref 8–23)
CALCIUM SERPL-MCNC: 9.5 MG/DL (ref 8.8–10.2)
CHLORIDE SERPL-SCNC: 103 MMOL/L (ref 98–111)
CHOLEST SERPL-MCNC: 185 MG/DL (ref 160–199)
CO2 SERPL-SCNC: 27 MMOL/L (ref 22–29)
CREAT SERPL-MCNC: 0.7 MG/DL (ref 0.5–0.9)
EOSINOPHIL # BLD: 0.1 K/UL (ref 0–0.6)
EOSINOPHIL NFR BLD: 1 % (ref 0–5)
ERYTHROCYTE [DISTWIDTH] IN BLOOD BY AUTOMATED COUNT: 13 % (ref 11.5–14.5)
GLUCOSE SERPL-MCNC: 112 MG/DL (ref 74–109)
HCT VFR BLD AUTO: 36.5 % (ref 37–47)
HDLC SERPL-MCNC: 55 MG/DL (ref 65–121)
HGB BLD-MCNC: 11.9 G/DL (ref 12–16)
IMM GRANULOCYTES # BLD: 0 K/UL
LDLC SERPL CALC-MCNC: 110 MG/DL
LYMPHOCYTES # BLD: 1.7 K/UL (ref 1.1–4.5)
LYMPHOCYTES NFR BLD: 20.2 % (ref 20–40)
MCH RBC QN AUTO: 29 PG (ref 27–31)
MCHC RBC AUTO-ENTMCNC: 32.6 G/DL (ref 33–37)
MCV RBC AUTO: 89 FL (ref 81–99)
MONOCYTES # BLD: 0.6 K/UL (ref 0–0.9)
MONOCYTES NFR BLD: 7.7 % (ref 0–10)
NEUTROPHILS # BLD: 5.8 K/UL (ref 1.5–7.5)
NEUTS SEG NFR BLD: 70.7 % (ref 50–65)
PLATELET # BLD AUTO: 218 K/UL (ref 130–400)
PMV BLD AUTO: 10.8 FL (ref 9.4–12.3)
POTASSIUM SERPL-SCNC: 4.5 MMOL/L (ref 3.5–5)
PROT SERPL-MCNC: 7.4 G/DL (ref 6.6–8.7)
RBC # BLD AUTO: 4.1 M/UL (ref 4.2–5.4)
SODIUM SERPL-SCNC: 139 MMOL/L (ref 136–145)
TRIGL SERPL-MCNC: 102 MG/DL (ref 0–149)
WBC # BLD AUTO: 8.3 K/UL (ref 4.8–10.8)

## 2024-03-25 ENCOUNTER — OFFICE VISIT (OUTPATIENT)
Age: 64
End: 2024-03-25
Payer: MEDICARE

## 2024-03-25 VITALS
SYSTOLIC BLOOD PRESSURE: 122 MMHG | TEMPERATURE: 97.8 F | WEIGHT: 180 LBS | HEART RATE: 83 BPM | DIASTOLIC BLOOD PRESSURE: 76 MMHG | RESPIRATION RATE: 20 BRPM | HEIGHT: 65 IN | BODY MASS INDEX: 29.99 KG/M2 | OXYGEN SATURATION: 97 %

## 2024-03-25 DIAGNOSIS — J01.90 ACUTE RHINOSINUSITIS: ICD-10-CM

## 2024-03-25 DIAGNOSIS — J02.9 SORE THROAT: Primary | ICD-10-CM

## 2024-03-25 DIAGNOSIS — R09.81 NASAL CONGESTION: ICD-10-CM

## 2024-03-25 LAB
INFLUENZA A ANTIBODY: NORMAL
INFLUENZA B ANTIBODY: NORMAL
S PYO AG THROAT QL: NORMAL

## 2024-03-25 PROCEDURE — 99213 OFFICE O/P EST LOW 20 MIN: CPT

## 2024-03-25 PROCEDURE — 87804 INFLUENZA ASSAY W/OPTIC: CPT

## 2024-03-25 PROCEDURE — 87880 STREP A ASSAY W/OPTIC: CPT

## 2024-03-25 RX ORDER — AZITHROMYCIN 250 MG/1
TABLET, FILM COATED ORAL
Qty: 6 TABLET | Refills: 0 | Status: SHIPPED | OUTPATIENT
Start: 2024-03-25 | End: 2024-04-04

## 2024-03-25 ASSESSMENT — ENCOUNTER SYMPTOMS
SHORTNESS OF BREATH: 0
RHINORRHEA: 1
ABDOMINAL PAIN: 0
WHEEZING: 0
COUGH: 1
SORE THROAT: 1
VOMITING: 0
NAUSEA: 1
SINUS PRESSURE: 1
DIARRHEA: 0

## 2024-03-25 NOTE — PATIENT INSTRUCTIONS
Antibiotic sent to the pharmacy, take as directed.  Continue OTC oral antihistamine.  Start OTC nasal decongestant as discussed.  Rest.  Tylenol/Motrin as needed.  Increase fluid intake.  Cool mist humidifier while sleeping.  Return to the clinic or follow up with PCP if symptoms worsen or fail to improve.

## 2024-03-25 NOTE — PROGRESS NOTES
[facility]  [facility]  [address] [place] [address] DRIVE  [place] [address]  Dept: [phone]  Dept Fax: [phone]  Loc: [phone]    Salud Funez is a 63 y.o. female who presents today for her medical conditions/complaints as noted below.  Salud Funez is c/o of Headache, Fever (Took tylenol this morning @ 8am), Sore Throat, Nasal Congestion, and Otalgia (Left ear)        HPI:     Salud Funez presents with complaints of headache, nasal congestion, sinus pressure, ear pain and sore throat. Reports fever started early this morning. Symptoms began about 4-5 days ago. OTC treatment includes Allegra D, Claritin, and Sudafed. Denies any recent steroid administration, reports she has taken Augmentin recently.      Past Medical History:   Diagnosis Date    Allergic bronchitis     has wheezing related to dust and pollen    Allergic rhinitis     Asthma     Blepharitis of both eyes     Bone spur     cervical    Cervical disc disease     Chicken pox     age 19    Chronic anxiety 2006    has been on valium after failing effexor and other medications    Coccyx disorder     history of f/x    Constipation     chronic since a teen    Degenerative disc disease at L5-S1 level     Genital warts     Dr Elaine    GERD (gastroesophageal reflux disease)     Hemorrhoids     HPV in female     Hyperthyroidism     took radioactive iodine    Post menopausal syndrome 2010    Reactive airway disease with wheezing     Scoliosis     Unspecified vitamin D deficiency      Past Surgical History:   Procedure Laterality Date    CERVICAL DISC ARTHROPLASTY      CERVICAL DISCECTOMY      COLONOSCOPY  09/16/2011    [facility]?    COLONOSCOPY N/A 11/11/2016    Dr Joshi-Tubular AP (-) dysplasia x 1, BCM x 1--3 yr recall    COLONOSCOPY N/A 11/11/2019    Dr Brian-Diverticulosis, large grade 3 internal and external hemorrhoids, suboptimal prep, 3 yr recall    COLONOSCOPY N/A 12/16/2022

## 2024-04-10 ENCOUNTER — HOSPITAL ENCOUNTER (OUTPATIENT)
Dept: GENERAL RADIOLOGY | Age: 64
Discharge: HOME | End: 2024-04-10
Payer: MEDICARE

## 2024-04-10 ENCOUNTER — HOSPITAL ENCOUNTER (OUTPATIENT)
Dept: MRI IMAGING | Age: 64
Discharge: HOME | End: 2024-04-10
Payer: MEDICARE

## 2024-04-10 DIAGNOSIS — M75.51 BURSITIS OF RIGHT SHOULDER: ICD-10-CM

## 2024-04-10 DIAGNOSIS — M51.16 LUMBAR DISC HERNIATION WITH RADICULOPATHY: ICD-10-CM

## 2024-04-10 DIAGNOSIS — M51.16 LUMBAR DISC DISEASE WITH RADICULOPATHY: ICD-10-CM

## 2024-04-10 PROCEDURE — 73030 X-RAY EXAM OF SHOULDER: CPT

## 2024-04-10 PROCEDURE — 72148 MRI LUMBAR SPINE W/O DYE: CPT

## 2024-04-10 PROCEDURE — 72100 X-RAY EXAM L-S SPINE 2/3 VWS: CPT

## 2024-04-12 ENCOUNTER — HOSPITAL ENCOUNTER (EMERGENCY)
Age: 64
Discharge: HOME | End: 2024-04-12
Attending: EMERGENCY MEDICINE
Payer: MEDICARE

## 2024-04-12 ENCOUNTER — APPOINTMENT (OUTPATIENT)
Dept: GENERAL RADIOLOGY | Age: 64
End: 2024-04-12
Payer: MEDICARE

## 2024-04-12 VITALS
BODY MASS INDEX: 31.89 KG/M2 | TEMPERATURE: 97.9 F | DIASTOLIC BLOOD PRESSURE: 62 MMHG | OXYGEN SATURATION: 97 % | RESPIRATION RATE: 17 BRPM | HEART RATE: 67 BPM | WEIGHT: 180 LBS | HEIGHT: 63 IN | SYSTOLIC BLOOD PRESSURE: 125 MMHG

## 2024-04-12 DIAGNOSIS — S59.902A ELBOW INJURY, LEFT, INITIAL ENCOUNTER: ICD-10-CM
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
## 2024-04-12 PROCEDURE — 73080 X-RAY EXAM OF ELBOW: CPT

## 2024-04-12 PROCEDURE — 99283 EMERGENCY DEPT VISIT LOW MDM: CPT

## 2024-04-12 RX ORDER — CYCLOBENZAPRINE HCL 10 MG
10 TABLET ORAL 3 TIMES DAILY PRN
Qty: 30 TABLET | Refills: 0 | Status: SHIPPED | OUTPATIENT
Start: 2024-04-12 | End: 2024-04-22

## 2024-04-12 ASSESSMENT — PAIN SCALES - GENERAL: PAINLEVEL_OUTOF10: 10

## 2024-04-12 ASSESSMENT — PAIN - FUNCTIONAL ASSESSMENT: PAIN_FUNCTIONAL_ASSESSMENT: 0-10

## 2024-04-12 ASSESSMENT — PAIN DESCRIPTION - ORIENTATION: ORIENTATION: LEFT

## 2024-04-12 ASSESSMENT — ENCOUNTER SYMPTOMS
RESPIRATORY NEGATIVE: 1
EYES NEGATIVE: 1
GASTROINTESTINAL NEGATIVE: 1

## 2024-04-12 ASSESSMENT — PAIN DESCRIPTION - LOCATION: LOCATION: ARM;ELBOW

## 2024-04-12 NOTE — DISCHARGE INSTR - COC
Continuity of Care Form    Patient Name: Salud Funez   :  1960  MRN:  [id number]    Admit date:  2024  Discharge date:  ***    Code Status Order: Prior   Advance Directives:     Admitting Physician:  No admitting provider for patient encounter.  PCP: Leila Hayward, ANGEL See CNP    Discharging Nurse: ***  Discharging Hospital Unit/Room#:   Discharging Unit Phone Number: ***    Emergency Contact:   Extended Emergency Contact Information  Primary Emergency Contact: Corey Servina   [facility]  Home Phone: [phone]  Mobile Phone: [phone]  Relation: Brother/Sister  Secondary Emergency Contact: Truman Callejas   [facility]  Home Phone: [phone]  Mobile Phone: [phone]  Relation: Other    Past Surgical History:  Past Surgical History:   Procedure Laterality Date    CERVICAL DISC ARTHROPLASTY      CERVICAL DISCECTOMY      COLONOSCOPY  2011    [facility]?    COLONOSCOPY N/A 2016    Dr Joshi-Tubular AP (-) dysplasia x 1, BCM x 1--3 yr recall    COLONOSCOPY N/A 2019    Dr Brian-Diverticulosis, large grade 3 internal and external hemorrhoids, suboptimal prep, 3 yr recall    COLONOSCOPY N/A 2022    Dr Brian,  lg int hem Gr 3, ext hem wo bleeding, mild diverticulosis left colon, 3 year recall    DILATION AND CURETTAGE OF UTERUS N/A 2020    DILATATION AND CURETTAGE HYSTEROSCOPY performed by Urzsula Lozano MD at [facility] OR    HEMORRHOID SURGERY N/A 2019    INTERNAL AND EXTERNAL HEMORRHOIDECTOMY performed by Christin Franklin MD at [facility] OR    NASAL SEPTUM SURGERY      NECK SURGERY  2021    replaced discs    TUBAL LIGATION      UPPER GASTROINTESTINAL ENDOSCOPY  2011     ?       Immunization History:   Immunization History   Administered Date(s) Administered    COVID-19, MODERNA BLUE border, Primary or Immunocompromised, (age 12y+), IM, 100 mcg/0.5mL 2021, 2021    COVID-19, PFIZER, ( formula),

## 2024-04-12 NOTE — ED PROVIDER NOTES
deficit present.      Mental Status: She is alert and oriented to person, place, and time.      GCS: GCS eye subscore is 4. GCS verbal subscore is 5. GCS motor subscore is 6.      Cranial Nerves: No cranial nerve deficit.      Motor: No abnormal muscle tone.   Psychiatric:         Behavior: Behavior normal.         Thought Content: Thought content normal.         Judgment: Judgment normal.         DIAGNOSTIC RESULTS       RADIOLOGY:   Non-plain film images such as CT, Ultrasound and MRI are read by the radiologist. Plainradiographic images are visualized and preliminarily interpreted by the emergency physician with the below findings:      Interpretation per the Radiologist below, if available at the time of this note:    XR ELBOW LEFT (MIN 3 VIEWS)   Final Result   No acute fracture, dislocation, or suspicious radiopaque foreign body.               ______________________________________    Electronically signed by: EULALIA MEDINA M.D.   Date:     04/12/2024   Time:    08:55             ED BEDSIDE ULTRASOUND:   Performed by ED Physician - none    LABS:  Labs Reviewed - No data to display    All other labs were within normal range or not returned as of this dictation.    Medications - No data to display    EMERGENCY DEPARTMENT COURSE and DIFFERENTIALDIAGNOSIS/MDM:   Vitals:    Vitals:    04/12/24 0826 04/12/24 0900   BP: 124/60 125/62   Pulse: 64 67   Resp: 17 17   Temp: 97.8 °F (36.6 °C) 97.9 °F (36.6 °C)   SpO2: 98% 97%   Weight: 81.6 kg (180 lb)    Height: 1.6 m (5' 3\")      EKG: All EKG's are interpreted by the Emergency Department Physician who either signs or Co-signs this chart in the absence of a cardiologist.      MDM       X-ray shows no fracture or dislocation.  No secondary findings of occult fracture on x-ray.  Patient does have decreased range of motion with swelling primarily over the olecranon.  No focal tenderness over the radial head.  Plan to be immobilized with a sling and follow-up with  words are mis-transcribed.)    Rj East Jr, MD (electronically signed)  AttendingEmergency Physician          Rj East Jr., MD  04/12/24 6687

## 2024-05-06 RX ORDER — LEVOCETIRIZINE DIHYDROCHLORIDE 5 MG/1
TABLET, FILM COATED ORAL
Qty: 90 TABLET | Refills: 0 | Status: SHIPPED | OUTPATIENT
Start: 2024-05-06

## 2024-05-31 DIAGNOSIS — M25.542 ARTHRALGIA OF BOTH HANDS: ICD-10-CM

## 2024-05-31 DIAGNOSIS — M25.541 ARTHRALGIA OF BOTH HANDS: ICD-10-CM

## 2024-05-31 RX ORDER — MELOXICAM 15 MG/1
15 TABLET ORAL DAILY
Qty: 30 TABLET | Refills: 0 | Status: SHIPPED | OUTPATIENT
Start: 2024-05-31

## 2024-06-20 ENCOUNTER — TELEPHONE (OUTPATIENT)
Dept: PRIMARY CARE CLINIC | Age: 64
End: 2024-06-20

## 2024-06-29 DIAGNOSIS — F32.A ANXIETY AND DEPRESSION: ICD-10-CM

## 2024-06-29 DIAGNOSIS — F41.9 ANXIETY AND DEPRESSION: ICD-10-CM

## 2024-07-01 RX ORDER — FLUOXETINE 10 MG/1
CAPSULE ORAL
Qty: 90 CAPSULE | Refills: 0 | Status: SHIPPED | OUTPATIENT
Start: 2024-07-01

## 2024-07-03 DIAGNOSIS — M25.542 ARTHRALGIA OF BOTH HANDS: ICD-10-CM

## 2024-07-03 DIAGNOSIS — M25.541 ARTHRALGIA OF BOTH HANDS: ICD-10-CM

## 2024-07-03 RX ORDER — MELOXICAM 15 MG/1
15 TABLET ORAL DAILY
Qty: 30 TABLET | Refills: 0 | Status: SHIPPED | OUTPATIENT
Start: 2024-07-03

## 2024-07-08 DIAGNOSIS — K21.9 GASTROESOPHAGEAL REFLUX DISEASE WITHOUT ESOPHAGITIS: ICD-10-CM

## 2024-07-08 RX ORDER — PANTOPRAZOLE SODIUM 40 MG/1
40 TABLET, DELAYED RELEASE ORAL
Qty: 14 TABLET | Refills: 0 | Status: SHIPPED | OUTPATIENT
Start: 2024-07-08

## 2024-07-14 SDOH — HEALTH STABILITY: PHYSICAL HEALTH: ON AVERAGE, HOW MANY MINUTES DO YOU ENGAGE IN EXERCISE AT THIS LEVEL?: 60 MIN

## 2024-07-14 SDOH — HEALTH STABILITY: PHYSICAL HEALTH: ON AVERAGE, HOW MANY DAYS PER WEEK DO YOU ENGAGE IN MODERATE TO STRENUOUS EXERCISE (LIKE A BRISK WALK)?: 7 DAYS

## 2024-07-14 ASSESSMENT — PATIENT HEALTH QUESTIONNAIRE - PHQ9
SUM OF ALL RESPONSES TO PHQ QUESTIONS 1-9: 0
SUM OF ALL RESPONSES TO PHQ QUESTIONS 1-9: 0
1. LITTLE INTEREST OR PLEASURE IN DOING THINGS: NOT AT ALL
SUM OF ALL RESPONSES TO PHQ QUESTIONS 1-9: 0
SUM OF ALL RESPONSES TO PHQ QUESTIONS 1-9: 0
2. FEELING DOWN, DEPRESSED OR HOPELESS: NOT AT ALL
SUM OF ALL RESPONSES TO PHQ9 QUESTIONS 1 & 2: 0

## 2024-07-14 ASSESSMENT — LIFESTYLE VARIABLES
HOW OFTEN DO YOU HAVE A DRINK CONTAINING ALCOHOL: NEVER
HOW OFTEN DO YOU HAVE SIX OR MORE DRINKS ON ONE OCCASION: 1
HOW OFTEN DO YOU HAVE A DRINK CONTAINING ALCOHOL: 1

## 2024-07-15 ENCOUNTER — HOSPITAL ENCOUNTER (OUTPATIENT)
Dept: GENERAL RADIOLOGY | Age: 64
Discharge: HOME | End: 2024-07-15
Payer: MEDICARE

## 2024-07-15 ENCOUNTER — OFFICE VISIT (OUTPATIENT)
Dept: PRIMARY CARE CLINIC | Age: 64
End: 2024-07-15
Payer: MEDICARE

## 2024-07-15 VITALS
SYSTOLIC BLOOD PRESSURE: 120 MMHG | TEMPERATURE: 97 F | DIASTOLIC BLOOD PRESSURE: 64 MMHG | HEART RATE: 64 BPM | OXYGEN SATURATION: 96 % | WEIGHT: 169.2 LBS | HEIGHT: 65 IN | BODY MASS INDEX: 28.19 KG/M2

## 2024-07-15 DIAGNOSIS — M25.572 ACUTE LEFT ANKLE PAIN: ICD-10-CM

## 2024-07-15 DIAGNOSIS — K21.9 GASTROESOPHAGEAL REFLUX DISEASE WITHOUT ESOPHAGITIS: ICD-10-CM

## 2024-07-15 DIAGNOSIS — Z00.00 MEDICARE ANNUAL WELLNESS VISIT, SUBSEQUENT: Primary | ICD-10-CM

## 2024-07-15 PROCEDURE — 73610 X-RAY EXAM OF ANKLE: CPT

## 2024-07-15 PROCEDURE — G0439 PPPS, SUBSEQ VISIT: HCPCS | Performed by: NURSE PRACTITIONER

## 2024-07-15 RX ORDER — PANTOPRAZOLE SODIUM 40 MG/1
40 TABLET, DELAYED RELEASE ORAL
Qty: 90 TABLET | Refills: 3 | Status: SHIPPED | OUTPATIENT
Start: 2024-07-15

## 2024-07-15 RX ORDER — NALOXEGOL OXALATE 12.5 MG/1
12.5 TABLET, FILM COATED ORAL ONCE
COMMUNITY
Start: 2024-05-30

## 2024-07-15 ASSESSMENT — PATIENT HEALTH QUESTIONNAIRE - PHQ9
2. FEELING DOWN, DEPRESSED OR HOPELESS: NOT AT ALL
9. THOUGHTS THAT YOU WOULD BE BETTER OFF DEAD, OR OF HURTING YOURSELF: NOT AT ALL
4. FEELING TIRED OR HAVING LITTLE ENERGY: NOT AT ALL
3. TROUBLE FALLING OR STAYING ASLEEP: NOT AT ALL
1. LITTLE INTEREST OR PLEASURE IN DOING THINGS: NOT AT ALL
10. IF YOU CHECKED OFF ANY PROBLEMS, HOW DIFFICULT HAVE THESE PROBLEMS MADE IT FOR YOU TO DO YOUR WORK, TAKE CARE OF THINGS AT HOME, OR GET ALONG WITH OTHER PEOPLE: NOT DIFFICULT AT ALL
6. FEELING BAD ABOUT YOURSELF - OR THAT YOU ARE A FAILURE OR HAVE LET YOURSELF OR YOUR FAMILY DOWN: NOT AT ALL
SUM OF ALL RESPONSES TO PHQ9 QUESTIONS 1 & 2: 0
SUM OF ALL RESPONSES TO PHQ QUESTIONS 1-9: 1
8. MOVING OR SPEAKING SO SLOWLY THAT OTHER PEOPLE COULD HAVE NOTICED. OR THE OPPOSITE, BEING SO FIGETY OR RESTLESS THAT YOU HAVE BEEN MOVING AROUND A LOT MORE THAN USUAL: NOT AT ALL
SUM OF ALL RESPONSES TO PHQ QUESTIONS 1-9: 1
7. TROUBLE CONCENTRATING ON THINGS, SUCH AS READING THE NEWSPAPER OR WATCHING TELEVISION: SEVERAL DAYS
5. POOR APPETITE OR OVEREATING: NOT AT ALL
SUM OF ALL RESPONSES TO PHQ QUESTIONS 1-9: 1
SUM OF ALL RESPONSES TO PHQ QUESTIONS 1-9: 1

## 2024-07-15 NOTE — PROGRESS NOTES
Medicare Annual Wellness Visit    Salud Funez is here for Medicare AWV    Assessment & Plan   Medicare annual wellness visit, subsequent  Acute left ankle pain  -     XR ANKLE LEFT (MIN 3 VIEWS); Future  Gastroesophageal reflux disease without esophagitis  -     pantoprazole (PROTONIX) 40 MG tablet; Take 1 tablet by mouth every morning (before breakfast), Disp-90 tablet, R-3Normal  Recommendations for Preventive Services Due: see orders and patient instructions/AVS.  Recommended screening schedule for the next 5-10 years is provided to the patient in written form: see Patient Instructions/AVS.     Return in 3 months (on 10/15/2024).     Subjective   The following acute and/or chronic problems were also addressed today:  Left ankle tenderness. Fell about 3 months ago and did PT for left elbow. Has some ankle swelling. Requests refills of pantoprazole-GERD is well controlled.    Patient's complete Health Risk Assessment and screening values have been reviewed and are found in Flowsheets. The following problems were reviewed today and where indicated follow up appointments were made and/or referrals ordered.    Positive Risk Factor Screenings with Interventions:    Fall Risk:  Do you feel unsteady or are you worried about falling? : no  2 or more falls in past year?: (!) yes  Fall with injury in past year?: (!) yes     Interventions:    Patient comments: did fall 3 months ago and had PT for her left elbow  Reviewed medications, home hazards, visual acuity, and co-morbidities that can increase risk for falls           Self-assessment of health:  In general, how would you say your health is?: (!) Poor    Interventions:  Patient comments: is eating 2 meals per day- has lost 11 pounds.     General HRA Questions:  Select all that apply: (!) New or Increased Pain    Interventions - Pain:  Patient comments: norco she takes for her back is helping her with ankle            Safety:  Do you have any tripping hazards - loose

## 2024-07-15 NOTE — PATIENT INSTRUCTIONS
.  Order or download the FREE \"Exercise & Physical Activity: Your Everyday Guide\" from [employer]. Call 1-771.506.8634 or search [employer] online.  You need 2500-2826 mg of calcium and 0295-6945 IU of vitamin D per day. It is possible to meet your calcium requirement with diet alone, but a vitamin D supplement is usually necessary to meet this goal.  When exposed to the sun, use a sunscreen that protects against both UVA and UVB radiation with an SPF of 30 or greater. Reapply every 2 to 3 hours or after sweating, drying off with a towel, or swimming.  Always wear a seat belt when traveling in a car. Always wear a helmet when riding a bicycle or motorcycle.

## 2024-07-29 DIAGNOSIS — M25.542 ARTHRALGIA OF BOTH HANDS: ICD-10-CM

## 2024-07-29 DIAGNOSIS — M25.541 ARTHRALGIA OF BOTH HANDS: ICD-10-CM

## 2024-07-29 RX ORDER — MELOXICAM 15 MG/1
15 TABLET ORAL DAILY
Qty: 30 TABLET | Refills: 2 | Status: SHIPPED | OUTPATIENT
Start: 2024-07-29

## 2024-07-29 NOTE — TELEPHONE ENCOUNTER
Salud TIARRA Funez called to request a refill on her medication.      Last office visit : 7/15/2024   Next office visit : 10/15/2024     Requested Prescriptions     Pending Prescriptions Disp Refills    meloxicam (MOBIC) 15 MG tablet [Pharmacy Med Name: MELOXICAM 15 MG TABLET] 30 tablet 0     Sig: TAKE 1 TABLET BY MOUTH EVERY DAY            Elba Strong LPN

## 2024-07-31 RX ORDER — LEVOCETIRIZINE DIHYDROCHLORIDE 5 MG/1
TABLET, FILM COATED ORAL
Qty: 90 TABLET | Refills: 0 | Status: SHIPPED | OUTPATIENT
Start: 2024-07-31

## 2024-08-14 ENCOUNTER — HOSPITAL ENCOUNTER (EMERGENCY)
Age: 64
Discharge: HOME | End: 2024-08-15
Attending: EMERGENCY MEDICINE
Payer: COMMERCIAL

## 2024-08-14 DIAGNOSIS — S16.1XXA ACUTE STRAIN OF NECK MUSCLE, INITIAL ENCOUNTER: ICD-10-CM
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
## 2024-08-14 DIAGNOSIS — S09.90XA CLOSED HEAD INJURY, INITIAL ENCOUNTER: ICD-10-CM

## 2024-08-14 PROCEDURE — 99285 EMERGENCY DEPT VISIT HI MDM: CPT

## 2024-08-14 RX ORDER — HYDROCODONE BITARTRATE AND ACETAMINOPHEN 10; 325 MG/1; MG/1
1 TABLET ORAL 3 TIMES DAILY
COMMUNITY
Start: 2024-07-22

## 2024-08-14 ASSESSMENT — ENCOUNTER SYMPTOMS
GASTROINTESTINAL NEGATIVE: 1
EYES NEGATIVE: 1
RESPIRATORY NEGATIVE: 1

## 2024-08-14 ASSESSMENT — PAIN SCALES - GENERAL: PAINLEVEL_OUTOF10: 7

## 2024-08-14 ASSESSMENT — PAIN DESCRIPTION - LOCATION: LOCATION: NECK

## 2024-08-14 ASSESSMENT — PAIN - FUNCTIONAL ASSESSMENT: PAIN_FUNCTIONAL_ASSESSMENT: 0-10

## 2024-08-14 ASSESSMENT — PAIN DESCRIPTION - DESCRIPTORS: DESCRIPTORS: ACHING

## 2024-08-15 ENCOUNTER — APPOINTMENT (OUTPATIENT)
Dept: CT IMAGING | Age: 64
End: 2024-08-15
Payer: COMMERCIAL

## 2024-08-15 VITALS
TEMPERATURE: 98.9 F | OXYGEN SATURATION: 98 % | RESPIRATION RATE: 16 BRPM | HEART RATE: 76 BPM | WEIGHT: 171 LBS | HEIGHT: 63 IN | BODY MASS INDEX: 30.3 KG/M2 | SYSTOLIC BLOOD PRESSURE: 151 MMHG | DIASTOLIC BLOOD PRESSURE: 83 MMHG

## 2024-08-15 PROCEDURE — 70450 CT HEAD/BRAIN W/O DYE: CPT

## 2024-08-15 PROCEDURE — 72125 CT NECK SPINE W/O DYE: CPT

## 2024-08-15 PROCEDURE — 6360000002 HC RX W HCPCS: Performed by: EMERGENCY MEDICINE

## 2024-08-15 PROCEDURE — 6370000000 HC RX 637 (ALT 250 FOR IP): Performed by: EMERGENCY MEDICINE

## 2024-08-15 PROCEDURE — 74177 CT ABD & PELVIS W/CONTRAST: CPT

## 2024-08-15 PROCEDURE — 96374 THER/PROPH/DIAG INJ IV PUSH: CPT

## 2024-08-15 PROCEDURE — 71260 CT THORAX DX C+: CPT

## 2024-08-15 PROCEDURE — 6360000004 HC RX CONTRAST MEDICATION: Performed by: EMERGENCY MEDICINE

## 2024-08-15 RX ORDER — KETOROLAC TROMETHAMINE 30 MG/ML
15 INJECTION, SOLUTION INTRAMUSCULAR; INTRAVENOUS ONCE
Status: COMPLETED | OUTPATIENT
Start: 2024-08-15 | End: 2024-08-15

## 2024-08-15 RX ORDER — CYCLOBENZAPRINE HCL 10 MG
10 TABLET ORAL 3 TIMES DAILY PRN
Qty: 21 TABLET | Refills: 0 | Status: SHIPPED | OUTPATIENT
Start: 2024-08-15 | End: 2024-08-25

## 2024-08-15 RX ORDER — CYCLOBENZAPRINE HCL 10 MG
10 TABLET ORAL ONCE
Status: COMPLETED | OUTPATIENT
Start: 2024-08-15 | End: 2024-08-15

## 2024-08-15 RX ADMIN — IOPAMIDOL 90 ML: 755 INJECTION, SOLUTION INTRAVENOUS at 00:18

## 2024-08-15 RX ADMIN — CYCLOBENZAPRINE 10 MG: 10 TABLET, FILM COATED ORAL at 01:07

## 2024-08-15 RX ADMIN — KETOROLAC TROMETHAMINE 15 MG: 30 INJECTION, SOLUTION INTRAMUSCULAR at 01:07

## 2024-08-15 ASSESSMENT — PAIN DESCRIPTION - DESCRIPTORS: DESCRIPTORS: ACHING

## 2024-08-15 ASSESSMENT — PAIN DESCRIPTION - ORIENTATION: ORIENTATION: POSTERIOR;LOWER;MID

## 2024-08-15 ASSESSMENT — PAIN DESCRIPTION - LOCATION: LOCATION: NECK

## 2024-08-15 ASSESSMENT — PAIN SCALES - GENERAL: PAINLEVEL_OUTOF10: 7

## 2024-08-15 ASSESSMENT — PAIN - FUNCTIONAL ASSESSMENT: PAIN_FUNCTIONAL_ASSESSMENT: ACTIVITIES ARE NOT PREVENTED

## 2024-08-15 NOTE — ED PROVIDER NOTES
using dose optimization techniques as appropriate to the performed exam and include    at least one of the following: Automated exposure control, adjustment of the mA and/or kV according to size, and the use of iterative reconstruction technique.        ______________________________________    Electronically signed by: RAOUL BREWER M.D.   Date:     08/15/2024   Time:    00:33       CT ABDOMEN PELVIS W IV CONTRAST Additional Contrast? None   Final Result   Impression:   1.  No evidence of injury to solid or hollow intra-abdominal viscera.  No acute findings.        All CT scans are performed using dose optimization techniques as appropriate to the performed exam and include    at least one of the following: Automated exposure control, adjustment of the mA and/or kV according to size, and the use of iterative reconstruction technique.        ______________________________________    Electronically signed by: SANFORD DE JESUS M.D.   Date:     08/15/2024   Time:    00:33             ED BEDSIDE ULTRASOUND:   Performed by ED Physician - none    LABS:  Labs Reviewed - No data to display    All other labs were within normal range or not returned as of this dictation.    Medications   iopamidol (ISOVUE-370) 76 % injection 90 mL (90 mLs IntraVENous Given 8/15/24 0018)   cyclobenzaprine (FLEXERIL) tablet 10 mg (10 mg Oral Given 8/15/24 0107)   ketorolac (TORADOL) injection 15 mg (15 mg IntraVENous Given 8/15/24 0107)       EMERGENCY DEPARTMENT COURSE and DIFFERENTIALDIAGNOSIS/MDM:   Vitals:    Vitals:    08/14/24 2340 08/15/24 0000 08/15/24 0015 08/15/24 0030   BP:  (!) 160/93 (!) 159/91 (!) 151/83   Pulse:  72 80 76   Resp:  15 20 16   Temp: 98.9 °F (37.2 °C)      TempSrc: Axillary      SpO2:  98% 100% 98%   Weight:       Height:             PROCEDURES:  Unless otherwise notedbelow, none  Procedures    EKG: All EKG's are interpreted by the Emergency Department Physician who either signs or Co-signs this chart in the
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
## 2024-08-15 NOTE — ED NOTES
Pt reports needing to urinate. Spoke with MD East to review CT scans. Verbal order to remove C-collar and pt can attempt to ambulate to bathroom. Pt ambulated to the bathroom without difficulty.

## 2024-09-20 ENCOUNTER — OFFICE VISIT (OUTPATIENT)
Dept: PRIMARY CARE CLINIC | Age: 64
End: 2024-09-20
Payer: MEDICARE

## 2024-09-20 VITALS
SYSTOLIC BLOOD PRESSURE: 130 MMHG | HEART RATE: 80 BPM | HEIGHT: 63 IN | BODY MASS INDEX: 29.91 KG/M2 | WEIGHT: 168.8 LBS | OXYGEN SATURATION: 98 % | DIASTOLIC BLOOD PRESSURE: 80 MMHG | TEMPERATURE: 97.3 F

## 2024-09-20 DIAGNOSIS — R09.89 LABILE BLOOD PRESSURE: ICD-10-CM

## 2024-09-20 DIAGNOSIS — S16.1XXD STRAIN OF NECK MUSCLE, SUBSEQUENT ENCOUNTER: ICD-10-CM
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
## 2024-09-20 DIAGNOSIS — M54.2 NECK PAIN: ICD-10-CM

## 2024-09-20 DIAGNOSIS — R51.9 RECURRENT HEADACHE: ICD-10-CM

## 2024-09-20 PROCEDURE — 99214 OFFICE O/P EST MOD 30 MIN: CPT | Performed by: NURSE PRACTITIONER

## 2024-09-20 RX ORDER — LIDOCAINE 50 MG/G
1 PATCH TOPICAL DAILY
Qty: 10 PATCH | Refills: 0 | Status: SHIPPED | OUTPATIENT
Start: 2024-09-20 | End: 2024-09-30

## 2024-09-20 RX ORDER — METHYLPREDNISOLONE 4 MG
TABLET, DOSE PACK ORAL
Qty: 1 KIT | Refills: 0 | Status: SHIPPED | OUTPATIENT
Start: 2024-09-20 | End: 2024-09-26

## 2024-09-20 ASSESSMENT — ENCOUNTER SYMPTOMS
COUGH: 0
SORE THROAT: 0
ABDOMINAL PAIN: 0
SHORTNESS OF BREATH: 0
WHEEZING: 0
NAUSEA: 0
DIARRHEA: 0
CHEST TIGHTNESS: 0

## 2024-09-27 DIAGNOSIS — F41.9 ANXIETY AND DEPRESSION: ICD-10-CM

## 2024-09-27 DIAGNOSIS — F32.A ANXIETY AND DEPRESSION: ICD-10-CM

## 2024-09-27 RX ORDER — FLUOXETINE 10 MG/1
CAPSULE ORAL
Qty: 90 CAPSULE | Refills: 0 | Status: SHIPPED | OUTPATIENT
Start: 2024-09-27

## 2024-10-15 ENCOUNTER — OFFICE VISIT (OUTPATIENT)
Dept: PRIMARY CARE CLINIC | Age: 64
End: 2024-10-15
Payer: MEDICARE

## 2024-10-15 VITALS
HEIGHT: 63 IN | SYSTOLIC BLOOD PRESSURE: 130 MMHG | HEART RATE: 83 BPM | WEIGHT: 170.4 LBS | DIASTOLIC BLOOD PRESSURE: 76 MMHG | BODY MASS INDEX: 30.19 KG/M2 | OXYGEN SATURATION: 99 % | TEMPERATURE: 97.4 F

## 2024-10-15 DIAGNOSIS — M25.542 ARTHRALGIA OF BOTH HANDS: ICD-10-CM

## 2024-10-15 DIAGNOSIS — F41.9 ANXIETY AND DEPRESSION: ICD-10-CM

## 2024-10-15 DIAGNOSIS — M25.572 ACUTE LEFT ANKLE PAIN: ICD-10-CM

## 2024-10-15 DIAGNOSIS — M25.541 ARTHRALGIA OF BOTH HANDS: ICD-10-CM

## 2024-10-15 DIAGNOSIS — K21.9 GASTROESOPHAGEAL REFLUX DISEASE WITHOUT ESOPHAGITIS: Primary | ICD-10-CM

## 2024-10-15 DIAGNOSIS — F32.A ANXIETY AND DEPRESSION: ICD-10-CM

## 2024-10-15 DIAGNOSIS — M79.10 MYALGIA: ICD-10-CM

## 2024-10-15 PROCEDURE — 99214 OFFICE O/P EST MOD 30 MIN: CPT | Performed by: NURSE PRACTITIONER

## 2024-10-15 PROCEDURE — G0008 ADMIN INFLUENZA VIRUS VAC: HCPCS | Performed by: NURSE PRACTITIONER

## 2024-10-15 PROCEDURE — 90661 CCIIV3 VAC ABX FR 0.5 ML IM: CPT | Performed by: NURSE PRACTITIONER

## 2024-10-15 RX ORDER — MELOXICAM 15 MG/1
15 TABLET ORAL DAILY
Qty: 90 TABLET | Refills: 0 | Status: SHIPPED | OUTPATIENT
Start: 2024-10-15

## 2024-10-15 ASSESSMENT — ANXIETY QUESTIONNAIRES
4. TROUBLE RELAXING: NOT AT ALL
2. NOT BEING ABLE TO STOP OR CONTROL WORRYING: NOT AT ALL
GAD7 TOTAL SCORE: 1
1. FEELING NERVOUS, ANXIOUS, OR ON EDGE: NOT AT ALL
6. BECOMING EASILY ANNOYED OR IRRITABLE: NOT AT ALL
3. WORRYING TOO MUCH ABOUT DIFFERENT THINGS: NOT AT ALL
IF YOU CHECKED OFF ANY PROBLEMS ON THIS QUESTIONNAIRE, HOW DIFFICULT HAVE THESE PROBLEMS MADE IT FOR YOU TO DO YOUR WORK, TAKE CARE OF THINGS AT HOME, OR GET ALONG WITH OTHER PEOPLE: NOT DIFFICULT AT ALL
5. BEING SO RESTLESS THAT IT IS HARD TO SIT STILL: SEVERAL DAYS
7. FEELING AFRAID AS IF SOMETHING AWFUL MIGHT HAPPEN: NOT AT ALL

## 2024-10-15 ASSESSMENT — PATIENT HEALTH QUESTIONNAIRE - PHQ9
4. FEELING TIRED OR HAVING LITTLE ENERGY: NOT AT ALL
SUM OF ALL RESPONSES TO PHQ QUESTIONS 1-9: 1
6. FEELING BAD ABOUT YOURSELF - OR THAT YOU ARE A FAILURE OR HAVE LET YOURSELF OR YOUR FAMILY DOWN: NOT AT ALL
SUM OF ALL RESPONSES TO PHQ QUESTIONS 1-9: 1
10. IF YOU CHECKED OFF ANY PROBLEMS, HOW DIFFICULT HAVE THESE PROBLEMS MADE IT FOR YOU TO DO YOUR WORK, TAKE CARE OF THINGS AT HOME, OR GET ALONG WITH OTHER PEOPLE: NOT DIFFICULT AT ALL
8. MOVING OR SPEAKING SO SLOWLY THAT OTHER PEOPLE COULD HAVE NOTICED. OR THE OPPOSITE, BEING SO FIGETY OR RESTLESS THAT YOU HAVE BEEN MOVING AROUND A LOT MORE THAN USUAL: NOT AT ALL
SUM OF ALL RESPONSES TO PHQ9 QUESTIONS 1 & 2: 0
1. LITTLE INTEREST OR PLEASURE IN DOING THINGS: NOT AT ALL
9. THOUGHTS THAT YOU WOULD BE BETTER OFF DEAD, OR OF HURTING YOURSELF: NOT AT ALL
5. POOR APPETITE OR OVEREATING: SEVERAL DAYS
SUM OF ALL RESPONSES TO PHQ QUESTIONS 1-9: 1
3. TROUBLE FALLING OR STAYING ASLEEP: NOT AT ALL
7. TROUBLE CONCENTRATING ON THINGS, SUCH AS READING THE NEWSPAPER OR WATCHING TELEVISION: NOT AT ALL
2. FEELING DOWN, DEPRESSED OR HOPELESS: NOT AT ALL
SUM OF ALL RESPONSES TO PHQ QUESTIONS 1-9: 1

## 2024-10-15 NOTE — PROGRESS NOTES
Dr Brian,  lg int hem Gr 3, ext hem wo bleeding, mild diverticulosis left colon, 3 year recall    DILATION AND CURETTAGE OF UTERUS N/A 2020    DILATATION AND CURETTAGE HYSTEROSCOPY performed by Urszula Lozano MD at [facility] OR    HEMORRHOID SURGERY N/A 2019    INTERNAL AND EXTERNAL HEMORRHOIDECTOMY performed by Christin Franklin MD at [facility] OR    NASAL SEPTUM SURGERY      NECK SURGERY  2021    replaced discs    TUBAL LIGATION      UPPER GASTROINTESTINAL ENDOSCOPY  2011     ?       Social History     Tobacco Use    Smoking status: Former     Current packs/day: 0.00     Average packs/day: 2.0 packs/day for 25.0 years (50.0 ttl pk-yrs)     Types: Cigarettes     Start date: 1983     Quit date: 2008     Years since quittin.5    Smokeless tobacco: Never    Tobacco comments:     Quit smoking in  with Wellbutrin (had crazy dreams on it)   Substance Use Topics    Alcohol use: No     Alcohol/week: 0.0 standard drinks of alcohol        Current Outpatient Medications   Medication Sig Dispense Refill    meloxicam (MOBIC) 15 MG tablet Take 1 tablet by mouth daily 90 tablet 0    FLUoxetine (PROZAC) 10 MG capsule TAKE 1 CAPSULE BY MOUTH EVERY DAY 90 capsule 0    HYDROcodone-acetaminophen (NORCO)  MG per tablet Take 1 tablet by mouth 3 times daily.      levocetirizine (XYZAL) 5 MG tablet TAKE 1 TABLET BY MOUTH EVERY DAY 90 tablet 0    MOVANTIK 12.5 MG TABS tablet Take 1 tablet by mouth once      pantoprazole (PROTONIX) 40 MG tablet Take 1 tablet by mouth every morning (before breakfast) 90 tablet 3    nystatin-triamcinolone (MYCOLOG II) 464946-2.1 UNIT/GM-% cream Apply topically 2 times daily. 1 each 1    SYNTHROID 100 MCG tablet TAKE 1 TABLET BY MOUTH EVERY DAY 90 tablet 1    montelukast (SINGULAIR) 10 MG tablet TAKE 1 TABLET BY MOUTH EVERY DAY AT NIGHT 90 tablet 1    Polyethylene Glycol 3350 (MIRALAX PO) Take 1 Scoop by mouth daily      augmented betamethasone dipropionate

## 2024-10-21 ASSESSMENT — ENCOUNTER SYMPTOMS
VOMITING: 0
CHEST TIGHTNESS: 0
SHORTNESS OF BREATH: 0
COUGH: 0
SORE THROAT: 0
NAUSEA: 0
DIARRHEA: 0
ABDOMINAL PAIN: 0
COLOR CHANGE: 0

## 2024-10-31 RX ORDER — LEVOCETIRIZINE DIHYDROCHLORIDE 5 MG/1
TABLET, FILM COATED ORAL
Qty: 90 TABLET | Refills: 1 | Status: SHIPPED | OUTPATIENT
Start: 2024-10-31

## 2024-10-31 RX ORDER — MONTELUKAST SODIUM 10 MG/1
10 TABLET ORAL NIGHTLY
Qty: 90 TABLET | Refills: 1 | Status: SHIPPED | OUTPATIENT
Start: 2024-10-31

## 2024-10-31 NOTE — TELEPHONE ENCOUNTER
Salud Funez called to request a refill on her medication.      Last office visit : 10/15/2024   Next office visit : 1/15/2025     Requested Prescriptions     Pending Prescriptions Disp Refills    montelukast (SINGULAIR) 10 MG tablet [Pharmacy Med Name: MONTELUKAST SOD 10 MG TABLET] 90 tablet 1     Sig: TAKE 1 TABLET BY MOUTH EVERY DAY AT NIGHT    levocetirizine (XYZAL) 5 MG tablet [Pharmacy Med Name: LEVOCETIRIZINE 5 MG TABLET] 90 tablet 0     Sig: TAKE 1 TABLET BY MOUTH EVERY DAY            Samia Collado LPN

## 2024-11-07 DIAGNOSIS — E03.9 HYPOTHYROIDISM, UNSPECIFIED TYPE: ICD-10-CM

## 2024-11-07 NOTE — TELEPHONE ENCOUNTER
Salud Funez called to request a refill on her medication.      Last office visit : 10/15/2024   Next office visit : 1/15/2025     Requested Prescriptions     Pending Prescriptions Disp Refills    SYNTHROID 100 MCG tablet [Pharmacy Med Name: SYNTHROID 100 MCG TABLET] 90 tablet 1     Sig: TAKE 1 TABLET BY MOUTH EVERY DAY            Yaron Razo MA

## 2024-11-08 RX ORDER — LEVOTHYROXINE SODIUM 100 MCG
TABLET ORAL
Qty: 90 TABLET | Refills: 3 | Status: SHIPPED | OUTPATIENT
Start: 2024-11-08

## 2024-11-12 ENCOUNTER — OFFICE VISIT (OUTPATIENT)
Age: 64
End: 2024-11-12
Payer: MEDICARE

## 2024-11-12 VITALS
HEIGHT: 63 IN | RESPIRATION RATE: 17 BRPM | BODY MASS INDEX: 29.41 KG/M2 | OXYGEN SATURATION: 97 % | DIASTOLIC BLOOD PRESSURE: 62 MMHG | WEIGHT: 166 LBS | SYSTOLIC BLOOD PRESSURE: 122 MMHG | HEART RATE: 87 BPM | TEMPERATURE: 97.5 F

## 2024-11-12 DIAGNOSIS — R05.1 ACUTE COUGH: ICD-10-CM

## 2024-11-12 DIAGNOSIS — J02.0 ACUTE STREPTOCOCCAL PHARYNGITIS: Primary | ICD-10-CM

## 2024-11-12 DIAGNOSIS — J02.9 SORE THROAT: ICD-10-CM

## 2024-11-12 DIAGNOSIS — R09.81 SINUS CONGESTION: ICD-10-CM

## 2024-11-12 LAB
Lab: NORMAL
QC PASS/FAIL: NORMAL
S PYO AG THROAT QL: POSITIVE
SARS-COV-2, POC: NORMAL

## 2024-11-12 PROCEDURE — 99213 OFFICE O/P EST LOW 20 MIN: CPT

## 2024-11-12 PROCEDURE — 87880 STREP A ASSAY W/OPTIC: CPT

## 2024-11-12 PROCEDURE — 87811 SARS-COV-2 COVID19 W/OPTIC: CPT

## 2024-11-12 RX ORDER — AZELASTINE HYDROCHLORIDE 137 UG/1
SPRAY, METERED NASAL
COMMUNITY
Start: 2024-10-06

## 2024-11-12 ASSESSMENT — ENCOUNTER SYMPTOMS
COUGH: 1
TROUBLE SWALLOWING: 0
CHOKING: 0
FACIAL SWELLING: 0
COLOR CHANGE: 0
APNEA: 0
CONSTIPATION: 0
WHEEZING: 0
CHEST TIGHTNESS: 0
SHORTNESS OF BREATH: 0
VOMITING: 0
EYE PAIN: 0
DIARRHEA: 0
NAUSEA: 0
SINUS PAIN: 0
ABDOMINAL PAIN: 0
EYE DISCHARGE: 0
SORE THROAT: 1
STRIDOR: 0
SINUS PRESSURE: 1
EYE REDNESS: 0
ABDOMINAL DISTENTION: 0

## 2024-11-12 NOTE — PROGRESS NOTES
movements intact.      Conjunctiva/sclera: Conjunctivae normal.      Pupils: Pupils are equal, round, and reactive to light.   Cardiovascular:      Rate and Rhythm: Normal rate and regular rhythm.      Pulses: Normal pulses.      Heart sounds: Normal heart sounds. No murmur heard.     No friction rub. No gallop.   Pulmonary:      Effort: Pulmonary effort is normal. No respiratory distress.      Breath sounds: Normal breath sounds. No stridor. No wheezing, rhonchi or rales.   Abdominal:      General: Bowel sounds are normal. There is no distension.      Palpations: Abdomen is soft.      Tenderness: There is no abdominal tenderness. There is no guarding.   Musculoskeletal:         General: Normal range of motion.      Cervical back: Normal range of motion.   Skin:     General: Skin is warm.      Capillary Refill: Capillary refill takes less than 2 seconds.      Coloration: Skin is not jaundiced or pale.      Findings: No erythema or rash.   Neurological:      General: No focal deficit present.      Mental Status: She is alert and oriented to person, place, and time.      Deep Tendon Reflexes: Reflexes are normal and symmetric.   Psychiatric:         Attention and Perception: Attention normal.         Mood and Affect: Mood normal.         Behavior: Behavior normal. Behavior is cooperative.         Thought Content: Thought content normal.         /62   Pulse 87   Temp 97.5 °F (36.4 °C) (Temporal)   Resp 17   Ht 1.6 m (5' 3\")   Wt 75.3 kg (166 lb)   LMP 07/11/2009   SpO2 97%   BMI 29.41 kg/m²     Assessment   Assessment & Plan      Diagnosis Orders   1. Acute streptococcal pharyngitis  amoxicillin-clavulanate (AUGMENTIN) 875-125 MG per tablet      2. Sinus congestion  POCT COVID-19 Antigen Card      3. Acute cough  POCT COVID-19 Antigen Card      4. Sore throat  POCT rapid strep A    POCT COVID-19 Antigen Card          Plan   Strep test is positive.    COVID test is negative.    Augmentin prescribed.  Take

## 2024-11-12 NOTE — PATIENT INSTRUCTIONS
Strep test is positive.    COVID test is negative.    Augmentin prescribed.  Take full course of antibiotics.    Monitor for fever and treat as needed with tylenol or ibuprofen    Recommended throat lozenges as needed and salt water gargles three times daily    Replace toothbrush in 24-48 hours after antibiotics are started    The patient is to follow up with PCP or return to clinic if symptoms worsen/fail to improve.    Any condition can change, despite proper treatment. Therefore, if symptoms still persist or worsen after treatment plan intitated today, either go to the nearest ER, or call PCP, or return to UC for further evaluation.    Urgent Care evaluation today is not a substitute for PCP visit. Follow up care is your responsibility to discuss and review this UC visit.

## 2024-12-02 ENCOUNTER — OFFICE VISIT (OUTPATIENT)
Age: 64
End: 2024-12-02
Payer: MEDICARE

## 2024-12-02 VITALS
DIASTOLIC BLOOD PRESSURE: 68 MMHG | HEART RATE: 86 BPM | TEMPERATURE: 98.1 F | RESPIRATION RATE: 16 BRPM | BODY MASS INDEX: 30.22 KG/M2 | WEIGHT: 170.6 LBS | OXYGEN SATURATION: 96 % | SYSTOLIC BLOOD PRESSURE: 114 MMHG

## 2024-12-02 DIAGNOSIS — H65.03 NON-RECURRENT ACUTE SEROUS OTITIS MEDIA OF BOTH EARS: Primary | ICD-10-CM

## 2024-12-02 DIAGNOSIS — J02.9 ACUTE PHARYNGITIS, UNSPECIFIED ETIOLOGY: ICD-10-CM

## 2024-12-02 DIAGNOSIS — J02.9 SORE THROAT: ICD-10-CM

## 2024-12-02 LAB — S PYO AG THROAT QL: NORMAL

## 2024-12-02 PROCEDURE — 99213 OFFICE O/P EST LOW 20 MIN: CPT

## 2024-12-02 PROCEDURE — 87880 STREP A ASSAY W/OPTIC: CPT

## 2024-12-02 RX ORDER — METHYLPREDNISOLONE 4 MG/1
TABLET ORAL
Qty: 1 KIT | Refills: 0 | Status: SHIPPED | OUTPATIENT
Start: 2024-12-02 | End: 2024-12-08

## 2024-12-02 ASSESSMENT — ENCOUNTER SYMPTOMS
ABDOMINAL PAIN: 0
DIARRHEA: 0
RHINORRHEA: 0
NAUSEA: 0
WHEEZING: 0
VOMITING: 0
SHORTNESS OF BREATH: 0
COUGH: 0
SORE THROAT: 1

## 2024-12-02 NOTE — PROGRESS NOTES
[facility]  [facility]  [address] [place] [id number]  Dept: [phone]  Dept Fax: [phone]  Loc: [phone]    Salud Funez is a 64 y.o. female who presents today for her medical conditions/complaints as noted below.  Salud Funez is c/o of Pharyngitis        HPI:     Salud Funez presents with complaints of sore throat, bilateral ear fullness, headache and low-grade fever. Patient was evaluated in the Urgent Care on 11/12/24, diagnosed with strep throat and prescribed Augmentin. Completed entire therapy. Denies OTC treatment. No known sick contact.      Past Medical History:   Diagnosis Date    Allergic bronchitis     has wheezing related to dust and pollen    Allergic rhinitis     Asthma     Blepharitis of both eyes     Bone spur     cervical    Cervical disc disease     Chicken pox     age 19    Chronic anxiety 2006    has been on valium after failing effexor and other medications    Coccyx disorder     history of f/x    Constipation     chronic since a teen    Degenerative disc disease at L5-S1 level     Genital warts     Dr lEaine    GERD (gastroesophageal reflux disease)     Hemorrhoids     HPV in female     Hyperthyroidism     took radioactive iodine    Post menopausal syndrome 2010    Reactive airway disease with wheezing     Scoliosis     Unspecified vitamin D deficiency      Past Surgical History:   Procedure Laterality Date    CERVICAL DISC ARTHROPLASTY      CERVICAL DISCECTOMY      COLONOSCOPY  09/16/2011    [facility]?    COLONOSCOPY N/A 11/11/2016    Dr Joshi-Tubular AP (-) dysplasia x 1, BCM x 1--3 yr recall    COLONOSCOPY N/A 11/11/2019    Dr Brian-Diverticulosis, large grade 3 internal and external hemorrhoids, suboptimal prep, 3 yr recall    COLONOSCOPY N/A 12/16/2022    Dr Brian,  lg int hem Gr 3, ext hem wo bleeding, mild diverticulosis left colon, 3 year recall    DILATION AND CURETTAGE OF UTERUS N/A

## 2024-12-02 NOTE — PATIENT INSTRUCTIONS
- Throat culture pending, will call once results are available.  - Will wait on treatment.  - Medrol dose pack sent to the pharmacy, take as directed.  - Rest.  - Maintain adequate fluid intake.  - OTC Flonase and oral antihistamine.  - Return to the clinic or follow up with PCP if symptoms worsen or fail to improve.

## 2024-12-04 LAB — BACTERIA THROAT AEROBE CULT: NORMAL

## 2025-01-05 DIAGNOSIS — F41.9 ANXIETY AND DEPRESSION: ICD-10-CM

## 2025-01-05 DIAGNOSIS — F32.A ANXIETY AND DEPRESSION: ICD-10-CM

## 2025-01-06 RX ORDER — FLUOXETINE 10 MG/1
CAPSULE ORAL
Qty: 90 CAPSULE | Refills: 0 | Status: SHIPPED | OUTPATIENT
Start: 2025-01-06

## 2025-01-27 SDOH — ECONOMIC STABILITY: FOOD INSECURITY: WITHIN THE PAST 12 MONTHS, THE FOOD YOU BOUGHT JUST DIDN'T LAST AND YOU DIDN'T HAVE MONEY TO GET MORE.: NEVER TRUE

## 2025-01-27 SDOH — ECONOMIC STABILITY: FOOD INSECURITY: WITHIN THE PAST 12 MONTHS, YOU WORRIED THAT YOUR FOOD WOULD RUN OUT BEFORE YOU GOT MONEY TO BUY MORE.: NEVER TRUE

## 2025-01-27 SDOH — ECONOMIC STABILITY: INCOME INSECURITY: IN THE LAST 12 MONTHS, WAS THERE A TIME WHEN YOU WERE NOT ABLE TO PAY THE MORTGAGE OR RENT ON TIME?: NO

## 2025-01-27 SDOH — ECONOMIC STABILITY: TRANSPORTATION INSECURITY
IN THE PAST 12 MONTHS, HAS THE LACK OF TRANSPORTATION KEPT YOU FROM MEDICAL APPOINTMENTS OR FROM GETTING MEDICATIONS?: NO

## 2025-01-27 ASSESSMENT — PATIENT HEALTH QUESTIONNAIRE - PHQ9
SUM OF ALL RESPONSES TO PHQ QUESTIONS 1-9: 0
7. TROUBLE CONCENTRATING ON THINGS, SUCH AS READING THE NEWSPAPER OR WATCHING TELEVISION: NOT AT ALL
10. IF YOU CHECKED OFF ANY PROBLEMS, HOW DIFFICULT HAVE THESE PROBLEMS MADE IT FOR YOU TO DO YOUR WORK, TAKE CARE OF THINGS AT HOME, OR GET ALONG WITH OTHER PEOPLE: NOT DIFFICULT AT ALL
6. FEELING BAD ABOUT YOURSELF - OR THAT YOU ARE A FAILURE OR HAVE LET YOURSELF OR YOUR FAMILY DOWN: NOT AT ALL
8. MOVING OR SPEAKING SO SLOWLY THAT OTHER PEOPLE COULD HAVE NOTICED. OR THE OPPOSITE, BEING SO FIGETY OR RESTLESS THAT YOU HAVE BEEN MOVING AROUND A LOT MORE THAN USUAL: NOT AT ALL
SUM OF ALL RESPONSES TO PHQ9 QUESTIONS 1 & 2: 0
5. POOR APPETITE OR OVEREATING: NOT AT ALL
SUM OF ALL RESPONSES TO PHQ QUESTIONS 1-9: 0
2. FEELING DOWN, DEPRESSED OR HOPELESS: NOT AT ALL
3. TROUBLE FALLING OR STAYING ASLEEP: NOT AT ALL
SUM OF ALL RESPONSES TO PHQ QUESTIONS 1-9: 0
4. FEELING TIRED OR HAVING LITTLE ENERGY: NOT AT ALL
9. THOUGHTS THAT YOU WOULD BE BETTER OFF DEAD, OR OF HURTING YOURSELF: NOT AT ALL
1. LITTLE INTEREST OR PLEASURE IN DOING THINGS: NOT AT ALL
SUM OF ALL RESPONSES TO PHQ QUESTIONS 1-9: 0

## 2025-01-28 ENCOUNTER — OFFICE VISIT (OUTPATIENT)
Dept: PRIMARY CARE CLINIC | Age: 65
End: 2025-01-28

## 2025-01-28 VITALS
OXYGEN SATURATION: 99 % | HEIGHT: 63 IN | HEART RATE: 58 BPM | DIASTOLIC BLOOD PRESSURE: 82 MMHG | BODY MASS INDEX: 30.41 KG/M2 | SYSTOLIC BLOOD PRESSURE: 138 MMHG | WEIGHT: 171.6 LBS | TEMPERATURE: 97.5 F

## 2025-01-28 DIAGNOSIS — J01.00 ACUTE NON-RECURRENT MAXILLARY SINUSITIS: Primary | ICD-10-CM

## 2025-01-28 DIAGNOSIS — H66.002 NON-RECURRENT ACUTE SUPPURATIVE OTITIS MEDIA OF LEFT EAR WITHOUT SPONTANEOUS RUPTURE OF TYMPANIC MEMBRANE: ICD-10-CM

## 2025-01-28 DIAGNOSIS — K59.09 OTHER CONSTIPATION: ICD-10-CM

## 2025-01-28 DIAGNOSIS — K21.9 GASTROESOPHAGEAL REFLUX DISEASE WITHOUT ESOPHAGITIS: ICD-10-CM

## 2025-01-28 DIAGNOSIS — Z13.220 SCREENING FOR HYPERLIPIDEMIA: ICD-10-CM

## 2025-01-28 DIAGNOSIS — E03.9 ACQUIRED HYPOTHYROIDISM: ICD-10-CM

## 2025-01-28 RX ORDER — CEFDINIR 300 MG/1
300 CAPSULE ORAL 2 TIMES DAILY
Qty: 20 CAPSULE | Refills: 0 | Status: SHIPPED | OUTPATIENT
Start: 2025-01-28 | End: 2025-02-07

## 2025-01-28 RX ORDER — POLYETHYLENE GLYCOL 3350 17 G/17G
17 POWDER, FOR SOLUTION ORAL DAILY
Qty: 510 G | Refills: 2 | Status: SHIPPED | OUTPATIENT
Start: 2025-01-28 | End: 2025-04-28

## 2025-01-28 ASSESSMENT — PATIENT HEALTH QUESTIONNAIRE - PHQ9
3. TROUBLE FALLING OR STAYING ASLEEP: NOT AT ALL
7. TROUBLE CONCENTRATING ON THINGS, SUCH AS READING THE NEWSPAPER OR WATCHING TELEVISION: NOT AT ALL
6. FEELING BAD ABOUT YOURSELF - OR THAT YOU ARE A FAILURE OR HAVE LET YOURSELF OR YOUR FAMILY DOWN: NOT AT ALL
1. LITTLE INTEREST OR PLEASURE IN DOING THINGS: NOT AT ALL
10. IF YOU CHECKED OFF ANY PROBLEMS, HOW DIFFICULT HAVE THESE PROBLEMS MADE IT FOR YOU TO DO YOUR WORK, TAKE CARE OF THINGS AT HOME, OR GET ALONG WITH OTHER PEOPLE: NOT DIFFICULT AT ALL
4. FEELING TIRED OR HAVING LITTLE ENERGY: NOT AT ALL
2. FEELING DOWN, DEPRESSED OR HOPELESS: NOT AT ALL
5. POOR APPETITE OR OVEREATING: NOT AT ALL
8. MOVING OR SPEAKING SO SLOWLY THAT OTHER PEOPLE COULD HAVE NOTICED. OR THE OPPOSITE - BEING SO FIDGETY OR RESTLESS THAT YOU HAVE BEEN MOVING AROUND A LOT MORE THAN USUAL: NOT AT ALL
9. THOUGHTS THAT YOU WOULD BE BETTER OFF DEAD, OR OF HURTING YOURSELF: NOT AT ALL
SUM OF ALL RESPONSES TO PHQ QUESTIONS 1-9: 0

## 2025-01-28 NOTE — PROGRESS NOTES
[address] [place] [address] Drive   [address] [facility], [address]     Phone:  [phone]  Fax:  [phone]      Salud Funez is a 64 y.o. female who presents today for her medical conditions/complaints as noted below.  Salud Funez is c/o of Follow-up, Gastroesophageal Reflux, Sinus Problem (Sinuses keep bleeding- 1 month), and Hypothyroidism      Chief Complaint   Patient presents with    Follow-up    Gastroesophageal Reflux    Sinus Problem     Sinuses keep bleeding- 1 month    Hypothyroidism       HPI:     Patient presents in office for follow up on GERD and hypothyroid. Also wants her sinuses looked at, states they have been bleeding for a little over a month now. Does have left sided abdominal pain with constipation. Denies any dysuria. Has been taking a probiotic. Goes days without a bowel movement. Is taking duloxocax as needed with some relief. Denies any thyroid concerns.       Past Medical History:   Diagnosis Date    Allergic bronchitis     has wheezing related to dust and pollen    Allergic rhinitis     Asthma     Blepharitis of both eyes     Bone spur     cervical    Cervical disc disease     Chicken pox     age 19    Chronic anxiety 2006    has been on valium after failing effexor and other medications    Coccyx disorder     history of f/x    Constipation     chronic since a teen    Degenerative disc disease at L5-S1 level     Genital warts     Dr Elaine    GERD (gastroesophageal reflux disease)     Hemorrhoids     HPV in female     Hyperthyroidism     took radioactive iodine    Post menopausal syndrome 2010    Reactive airway disease with wheezing     Scoliosis     Unspecified vitamin D deficiency         Past Surgical History:   Procedure Laterality Date    CERVICAL DISC ARTHROPLASTY      CERVICAL DISCECTOMY      COLONOSCOPY  09/16/2011    [facility]?    COLONOSCOPY N/A 11/11/2016    Dr Joshi-Tubular AP (-) dysplasia x 1, BCM x 1--3 yr recall    COLONOSCOPY N/A 11/11/2019

## 2025-01-31 ASSESSMENT — ENCOUNTER SYMPTOMS
ABDOMINAL PAIN: 0
NAUSEA: 0
COLOR CHANGE: 0
DIARRHEA: 0
SINUS PRESSURE: 1
CHEST TIGHTNESS: 0
SHORTNESS OF BREATH: 0
VOMITING: 0
SORE THROAT: 0
CONSTIPATION: 1
COUGH: 0

## 2025-02-02 DIAGNOSIS — M25.541 ARTHRALGIA OF BOTH HANDS: ICD-10-CM

## 2025-02-02 DIAGNOSIS — M25.542 ARTHRALGIA OF BOTH HANDS: ICD-10-CM

## 2025-02-03 RX ORDER — MELOXICAM 15 MG/1
15 TABLET ORAL DAILY
Qty: 90 TABLET | Refills: 0 | Status: SHIPPED | OUTPATIENT
Start: 2025-02-03

## 2025-02-06 DIAGNOSIS — E03.9 ACQUIRED HYPOTHYROIDISM: ICD-10-CM

## 2025-02-06 DIAGNOSIS — Z13.220 SCREENING FOR HYPERLIPIDEMIA: ICD-10-CM

## 2025-02-06 DIAGNOSIS — K21.9 GASTROESOPHAGEAL REFLUX DISEASE WITHOUT ESOPHAGITIS: ICD-10-CM

## 2025-02-06 LAB
ALBUMIN SERPL-MCNC: 4.3 G/DL (ref 3.5–5.2)
ALP SERPL-CCNC: 96 U/L (ref 35–104)
ALT SERPL-CCNC: 18 U/L (ref 5–33)
ANION GAP SERPL CALCULATED.3IONS-SCNC: 8 MMOL/L (ref 8–16)
AST SERPL-CCNC: 19 U/L (ref 5–32)
BASOPHILS # BLD: 0 K/UL (ref 0–0.2)
BASOPHILS NFR BLD: 0.3 % (ref 0–1)
BILIRUB SERPL-MCNC: 0.5 MG/DL (ref 0.2–1.2)
BUN SERPL-MCNC: 17 MG/DL (ref 8–23)
CALCIUM SERPL-MCNC: 9.5 MG/DL (ref 8.8–10.2)
CHLORIDE SERPL-SCNC: 102 MMOL/L (ref 98–107)
CHOLEST SERPL-MCNC: 173 MG/DL (ref 0–199)
CO2 SERPL-SCNC: 28 MMOL/L (ref 22–29)
CREAT SERPL-MCNC: 0.7 MG/DL (ref 0.5–0.9)
EOSINOPHIL # BLD: 0.1 K/UL (ref 0–0.6)
EOSINOPHIL NFR BLD: 1.9 % (ref 0–5)
ERYTHROCYTE [DISTWIDTH] IN BLOOD BY AUTOMATED COUNT: 12.9 % (ref 11.5–14.5)
GLUCOSE SERPL-MCNC: 104 MG/DL (ref 70–99)
HCT VFR BLD AUTO: 36.9 % (ref 37–47)
HDLC SERPL-MCNC: 48 MG/DL (ref 40–60)
HGB BLD-MCNC: 12.5 G/DL (ref 12–16)
IMM GRANULOCYTES # BLD: 0 K/UL
LDLC SERPL CALC-MCNC: 98 MG/DL
LYMPHOCYTES # BLD: 3 K/UL (ref 1.1–4.5)
LYMPHOCYTES NFR BLD: 52.5 % (ref 20–40)
MCH RBC QN AUTO: 30.4 PG (ref 27–31)
MCHC RBC AUTO-ENTMCNC: 33.9 G/DL (ref 33–37)
MCV RBC AUTO: 89.8 FL (ref 81–99)
MONOCYTES # BLD: 0.6 K/UL (ref 0–0.9)
MONOCYTES NFR BLD: 9.5 % (ref 0–10)
NEUTROPHILS # BLD: 2.1 K/UL (ref 1.5–7.5)
NEUTS SEG NFR BLD: 35.8 % (ref 50–65)
PLATELET # BLD AUTO: 231 K/UL (ref 130–400)
PMV BLD AUTO: 10.7 FL (ref 9.4–12.3)
POTASSIUM SERPL-SCNC: 4.9 MMOL/L (ref 3.5–5.1)
PROT SERPL-MCNC: 7.1 G/DL (ref 6.4–8.3)
RBC # BLD AUTO: 4.11 M/UL (ref 4.2–5.4)
SODIUM SERPL-SCNC: 138 MMOL/L (ref 136–145)
T4 FREE SERPL-MCNC: 1.45 NG/DL (ref 0.93–1.7)
TRIGL SERPL-MCNC: 135 MG/DL (ref 0–149)
TSH SERPL DL<=0.005 MIU/L-ACNC: 0.21 UIU/ML (ref 0.27–4.2)
WBC # BLD AUTO: 5.8 K/UL (ref 4.8–10.8)

## 2025-02-09 RX ORDER — LEVOTHYROXINE SODIUM 75 UG/1
75 TABLET ORAL DAILY
Qty: 60 TABLET | Refills: 0 | Status: SHIPPED | OUTPATIENT
Start: 2025-02-09

## 2025-04-03 DIAGNOSIS — E03.9 ACQUIRED HYPOTHYROIDISM: ICD-10-CM

## 2025-04-03 RX ORDER — LEVOTHYROXINE SODIUM 75 MCG
75 TABLET ORAL DAILY
Qty: 90 TABLET | Refills: 0 | Status: SHIPPED | OUTPATIENT
Start: 2025-04-03

## 2025-04-18 ENCOUNTER — TRANSCRIBE ORDERS (OUTPATIENT)
Dept: ADMINISTRATIVE | Age: 65
End: 2025-04-18

## 2025-04-18 DIAGNOSIS — M50.323 OTHER CERVICAL DISC DEGENERATION AT C6-C7 LEVEL: ICD-10-CM

## 2025-04-18 DIAGNOSIS — M96.1 POSTLAMINECTOMY SYNDROME, NOT ELSEWHERE CLASSIFIED: ICD-10-CM

## 2025-04-18 DIAGNOSIS — M47.813 SPONDYLOSIS OF CERVICOTHORACIC REGION WITHOUT MYELOPATHY OR RADICULOPATHY: ICD-10-CM

## 2025-04-18 DIAGNOSIS — M50.321 OTHER CERVICAL DISC DEGENERATION AT C4-C5 LEVEL: ICD-10-CM

## 2025-04-18 DIAGNOSIS — M51.16 LUMBAR DISC PROLAPSE WITH COMPRESSION RADICULOPATHY: Primary | ICD-10-CM

## 2025-04-18 DIAGNOSIS — M47.812 CERVICAL SPONDYLOSIS WITHOUT MYELOPATHY: ICD-10-CM

## 2025-04-18 DIAGNOSIS — M51.17 INTERVERTEBRAL DISC DISORDER WITH RADICULOPATHY OF LUMBOSACRAL REGION: ICD-10-CM

## 2025-04-18 DIAGNOSIS — M50.322 OTHER CERVICAL DISC DEGENERATION AT C5-C6 LEVEL: ICD-10-CM

## 2025-04-18 DIAGNOSIS — M50.31 OTHER CERVICAL DISC DEGENERATION, HIGH CERVICAL REGION: ICD-10-CM

## 2025-04-18 DIAGNOSIS — M50.33 DEGENERATION OF CERVICOTHORACIC INTERVERTEBRAL DISC: ICD-10-CM

## 2025-04-23 ENCOUNTER — HOSPITAL ENCOUNTER (OUTPATIENT)
Dept: MRI IMAGING | Age: 65
Discharge: HOME | End: 2025-04-23
Payer: MEDICARE

## 2025-04-23 ENCOUNTER — HOSPITAL ENCOUNTER (OUTPATIENT)
Dept: GENERAL RADIOLOGY | Age: 65
Discharge: HOME | End: 2025-04-23
Payer: MEDICARE

## 2025-04-23 DIAGNOSIS — M47.813 SPONDYLOSIS OF CERVICOTHORACIC REGION WITHOUT MYELOPATHY OR RADICULOPATHY: ICD-10-CM

## 2025-04-23 DIAGNOSIS — M50.31 OTHER CERVICAL DISC DEGENERATION, HIGH CERVICAL REGION: ICD-10-CM

## 2025-04-23 DIAGNOSIS — M50.322 OTHER CERVICAL DISC DEGENERATION AT C5-C6 LEVEL: ICD-10-CM

## 2025-04-23 DIAGNOSIS — M96.1 POSTLAMINECTOMY SYNDROME, NOT ELSEWHERE CLASSIFIED: ICD-10-CM

## 2025-04-23 DIAGNOSIS — M51.17 INTERVERTEBRAL DISC DISORDER WITH RADICULOPATHY OF LUMBOSACRAL REGION: ICD-10-CM

## 2025-04-23 DIAGNOSIS — M51.16 LUMBAR DISC PROLAPSE WITH COMPRESSION RADICULOPATHY: ICD-10-CM

## 2025-04-23 DIAGNOSIS — M47.812 CERVICAL SPONDYLOSIS WITHOUT MYELOPATHY: ICD-10-CM

## 2025-04-23 DIAGNOSIS — M50.323 OTHER CERVICAL DISC DEGENERATION AT C6-C7 LEVEL: ICD-10-CM

## 2025-04-23 DIAGNOSIS — M47.812 FACET ARTHROPATHY, CERVICAL: ICD-10-CM

## 2025-04-23 DIAGNOSIS — M47.813 SPONDYLOSIS WITHOUT MYELOPATHY OR RADICULOPATHY, CERVICOTHORACIC REGION: ICD-10-CM

## 2025-04-23 DIAGNOSIS — M50.33 DEGENERATION OF CERVICOTHORACIC INTERVERTEBRAL DISC: ICD-10-CM

## 2025-04-23 DIAGNOSIS — M50.321 OTHER CERVICAL DISC DEGENERATION AT C4-C5 LEVEL: ICD-10-CM

## 2025-04-23 DIAGNOSIS — M51.16 LUMBAR DISC DISEASE WITH RADICULOPATHY: ICD-10-CM

## 2025-04-23 PROCEDURE — 72040 X-RAY EXAM NECK SPINE 2-3 VW: CPT

## 2025-04-23 PROCEDURE — 72148 MRI LUMBAR SPINE W/O DYE: CPT

## 2025-04-23 PROCEDURE — 72100 X-RAY EXAM L-S SPINE 2/3 VWS: CPT

## 2025-04-23 PROCEDURE — 72072 X-RAY EXAM THORAC SPINE 3VWS: CPT

## 2025-04-23 PROCEDURE — 72146 MRI CHEST SPINE W/O DYE: CPT

## 2025-04-23 PROCEDURE — 72141 MRI NECK SPINE W/O DYE: CPT

## 2025-05-02 DIAGNOSIS — K21.9 GASTROESOPHAGEAL REFLUX DISEASE WITHOUT ESOPHAGITIS: ICD-10-CM

## 2025-05-02 RX ORDER — PANTOPRAZOLE SODIUM 40 MG/1
40 TABLET, DELAYED RELEASE ORAL
Qty: 90 TABLET | Refills: 3 | OUTPATIENT
Start: 2025-05-02

## 2025-05-02 RX ORDER — LEVOCETIRIZINE DIHYDROCHLORIDE 5 MG/1
5 TABLET, FILM COATED ORAL DAILY
Qty: 90 TABLET | Refills: 1 | Status: SHIPPED | OUTPATIENT
Start: 2025-05-02

## 2025-05-02 RX ORDER — MONTELUKAST SODIUM 10 MG/1
10 TABLET ORAL NIGHTLY
Qty: 90 TABLET | Refills: 1 | Status: SHIPPED | OUTPATIENT
Start: 2025-05-02

## 2025-05-03 DIAGNOSIS — M25.541 ARTHRALGIA OF BOTH HANDS: ICD-10-CM

## 2025-05-03 DIAGNOSIS — M25.542 ARTHRALGIA OF BOTH HANDS: ICD-10-CM

## 2025-05-05 RX ORDER — MELOXICAM 15 MG/1
15 TABLET ORAL DAILY
Qty: 90 TABLET | Refills: 0 | Status: SHIPPED | OUTPATIENT
Start: 2025-05-05

## 2025-05-08 DIAGNOSIS — F32.A ANXIETY AND DEPRESSION: ICD-10-CM

## 2025-05-08 DIAGNOSIS — F41.9 ANXIETY AND DEPRESSION: ICD-10-CM

## 2025-05-08 RX ORDER — FLUOXETINE 10 MG/1
CAPSULE ORAL DAILY
Qty: 90 CAPSULE | Refills: 0 | Status: SHIPPED | OUTPATIENT
Start: 2025-05-08

## 2025-05-21 ENCOUNTER — OFFICE VISIT (OUTPATIENT)
Age: 65
End: 2025-05-21

## 2025-05-21 VITALS
RESPIRATION RATE: 20 BRPM | TEMPERATURE: 97 F | BODY MASS INDEX: 30.94 KG/M2 | HEIGHT: 63 IN | OXYGEN SATURATION: 100 % | DIASTOLIC BLOOD PRESSURE: 76 MMHG | HEART RATE: 79 BPM | SYSTOLIC BLOOD PRESSURE: 128 MMHG | WEIGHT: 174.6 LBS

## 2025-05-21 DIAGNOSIS — R05.1 ACUTE COUGH: ICD-10-CM

## 2025-05-21 DIAGNOSIS — H65.191 OTHER NON-RECURRENT ACUTE NONSUPPURATIVE OTITIS MEDIA OF RIGHT EAR: Primary | ICD-10-CM

## 2025-05-21 RX ORDER — BENZONATATE 100 MG/1
100-200 CAPSULE ORAL 3 TIMES DAILY PRN
Qty: 42 CAPSULE | Refills: 0 | Status: SHIPPED | OUTPATIENT
Start: 2025-05-21 | End: 2025-05-28

## 2025-05-21 ASSESSMENT — ENCOUNTER SYMPTOMS
TROUBLE SWALLOWING: 0
VOMITING: 0
CONSTIPATION: 0
ABDOMINAL DISTENTION: 0
SINUS PRESSURE: 0
APNEA: 0
DIARRHEA: 0
CHEST TIGHTNESS: 0
RHINORRHEA: 0
EYE DISCHARGE: 0
SHORTNESS OF BREATH: 0
ABDOMINAL PAIN: 0
EYE ITCHING: 0
EYE PAIN: 0
WHEEZING: 0
COUGH: 1
NAUSEA: 0
COLOR CHANGE: 0
SORE THROAT: 1
SINUS PAIN: 0

## 2025-05-21 NOTE — PROGRESS NOTES
[facility]ENT [facility], [facility] ([place])  [facility]ENT [facility]ARE  [address]  [facility] [address]  Dept: [phone]  Dept Fax: [phone]    Salud Funez is a 64 y.o. female who presents today for her medical conditions/complaints as noted below.      HPI:     Salud Funez presents for evaluation of a sore throat, dry cough, fever, and bilateral otalgia x 4 days. Has used Claritin OTC without relief. Temperature max: 101.5. Denies known sick contacts.     Past Medical History:   Diagnosis Date    Allergic bronchitis     has wheezing related to dust and pollen    Allergic rhinitis     Asthma     Blepharitis of both eyes     Bone spur     cervical    Cervical disc disease     Chicken pox     age 19    Chronic anxiety 2006    has been on valium after failing effexor and other medications    Coccyx disorder     history of f/x    Constipation     chronic since a teen    Degenerative disc disease at L5-S1 level     Genital warts     Dr Elaine    GERD (gastroesophageal reflux disease)     Hemorrhoids     HPV in female     Hyperthyroidism     took radioactive iodine    Post menopausal syndrome 2010    Reactive airway disease with wheezing     Scoliosis     Unspecified vitamin D deficiency        Past Surgical History:   Procedure Laterality Date    CERVICAL DISC ARTHROPLASTY      CERVICAL DISCECTOMY      COLONOSCOPY  09/16/2011    [facility]?    COLONOSCOPY N/A 11/11/2016    Dr Joshi-Tubular AP (-) dysplasia x 1, BCM x 1--3 yr recall    COLONOSCOPY N/A 11/11/2019    Dr Brian-Diverticulosis, large grade 3 internal and external hemorrhoids, suboptimal prep, 3 yr recall    COLONOSCOPY N/A 12/16/2022    Dr Brian,  lg int hem Gr 3, ext hem wo bleeding, mild diverticulosis left colon, 3 year recall    DILATION AND CURETTAGE OF UTERUS N/A 07/24/2020    DILATATION AND CURETTAGE HYSTEROSCOPY performed by Urszula Lozano MD at [facility] OR    HEMORRHOID SURGERY N/A 12/06/2019    INTERNAL AND

## 2025-05-21 NOTE — PATIENT INSTRUCTIONS
Antibiotic sent to pharmacy for ear infection. Take full course even if symptoms start to improve.   Take Tessalon Perles as needed for cough.  May take Tylenol/Ibuprofen as needed for pain and/or fever, unless otherwise instructed by primary care provider or specialist.  May continue over-the-counter Claritin.  Follow up with primary care provider if symptoms do not improve.

## 2025-06-04 ENCOUNTER — HOSPITAL ENCOUNTER (OUTPATIENT)
Dept: PHYSICAL THERAPY | Age: 65
Setting detail: THERAPIES SERIES
Discharge: HOME | End: 2025-06-04
Payer: MEDICARE

## 2025-06-04 VITALS — DIASTOLIC BLOOD PRESSURE: 90 MMHG | SYSTOLIC BLOOD PRESSURE: 135 MMHG | HEART RATE: 77 BPM | OXYGEN SATURATION: 98 %

## 2025-06-04 PROCEDURE — 97162 PT EVAL MOD COMPLEX 30 MIN: CPT

## 2025-06-04 PROCEDURE — 97110 THERAPEUTIC EXERCISES: CPT

## 2025-06-04 ASSESSMENT — PAIN DESCRIPTION - PAIN TYPE: TYPE: CHRONIC PAIN

## 2025-06-04 ASSESSMENT — PAIN SCALES - GENERAL: PAINLEVEL_OUTOF10: 7

## 2025-06-04 ASSESSMENT — PAIN DESCRIPTION - LOCATION: LOCATION: BACK;NECK

## 2025-06-04 ASSESSMENT — PAIN DESCRIPTION - ORIENTATION: ORIENTATION: RIGHT;LEFT

## 2025-06-04 ASSESSMENT — PAIN DESCRIPTION - DESCRIPTORS: DESCRIPTORS: ACHING;NUMBNESS;DISCOMFORT

## 2025-06-04 NOTE — PROGRESS NOTES
Physical Therapy: Initial Evaluation    Patient: Salud Funez (64 y.o. female)   Examination Date: 2025  Plan of Care Certification Period: 2025 to 25      :  1960 ;    Confirmed: Yes MRN: [id number]  CSN: [id number]   Insurance: Payor: [facility] MEDICARE / Plan: AET MEDICARE ADVANTAGE HMO / Product Type: Medicare /   Insurance ID: [id number] - (Medicare Managed) Secondary Insurance (if applicable): MEDICAID [place]   Referring Physician: Hakan Fang MD Laxmaiah Manchikanti   PCP: Leila Hayward APRN - CNP Visits to Date/Visits Approved:     No Show/Cancelled Appts:   /       Medical Diagnosis: Intervertebral disc disorders with radiculopathy, lumbar region [M51.16]  Intervertebral disc disorders with radiculopathy, lumbosacral region [M51.17]  Other cervical disc degeneration at C4-C5 level [M50.321]  Other cervical disc degeneration at C6-C7 level [M50.323]  Spondylosis without myelopathy or radiculopathy, cervical region [M47.812]  Spondylosis without myelopathy or radiculopathy, cervicothoracic region [M47.813]  Other chronic pain [G89.29] Lumbar pain, neck pain.  Treatment Diagnosis: Cervicalgia.     PERTINENT MEDICAL HISTORY      Self reported health status:: Fair    Medical History: Chart Reviewed: Yes   Past Medical History:   Diagnosis Date    Allergic bronchitis     has wheezing related to dust and pollen    Allergic rhinitis     Asthma     Blepharitis of both eyes     Bone spur     cervical    Cervical disc disease     Chicken pox     age 19    Chronic anxiety     has been on valium after failing effexor and other medications    Coccyx disorder     history of f/x    Constipation     chronic since a teen    Degenerative disc disease at L5-S1 level     Genital warts     Dr Elaine    GERD (gastroesophageal reflux disease)     Hemorrhoids     HPV in female     Hyperthyroidism     took radioactive iodine    Post menopausal syndrome     Reactive airway disease

## 2025-06-05 ENCOUNTER — HOSPITAL ENCOUNTER (OUTPATIENT)
Dept: PHYSICAL THERAPY | Age: 65
Setting detail: THERAPIES SERIES
Discharge: HOME | End: 2025-06-05
Payer: MEDICARE

## 2025-06-05 PROCEDURE — 97140 MANUAL THERAPY 1/> REGIONS: CPT

## 2025-06-05 PROCEDURE — 97110 THERAPEUTIC EXERCISES: CPT

## 2025-06-05 ASSESSMENT — PAIN DESCRIPTION - DESCRIPTORS: DESCRIPTORS: ACHING;NUMBNESS;DISCOMFORT

## 2025-06-05 ASSESSMENT — PAIN SCALES - GENERAL: PAINLEVEL_OUTOF10: 5

## 2025-06-05 ASSESSMENT — PAIN DESCRIPTION - PAIN TYPE: TYPE: CHRONIC PAIN

## 2025-06-05 ASSESSMENT — PAIN DESCRIPTION - ORIENTATION: ORIENTATION: RIGHT;LEFT

## 2025-06-05 ASSESSMENT — PAIN DESCRIPTION - LOCATION: LOCATION: BACK;NECK

## 2025-06-05 NOTE — PROGRESS NOTES
Physical Therapy: Daily Note   Patient: Salud Funez (64 y.o. female)   Examination Date: 2025  Plan of Care/Certification Expiration Date: 25    No data recorded   :  1960 # of Visits since SOC:   2   MRN: [id number]  CSN: [id number] Start of Care Date:   2025   Insurance: Payor: [facility] MEDICARE / Plan: AET MEDICARE ADVANTAGE HMO / Product Type: Medicare /   Insurance ID: [id number] - (Medicare Managed) Secondary Insurance (if applicable): MEDICAID [place]   Referring Physician: Hakan Fang MD Laxmaiah Manchikanti   PCP: Leila Hayward APRN - CNP Visits to Date/Visits Approved:     No Show/Cancelled Appts:   /       Medical Diagnosis: Intervertebral disc disorders with radiculopathy, lumbar region [M51.16]  Intervertebral disc disorders with radiculopathy, lumbosacral region [M51.17]  Other cervical disc degeneration at C4-C5 level [M50.321]  Other cervical disc degeneration at C6-C7 level [M50.323]  Spondylosis without myelopathy or radiculopathy, cervical region [M47.812]  Spondylosis without myelopathy or radiculopathy, cervicothoracic region [M47.813]  Other chronic pain [G89.29]    Treatment Diagnosis: Cervicalgia.        SUBJECTIVE EXAMINATION   Pain Level: Pain Screening  Patient Currently in Pain: Yes  Pain Assessment: 0-10  Pain Level: 5  Pain Type: Chronic pain  Pain Location: Back, Neck  Pain Orientation: Right, Left  Pain Descriptors: Aching, Numbness, Discomfort    Patient Comments: Subjective: Nothing new today.        TREATMENT     Exercises:      Treatment Reasoning    Exercise 1: Pulleys x 3 mins  Exercise 2: UBE, 2 1/2 forward, 2 1/2 backward, level 1-only able to complete 4 mins  Exercise 3: Sitting, CROM, 5 reps  Exercise 4: Sitting, Shrugs/post rolls, 10 reps  Exercise 5: Sitting, slouch to overcorrect, 10 reps  Exercise 6: Sitting, UT stretch, 10 hold, 3 reps  Exercise 7: Sitting, lev scap stretch, 10 hold, 3 reps  Exercise 8: Sitting, scalene stretch, 10

## 2025-06-09 ENCOUNTER — HOSPITAL ENCOUNTER (OUTPATIENT)
Dept: PHYSICAL THERAPY | Age: 65
Setting detail: THERAPIES SERIES
Discharge: HOME | End: 2025-06-09
Payer: MEDICARE

## 2025-06-09 PROCEDURE — G0283 ELEC STIM OTHER THAN WOUND: HCPCS

## 2025-06-09 PROCEDURE — 97110 THERAPEUTIC EXERCISES: CPT

## 2025-06-09 ASSESSMENT — PAIN SCALES - GENERAL: PAINLEVEL_OUTOF10: 7

## 2025-06-09 ASSESSMENT — PAIN DESCRIPTION - LOCATION: LOCATION: BACK;NECK

## 2025-06-09 ASSESSMENT — PAIN DESCRIPTION - DESCRIPTORS: DESCRIPTORS: ACHING;DISCOMFORT;SORE

## 2025-06-09 ASSESSMENT — PAIN DESCRIPTION - ORIENTATION: ORIENTATION: RIGHT;LEFT;LOWER

## 2025-06-09 ASSESSMENT — PAIN DESCRIPTION - PAIN TYPE: TYPE: CHRONIC PAIN

## 2025-06-09 NOTE — PROGRESS NOTES
Physical Therapy  Daily Treatment Note  Date: 2025  Patient Name: Salud Funez  MRN: [id number]     :   1960    Referring Physician: Hakan Fang, * Hakan Fang   PCP: Leila Hayward, APRN - CNP    Medical Diagnosis: Intervertebral disc disorders with radiculopathy, lumbar region [M51.16]  Intervertebral disc disorders with radiculopathy, lumbosacral region [M51.17]  Other cervical disc degeneration at C4-C5 level [M50.321]  Other cervical disc degeneration at C6-C7 level [M50.323]  Spondylosis without myelopathy or radiculopathy, cervical region [M47.812]  Spondylosis without myelopathy or radiculopathy, cervicothoracic region [M47.813]  Other chronic pain [G89.29]    Treatment Diagnosis: Cervicalgia.      Insurance: Payor: [facility] MEDICARE / Plan: [facility] MEDICARE ADVANTAGE HMO / Product Type: Medicare /   Insurance ID: [id number] - (Medicare Managed)    Subjective:  General  Referring Provider (secondary): Hakan Fang  PT Visit Information  PT Insurance Information: 1. [employer]et Medicare  (no auth)   2. [place] Medicaid  (no auth)  Total # of Visits Approved: 12  Total # of Visits to Date: 3  Plan of Care/Certification Expiration Date: 25  Progress Note Due Date: 25  Referring Provider (secondary): Hakan Fang  Subjective  Subjective: pt reports feeling about the same  Pain Screening  Patient Currently in Pain: Yes  Pain Assessment: 0-10  Pain Level: 7  Pain Type: Chronic pain  Pain Location: Back, Neck  Pain Orientation: Right, Left, Lower  Pain Descriptors: Aching, Discomfort, Sore       Treatment Activities:  Exercises:      Treatment Reasoning    Exercise 1: Pulleys x 3 mins  Exercise 2: UBE, 2 1/2 forward, 2 1/2 backward, level 1  Exercise 3: Sitting, CROM, 5 reps  Exercise 4: Sitting, Shrugs/post rolls, 10 reps  Exercise 5: Sitting, slouch to overcorrect, 10 reps  Exercise 6: Sitting, UT stretch, 10 hold, 3 reps  Exercise 7: Sitting, lev scap stretch, 10 hold, 3

## 2025-06-12 ENCOUNTER — HOSPITAL ENCOUNTER (OUTPATIENT)
Dept: PHYSICAL THERAPY | Age: 65
Setting detail: THERAPIES SERIES
Discharge: HOME | End: 2025-06-12
Payer: MEDICARE

## 2025-06-12 PROCEDURE — G0283 ELEC STIM OTHER THAN WOUND: HCPCS

## 2025-06-12 PROCEDURE — 97110 THERAPEUTIC EXERCISES: CPT

## 2025-06-12 ASSESSMENT — PAIN DESCRIPTION - DESCRIPTORS: DESCRIPTORS: ACHING;DULL

## 2025-06-12 ASSESSMENT — PAIN DESCRIPTION - LOCATION: LOCATION: NECK

## 2025-06-12 ASSESSMENT — PAIN SCALES - GENERAL: PAINLEVEL_OUTOF10: 8

## 2025-06-12 ASSESSMENT — PAIN DESCRIPTION - PAIN TYPE: TYPE: CHRONIC PAIN

## 2025-06-12 NOTE — PROGRESS NOTES
Physical Therapy  Daily Treatment Note  Date: 2025  Patient Name: Salud Funez  MRN: [id number]     :   1960    Subjective:   General  Additional Pertinent Hx: 64 year old female has been referred for PT evaluation and treatment for neck pain.  She relates neck pain for several months.  She has been treated by pain management.  She underwent ACDF C4-C7 in 2021 with reduction of symptoms.  She was treated with cervical brace post op, no therapy.  She has had increasing pain for the past 6 months.  X-ray of 2025: IMPRESSION:  1. Prior ACDF of C4-C7.  2. Degenerative changes as above, with moderate bilateral neural foraminal stenosis at C7-T1 and moderate right and mild left neural foraminal stenosis at C3-C4. PMH: Hyperthyroidism.  PT Visit Information  PT Insurance Information: 1. [employer] Medicare  (no auth)   2. [place] Medicaid  (no auth)  Total # of Visits Approved: 12  Total # of Visits to Date: 4  Plan of Care/Certification Expiration Date: 25  Progress Note Due Date: 25  Referring Provider (secondary): Hakan Fang  Subjective: pt reports feeling about the same    Pain Screening  Patient Currently in Pain: Yes  Pain Assessment: 0-10  Pain Level: 8  Pain Type: Chronic pain  Pain Location: Neck  Pain Descriptors: Aching;Dull     Treatment Activities:    Exercises  Exercise 1: Pulleys x 3 mins  Exercise 2: UBE, 2 1/2 forward, 2 1/2 backward, level 1  Exercise 3: Sitting, CROM, 5 reps  Exercise 4: Sitting, Shrugs/post rolls, 10 reps  Exercise 5: Sitting, slouch to overcorrect, 10 reps  Exercise 6: Sitting, UT stretch, 10 hold, 3 reps  everardo  Exercise 7: Sitting, lev scap stretch, 10 hold, 3 reps  everardo  Exercise 8: Sitting, scalene stretch, 10 hold 3 reps  everardo  Exercise 9: Supine, TA series, 10 reps  Exercise 10: Supine, lower trunk rotation, 10 reps  Exercise 11: Supine, bridges, 10 reps  Exercise 12: Supine, elbow digs, 10 reps  Exercise 13: IASTM to neck/shoulders as needed x 10

## 2025-06-16 ENCOUNTER — HOSPITAL ENCOUNTER (OUTPATIENT)
Dept: PHYSICAL THERAPY | Age: 65
Setting detail: THERAPIES SERIES
Discharge: HOME | End: 2025-06-16
Payer: MEDICARE

## 2025-06-16 PROCEDURE — 97110 THERAPEUTIC EXERCISES: CPT

## 2025-06-16 PROCEDURE — G0283 ELEC STIM OTHER THAN WOUND: HCPCS

## 2025-06-16 ASSESSMENT — PAIN SCALES - GENERAL: PAINLEVEL_OUTOF10: 7

## 2025-06-16 ASSESSMENT — PAIN DESCRIPTION - DESCRIPTORS: DESCRIPTORS: ACHING;DULL

## 2025-06-16 ASSESSMENT — PAIN DESCRIPTION - PAIN TYPE: TYPE: CHRONIC PAIN

## 2025-06-16 ASSESSMENT — PAIN DESCRIPTION - LOCATION: LOCATION: BACK;NECK

## 2025-06-16 ASSESSMENT — PAIN DESCRIPTION - ORIENTATION: ORIENTATION: RIGHT;LEFT;MID

## 2025-06-19 ENCOUNTER — HOSPITAL ENCOUNTER (OUTPATIENT)
Dept: PHYSICAL THERAPY | Age: 65
Setting detail: THERAPIES SERIES
Discharge: HOME | End: 2025-06-19
Payer: MEDICARE

## 2025-06-19 PROCEDURE — G0283 ELEC STIM OTHER THAN WOUND: HCPCS

## 2025-06-19 PROCEDURE — 97110 THERAPEUTIC EXERCISES: CPT

## 2025-06-19 ASSESSMENT — PAIN DESCRIPTION - LOCATION: LOCATION: BACK;NECK

## 2025-06-19 ASSESSMENT — PAIN DESCRIPTION - ORIENTATION: ORIENTATION: RIGHT;LEFT;MID

## 2025-06-19 ASSESSMENT — PAIN SCALES - GENERAL: PAINLEVEL_OUTOF10: 8

## 2025-06-19 ASSESSMENT — PAIN DESCRIPTION - PAIN TYPE: TYPE: CHRONIC PAIN

## 2025-06-19 ASSESSMENT — PAIN DESCRIPTION - DESCRIPTORS: DESCRIPTORS: SHARP;SHOOTING

## 2025-06-19 NOTE — PROGRESS NOTES
Physical Therapy: Daily Note   Patient: Salud Funez (64 y.o. female)   Examination Date: 2025  Plan of Care/Certification Expiration Date: 25    No data recorded   :  1960 # of Visits since SOC:   6   MRN: [id number]  CSN: [id number] Start of Care Date:   2025   Insurance: [facility] / Plan: AET MEDICARE ADVANTAGE HMO / Product Type: Medicare /   Insurance ID: [id number] - (Medicare Managed) Secondary Insurance (if applicable): MEDICAID [place]   Referring Physician: Hakan Fang MD     PCP: Leila Hayward APRN - CNP Visits to Date/Visits Approved:     No Show/Cancelled Appts:   /       Medical Diagnosis: Intervertebral disc disorders with radiculopathy, lumbar region [M51.16]  Intervertebral disc disorders with radiculopathy, lumbosacral region [M51.17]  Other cervical disc degeneration at C4-C5 level [M50.321]  Other cervical disc degeneration at C6-C7 level [M50.323]  Spondylosis without myelopathy or radiculopathy, cervical region [M47.812]  Spondylosis without myelopathy or radiculopathy, cervicothoracic region [M47.813]  Other chronic pain [G89.29]    Treatment Diagnosis: Cervicalgia.        SUBJECTIVE EXAMINATION   Pain Level: Pain Screening  Patient Currently in Pain: Yes  Pain Assessment: 0-10  Pain Level: 8  Pain Type: Chronic pain  Pain Location: Back, Neck  Pain Orientation: Right, Left, Mid  Pain Descriptors: Sharp, Shooting    Patient Comments: Subjective: I was brushing my hair and got a sharp pain in mid back.  Thats where it hurts alot.  Like a sharp pain.      TREATMENT     Exercises:      Treatment Reasoning    Exercise 1: Pulleys x 3 mins  Exercise 2: UBE, 2 1/2 forward, 2 1/2 backward, level 1  Exercise 3: Sitting, CROM, 5 reps  Exercise 4: Sitting, Shrugs/post rolls, 10 reps  Exercise 5: Sitting, slouch to overcorrect, 10 reps  Exercise 6: Sitting, UT stretch, 10 hold, 3 reps  everardo  Exercise 7: Sitting, lev scap stretch, 10 hold, 3 reps

## 2025-06-22 DIAGNOSIS — K21.9 GASTROESOPHAGEAL REFLUX DISEASE WITHOUT ESOPHAGITIS: ICD-10-CM

## 2025-06-23 ENCOUNTER — HOSPITAL ENCOUNTER (OUTPATIENT)
Dept: PHYSICAL THERAPY | Age: 65
Setting detail: THERAPIES SERIES
Discharge: HOME | End: 2025-06-23
Payer: MEDICARE

## 2025-06-23 DIAGNOSIS — K21.9 GASTROESOPHAGEAL REFLUX DISEASE WITHOUT ESOPHAGITIS: ICD-10-CM

## 2025-06-23 PROCEDURE — 97110 THERAPEUTIC EXERCISES: CPT

## 2025-06-23 PROCEDURE — 97140 MANUAL THERAPY 1/> REGIONS: CPT

## 2025-06-23 PROCEDURE — 97032 APPL MODALITY 1+ESTIM EA 15: CPT

## 2025-06-23 RX ORDER — PANTOPRAZOLE SODIUM 40 MG/1
40 TABLET, DELAYED RELEASE ORAL
Qty: 90 TABLET | Refills: 3 | Status: SHIPPED | OUTPATIENT
Start: 2025-06-23

## 2025-06-23 RX ORDER — PANTOPRAZOLE SODIUM 40 MG/1
40 TABLET, DELAYED RELEASE ORAL
Qty: 90 TABLET | Refills: 3 | OUTPATIENT
Start: 2025-06-23

## 2025-06-23 ASSESSMENT — PAIN DESCRIPTION - DESCRIPTORS: DESCRIPTORS: ACHING;SHARP;SHOOTING

## 2025-06-23 ASSESSMENT — PAIN DESCRIPTION - PAIN TYPE: TYPE: CHRONIC PAIN

## 2025-06-23 ASSESSMENT — PAIN DESCRIPTION - LOCATION: LOCATION: BACK;NECK

## 2025-06-23 ASSESSMENT — PAIN DESCRIPTION - ORIENTATION: ORIENTATION: RIGHT;LEFT;MID

## 2025-06-23 ASSESSMENT — PAIN SCALES - GENERAL: PAINLEVEL_OUTOF10: 5

## 2025-06-23 NOTE — PROGRESS NOTES
everardo  Exercise 9: Supine, TA series, 10 reps  Exercise 10: Supine, lower trunk rotation, 10 reps  Exercise 11: Supine, bridges, 10 reps  Exercise 12: Supine, elbow digs, 10 reps  Exercise 13: IASTM to neck/shoulders as needed x 10 mins-seated  Exercise 14: Sitting, IFC with MH as needed-x 20 min                                 ASSESSMENT     Assessment: Assessment: Patient did well today.  She was able to complete her exercises.  Occassional verbal cues for hold times.  Noted min tension and a few TP's left UT's > right that decreased with IASTM. Pain rated at 5/10 pre and post session  Body Structures, Functions, Activity Limitations Requiring Skilled Therapeutic Intervention: Decreased functional mobility , Decreased ADL status, Decreased ROM, Decreased endurance, Decreased strength, Decreased high-level IADLs, Decreased posture, Increased pain    Post-Treatment Pain Level:      No data recorded  Therapy Prognosis: Good       GOALS   Patient Goals : 3-4 weeks  Short Term Goals Completed by 3-4 weeks Current Status Goal Status   Patient will increase cervical ROM by 5 degrees in all planes. Sitting, IFC with MH as needed     Patient will improve ADL with less pain as demonstrated by improvement of Neck Pain Index from 18% impairment to10%.   In progress   Patient will perform HEP with min cuing.       Patient will report less pain with travel/working >2 hours.                                                           Long Term Goals Completed by 6-8 weeks Current Status Goal Status   Patient will increase cervical ROM by 10 degrees in all planes.       Patient will improve ADL with less pain as demonstrated by improvement of Neck Pain Index from 18% impairment to <10%.       Patient will perform HEP with no cuing.       Patient will report no pain with travel/working >2 hours.                                                            TREATMENT PLAN   Plan Frequency: 2 X weekly  Plan weeks: 6-8 weeks  Current

## 2025-06-23 NOTE — TELEPHONE ENCOUNTER
Salud Funez called to request a refill on her medication.      Last office visit : 1/28/2025   Next office visit : 7/28/2025     Requested Prescriptions     Pending Prescriptions Disp Refills    pantoprazole (PROTONIX) 40 MG tablet [Pharmacy Med Name: PANTOPRAZOLE SOD DR 40 MG TAB] 90 tablet 3     Sig: TAKE 1 TABLET BY MOUTH EVERY DAY BEFORE BREAKFAST            Lisa Wooten MA

## 2025-06-26 ENCOUNTER — HOSPITAL ENCOUNTER (OUTPATIENT)
Dept: PHYSICAL THERAPY | Age: 65
Setting detail: THERAPIES SERIES
Discharge: HOME | End: 2025-06-26
Payer: MEDICARE

## 2025-06-26 PROCEDURE — G0283 ELEC STIM OTHER THAN WOUND: HCPCS

## 2025-06-26 PROCEDURE — 97110 THERAPEUTIC EXERCISES: CPT

## 2025-06-26 ASSESSMENT — PAIN DESCRIPTION - ORIENTATION: ORIENTATION: RIGHT;LEFT

## 2025-06-26 ASSESSMENT — PAIN SCALES - GENERAL: PAINLEVEL_OUTOF10: 5

## 2025-06-26 ASSESSMENT — PAIN DESCRIPTION - PAIN TYPE: TYPE: CHRONIC PAIN

## 2025-06-26 ASSESSMENT — PAIN DESCRIPTION - LOCATION: LOCATION: BACK;NECK

## 2025-06-26 ASSESSMENT — PAIN DESCRIPTION - DESCRIPTORS: DESCRIPTORS: ACHING;DISCOMFORT;SORE

## 2025-06-26 NOTE — PROGRESS NOTES
Physical Therapy  Daily Treatment Note  Date: 2025  Patient Name: Salud Funez  MRN: [id number]     :   1960    Referring Physician: Hakan Fang, * Hakan Fang   PCP: Leila Hayward, APRN - CNP    Medical Diagnosis: Intervertebral disc disorders with radiculopathy, lumbar region [M51.16]  Intervertebral disc disorders with radiculopathy, lumbosacral region [M51.17]  Other cervical disc degeneration at C4-C5 level [M50.321]  Other cervical disc degeneration at C6-C7 level [M50.323]  Spondylosis without myelopathy or radiculopathy, cervical region [M47.812]  Spondylosis without myelopathy or radiculopathy, cervicothoracic region [M47.813]  Other chronic pain [G89.29]    Treatment Diagnosis: Cervicalgia.      Insurance: Payor: [facility] MEDICARE / Plan: [facility] MEDICARE ADVANTAGE HMO / Product Type: Medicare /   Insurance ID: [id number] - (Medicare Managed)    Subjective:  General  Referring Provider (secondary): Hakan Fang  PT Visit Information  PT Insurance Information: 1. [employer] Medicare  (no auth)   2. [place] Medicaid  (no auth)  Total # of Visits Approved: 12  Total # of Visits to Date: 8  Plan of Care/Certification Expiration Date: 25  Progress Note Due Date: 25  Referring Provider (secondary): Hakan Fang  Subjective  Subjective: pt reports she hurting around a 5 right now in her back  pt reports she was sore after last session  Pain Screening  Patient Currently in Pain: Yes  Pain Assessment: 0-10  Pain Level: 5  Pain Type: Chronic pain  Pain Location: Back, Neck  Pain Orientation: Right, Left  Pain Descriptors: Aching, Discomfort, Sore       Treatment Activities:  Exercises:      Treatment Reasoning    Exercise 1: Pulleys x 3 mins  Exercise 2: UBE, 2 1/2 forward, 2 1/2 backward, level 1  Exercise 3: Sitting, CROM, 5 reps  Exercise 4: Sitting, Shrugs/post rolls, 10 reps  Exercise 5: Sitting, slouch to overcorrect, 10 reps  Exercise 6: Sitting, UT stretch, 10

## 2025-06-30 ENCOUNTER — APPOINTMENT (OUTPATIENT)
Dept: PHYSICAL THERAPY | Age: 65
End: 2025-06-30
Payer: MEDICARE

## 2025-06-30 DIAGNOSIS — E03.9 ACQUIRED HYPOTHYROIDISM: ICD-10-CM

## 2025-06-30 NOTE — TELEPHONE ENCOUNTER
Salud Funez called to request a refill on her medication.      Last office visit : 1/28/2025   Next office visit : 7/28/2025     Requested Prescriptions     Pending Prescriptions Disp Refills    SYNTHROID 75 MCG tablet [Pharmacy Med Name: SYNTHROID 75 MCG TABLET] 90 tablet 0     Sig: TAKE 1 TABLET BY MOUTH EVERY DAY            Jaqui Stephenson MA

## 2025-07-01 RX ORDER — LEVOTHYROXINE SODIUM 75 MCG
75 TABLET ORAL DAILY
Qty: 90 TABLET | Refills: 0 | Status: SHIPPED | OUTPATIENT
Start: 2025-07-01

## 2025-07-03 ENCOUNTER — APPOINTMENT (OUTPATIENT)
Dept: PHYSICAL THERAPY | Age: 65
End: 2025-07-03
Payer: MEDICARE

## 2025-07-09 ENCOUNTER — OFFICE VISIT (OUTPATIENT)
Age: 65
End: 2025-07-09

## 2025-07-09 VITALS
OXYGEN SATURATION: 97 % | BODY MASS INDEX: 30.65 KG/M2 | SYSTOLIC BLOOD PRESSURE: 160 MMHG | TEMPERATURE: 97.6 F | WEIGHT: 173 LBS | HEART RATE: 70 BPM | DIASTOLIC BLOOD PRESSURE: 94 MMHG | RESPIRATION RATE: 18 BRPM

## 2025-07-09 DIAGNOSIS — L23.7 CONTACT DERMATITIS DUE TO POISON IVY: Primary | ICD-10-CM

## 2025-07-09 DIAGNOSIS — R03.0 ELEVATED BLOOD PRESSURE READING: ICD-10-CM

## 2025-07-09 RX ORDER — METHYLPREDNISOLONE 4 MG/1
TABLET ORAL
Qty: 1 KIT | Refills: 0 | Status: SHIPPED | OUTPATIENT
Start: 2025-07-09 | End: 2025-07-15

## 2025-07-09 RX ORDER — DEXAMETHASONE SODIUM PHOSPHATE 10 MG/ML
10 INJECTION, SOLUTION INTRA-ARTICULAR; INTRALESIONAL; INTRAMUSCULAR; INTRAVENOUS; SOFT TISSUE ONCE
Status: COMPLETED | OUTPATIENT
Start: 2025-07-09 | End: 2025-07-09

## 2025-07-09 RX ORDER — TRIAMCINOLONE ACETONIDE 1 MG/G
CREAM TOPICAL
Qty: 80 G | Refills: 0 | Status: SHIPPED | OUTPATIENT
Start: 2025-07-09

## 2025-07-09 RX ADMIN — DEXAMETHASONE SODIUM PHOSPHATE 10 MG: 10 INJECTION, SOLUTION INTRA-ARTICULAR; INTRALESIONAL; INTRAMUSCULAR; INTRAVENOUS; SOFT TISSUE at 12:26

## 2025-07-09 ASSESSMENT — ENCOUNTER SYMPTOMS
ABDOMINAL DISTENTION: 0
SINUS PRESSURE: 0
EYE DISCHARGE: 0
CONSTIPATION: 0
APNEA: 0
VOMITING: 0
SORE THROAT: 0
DIARRHEA: 0
EYE PAIN: 0
ABDOMINAL PAIN: 0
SINUS PAIN: 0
RHINORRHEA: 0
TROUBLE SWALLOWING: 0
COUGH: 0
CHEST TIGHTNESS: 0
WHEEZING: 0
NAUSEA: 0
COLOR CHANGE: 0
SHORTNESS OF BREATH: 0
EYE ITCHING: 0

## 2025-07-09 NOTE — PROGRESS NOTES
Medication was administered by Loni Brown at 12:27 PM.    Medication: dexamethasone  Amount: 10mg  Route: intramuscular  Site: Dorsogluteal left    Patient tolerated well.  
18   Wt 78.5 kg (173 lb)   LMP 07/11/2009   SpO2 97%   BMI 30.65 kg/m²     :Assessment   Assessment & Plan    Diagnosis Orders   1. Contact dermatitis due to poison ivy  dexAMETHasone (DECADRON) IntraMUSCular 10 mg    methylPREDNISolone (MEDROL DOSEPACK) 4 MG tablet    triamcinolone (KENALOG) 0.1 % cream      2. Elevated blood pressure reading            :Plan     Based on the patient's exam and HPI, this aligns with the diagnosis of Contact Dermatitis due to Poison Ivy. Dexamethasone injection given today in office. Medrol Dose Pack sent to pharmacy; instructed the patient to begin taking this tomorrow. Risks and benefits of steroids were discussed; patient gave informed consent. Triamcinolone cream sent to pharmacy; apply to body as prescribed. Apply OTC hydrocortisone cream to the face. Discussed medication desired effects, potential side effects, and how to administer the medication. May continue taking OTC medications such as benadryl and zyrtec as needed. Increase oral fluid intake. Follow up for worsening or persistent symptoms and go to the ER if you begin having shortness of breath, wheezing, or feeling as if your throat is closing. Patient verbalizes understanding regarding plan of care and all questions answered.       Patient provided educational materials- see patient instructions.  Discussed administration, benefit, and side effects of any prescribed or OTC medications.  All patient questions answered appropriately.  Patient voiced understanding.     Return if symptoms worsen or fail to improve.    Urgent Care evaluation today is not a substitute for PCP visit. Follow up care is the responsibility of the patient to discuss and review this Urgent Care visit.    No orders of the defined types were placed in this encounter.      No results found for this visit on 07/09/25.    Orders Placed This Encounter   Medications    dexAMETHasone (DECADRON) IntraMUSCular 10 mg    methylPREDNISolone (MEDROL

## 2025-07-09 NOTE — PATIENT INSTRUCTIONS
Rash    Dexamethasone injection today; Risk/benefits discussed with patient; she gave informed consent.     Start medrol pack tomorrow    Triamcinolone topical prescribed    Continue zyrtec and benadryl as needed    If you have difficulty breathing or a sensation of your throat swelling, go to the ER.     The patient is to follow up with PCP or return to clinic if symptoms worsen/fail to improve.

## 2025-07-11 ENCOUNTER — HOSPITAL ENCOUNTER (OUTPATIENT)
Dept: PHYSICAL THERAPY | Age: 65
Setting detail: THERAPIES SERIES
Discharge: HOME | End: 2025-07-11
Payer: MEDICARE

## 2025-07-11 PROCEDURE — 97110 THERAPEUTIC EXERCISES: CPT

## 2025-07-11 ASSESSMENT — PAIN DESCRIPTION - DESCRIPTORS: DESCRIPTORS: ACHING;TIGHTNESS;SORE

## 2025-07-11 ASSESSMENT — PAIN DESCRIPTION - LOCATION: LOCATION: BACK;SHOULDER

## 2025-07-11 ASSESSMENT — PAIN DESCRIPTION - PAIN TYPE: TYPE: CHRONIC PAIN

## 2025-07-11 ASSESSMENT — PAIN DESCRIPTION - ORIENTATION: ORIENTATION: RIGHT;LEFT

## 2025-07-11 ASSESSMENT — PAIN SCALES - GENERAL: PAINLEVEL_OUTOF10: 5

## 2025-07-11 NOTE — PROGRESS NOTES
compared to when attending.  Does note improved core strength.    OBJECTIVE EXAMINATION   Restrictions:  No data recorded No data recorded No data recorded      Cervical Assessment     AROM Cervical Spine   Cervical flexion: 50  Cervical extension: 20  Cervical right lateral: 20  Cervical left lateral: 30  Cervical right rotation: 41  Cervical left rotation: 51          ASSESSMENT     Assessment: Assessment: Reassessment today.  Pt with modest improvements in ROM but no real changes in regards to pain/headaches or other symptoms.  NPDI score is worsened as compared to initial evaluation.  HEP was issued and reviewed with pt reporting understanding.  Due to minimal progress, as well as less pain during the 2 weeks that she was not attending PT, will plan to discharge from formal PT services at this date.  Body Structures, Functions, Activity Limitations Requiring Skilled Therapeutic Intervention: Decreased functional mobility , Decreased ADL status, Decreased ROM, Decreased endurance, Decreased strength, Decreased high-level IADLs, Decreased posture, Increased pain    Post-Treatment Pain Level:      No data recorded  Therapy Prognosis: Good       GOALS   Patient Goals : 3-4 weeks  Short Term Goals Completed by 3-4 weeks Current Status Goal Status   Patient will increase cervical ROM by 5 degrees in all planes. 7/11: See strength section In progress   Patient will improve ADL with less pain as demonstrated by improvement of Neck Pain Index from 18% impairment to10%. 7/11: 24% today (was 18% at evaluation) Not Met   Patient will perform HEP with min cuing. 7/11: HEP issued today and pt reports understanding. Met   Patient will report less pain with travel/working >2 hours. 7/11: No changes since initial evaluation Not Met                                                       Long Term Goals Completed by 6-8 weeks Current Status Goal Status   Patient will increase cervical ROM by 10 degrees in all planes. 7/11: See

## 2025-07-16 ENCOUNTER — APPOINTMENT (OUTPATIENT)
Dept: PHYSICAL THERAPY | Age: 65
End: 2025-07-16
Payer: MEDICARE

## 2025-07-18 ENCOUNTER — APPOINTMENT (OUTPATIENT)
Dept: PHYSICAL THERAPY | Age: 65
End: 2025-07-18
Payer: MEDICARE

## 2025-07-28 ENCOUNTER — OFFICE VISIT (OUTPATIENT)
Dept: PRIMARY CARE CLINIC | Age: 65
End: 2025-07-28
Payer: MEDICARE

## 2025-07-28 VITALS
DIASTOLIC BLOOD PRESSURE: 74 MMHG | HEIGHT: 63 IN | HEART RATE: 66 BPM | SYSTOLIC BLOOD PRESSURE: 126 MMHG | TEMPERATURE: 98 F | BODY MASS INDEX: 30.65 KG/M2 | OXYGEN SATURATION: 98 % | WEIGHT: 173 LBS

## 2025-07-28 DIAGNOSIS — Z00.00 MEDICARE ANNUAL WELLNESS VISIT, SUBSEQUENT: Primary | ICD-10-CM

## 2025-07-28 DIAGNOSIS — Z12.31 ENCOUNTER FOR SCREENING MAMMOGRAM FOR MALIGNANT NEOPLASM OF BREAST: ICD-10-CM

## 2025-07-28 DIAGNOSIS — E03.9 ACQUIRED HYPOTHYROIDISM: ICD-10-CM

## 2025-07-28 DIAGNOSIS — M25.541 ARTHRALGIA OF BOTH HANDS: ICD-10-CM

## 2025-07-28 DIAGNOSIS — F32.A ANXIETY AND DEPRESSION: ICD-10-CM

## 2025-07-28 DIAGNOSIS — M51.362 DEGENERATION OF INTERVERTEBRAL DISC OF LUMBAR REGION WITH DISCOGENIC BACK PAIN AND LOWER EXTREMITY PAIN: ICD-10-CM

## 2025-07-28 DIAGNOSIS — F41.9 ANXIETY AND DEPRESSION: ICD-10-CM

## 2025-07-28 DIAGNOSIS — M25.542 ARTHRALGIA OF BOTH HANDS: ICD-10-CM

## 2025-07-28 DIAGNOSIS — H93.13 TINNITUS OF BOTH EARS: ICD-10-CM

## 2025-07-28 LAB
T4 FREE SERPL-MCNC: 1.35 NG/DL (ref 0.93–1.7)
TSH SERPL DL<=0.005 MIU/L-ACNC: 1.24 UIU/ML (ref 0.27–4.2)

## 2025-07-28 PROCEDURE — G0439 PPPS, SUBSEQ VISIT: HCPCS | Performed by: NURSE PRACTITIONER

## 2025-07-28 RX ORDER — MELOXICAM 15 MG/1
15 TABLET ORAL DAILY
Qty: 90 TABLET | Refills: 0 | Status: SHIPPED | OUTPATIENT
Start: 2025-07-28

## 2025-07-28 RX ORDER — FLUOXETINE 10 MG/1
10 CAPSULE ORAL DAILY
Qty: 90 CAPSULE | Refills: 1 | Status: SHIPPED | OUTPATIENT
Start: 2025-07-28

## 2025-07-28 RX ORDER — LEVOTHYROXINE SODIUM 75 UG/1
75 TABLET ORAL DAILY
Qty: 90 TABLET | Refills: 1 | Status: SHIPPED | OUTPATIENT
Start: 2025-07-28

## 2025-07-28 ASSESSMENT — PATIENT HEALTH QUESTIONNAIRE - PHQ9
4. FEELING TIRED OR HAVING LITTLE ENERGY: NOT AT ALL
7. TROUBLE CONCENTRATING ON THINGS, SUCH AS READING THE NEWSPAPER OR WATCHING TELEVISION: NOT AT ALL
9. THOUGHTS THAT YOU WOULD BE BETTER OFF DEAD, OR OF HURTING YOURSELF: NOT AT ALL
5. POOR APPETITE OR OVEREATING: SEVERAL DAYS
SUM OF ALL RESPONSES TO PHQ QUESTIONS 1-9: 2
2. FEELING DOWN, DEPRESSED OR HOPELESS: NOT AT ALL
10. IF YOU CHECKED OFF ANY PROBLEMS, HOW DIFFICULT HAVE THESE PROBLEMS MADE IT FOR YOU TO DO YOUR WORK, TAKE CARE OF THINGS AT HOME, OR GET ALONG WITH OTHER PEOPLE: NOT DIFFICULT AT ALL
6. FEELING BAD ABOUT YOURSELF - OR THAT YOU ARE A FAILURE OR HAVE LET YOURSELF OR YOUR FAMILY DOWN: NOT AT ALL
SUM OF ALL RESPONSES TO PHQ QUESTIONS 1-9: 2
3. TROUBLE FALLING OR STAYING ASLEEP: NOT AT ALL
SUM OF ALL RESPONSES TO PHQ QUESTIONS 1-9: 2
SUM OF ALL RESPONSES TO PHQ QUESTIONS 1-9: 2
8. MOVING OR SPEAKING SO SLOWLY THAT OTHER PEOPLE COULD HAVE NOTICED. OR THE OPPOSITE, BEING SO FIGETY OR RESTLESS THAT YOU HAVE BEEN MOVING AROUND A LOT MORE THAN USUAL: SEVERAL DAYS
1. LITTLE INTEREST OR PLEASURE IN DOING THINGS: NOT AT ALL

## 2025-07-28 ASSESSMENT — LIFESTYLE VARIABLES
HOW MANY STANDARD DRINKS CONTAINING ALCOHOL DO YOU HAVE ON A TYPICAL DAY: PATIENT DOES NOT DRINK
HOW OFTEN DO YOU HAVE A DRINK CONTAINING ALCOHOL: NEVER

## 2025-07-28 NOTE — PROGRESS NOTES
Medicare Annual Wellness Visit    Salud Funez is here for Medicare AWV    Assessment & Plan   Medicare annual wellness visit, subsequent  Anxiety and depression  -     FLUoxetine (PROZAC) 10 MG capsule; Take 1 capsule by mouth daily, Disp-90 capsule, R-1Normal  Arthralgia of both hands  -     meloxicam (MOBIC) 15 MG tablet; Take 1 tablet by mouth daily, Disp-90 tablet, R-0Normal  Acquired hypothyroidism  -     levothyroxine (SYNTHROID) 75 MCG tablet; Take 1 tablet by mouth daily, Disp-90 tablet, R-1Normal  Encounter for screening mammogram for malignant neoplasm of breast  -     JAME DIGITAL SCREEN W OR WO CAD BILATERAL; Future     Return in about 6 months (around 1/28/2026) for anxiety and depression, hypothyroidism, gerd.     Subjective   The following acute and/or chronic problems were also addressed today:    She has been experiencing middle back pain, which was not alleviated by physical therapy. She has an upcoming appointment with pain management next month. She is currently on hydrocodone, which provides some relief by taking the edge off the pain. She reports numbness and tingling in her lower back that radiates down to her buttocks and leg, but notes that her pain medication does not alleviate the middle back pain. She has not had any recent falls. Stretching exercises for her back have been advised, but she finds them painful. There is a specific spot on her shoulder blade that causes discomfort when touched. She has not received any trigger point injections.    She also reports tinnitus, which causes her anxiety and disrupts her sleep. She has tried various over-the-counter remedies without success. She has not tried using earplugs for sleep.    Additionally, she experiences restlessness in her legs at night, which prevents her from sleeping and forces her to get up and move around. This occurs 2 to 3 times a week. She takes her pain medication before bed due to her back pain. She has tried stretching

## (undated) DEVICE — KIT CANSTR VAC TANTEM TB FOR AQUILEX FLD CTRL SYS

## (undated) DEVICE — GOWN,PREVENTION PLUS,XLN/XL,ST,24/CS: Brand: MEDLINE

## (undated) DEVICE — CONN FLX BREATHE CIRCT

## (undated) DEVICE — SET FLD MGMT CTRL SYS INFLO TB AQUILEX

## (undated) DEVICE — TUBING, SUCTION, 1/4" X 12', STRAIGHT: Brand: MEDLINE

## (undated) DEVICE — CONMED GOLDLINE ELECTROSURGICAL HANDPIECE, HAND CONTROLLED WITH BLADE ELECTRODE, BUTTON SWITCH, SAFETY HOLSTER AND 10 FT (3 M) CABLE: Brand: CONMED GOLDLINE

## (undated) DEVICE — SOLUTION IV IRRIG POUR BRL 0.9% SODIUM CHL 2F7124

## (undated) DEVICE — TOTAL TRAY, 16FR 10ML SIL FOLEY, URN: Brand: MEDLINE

## (undated) DEVICE — VAGINAL PREP TRAY: Brand: MEDLINE INDUSTRIES, INC.

## (undated) DEVICE — TRAY PREP DRY W/ PREM GLV 2 APPL 6 SPNG 2 UNDPD 1 OVERWRAP

## (undated) DEVICE — ANTIBACTERIAL UNDYED BRAIDED (POLYGLACTIN 910), SYNTHETIC ABSORBABLE SUTURE: Brand: COATED VICRYL

## (undated) DEVICE — PK SPINE CERV ANT 30

## (undated) DEVICE — NEEDLE, QUINCKE, 18GX3.5": Brand: MEDLINE

## (undated) DEVICE — HALTR TRACT HD CERV STD UNIV

## (undated) DEVICE — EMG TUBE 8229707 NIM TRIVANTAGE 7.0MM ID: Brand: NIM TRIVANTAGE™

## (undated) DEVICE — PROXIMATE RH ROTATING HEAD SKIN STAPLERS (35 WIDE) CONTAINS 35 STAINLESS STEEL STAPLES: Brand: PROXIMATE

## (undated) DEVICE — GLV SURG DERMASSURE GRN LF PF 7.0

## (undated) DEVICE — MAJOR BSIN SETUP PK

## (undated) DEVICE — DRP C/ARMOR

## (undated) DEVICE — GLOVE SURG SZ 7 CRM LTX FREE POLYISOPRENE POLYMER BEAD ANTI

## (undated) DEVICE — SPNG DISSCT CHRRY 3/8IN STRL PK/5

## (undated) DEVICE — SURGICAL PROCEDURE PACK GYNECOLOGIC MIN LOURDES HOSP

## (undated) DEVICE — AMBU AURA-I U SIZE 3, DISPOSABLE LARYNGEAL MASK: Brand: AURA-I

## (undated) DEVICE — ENDO KIT,LOURDES HOSPITAL: Brand: MEDLINE INDUSTRIES, INC.

## (undated) DEVICE — SOUND/DILATOR (ACCESSORY FOR CLEARVIEW UTERINE MANIPULATOR): Brand: CLEARVIEW

## (undated) DEVICE — 9165 UNIVERSAL PATIENT PLATE: Brand: 3M™

## (undated) DEVICE — GLOVE SURG SZ 65 CRM LTX FREE POLYISOPRENE POLYMER BEAD ANTI

## (undated) DEVICE — TOWEL,OR,DSP,ST,BLUE,DLX,4/PK,20PK/CS: Brand: MEDLINE

## (undated) DEVICE — GLV SURG DERMASSURE GRN LF PF 8.0

## (undated) DEVICE — ELECTRD BLD EZ CLN MOD XLNG 2.75IN

## (undated) DEVICE — PACK,UNIVERSAL,NO GOWNS: Brand: MEDLINE

## (undated) DEVICE — 5.0MM BARREL STRAIGHT FLUTE

## (undated) DEVICE — SET ENDOSCP SEAL HYSTEROSCOPE RIG OUTFLO CHN DISP MYOSURE

## (undated) DEVICE — CLTH CLENS READYCLEANSE PERI CARE PK/5

## (undated) DEVICE — SUT MNCRYL 4/0 PS2 27IN UD MCP426H

## (undated) DEVICE — SET FLD MGMT OUTFLO TB DISP FOR CTRL SYS AQUILEX

## (undated) DEVICE — Y-TYPE TUR/BLADDER IRRIGATION SET, REGULATING CLAMP

## (undated) DEVICE — Z INACTIVE USE 2660664 SOLUTION IRRIG 3000ML 0.9% SOD CHL USP UROMATIC PLAS CONT

## (undated) DEVICE — PK TURNOVER RM ADV

## (undated) DEVICE — DISPOSABLE IRRIGATION CASSETTE: Brand: CORE

## (undated) DEVICE — DBD-DRAPE,LITHO,ANE,STERILE: Brand: MEDLINE

## (undated) DEVICE — LIMB HOLDER, WRIST/ANKLE: Brand: DEROYAL

## (undated) DEVICE — GLV SURG BIOGEL LTX PF 6 1/2

## (undated) DEVICE — LEGGINGS, PAIR, CLEAR, STERILE: Brand: MEDLINE

## (undated) DEVICE — GLV SURG BIOGEL LTX PF 8

## (undated) DEVICE — DEVICE TISS REM IU CANSTR VAC TB FT PEDAL DISPOSABLE MYOSURE